# Patient Record
Sex: MALE | Race: BLACK OR AFRICAN AMERICAN | Employment: OTHER | URBAN - METROPOLITAN AREA
[De-identification: names, ages, dates, MRNs, and addresses within clinical notes are randomized per-mention and may not be internally consistent; named-entity substitution may affect disease eponyms.]

---

## 2017-01-13 RX ORDER — VALSARTAN 320 MG/1
TABLET ORAL
Qty: 90 TABLET | Refills: 1 | Status: SHIPPED | OUTPATIENT
Start: 2017-01-13 | End: 2017-07-13 | Stop reason: SDUPTHER

## 2017-01-13 RX ORDER — POTASSIUM CHLORIDE 750 MG/1
TABLET, EXTENDED RELEASE ORAL
Qty: 180 TABLET | Refills: 1 | Status: SHIPPED | OUTPATIENT
Start: 2017-01-13 | End: 2017-07-12 | Stop reason: SDUPTHER

## 2017-01-27 RX ORDER — HYDROCHLOROTHIAZIDE 25 MG/1
TABLET ORAL
Qty: 135 TABLET | Refills: 1 | Status: SHIPPED | OUTPATIENT
Start: 2017-01-27 | End: 2017-07-26 | Stop reason: SDUPTHER

## 2017-03-24 DIAGNOSIS — K21.9 GERD (GASTROESOPHAGEAL REFLUX DISEASE): ICD-10-CM

## 2017-03-24 DIAGNOSIS — L41.0 PITYRIASIS LICHENOIDES: ICD-10-CM

## 2017-03-24 DIAGNOSIS — Z00.00 PREVENTATIVE HEALTH CARE: ICD-10-CM

## 2017-03-24 DIAGNOSIS — I10 HYPERTENSION: ICD-10-CM

## 2017-03-24 DIAGNOSIS — M19.90 OSTEOARTHRITIS: ICD-10-CM

## 2017-03-24 RX ORDER — ACYCLOVIR 400 MG/1
TABLET ORAL
Qty: 90 TABLET | Refills: 0 | Status: SHIPPED | OUTPATIENT
Start: 2017-03-24 | End: 2019-11-20 | Stop reason: SDUPTHER

## 2017-05-03 LAB
A/G RATIO: 1.8
ALBUMIN SERPL-MCNC: 4.3 G/DL
ALP BLD-CCNC: 46 U/L
ALT SERPL-CCNC: 21 U/L
AST SERPL-CCNC: 25 U/L
BASOPHILS ABSOLUTE: ABNORMAL /ΜL
BASOPHILS RELATIVE PERCENT: ABNORMAL %
BILIRUB SERPL-MCNC: 1 MG/DL (ref 0.1–1.4)
BILIRUBIN DIRECT: 0.29 MG/DL
BILIRUBIN, INDIRECT: NORMAL
BUN BLDV-MCNC: 18 MG/DL
CALCIUM SERPL-MCNC: 9 MG/DL
CHLORIDE BLD-SCNC: 104 MMOL/L
CHOLESTEROL, TOTAL: 170 MG/DL
CHOLESTEROL/HDL RATIO: ABNORMAL
CO2: NORMAL MMOL/L
CREAT SERPL-MCNC: 0.89 MG/DL
EOSINOPHILS ABSOLUTE: ABNORMAL /ΜL
EOSINOPHILS RELATIVE PERCENT: ABNORMAL %
GFR CALCULATED: NORMAL
GLOBULIN: 2.4
GLUCOSE BLD-MCNC: 95 MG/DL
HCT VFR BLD CALC: 40.4 % (ref 41–53)
HDLC SERPL-MCNC: 86 MG/DL (ref 35–70)
HEMOGLOBIN: 13.8 G/DL (ref 13.5–17.5)
LDL CHOLESTEROL CALCULATED: 74 MG/DL (ref 0–160)
LYMPHOCYTES ABSOLUTE: ABNORMAL /ΜL
LYMPHOCYTES RELATIVE PERCENT: ABNORMAL %
MCH RBC QN AUTO: 32.2 PG
MCHC RBC AUTO-ENTMCNC: 34.2 G/DL
MCV RBC AUTO: 94 FL
MONOCYTES ABSOLUTE: ABNORMAL /ΜL
MONOCYTES RELATIVE PERCENT: ABNORMAL %
NEUTROPHILS ABSOLUTE: ABNORMAL /ΜL
NEUTROPHILS RELATIVE PERCENT: ABNORMAL %
PLATELET # BLD: 235 K/ΜL
PMV BLD AUTO: ABNORMAL FL
POTASSIUM SERPL-SCNC: 4.2 MMOL/L
PROTEIN TOTAL: 6.7 G/DL
RBC # BLD: ABNORMAL 10^6/ΜL
SODIUM BLD-SCNC: 146 MMOL/L
TRIGL SERPL-MCNC: 50 MG/DL
VLDLC SERPL CALC-MCNC: 10 MG/DL
WBC # BLD: ABNORMAL 10^3/ML

## 2017-06-02 RX ORDER — SUCRALFATE 1 G/1
TABLET ORAL
Qty: 270 TABLET | Refills: 0 | Status: SHIPPED | OUTPATIENT
Start: 2017-06-02 | End: 2017-09-05 | Stop reason: SDUPTHER

## 2017-07-12 RX ORDER — POTASSIUM CHLORIDE 750 MG/1
TABLET, EXTENDED RELEASE ORAL
Qty: 180 TABLET | Refills: 0 | Status: SHIPPED | OUTPATIENT
Start: 2017-07-12 | End: 2017-10-11 | Stop reason: SDUPTHER

## 2017-07-13 RX ORDER — VALSARTAN 320 MG/1
TABLET ORAL
Qty: 90 TABLET | Refills: 0 | Status: SHIPPED | OUTPATIENT
Start: 2017-07-13 | End: 2017-10-15 | Stop reason: SDUPTHER

## 2017-07-26 RX ORDER — HYDROCHLOROTHIAZIDE 25 MG/1
TABLET ORAL
Qty: 135 TABLET | Refills: 0 | Status: SHIPPED | OUTPATIENT
Start: 2017-07-26 | End: 2017-10-25 | Stop reason: SDUPTHER

## 2017-08-14 RX ORDER — ESOMEPRAZOLE MAGNESIUM 40 MG/1
CAPSULE, DELAYED RELEASE ORAL
Qty: 180 CAPSULE | Refills: 0 | Status: SHIPPED | OUTPATIENT
Start: 2017-08-14 | End: 2017-11-12 | Stop reason: SDUPTHER

## 2017-09-05 RX ORDER — SUCRALFATE 1 G/1
TABLET ORAL
Qty: 270 TABLET | Refills: 0 | OUTPATIENT
Start: 2017-09-05

## 2017-09-05 RX ORDER — SUCRALFATE 1 G/1
TABLET ORAL
Qty: 90 TABLET | Refills: 0 | Status: SHIPPED | OUTPATIENT
Start: 2017-09-05 | End: 2018-10-20 | Stop reason: SDUPTHER

## 2017-09-14 ENCOUNTER — OFFICE VISIT (OUTPATIENT)
Dept: INTERNAL MEDICINE CLINIC | Age: 62
End: 2017-09-14

## 2017-09-14 VITALS
BODY MASS INDEX: 19.93 KG/M2 | WEIGHT: 139.2 LBS | DIASTOLIC BLOOD PRESSURE: 82 MMHG | HEART RATE: 69 BPM | TEMPERATURE: 98.8 F | SYSTOLIC BLOOD PRESSURE: 126 MMHG | HEIGHT: 70 IN | OXYGEN SATURATION: 99 %

## 2017-09-14 DIAGNOSIS — K21.9 GASTROESOPHAGEAL REFLUX DISEASE, ESOPHAGITIS PRESENCE NOT SPECIFIED: ICD-10-CM

## 2017-09-14 DIAGNOSIS — R19.5 HEME POSITIVE STOOL: ICD-10-CM

## 2017-09-14 DIAGNOSIS — Z76.89 ESTABLISHING CARE WITH NEW DOCTOR, ENCOUNTER FOR: Primary | ICD-10-CM

## 2017-09-14 DIAGNOSIS — I10 ESSENTIAL HYPERTENSION: ICD-10-CM

## 2017-09-14 PROCEDURE — 99213 OFFICE O/P EST LOW 20 MIN: CPT | Performed by: INTERNAL MEDICINE

## 2017-09-14 ASSESSMENT — PATIENT HEALTH QUESTIONNAIRE - PHQ9
2. FEELING DOWN, DEPRESSED OR HOPELESS: 0
SUM OF ALL RESPONSES TO PHQ QUESTIONS 1-9: 0
1. LITTLE INTEREST OR PLEASURE IN DOING THINGS: 0
SUM OF ALL RESPONSES TO PHQ9 QUESTIONS 1 & 2: 0

## 2017-10-11 RX ORDER — POTASSIUM CHLORIDE 750 MG/1
TABLET, EXTENDED RELEASE ORAL
Qty: 180 TABLET | Refills: 0 | Status: SHIPPED | OUTPATIENT
Start: 2017-10-11 | End: 2018-01-09 | Stop reason: SDUPTHER

## 2017-10-16 RX ORDER — VALSARTAN 320 MG/1
TABLET ORAL
Qty: 90 TABLET | Refills: 0 | Status: SHIPPED | OUTPATIENT
Start: 2017-10-16 | End: 2018-01-14 | Stop reason: SDUPTHER

## 2017-10-25 RX ORDER — HYDROCHLOROTHIAZIDE 25 MG/1
TABLET ORAL
Qty: 135 TABLET | Refills: 0 | Status: SHIPPED | OUTPATIENT
Start: 2017-10-25 | End: 2018-01-23 | Stop reason: SDUPTHER

## 2017-11-03 ENCOUNTER — OFFICE VISIT (OUTPATIENT)
Dept: INTERNAL MEDICINE CLINIC | Age: 62
End: 2017-11-03

## 2017-11-03 VITALS
HEART RATE: 80 BPM | HEIGHT: 69 IN | WEIGHT: 140 LBS | DIASTOLIC BLOOD PRESSURE: 76 MMHG | BODY MASS INDEX: 20.73 KG/M2 | SYSTOLIC BLOOD PRESSURE: 134 MMHG | TEMPERATURE: 98.4 F

## 2017-11-03 DIAGNOSIS — I10 ESSENTIAL HYPERTENSION: ICD-10-CM

## 2017-11-03 DIAGNOSIS — K21.9 GASTROESOPHAGEAL REFLUX DISEASE, ESOPHAGITIS PRESENCE NOT SPECIFIED: ICD-10-CM

## 2017-11-03 DIAGNOSIS — Z23 FLU VACCINE NEED: ICD-10-CM

## 2017-11-03 DIAGNOSIS — Z00.00 PHYSICAL EXAM: Primary | ICD-10-CM

## 2017-11-03 DIAGNOSIS — Z00.00 PHYSICAL EXAM: ICD-10-CM

## 2017-11-03 LAB
ANION GAP SERPL CALCULATED.3IONS-SCNC: 14 MMOL/L (ref 3–16)
BASOPHILS ABSOLUTE: 0.1 K/UL (ref 0–0.2)
BASOPHILS RELATIVE PERCENT: 2.1 %
BILIRUBIN URINE: NEGATIVE
BLOOD, URINE: ABNORMAL
BUN BLDV-MCNC: 17 MG/DL (ref 7–20)
CALCIUM SERPL-MCNC: 9 MG/DL (ref 8.3–10.6)
CHLORIDE BLD-SCNC: 101 MMOL/L (ref 99–110)
CHOLESTEROL, TOTAL: 186 MG/DL (ref 0–199)
CLARITY: CLEAR
CO2: 29 MMOL/L (ref 21–32)
COLOR: YELLOW
CREAT SERPL-MCNC: 0.8 MG/DL (ref 0.8–1.3)
EOSINOPHILS ABSOLUTE: 0.4 K/UL (ref 0–0.6)
EOSINOPHILS RELATIVE PERCENT: 8.9 %
EPITHELIAL CELLS, UA: 0 /HPF (ref 0–5)
GFR AFRICAN AMERICAN: >60
GFR NON-AFRICAN AMERICAN: >60
GLUCOSE BLD-MCNC: 86 MG/DL (ref 70–99)
GLUCOSE URINE: NEGATIVE MG/DL
HCT VFR BLD CALC: 42.4 % (ref 40.5–52.5)
HDLC SERPL-MCNC: 99 MG/DL (ref 40–60)
HEMOGLOBIN: 14.5 G/DL (ref 13.5–17.5)
HYALINE CASTS: 0 /LPF (ref 0–8)
KETONES, URINE: NEGATIVE MG/DL
LDL CHOLESTEROL CALCULATED: 79 MG/DL
LEUKOCYTE ESTERASE, URINE: NEGATIVE
LYMPHOCYTES ABSOLUTE: 1.3 K/UL (ref 1–5.1)
LYMPHOCYTES RELATIVE PERCENT: 29.4 %
MCH RBC QN AUTO: 33 PG (ref 26–34)
MCHC RBC AUTO-ENTMCNC: 34.2 G/DL (ref 31–36)
MCV RBC AUTO: 96.6 FL (ref 80–100)
MICROSCOPIC EXAMINATION: YES
MONOCYTES ABSOLUTE: 0.4 K/UL (ref 0–1.3)
MONOCYTES RELATIVE PERCENT: 8.5 %
NEUTROPHILS ABSOLUTE: 2.2 K/UL (ref 1.7–7.7)
NEUTROPHILS RELATIVE PERCENT: 51.1 %
NITRITE, URINE: NEGATIVE
PDW BLD-RTO: 13 % (ref 12.4–15.4)
PH UA: 6.5
PLATELET # BLD: 182 K/UL (ref 135–450)
PMV BLD AUTO: 9.1 FL (ref 5–10.5)
POTASSIUM SERPL-SCNC: 3.9 MMOL/L (ref 3.5–5.1)
PROTEIN UA: NEGATIVE MG/DL
RBC # BLD: 4.39 M/UL (ref 4.2–5.9)
RBC UA: 4 /HPF (ref 0–4)
SODIUM BLD-SCNC: 144 MMOL/L (ref 136–145)
SPECIFIC GRAVITY UA: 1.02
TRIGL SERPL-MCNC: 40 MG/DL (ref 0–150)
TSH SERPL DL<=0.05 MIU/L-ACNC: 0.66 UIU/ML (ref 0.27–4.2)
UROBILINOGEN, URINE: 0.2 E.U./DL
VITAMIN D 25-HYDROXY: 43.9 NG/ML
VLDLC SERPL CALC-MCNC: 8 MG/DL
WBC # BLD: 4.3 K/UL (ref 4–11)
WBC UA: 0 /HPF (ref 0–5)

## 2017-11-03 PROCEDURE — 90471 IMMUNIZATION ADMIN: CPT | Performed by: INTERNAL MEDICINE

## 2017-11-03 PROCEDURE — 81001 URINALYSIS AUTO W/SCOPE: CPT | Performed by: INTERNAL MEDICINE

## 2017-11-03 PROCEDURE — 99396 PREV VISIT EST AGE 40-64: CPT | Performed by: INTERNAL MEDICINE

## 2017-11-03 PROCEDURE — 90688 IIV4 VACCINE SPLT 0.5 ML IM: CPT | Performed by: INTERNAL MEDICINE

## 2017-11-03 NOTE — PATIENT INSTRUCTIONS
Vaccine Information Sheet, \"Influenza - Inactivated\"  given to Rachel Cooper, or parent/legal guardian of  Rachel Cooper and verbalized understanding. Patient responses:    Have you ever had a reaction to a flu vaccine? No  Are you able to eat eggs without adverse effects? Yes  Do you have any current illness? No  Have you ever had Guillian [place] Syndrome? No    Flu vaccine given per order. Please see immunization tab.

## 2017-11-03 NOTE — PROGRESS NOTES
Aviva Sim  YOB: 1955    Date of Service:  11/3/2017    Chief Complaint:   Aviva Sim is a 58 y.o. male who presents for complete physical examination. HPI: Patient is here for physical exam.  Patient denies any complaints today. Patient has history of hypertension and GERD stable. Patient requested flu vaccine today. As stool heme test was positive previously, Patient again counseled to see GI specialist.Patient says his body weight is stable no recent weight loss. Review of Systems:  Constitutional: Negative for chills, fever,  HENT: Negative for headaches,sore throat. Respiratory: Negative for cough, shortness of breath and wheezing. Cardiovascular: Negative for chest pain, claudication and leg swelling. Gastrointestinal: Negative for abdominal pain, nausea and vomiting, constipation/diarrhea. Genitourinary: Negative for dysuria and urgency. Musculoskeletal: Negative for back pain, myalgias and neck pain. Neurological: Negative for dizziness. Vitals:    11/03/17 1105   BP: 134/76   Pulse: 80   Temp: 98.4 °F (36.9 °C)     Wt Readings from Last 3 Encounters:   11/03/17 140 lb (63.5 kg)   09/14/17 139 lb 3.2 oz (63.1 kg)   12/01/16 138 lb 9.6 oz (62.9 kg)     BP Readings from Last 3 Encounters:   11/03/17 134/76   09/14/17 126/82   12/01/16 124/66       Physical Exam:  Constitutional:  Oriented to person, place, and time. appears well-nourished. HENT: Oropharynx is clear and moist.  Neck supple. Cardiovascular: Normal heart sounds and intact distal pulses. Chest: Effort normal and breath sounds normal. no wheezes. Abdominal: Soft. There is no tenderness. Musculoskeletal: Normal range of motion. no edema. Neurological: alert and oriented to person, place, and time. normal reflexes. Skin: Skin is warm. No rash noted. No erythema.          Immunization History   Administered Date(s) Administered    Influenza Vaccine, unspecified formulation 10/01/2016    Influenza Virus Vaccine 10/23/2013, 10/01/2014, 10/01/2015    Influenza Whole 2010    Pneumococcal Polysaccharide ([id number]) 2011    Tdap (Boostrix, Adacel) 2013    Zoster 2015       No Known Allergies  Current Outpatient Prescriptions   Medication Sig Dispense Refill    hydrochlorothiazide (HYDRODIURIL) 25 MG tablet TAKE ONE AND ONE-HALF TABLETS DAILY 135 tablet 0    valsartan (DIOVAN) 320 MG tablet TAKE 1 TABLET DAILY 90 tablet 0    potassium chloride (KLOR-CON M) 10 MEQ extended release tablet TAKE 2 TABLETS DAILY 180 tablet 0    sucralfate (CARAFATE) 1 GM tablet TAKE ONE TABLET THREE TIMES DAILY 90 tablet 0    esomeprazole (NEXIUM) 40 MG delayed release capsule TAKE 1 CAPSULE TWICE A  capsule 0    acyclovir (ZOVIRAX) 400 MG tablet TAKE ONE TABLET BY MOUTH THREE TIMES A DAY FOR 10 DAYS FOR HERPES SIMPLEX FLARE-UP 90 tablet 0    Loratadine (CLARITIN PO) Take  by mouth.  Multiple Vitamin (MULTIVITAMIN PO) Take 1 capsule by mouth daily.  potassium chloride SA (K-DUR;KLOR-CON) 10 MEQ tablet Take 2 tablets by mouth daily. 180 tablet 3     No current facility-administered medications for this visit. Past Medical History:   Diagnosis Date    Allergic rhinitis     GERD (gastroesophageal reflux disease)     Hemorrhoids     Herpes simplex 2012    Hypertension     Osteoarthritis     Other Acquired Deformity of Ankle and Foot     Right carotid bruit 2014 Normal carotid / vertebral US.  Right carotid bruit 2015 Carotid US normal     Thyroid nodule     colloid nodule--benign     Past Surgical History:   Procedure Laterality Date    COLONOSCOPY  8/10    ANT Rodriguez, hemorrhoidal rectal bleeding, repeat 10 years    KNEE SURGERY      right knee    THYROID SURGERY      biopsy-benign colloid nodule     Family History   Problem Relation Age of Onset    Diabetes Mother 79      from [address] Father 39 Oral cancer,    [employer] Sister      Breast    Hypertension Mother     Heart Attack  80      in hospital    Dementia Mother      Social History     Social History    Marital status:      Spouse name: N/A    Number of children: N/A    Years of education: N/A     Occupational History    Not on file. Social History Main Topics    Smoking status: Current Every Day Smoker     Packs/day: 0.50    Smokeless tobacco: Never Used      Comment: He has Chantix but has not tried it.  Alcohol use 0.0 oz/week      Comment: occassionally    Drug use: No    Sexual activity: Not on file     Other Topics Concern    Not on file     Social History Narrative    No narrative on file       Assessment/Plan:  2. Physical exam  - TSH without Reflex; Future  - Vitamin D 25 Hydroxy; Future  - Urinalysis with Microscopic; Future  - Lipid Panel; Future  - CBC Auto Differential; Future  - Basic Metabolic Panel  Hypertension and GERD  Stable  Continue the same management    Preventive Care:  Colonoscopy: Patient was given referral for Dr Monica Worthington on last visit.    Vaccine:   - INFLUENZA, QUADV, 3 YRS AND OLDER, IM, MDV, 0.5ML (Jacoby Franks)

## 2017-11-13 RX ORDER — ESOMEPRAZOLE MAGNESIUM 40 MG/1
CAPSULE, DELAYED RELEASE ORAL
Qty: 180 CAPSULE | Refills: 0 | Status: SHIPPED | OUTPATIENT
Start: 2017-11-13 | End: 2018-02-11 | Stop reason: SDUPTHER

## 2018-01-09 RX ORDER — POTASSIUM CHLORIDE 750 MG/1
TABLET, EXTENDED RELEASE ORAL
Qty: 180 TABLET | Refills: 0 | Status: SHIPPED | OUTPATIENT
Start: 2018-01-09 | End: 2018-04-09 | Stop reason: SDUPTHER

## 2018-01-16 RX ORDER — VALSARTAN 320 MG/1
TABLET ORAL
Qty: 90 TABLET | Refills: 0 | Status: SHIPPED | OUTPATIENT
Start: 2018-01-16 | End: 2018-04-14 | Stop reason: SDUPTHER

## 2018-01-23 RX ORDER — HYDROCHLOROTHIAZIDE 25 MG/1
TABLET ORAL
Qty: 135 TABLET | Refills: 1 | Status: SHIPPED | OUTPATIENT
Start: 2018-01-23 | End: 2018-07-22 | Stop reason: SDUPTHER

## 2018-02-02 ENCOUNTER — TELEPHONE (OUTPATIENT)
Dept: INTERNAL MEDICINE CLINIC | Age: 63
End: 2018-02-02

## 2018-02-02 ENCOUNTER — HOSPITAL ENCOUNTER (OUTPATIENT)
Dept: GENERAL RADIOLOGY | Facility: MEDICAL CENTER | Age: 63
Discharge: HOME | End: 2018-02-02
Attending: INTERNAL MEDICINE | Admitting: INTERNAL MEDICINE

## 2018-02-02 ENCOUNTER — OFFICE VISIT (OUTPATIENT)
Dept: INTERNAL MEDICINE CLINIC | Age: 63
End: 2018-02-02

## 2018-02-02 VITALS
BODY MASS INDEX: 20.97 KG/M2 | DIASTOLIC BLOOD PRESSURE: 70 MMHG | HEIGHT: 69 IN | HEART RATE: 76 BPM | WEIGHT: 141.6 LBS | TEMPERATURE: 98.4 F | SYSTOLIC BLOOD PRESSURE: 120 MMHG

## 2018-02-02 DIAGNOSIS — M54.50 ACUTE LEFT-SIDED LOW BACK PAIN WITHOUT SCIATICA: ICD-10-CM

## 2018-02-02 DIAGNOSIS — M54.50 ACUTE LEFT-SIDED LOW BACK PAIN WITHOUT SCIATICA: Primary | ICD-10-CM

## 2018-02-02 PROCEDURE — 99213 OFFICE O/P EST LOW 20 MIN: CPT | Performed by: INTERNAL MEDICINE

## 2018-02-02 RX ORDER — METHOCARBAMOL 500 MG/1
500 TABLET, FILM COATED ORAL 3 TIMES DAILY
Qty: 20 TABLET | Refills: 0 | Status: SHIPPED | OUTPATIENT
Start: 2018-02-02 | End: 2018-02-12

## 2018-02-02 NOTE — PROGRESS NOTES
Mahendra Pineda  YOB: 1955    Date of Service:  2/2/2018    Chief Complaint:   Mahendra Pineda is a 58 y.o. male who presents for lower back pain. HPI: patient came here for lower back pain and left hip pain. Patient says first he had left hip pain for two weeks that improved . last week patient was removing snow from driveway, then days later patient has  lower back pain . Pain associated with movement, intensity 3, sharp in nature. No h/o fall. Patient denies any swelling or redness. Patient has h/o osteoarthritis . Review of Systems:  Constitutional: Negative for chills, fever, malaise/fatigue and weight loss. HENT: Negative for headaches, ear discharge, ear pain, hearing loss, sore throat, blurry vision and double vision. Respiratory: Negative for cough,sputum production, shortness of breath and wheezing. Cardiovascular: Negative for chest pain, palpitations, orthopnea, claudication and leg swelling. Gastrointestinal: Negative for abdominal pain, nausea and vomiting, constipation, diarrhea. Genitourinary: Negative for dysuria, flank pain, frequency, hematuria and urgency. Musculoskeletal: Negative for  neck pain. Neurological: Negative for dizziness, tremors, sensory change, focal weakness, seizures, loss of consciousness . Psychiatric/Behavioral: Negative for depression and substance abuse. The patient does not have insomnia. Vitals:    02/02/18 1103   BP: 120/70   Pulse: 76   Temp: 98.4 °F (36.9 °C)     Wt Readings from Last 3 Encounters:   02/02/18 141 lb 9.6 oz (64.2 kg)   11/03/17 140 lb (63.5 kg)   09/14/17 139 lb 3.2 oz (63.1 kg)     BP Readings from Last 3 Encounters:   02/02/18 120/70   11/03/17 134/76   09/14/17 126/82     Physical Exam:  Constitutional:  Oriented to person, place, and time. appears well-nourished. HENT:Conjunctivae and EOM are normal,Mouth/Throat: Oropharynx is clear and moist.   Neck: Normal range of motion. Neck supple. for this visit. Past Medical History:   Diagnosis Date    Allergic rhinitis     GERD (gastroesophageal reflux disease)     Hemorrhoids     Herpes simplex 2012    Hypertension     Osteoarthritis     Other Acquired Deformity of Ankle and Foot     Right carotid bruit 2014 Normal carotid / vertebral US.  Right carotid bruit 2015 Carotid US normal     Thyroid nodule     colloid nodule--benign     Past Surgical History:   Procedure Laterality Date    COLONOSCOPY  8/10    M. Michael, hemorrhoidal rectal bleeding, repeat 10 years    KNEE SURGERY      right knee    THYROID SURGERY      biopsy-benign colloid nodule     Family History   Problem Relation Age of Onset    Diabetes Mother 79      from [address] Father 39     Oral cancer,    [employer] Cancer Sister      Breast    Hypertension Mother     Heart Attack  80      in hospital    Dementia Mother      Social History     Social History    Marital status:      Spouse name: N/A    Number of children: N/A    Years of education: N/A     Occupational History    Not on file. Social History Main Topics    Smoking status: Current Every Day Smoker     Packs/day: 0.50    Smokeless tobacco: Never Used      Comment: He has Chantix but has not tried it.  Alcohol use 0.0 oz/week      Comment: occassionally    Drug use: No    Sexual activity: Not on file     Other Topics Concern    Not on file     Social History Narrative    No narrative on file       Assessment/Plan:    1. Acute left-sided low back pain without sciatica  - XR HIP LEFT (2-3 VIEWS); Future  - XR LUMBAR SPINE (2-3 VIEWS);  Future  -Robaxin

## 2018-02-02 NOTE — TELEPHONE ENCOUNTER
Per Dr Fany Vazquez, due to patient's liver function medication should only be taken twice a day. Pharmacy should be called with the correct directions.   I spoke with Moiz, Mirta Gerard, and gave her the new directions

## 2018-02-02 NOTE — TELEPHONE ENCOUNTER
PT CALLING SAYING HE IS WAITING AT [facility] [phone] SAYING THEY ARE HAVING TROUBLE FILLING RX BECAUSE DIRECTIONS AND QUANTITY DO NOT [address]. HE SAYS THEY ARE TELLING HIM THEY CALLED THE OFFICE BUT IT WOULD BE QUICKER IF HE CALLED THE OFFICE.

## 2018-02-05 ENCOUNTER — TELEPHONE (OUTPATIENT)
Dept: INTERNAL MEDICINE CLINIC | Age: 63
End: 2018-02-05

## 2018-02-05 DIAGNOSIS — M25.552 PAIN OF LEFT HIP JOINT: Primary | ICD-10-CM

## 2018-02-05 DIAGNOSIS — M54.89 OTHER BACK PAIN, UNSPECIFIED CHRONICITY: Primary | ICD-10-CM

## 2018-02-05 DIAGNOSIS — M54.9 OTHER CHRONIC BACK PAIN: ICD-10-CM

## 2018-02-05 DIAGNOSIS — G89.29 OTHER CHRONIC BACK PAIN: ICD-10-CM

## 2018-02-05 NOTE — TELEPHONE ENCOUNTER
Spoke with patient. He is okay with the MRIs. These orders sent to [facility]. He also wants his physical therapy orders sent to a local PT office. He will provide us with the fax number. He wants to have therapy in place should the MRI not show any explanation for the pain.

## 2018-02-09 ENCOUNTER — TELEPHONE (OUTPATIENT)
Dept: INTERNAL MEDICINE CLINIC | Age: 63
End: 2018-02-09

## 2018-02-12 RX ORDER — ESOMEPRAZOLE MAGNESIUM 40 MG/1
CAPSULE, DELAYED RELEASE ORAL
Qty: 180 CAPSULE | Refills: 0 | Status: SHIPPED | OUTPATIENT
Start: 2018-02-12 | End: 2018-05-12 | Stop reason: SDUPTHER

## 2018-02-14 ENCOUNTER — HOSPITAL ENCOUNTER (OUTPATIENT)
Dept: MRI IMAGING | Age: 63
Discharge: HOME | End: 2018-02-14
Admitting: INTERNAL MEDICINE

## 2018-02-14 ENCOUNTER — TELEPHONE (OUTPATIENT)
Dept: INTERNAL MEDICINE CLINIC | Age: 63
End: 2018-02-14

## 2018-02-14 DIAGNOSIS — M54.9 OTHER CHRONIC BACK PAIN: ICD-10-CM

## 2018-02-14 DIAGNOSIS — M25.552 PAIN OF LEFT HIP JOINT: ICD-10-CM

## 2018-02-14 DIAGNOSIS — M25.552 PAIN IN LEFT HIP: ICD-10-CM

## 2018-02-14 DIAGNOSIS — G89.29 OTHER CHRONIC BACK PAIN: ICD-10-CM

## 2018-04-09 RX ORDER — POTASSIUM CHLORIDE 750 MG/1
TABLET, EXTENDED RELEASE ORAL
Qty: 180 TABLET | Refills: 0 | Status: SHIPPED | OUTPATIENT
Start: 2018-04-09 | End: 2018-07-08 | Stop reason: SDUPTHER

## 2018-04-16 RX ORDER — VALSARTAN 320 MG/1
TABLET ORAL
Qty: 90 TABLET | Refills: 0 | Status: SHIPPED | OUTPATIENT
Start: 2018-04-16 | End: 2018-07-15 | Stop reason: SDUPTHER

## 2018-05-04 ENCOUNTER — OFFICE VISIT (OUTPATIENT)
Dept: INTERNAL MEDICINE CLINIC | Age: 63
End: 2018-05-04

## 2018-05-04 VITALS
WEIGHT: 142.2 LBS | BODY MASS INDEX: 21 KG/M2 | DIASTOLIC BLOOD PRESSURE: 68 MMHG | TEMPERATURE: 98.6 F | HEART RATE: 76 BPM | SYSTOLIC BLOOD PRESSURE: 110 MMHG

## 2018-05-04 DIAGNOSIS — I10 ESSENTIAL HYPERTENSION: Primary | ICD-10-CM

## 2018-05-04 DIAGNOSIS — I10 ESSENTIAL HYPERTENSION: ICD-10-CM

## 2018-05-04 LAB
ANION GAP SERPL CALCULATED.3IONS-SCNC: 16 MMOL/L (ref 3–16)
BUN BLDV-MCNC: 16 MG/DL (ref 7–20)
CALCIUM SERPL-MCNC: 8.9 MG/DL (ref 8.3–10.6)
CHLORIDE BLD-SCNC: 101 MMOL/L (ref 99–110)
CO2: 25 MMOL/L (ref 21–32)
CREAT SERPL-MCNC: 0.8 MG/DL (ref 0.8–1.3)
GFR AFRICAN AMERICAN: >60
GFR NON-AFRICAN AMERICAN: >60
GLUCOSE BLD-MCNC: 98 MG/DL (ref 70–99)
POTASSIUM SERPL-SCNC: 3.8 MMOL/L (ref 3.5–5.1)
SODIUM BLD-SCNC: 142 MMOL/L (ref 136–145)

## 2018-05-04 PROCEDURE — 99213 OFFICE O/P EST LOW 20 MIN: CPT | Performed by: INTERNAL MEDICINE

## 2018-05-14 RX ORDER — ESOMEPRAZOLE MAGNESIUM 40 MG/1
CAPSULE, DELAYED RELEASE ORAL
Qty: 180 CAPSULE | Refills: 0 | Status: SHIPPED | OUTPATIENT
Start: 2018-05-14 | End: 2018-08-11 | Stop reason: SDUPTHER

## 2018-07-09 RX ORDER — POTASSIUM CHLORIDE 750 MG/1
TABLET, EXTENDED RELEASE ORAL
Qty: 180 TABLET | Refills: 0 | Status: SHIPPED | OUTPATIENT
Start: 2018-07-09 | End: 2018-10-07 | Stop reason: SDUPTHER

## 2018-07-16 RX ORDER — VALSARTAN 320 MG/1
TABLET ORAL
Qty: 90 TABLET | Refills: 1 | Status: SHIPPED | OUTPATIENT
Start: 2018-07-16 | End: 2019-12-10

## 2018-07-23 RX ORDER — HYDROCHLOROTHIAZIDE 25 MG/1
TABLET ORAL
Qty: 135 TABLET | Refills: 1 | Status: SHIPPED | OUTPATIENT
Start: 2018-07-23 | End: 2019-01-19 | Stop reason: SDUPTHER

## 2018-07-24 ENCOUNTER — TELEPHONE (OUTPATIENT)
Dept: INTERNAL MEDICINE CLINIC | Age: 63
End: 2018-07-24

## 2018-07-24 RX ORDER — OLMESARTAN MEDOXOMIL 40 MG/1
40 TABLET ORAL DAILY
Qty: 90 TABLET | Refills: 1 | Status: SHIPPED | OUTPATIENT
Start: 2018-07-24 | End: 2019-01-07 | Stop reason: SDUPTHER

## 2018-07-24 RX ORDER — OLMESARTAN MEDOXOMIL 40 MG/1
40 TABLET ORAL DAILY
Qty: 30 TABLET | Refills: 0 | Status: SHIPPED | OUTPATIENT
Start: 2018-07-24 | End: 2019-05-10 | Stop reason: SDUPTHER

## 2018-07-24 NOTE — TELEPHONE ENCOUNTER
Left message on voice mail that the medications have sent. He should stop the valsartan right away and begin the benicar.

## 2018-08-13 RX ORDER — ESOMEPRAZOLE MAGNESIUM 40 MG/1
CAPSULE, DELAYED RELEASE ORAL
Qty: 180 CAPSULE | Refills: 1 | Status: SHIPPED | OUTPATIENT
Start: 2018-08-13 | End: 2019-02-09 | Stop reason: SDUPTHER

## 2018-10-08 RX ORDER — POTASSIUM CHLORIDE 750 MG/1
TABLET, EXTENDED RELEASE ORAL
Qty: 180 TABLET | Refills: 0 | Status: SHIPPED | OUTPATIENT
Start: 2018-10-08 | End: 2019-01-06 | Stop reason: SDUPTHER

## 2018-10-22 RX ORDER — SUCRALFATE 1 G/1
TABLET ORAL
Qty: 90 TABLET | Refills: 0 | Status: SHIPPED | OUTPATIENT
Start: 2018-10-22 | End: 2018-12-17 | Stop reason: SDUPTHER

## 2018-10-23 ENCOUNTER — TELEPHONE (OUTPATIENT)
Dept: PRIMARY CARE CLINIC | Age: 63
End: 2018-10-23

## 2018-11-07 ENCOUNTER — NURSE ONLY (OUTPATIENT)
Dept: PRIMARY CARE CLINIC | Age: 63
End: 2018-11-07
Payer: COMMERCIAL

## 2018-11-07 DIAGNOSIS — Z00.00 ROUTINE GENERAL MEDICAL EXAMINATION AT A HEALTH CARE FACILITY: ICD-10-CM

## 2018-11-07 DIAGNOSIS — Z23 FLU VACCINE NEED: ICD-10-CM

## 2018-11-07 DIAGNOSIS — Z23 FLU VACCINE NEED: Primary | ICD-10-CM

## 2018-11-07 LAB
ANION GAP SERPL CALCULATED.3IONS-SCNC: 16 MMOL/L (ref 3–16)
BASOPHILS ABSOLUTE: 0.1 K/UL (ref 0–0.2)
BASOPHILS RELATIVE PERCENT: 1.4 %
BUN BLDV-MCNC: 14 MG/DL (ref 7–20)
CALCIUM SERPL-MCNC: 9 MG/DL (ref 8.3–10.6)
CHLORIDE BLD-SCNC: 101 MMOL/L (ref 99–110)
CHOLESTEROL, TOTAL: 159 MG/DL (ref 0–199)
CO2: 28 MMOL/L (ref 21–32)
CREAT SERPL-MCNC: 0.8 MG/DL (ref 0.8–1.3)
EOSINOPHILS ABSOLUTE: 0.4 K/UL (ref 0–0.6)
EOSINOPHILS RELATIVE PERCENT: 9.1 %
GFR AFRICAN AMERICAN: >60
GFR NON-AFRICAN AMERICAN: >60
GLUCOSE BLD-MCNC: 97 MG/DL (ref 70–99)
HCT VFR BLD CALC: 40.6 % (ref 40.5–52.5)
HDLC SERPL-MCNC: 77 MG/DL (ref 40–60)
HEMOGLOBIN: 14 G/DL (ref 13.5–17.5)
LDL CHOLESTEROL CALCULATED: 75 MG/DL
LYMPHOCYTES ABSOLUTE: 1.1 K/UL (ref 1–5.1)
LYMPHOCYTES RELATIVE PERCENT: 25.6 %
MCH RBC QN AUTO: 32.9 PG (ref 26–34)
MCHC RBC AUTO-ENTMCNC: 34.6 G/DL (ref 31–36)
MCV RBC AUTO: 95 FL (ref 80–100)
MONOCYTES ABSOLUTE: 0.4 K/UL (ref 0–1.3)
MONOCYTES RELATIVE PERCENT: 8.3 %
NEUTROPHILS ABSOLUTE: 2.5 K/UL (ref 1.7–7.7)
NEUTROPHILS RELATIVE PERCENT: 55.6 %
PDW BLD-RTO: 12.9 % (ref 12.4–15.4)
PLATELET # BLD: 196 K/UL (ref 135–450)
PMV BLD AUTO: 8.7 FL (ref 5–10.5)
POTASSIUM SERPL-SCNC: 3.5 MMOL/L (ref 3.5–5.1)
PROSTATE SPECIFIC ANTIGEN: 0.47 NG/ML (ref 0–4)
RBC # BLD: 4.27 M/UL (ref 4.2–5.9)
SODIUM BLD-SCNC: 145 MMOL/L (ref 136–145)
TRIGL SERPL-MCNC: 35 MG/DL (ref 0–150)
TSH SERPL DL<=0.05 MIU/L-ACNC: 0.6 UIU/ML (ref 0.27–4.2)
VITAMIN D 25-HYDROXY: 35.1 NG/ML
VLDLC SERPL CALC-MCNC: 7 MG/DL
WBC # BLD: 4.4 K/UL (ref 4–11)

## 2018-11-07 PROCEDURE — 90682 RIV4 VACC RECOMBINANT DNA IM: CPT | Performed by: INTERNAL MEDICINE

## 2018-11-07 PROCEDURE — 90471 IMMUNIZATION ADMIN: CPT | Performed by: INTERNAL MEDICINE

## 2018-11-09 ENCOUNTER — OFFICE VISIT (OUTPATIENT)
Dept: PRIMARY CARE CLINIC | Age: 63
End: 2018-11-09
Payer: COMMERCIAL

## 2018-11-09 VITALS
HEIGHT: 69 IN | BODY MASS INDEX: 20.71 KG/M2 | SYSTOLIC BLOOD PRESSURE: 116 MMHG | WEIGHT: 139.8 LBS | DIASTOLIC BLOOD PRESSURE: 74 MMHG | TEMPERATURE: 98.7 F | HEART RATE: 64 BPM

## 2018-11-09 DIAGNOSIS — Z00.00 PHYSICAL EXAM: Primary | ICD-10-CM

## 2018-11-09 PROCEDURE — 99396 PREV VISIT EST AGE 40-64: CPT | Performed by: INTERNAL MEDICINE

## 2018-11-09 ASSESSMENT — PATIENT HEALTH QUESTIONNAIRE - PHQ9
1. LITTLE INTEREST OR PLEASURE IN DOING THINGS: 0
SUM OF ALL RESPONSES TO PHQ QUESTIONS 1-9: 0
SUM OF ALL RESPONSES TO PHQ9 QUESTIONS 1 & 2: 0
2. FEELING DOWN, DEPRESSED OR HOPELESS: 0
SUM OF ALL RESPONSES TO PHQ QUESTIONS 1-9: 0

## 2018-12-18 RX ORDER — SUCRALFATE 1 G/1
TABLET ORAL
Qty: 90 TABLET | Refills: 0 | Status: SHIPPED | OUTPATIENT
Start: 2018-12-18 | End: 2019-04-12 | Stop reason: SDUPTHER

## 2019-01-07 RX ORDER — POTASSIUM CHLORIDE 750 MG/1
TABLET, EXTENDED RELEASE ORAL
Qty: 180 TABLET | Refills: 0 | Status: SHIPPED | OUTPATIENT
Start: 2019-01-07 | End: 2019-04-06 | Stop reason: SDUPTHER

## 2019-01-07 RX ORDER — OLMESARTAN MEDOXOMIL 40 MG/1
TABLET ORAL
Qty: 90 TABLET | Refills: 1 | Status: SHIPPED | OUTPATIENT
Start: 2019-01-07 | End: 2019-07-06 | Stop reason: SDUPTHER

## 2019-01-21 RX ORDER — HYDROCHLOROTHIAZIDE 25 MG/1
TABLET ORAL
Qty: 135 TABLET | Refills: 1 | Status: SHIPPED | OUTPATIENT
Start: 2019-01-21 | End: 2019-07-20 | Stop reason: SDUPTHER

## 2019-02-11 RX ORDER — ESOMEPRAZOLE MAGNESIUM 40 MG/1
CAPSULE, DELAYED RELEASE ORAL
Qty: 180 CAPSULE | Refills: 1 | Status: SHIPPED | OUTPATIENT
Start: 2019-02-11 | End: 2019-08-10 | Stop reason: SDUPTHER

## 2019-04-08 RX ORDER — POTASSIUM CHLORIDE 750 MG/1
TABLET, EXTENDED RELEASE ORAL
Qty: 180 TABLET | Refills: 1 | Status: SHIPPED | OUTPATIENT
Start: 2019-04-08 | End: 2019-10-05 | Stop reason: SDUPTHER

## 2019-04-15 RX ORDER — SUCRALFATE 1 G/1
TABLET ORAL
Qty: 90 TABLET | Refills: 0 | Status: SHIPPED | OUTPATIENT
Start: 2019-04-15 | End: 2019-05-10 | Stop reason: SDUPTHER

## 2019-05-10 ENCOUNTER — OFFICE VISIT (OUTPATIENT)
Dept: PRIMARY CARE CLINIC | Age: 64
End: 2019-05-10
Payer: COMMERCIAL

## 2019-05-10 VITALS
DIASTOLIC BLOOD PRESSURE: 76 MMHG | WEIGHT: 138.8 LBS | HEART RATE: 56 BPM | TEMPERATURE: 98.7 F | BODY MASS INDEX: 20.5 KG/M2 | SYSTOLIC BLOOD PRESSURE: 134 MMHG

## 2019-05-10 DIAGNOSIS — I10 ESSENTIAL HYPERTENSION: ICD-10-CM

## 2019-05-10 DIAGNOSIS — K21.9 GASTROESOPHAGEAL REFLUX DISEASE, ESOPHAGITIS PRESENCE NOT SPECIFIED: Primary | ICD-10-CM

## 2019-05-10 DIAGNOSIS — R19.5 HEME POSITIVE STOOL: ICD-10-CM

## 2019-05-10 PROCEDURE — 99214 OFFICE O/P EST MOD 30 MIN: CPT | Performed by: INTERNAL MEDICINE

## 2019-05-10 RX ORDER — SUCRALFATE 1 G/1
TABLET ORAL
Qty: 270 TABLET | Refills: 1 | Status: SHIPPED | OUTPATIENT
Start: 2019-05-10 | End: 2019-11-06 | Stop reason: SDUPTHER

## 2019-05-10 NOTE — PROGRESS NOTES
Arie Zamudio  YOB: 1955    Date of Service:  5/10/2019    Chief Complaint:   Arie Zamudio is a 61 y.o. male who presents for chronic health problems. HPI: patient came here for chronic health problems.          Hypertension : stable patient is on Benicar and HCT. He denies any side effects of medication. Patient is not monitoring his bp at home. He encouraged to monitor bp at home. Gastroesophageal reflux disease: stable  On Nexium and sucralfate     Heme positive stool: this time he is agreed for EGD and colonoscopy. Patient denies any active complaints today. Review of Systems:  Constitutional: Negative for chills, fever, malaise/fatigue and weight loss. HENT: Negative for headaches. , ear discharge, ear pain, hearing loss, sore throat,   blurry vision and double vision. Respiratory: Negative for cough, hemoptysis, sputum production, shortness of breath and wheezing. Cardiovascular: Negative for chest pain, palpitations, orthopnea, claudication and leg swelling. Gastrointestinal: Negative for abdominal pain, nausea and vomiting, constipation, diarrhea, heartburn, blood in stool, , melena. Genitourinary: Negative for dysuria, flank pain, frequency, hematuria and urgency. Musculoskeletal: Negative for back pain, myalgias and neck pain. Neurological: Negative for dizziness, seizure , sensory change, focal weakness,  loss of consciousness . Psychiatric/Behavioral: Negative for depression and substance abuse. The patient does not have insomnia. Vitals:    05/10/19 1325   BP: 134/76   Pulse: 56   Temp: 98.7 °F (37.1 °C)     Wt Readings from Last 3 Encounters:   05/10/19 138 lb 12.8 oz (63 kg)   11/09/18 139 lb 12.8 oz (63.4 kg)   05/04/18 142 lb 3.2 oz (64.5 kg)     BP Readings from Last 3 Encounters:   05/10/19 134/76   11/09/18 116/74   05/04/18 110/68         Physical Exam:  Constitutional:   appears well-nourished.  No distress. HENT:   Head: Normocephalic. Right Ear: External ear normal.   Left Ear: External ear normal.   Mouth/Throat: Oropharynx is clear and moist. No oropharyngeal exudate. Eyes: Conjunctivae and EOM are normal. Pupils are equal, round, and reactive to light. Right eye exhibits no discharge. Left eye exhibits no discharge. No scleral icterus. Neck: Neck supple. No JVD present. No thyromegaly present. Cardiovascular:  Normal rate, regular rhythm, normal heart sounds and intact distal pulses. Exam reveals no gallop. No murmur heard. no edema. Chest: Effort normal and breath sounds normal. no wheezes. has no rales. Abdominal: Soft, Bowel sounds are normal.  exhibits no distension and no mass. No organomegaly  There is no tenderness. There is no guarding. Musculoskeletal: Normal range of motion in all extremities. Lymphadenopathy: No cervical adenopathy. Neurological:  exhibits normal muscle tone. No sensory or motor deficit, Coordination normal, normal reflexes. Skin: Skin is warm. No rash noted. No Erythema. Psychiatric:  alert and oriented to person, place, and time. Normal mood and affect.          Immunization History   Administered Date(s) Administered    Hepatitis A 08/23/2018    Influenza Vaccine, unspecified formulation 10/01/2016    Influenza Virus Vaccine 10/23/2013, 10/01/2014, 10/01/2015    Influenza Whole 09/17/2010    Influenza, Mili Ovalle, 3 Years and older, IM (Fluzone 3 yrs and older or Afluria 5 yrs and older) 11/03/2017    Influenza, Quadv, Recombinant, IM PF (Flublok 18 yrs and older) 11/07/2018    Pneumococcal Polysaccharide ([id number]) 08/29/2011    Tdap (Boostrix, Adacel) 05/21/2013    Zoster Live (Zostavax) 01/08/2015       No Known Allergies  Current Outpatient Medications   Medication Sig Dispense Refill    sucralfate (CARAFATE) 1 GM tablet TAKE 1 TABLET THREE TIMES A  tablet 1    potassium chloride (KLOR-CON M) 10 MEQ extended release tablet TAKE 2 TABLETS DAILY 180 tablet 1    esomeprazole (NEXIUM) 40 MG delayed release capsule TAKE 1 CAPSULE TWICE A  capsule 1    hydrochlorothiazide (HYDRODIURIL) 25 MG tablet TAKE ONE AND ONE-HALF TABLETS DAILY 135 tablet 1    olmesartan (BENICAR) 40 MG tablet TAKE 1 TABLET DAILY 90 tablet 1    valsartan (DIOVAN) 320 MG tablet TAKE 1 TABLET DAILY 90 tablet 1    acyclovir (ZOVIRAX) 400 MG tablet TAKE ONE TABLET BY MOUTH THREE TIMES A DAY FOR 10 DAYS FOR HERPES SIMPLEX FLARE-UP 90 tablet 0    Loratadine (CLARITIN PO) Take  by mouth.  Multiple Vitamin (MULTIVITAMIN PO) Take 1 capsule by mouth daily. No current facility-administered medications for this visit. Past Medical History:   Diagnosis Date    Allergic rhinitis     GERD (gastroesophageal reflux disease)     Hemorrhoids     Herpes simplex 2012    Hypertension     Osteoarthritis     Other Acquired Deformity of Ankle and Foot     Right carotid bruit 2014 Normal carotid / vertebral US.  Right carotid bruit 2015 Carotid US normal     Thyroid nodule     colloid nodule--benign     Past Surgical History:   Procedure Laterality Date    COLONOSCOPY  8/10    M.  Michael, hemorrhoidal rectal bleeding, repeat 10 years    KNEE SURGERY      right knee    THYROID SURGERY      biopsy-benign colloid nodule     Family History   Problem Relation Age of Onset    Diabetes Mother 79         from [address] Father 39        Oral cancer,    [facility] Cancer Sister         Breast    Hypertension Mother     Heart Attack Unknown 80         in hospital    Dementia Mother      Social History     Socioeconomic History    Marital status:      Spouse name: Not on file    Number of children: Not on file    Years of education: Not on file    Highest education level: Not on file   Occupational History    Not on file   Social Needs    Financial resource strain: Not on file  Food insecurity:     Worry: Not on file     Inability: Not on file    Transportation needs:     Medical: Not on file     Non-medical: Not on file   Tobacco Use    Smoking status: Current Every Day Smoker     Packs/day: 0.50    Smokeless tobacco: Never Used    Tobacco comment: He has Chantix but has not tried it. Substance and Sexual Activity    Alcohol use: Yes     Alcohol/week: 0.0 oz     Comment: occassionally    Drug use: No    Sexual activity: Not on file   Lifestyle    Physical activity:     Days per week: Not on file     Minutes per session: Not on file    Stress: Not on file   Relationships    Social connections:     Talks on phone: Not on file     Gets together: Not on file     Attends L[employer]-day service: Not on file     Active member of club or organization: Not on file     Attends meetings of clubs or organizations: Not on file     Relationship status: Not on file    Intimate partner violence:     Fear of current or ex partner: Not on file     Emotionally abused: Not on file     Physically abused: Not on file     Forced sexual activity: Not on file   Other Topics Concern    Not on file   Social History Narrative    Not on file       Assessment/Plan:  1. Essential hypertension  - BASIC METABOLIC PANEL; Future  Benicar  and HCT. Monitor blood pressure regularly  Regular exercise and low salt diet        1. Gastroesophageal reflux disease, esophagitis presence not specified  Stable       3.  Heme positive stool  He needs EGD and colonoscopy   - [facility] - Geoffrey Johnson MD, Gastroenterology, [place]

## 2019-05-11 LAB
ANION GAP SERPL CALCULATED.3IONS-SCNC: 15 MMOL/L (ref 3–16)
BUN BLDV-MCNC: 12 MG/DL (ref 7–20)
CALCIUM SERPL-MCNC: 9.4 MG/DL (ref 8.3–10.6)
CHLORIDE BLD-SCNC: 103 MMOL/L (ref 99–110)
CO2: 27 MMOL/L (ref 21–32)
CREAT SERPL-MCNC: 0.8 MG/DL (ref 0.8–1.3)
GFR AFRICAN AMERICAN: >60
GFR NON-AFRICAN AMERICAN: >60
GLUCOSE BLD-MCNC: 104 MG/DL (ref 70–99)
POTASSIUM SERPL-SCNC: 4.9 MMOL/L (ref 3.5–5.1)
SODIUM BLD-SCNC: 145 MMOL/L (ref 136–145)

## 2019-07-08 RX ORDER — OLMESARTAN MEDOXOMIL 40 MG/1
TABLET ORAL
Qty: 90 TABLET | Refills: 1 | Status: SHIPPED | OUTPATIENT
Start: 2019-07-08 | End: 2020-01-06

## 2019-07-08 NOTE — TELEPHONE ENCOUNTER
Medication:   Requested Prescriptions     Pending Prescriptions Disp Refills    olmesartan (BENICAR) 40 MG tablet [Pharmacy Med Name: OLMESARTAN MEDOXOMIL TABS 40MG] 90 tablet 1     Sig: TAKE 1 TABLET DAILY       Last Filled:      Patient Phone Number: [phone] (home) [phone] (work)    Last appt: 5/10/2019   Next appt: 11/15/2019    Last BMP:   Lab Results   Component Value Date     05/10/2019    K 4.9 05/10/2019     05/10/2019    CO2 27 05/10/2019    ANIONGAP 15 05/10/2019    GLUCOSE 104 05/10/2019    BUN 12 05/10/2019    CREATININE 0.8 05/10/2019    LABGLOM >60 05/10/2019    GFRAA >60 05/10/2019    GFRAA >60 05/15/2013    CALCIUM 9.4 05/10/2019      Last CMP:   Lab Results   Component Value Date     05/10/2019    K 4.9 05/10/2019     05/10/2019    CO2 27 05/10/2019    ANIONGAP 15 05/10/2019    GLUCOSE 104 05/10/2019    BUN 12 05/10/2019    CREATININE 0.8 05/10/2019    LABGLOM >60 05/10/2019    GFRAA >60 05/10/2019    GFRAA >60 05/15/2013    PROT 6.7 05/03/2017    LABALBU 4.3 05/03/2017    AGRATIO 1.8 05/03/2017    BILITOT 1.0 05/03/2017    ALKPHOS 46 05/03/2017    ALT 21 05/03/2017    AST 25 05/03/2017    GLOB 2.4 05/03/2017     Last Renal Function:   Lab Results   Component Value Date     05/10/2019    K 4.9 05/10/2019     05/10/2019    CO2 27 05/10/2019    GLUCOSE 104 05/10/2019    BUN 12 05/10/2019    CREATININE 0.8 05/10/2019    LABALBU 4.3 05/03/2017    CALCIUM 9.4 05/10/2019    GFR >60 05/15/2013    GFRAA >60 05/10/2019    GFRAA >60 05/15/2013     Last Labs DM: No results found for: LABA1C  Last Lipid:   Lab Results   Component Value Date    CHOL 159 11/07/2018    TRIG 35 11/07/2018    HDL 77 11/07/2018    HDL 75 04/30/2012    LDLCALC 75 11/07/2018     Last PSA:   Lab Results   Component Value Date    PSA 0.47 11/07/2018     Last Thyroid:   Lab Results   Component Value Date    TSH 0.60 11/07/2018       Last OARRS: No flowsheet data found.     Preferred Pharmacy: [facility] - [address] [facility] [address]  [facility] [place] [address]  Phone: [phone] Fax: [phone]

## 2019-07-22 RX ORDER — HYDROCHLOROTHIAZIDE 25 MG/1
TABLET ORAL
Qty: 135 TABLET | Refills: 1 | Status: SHIPPED | OUTPATIENT
Start: 2019-07-22 | End: 2020-01-20

## 2019-08-12 RX ORDER — ESOMEPRAZOLE MAGNESIUM 40 MG/1
CAPSULE, DELAYED RELEASE ORAL
Qty: 180 CAPSULE | Refills: 1 | Status: SHIPPED | OUTPATIENT
Start: 2019-08-12 | End: 2020-02-10

## 2019-10-07 RX ORDER — POTASSIUM CHLORIDE 750 MG/1
TABLET, EXTENDED RELEASE ORAL
Qty: 180 TABLET | Refills: 1 | Status: SHIPPED | OUTPATIENT
Start: 2019-10-07 | End: 2020-07-21 | Stop reason: SDUPTHER

## 2019-11-06 RX ORDER — SUCRALFATE 1 G/1
TABLET ORAL
Qty: 270 TABLET | Refills: 4 | Status: SHIPPED | OUTPATIENT
Start: 2019-11-06 | End: 2020-05-06

## 2019-11-20 ENCOUNTER — OFFICE VISIT (OUTPATIENT)
Dept: PRIMARY CARE CLINIC | Age: 64
End: 2019-11-20
Payer: COMMERCIAL

## 2019-11-20 ENCOUNTER — TELEPHONE (OUTPATIENT)
Dept: PHARMACY | Age: 64
End: 2019-11-20

## 2019-11-20 VITALS
HEIGHT: 69 IN | BODY MASS INDEX: 20.44 KG/M2 | SYSTOLIC BLOOD PRESSURE: 114 MMHG | WEIGHT: 138 LBS | HEART RATE: 68 BPM | TEMPERATURE: 98.6 F | DIASTOLIC BLOOD PRESSURE: 62 MMHG

## 2019-11-20 DIAGNOSIS — F17.200 SMOKER: ICD-10-CM

## 2019-11-20 DIAGNOSIS — Z12.11 COLON CANCER SCREENING: ICD-10-CM

## 2019-11-20 DIAGNOSIS — M19.91 PRIMARY OSTEOARTHRITIS, UNSPECIFIED SITE: ICD-10-CM

## 2019-11-20 DIAGNOSIS — K21.9 GASTROESOPHAGEAL REFLUX DISEASE, ESOPHAGITIS PRESENCE NOT SPECIFIED: ICD-10-CM

## 2019-11-20 DIAGNOSIS — R19.5 HEME POSITIVE STOOL: ICD-10-CM

## 2019-11-20 DIAGNOSIS — Z23 NEED FOR PNEUMOCOCCAL VACCINE: ICD-10-CM

## 2019-11-20 DIAGNOSIS — Z00.00 ROUTINE GENERAL MEDICAL EXAMINATION AT A HEALTH CARE FACILITY: Primary | ICD-10-CM

## 2019-11-20 DIAGNOSIS — Z12.5 SCREENING PSA (PROSTATE SPECIFIC ANTIGEN): ICD-10-CM

## 2019-11-20 DIAGNOSIS — Z23 FLU VACCINE NEED: ICD-10-CM

## 2019-11-20 DIAGNOSIS — I10 ESSENTIAL HYPERTENSION: ICD-10-CM

## 2019-11-20 PROCEDURE — 90670 PCV13 VACCINE IM: CPT | Performed by: INTERNAL MEDICINE

## 2019-11-20 PROCEDURE — 90686 IIV4 VACC NO PRSV 0.5 ML IM: CPT | Performed by: INTERNAL MEDICINE

## 2019-11-20 PROCEDURE — 99396 PREV VISIT EST AGE 40-64: CPT | Performed by: INTERNAL MEDICINE

## 2019-11-20 PROCEDURE — 90471 IMMUNIZATION ADMIN: CPT | Performed by: INTERNAL MEDICINE

## 2019-11-20 PROCEDURE — 99214 OFFICE O/P EST MOD 30 MIN: CPT | Performed by: INTERNAL MEDICINE

## 2019-11-20 PROCEDURE — 90472 IMMUNIZATION ADMIN EACH ADD: CPT | Performed by: INTERNAL MEDICINE

## 2019-11-20 RX ORDER — ACYCLOVIR 400 MG/1
TABLET ORAL
Qty: 90 TABLET | Refills: 1 | Status: SHIPPED | OUTPATIENT
Start: 2019-11-20 | End: 2021-02-01 | Stop reason: SDUPTHER

## 2019-11-20 ASSESSMENT — PATIENT HEALTH QUESTIONNAIRE - PHQ9
2. FEELING DOWN, DEPRESSED OR HOPELESS: 0
1. LITTLE INTEREST OR PLEASURE IN DOING THINGS: 0
SUM OF ALL RESPONSES TO PHQ9 QUESTIONS 1 & 2: 0
SUM OF ALL RESPONSES TO PHQ QUESTIONS 1-9: 0
SUM OF ALL RESPONSES TO PHQ QUESTIONS 1-9: 0

## 2019-11-21 DIAGNOSIS — Z00.00 ROUTINE GENERAL MEDICAL EXAMINATION AT A HEALTH CARE FACILITY: ICD-10-CM

## 2019-11-21 LAB
ANION GAP SERPL CALCULATED.3IONS-SCNC: 13 MMOL/L (ref 3–16)
BASOPHILS ABSOLUTE: 0.1 K/UL (ref 0–0.2)
BASOPHILS RELATIVE PERCENT: 1 %
BUN BLDV-MCNC: 22 MG/DL (ref 7–20)
CALCIUM SERPL-MCNC: 9.1 MG/DL (ref 8.3–10.6)
CHLORIDE BLD-SCNC: 100 MMOL/L (ref 99–110)
CHOLESTEROL, TOTAL: 166 MG/DL (ref 0–199)
CO2: 26 MMOL/L (ref 21–32)
CREAT SERPL-MCNC: 0.9 MG/DL (ref 0.8–1.3)
EOSINOPHILS ABSOLUTE: 0.4 K/UL (ref 0–0.6)
EOSINOPHILS RELATIVE PERCENT: 7.8 %
GFR AFRICAN AMERICAN: >60
GFR NON-AFRICAN AMERICAN: >60
GLUCOSE BLD-MCNC: 93 MG/DL (ref 70–99)
HCT VFR BLD CALC: 40.3 % (ref 40.5–52.5)
HDLC SERPL-MCNC: 94 MG/DL (ref 40–60)
HEMOGLOBIN: 13.9 G/DL (ref 13.5–17.5)
LDL CHOLESTEROL CALCULATED: 66 MG/DL
LYMPHOCYTES ABSOLUTE: 1 K/UL (ref 1–5.1)
LYMPHOCYTES RELATIVE PERCENT: 18.3 %
MCH RBC QN AUTO: 33.5 PG (ref 26–34)
MCHC RBC AUTO-ENTMCNC: 34.4 G/DL (ref 31–36)
MCV RBC AUTO: 97.3 FL (ref 80–100)
MONOCYTES ABSOLUTE: 0.6 K/UL (ref 0–1.3)
MONOCYTES RELATIVE PERCENT: 11.2 %
NEUTROPHILS ABSOLUTE: 3.4 K/UL (ref 1.7–7.7)
NEUTROPHILS RELATIVE PERCENT: 61.7 %
PDW BLD-RTO: 12.5 % (ref 12.4–15.4)
PLATELET # BLD: 207 K/UL (ref 135–450)
PMV BLD AUTO: 8.6 FL (ref 5–10.5)
POTASSIUM SERPL-SCNC: 4.1 MMOL/L (ref 3.5–5.1)
RBC # BLD: 4.14 M/UL (ref 4.2–5.9)
SODIUM BLD-SCNC: 139 MMOL/L (ref 136–145)
TRIGL SERPL-MCNC: 32 MG/DL (ref 0–150)
TSH SERPL DL<=0.05 MIU/L-ACNC: 1.09 UIU/ML (ref 0.27–4.2)
VITAMIN D 25-HYDROXY: 38.7 NG/ML
VLDLC SERPL CALC-MCNC: 6 MG/DL
WBC # BLD: 5.5 K/UL (ref 4–11)

## 2019-12-10 ENCOUNTER — OFFICE VISIT (OUTPATIENT)
Dept: PHARMACY | Age: 64
End: 2019-12-10
Payer: COMMERCIAL

## 2019-12-10 ENCOUNTER — TELEPHONE (OUTPATIENT)
Dept: PHARMACY | Age: 64
End: 2019-12-10

## 2019-12-10 VITALS — DIASTOLIC BLOOD PRESSURE: 72 MMHG | SYSTOLIC BLOOD PRESSURE: 110 MMHG

## 2019-12-10 DIAGNOSIS — Z72.0 TOBACCO ABUSE: ICD-10-CM

## 2019-12-10 PROCEDURE — 99212 OFFICE O/P EST SF 10 MIN: CPT

## 2019-12-10 RX ORDER — VARENICLINE TARTRATE 25 MG
KIT ORAL SEE ADMIN INSTRUCTIONS
COMMUNITY
End: 2019-12-11

## 2019-12-11 ENCOUNTER — NURSE ONLY (OUTPATIENT)
Dept: PRIMARY CARE CLINIC | Age: 64
End: 2019-12-11
Payer: COMMERCIAL

## 2019-12-11 DIAGNOSIS — Z23 NEED FOR SHINGLES VACCINE: Primary | ICD-10-CM

## 2019-12-11 PROCEDURE — 90750 HZV VACC RECOMBINANT IM: CPT | Performed by: INTERNAL MEDICINE

## 2019-12-11 PROCEDURE — 90471 IMMUNIZATION ADMIN: CPT | Performed by: INTERNAL MEDICINE

## 2019-12-11 RX ORDER — VARENICLINE TARTRATE 0.5 MG/1
TABLET, FILM COATED ORAL
Qty: 57 TABLET | Refills: 0 | Status: SHIPPED | OUTPATIENT
Start: 2019-12-11 | End: 2020-01-15 | Stop reason: DRUGHIGH

## 2019-12-19 ENCOUNTER — OFFICE VISIT (OUTPATIENT)
Dept: PHARMACY | Age: 64
End: 2019-12-19
Payer: COMMERCIAL

## 2019-12-19 DIAGNOSIS — F17.209 NICOTINE DEPENDENCE WITH NICOTINE-INDUCED DISORDER, UNSPECIFIED NICOTINE PRODUCT TYPE: ICD-10-CM

## 2019-12-19 PROCEDURE — 99211 OFF/OP EST MAY X REQ PHY/QHP: CPT

## 2019-12-31 ENCOUNTER — OFFICE VISIT (OUTPATIENT)
Dept: PHARMACY | Age: 64
End: 2019-12-31
Payer: COMMERCIAL

## 2019-12-31 DIAGNOSIS — Z72.0 NICOTINE ABUSE: ICD-10-CM

## 2019-12-31 PROCEDURE — 99211 OFF/OP EST MAY X REQ PHY/QHP: CPT

## 2020-01-06 RX ORDER — OLMESARTAN MEDOXOMIL 40 MG/1
TABLET ORAL
Qty: 90 TABLET | Refills: 4 | Status: SHIPPED | OUTPATIENT
Start: 2020-01-06 | End: 2021-01-12 | Stop reason: SDUPTHER

## 2020-01-14 ENCOUNTER — OFFICE VISIT (OUTPATIENT)
Dept: PHARMACY | Age: 65
End: 2020-01-14
Payer: COMMERCIAL

## 2020-01-14 PROCEDURE — 99212 OFFICE O/P EST SF 10 MIN: CPT

## 2020-01-14 RX ORDER — VARENICLINE TARTRATE 1 MG/1
1 TABLET, FILM COATED ORAL 2 TIMES DAILY
Qty: 60 TABLET | Refills: 1 | Status: SHIPPED | OUTPATIENT
Start: 2020-01-14 | End: 2020-03-12 | Stop reason: SDUPTHER

## 2020-01-14 NOTE — PATIENT INSTRUCTIONS
Recumbent bike 3x per week  Try straw/cinnamon stick near TV. Write list of reasons for quitting.    Try quitters [place] shun or 5-423-QUIT-NOW  Continue chantix   Save money for indoor grill

## 2020-01-14 NOTE — PROGRESS NOTES
CLINICAL PHARMACY NOTE--Smoking Cessation    SUBJECTIVE/OBJECTIVE:   Gaurav Krueger is a 59 y.o. male with PMHx significant for GERD, HTN, pulmonary emphesema referred by Dr Snow Smith for smoking cessation counseling and medication management. 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 30-60 min (previously immediately after waking)  Do you smoke 20 or more cigarettes each day? No  At times when you can't smoke or haven't got any cigarettes, do you feel a craving for one? Yes, but not if not keeping busy  Is it tough for you to keep from smoking for more than a few hours? No, not if in a place where he can't smoke  When you are sick enough to stay in bed, to you still smoke? no  If yes to two or more questions, consider using NRT    Reasons for addiction: Touch and Handle, Stress-relief, Habit, Addiction, \"something to do\"  Triggers: meals, stress, smoke breaks from his work; stopped smoking while driving, with coffee, alcohol (already tried to dissociate smoking with certain activities, but picked it up with other activities)  Barriers: not confident (only 5 on a 1-10 scale); He is \"afraid\" to run out of cigarettes. Motivation for quitting: Has wanted to quit for years (in back of mind) because he doesn't like that it's in control of him (addiction), Health, family, money    12/19 update:  Did  Chantix from pharmacy ($40), but did not start. Fearful of side effects.  Has history of bad dreams 4 days followed by 1mg TWICE DAILY. Take with food. 57 tablet 0    acyclovir (ZOVIRAX) 400 MG tablet TAKE ONE TABLET BY MOUTH THREE TIMES A DAY FOR 10 DAYS FOR HERPES SIMPLEX FLARE-UP 90 tablet 1    sucralfate (CARAFATE) 1 GM tablet TAKE 1 TABLET THREE TIMES A  tablet 4    potassium chloride (KLOR-CON M) 10 MEQ extended release tablet TAKE 2 TABLETS DAILY 180 tablet 1    esomeprazole (NEXIUM) 40 MG delayed release capsule TAKE 1 CAPSULE TWICE A  capsule 1    hydrochlorothiazide (HYDRODIURIL) 25 MG tablet TAKE ONE AND ONE-HALF TABLETS DAILY 135 tablet 1    Loratadine (CLARITIN PO) Take  by mouth.  Multiple Vitamin (MULTIVITAMIN PO) Take 1 capsule by mouth daily. No current facility-administered medications for this visit. Pertinent Labs/Vitals  Scr 0.9 (11/21/19)    ASSESSMENT/PLAN:   Patient is motivated to quit smoking and has been cutting back gradually; however he is not very confident that he can quit. Pt would like to continue Chantix 1 mg BID, as he feels he is tolerating fine. Pt informed he can reduce dose if nausea persists and is bothersome. Pt instructed to stop Chantix and notify PCP for worsening anxiety, but he feels it is mild and tolerable. Will send refill today for maintenance dosing.  -Side effects: nausea (take with food), abnormal dreams, neuropsychiatric symptoms (rare)  -Flexible Quit Approach: Quit between days 8-35, continue Chantix for 12 weeks total  -Gradual Quit Approach: Gradually cut down on smoking with the goal of quitting by week 12, continue Chantix for 24 weeks. Follow healthy diet, moderate exercise, and limit alcohol  Referred pt to [address] [facility] [employer]. Referred pt to [email or web address]  Referred to [employer] Echo on their smartphone device    Goals  Recumbent bike 3x per week  Try putting straw/cinnamon stick near TV, when you would normally smoke. Write list of reasons for quitting.    Try quitters [place] shun or 1-800-QUIT-NOW  Save money for indoor grill  Keep cutting back gradually with goal of cutting back at least 50% within the first month. Discussed the following  Health risks of smoking  Physical and psychological dependence associated with smoking and potential withdrawal symptoms  Benefits to quitting: health, time, financial  Tobacco cessation quit tips  Trigger avoidance and coping with the urge to quit   Nicotine replacement and pharmacological options     Standard written patient education provided:  Medication Management Tobacco Cessation Program packet  Stopping smoking: Care Instructions  Deciding About Using Medicines To Quit Smoking  Learning About Benefits From Quitting Smoking  10 Things You Should Know About Quitting Smoking    Next appt:  1/30    Pt verbalized understanding of the above information and denied further questions or concerns. The total time of the visit was 45 min.     Judith Acevedo, PharmD, [facility]  Medication Management Clinic   [place] [address] Ph: [phone]  [facility] Ph: [phone]  1/14/2020 1:29 PM

## 2020-01-20 RX ORDER — HYDROCHLOROTHIAZIDE 25 MG/1
TABLET ORAL
Qty: 135 TABLET | Refills: 4 | Status: SHIPPED | OUTPATIENT
Start: 2020-01-20 | End: 2020-10-05

## 2020-01-30 ENCOUNTER — OFFICE VISIT (OUTPATIENT)
Dept: PHARMACY | Age: 65
End: 2020-01-30
Payer: COMMERCIAL

## 2020-01-30 VITALS — SYSTOLIC BLOOD PRESSURE: 113 MMHG | DIASTOLIC BLOOD PRESSURE: 75 MMHG

## 2020-01-30 PROCEDURE — 99211 OFF/OP EST MAY X REQ PHY/QHP: CPT

## 2020-01-30 NOTE — PROGRESS NOTES
several years ago and is afraid it may cause vivid dreams. Has been able to cut back slightly on his own. Was able to eliminate the 1st cigarette of the day, is now working on the 2nd. Started playing pool to fill downtime. He has been able to avoid buying more cigarettes until completely out of current pack  It has been a busy week, and he hasn't been able to focus on quitting as much as he would like. He plans to go to [place] for [place]. 12/31 update:  Smoking \"a little less,\" but unable to give exact amount. Started Chantix yesterday. Has tolerated 2 doses so far. No side effects. Has been able to delay the 2nd cigarette more by switching AM routine (drink coffe before breakfast)  Sucking on mints  Used recumbent bike a couple times, but no routine yet. Moved cigarettes to basement (out of pocket) to make it more inconvenient. Has kept a list of things to do on his breaks. 1/14 update:  Continues Chantix 1 mg BID. Takes with food, but has been difficult because he does not eat at regular times during the day. Experiencing some nausea/ \"queesy\" stomach. Also having occasional slight anxiety, but nothing major and is able to tolerate. Denies depression/thoughts of suicide or harming himself. Smoking less, but is not counting exact number because he thinks it will make him think about smoking more. He knows he is not buying cigarettes as often. He does not buy cigarettes until he is completely out of them. He needs a new RX for chantix. He is trying to stay busy and avoid long breaks which triggers him to smoke. He has been able to postpone the cigarette many times. 1/30 update:   Continues Chantix 1 mg BID. Takes with food. Experiencing some nausea/ \"queesy\" stomach, but tolerable. Also having occasional slight anxiety, but nothing major and is able to tolerate. Denies depression/thoughts of suicide or harming himself. Rides his recumbent bike 2x per week for 15 minutes.   Smoking less, but is still not counting the exact number because he thinks it will make him think about smoking more. He is buying cigarettes less often and has placed them in his basement so has to go downstairs to get them when he wants to smoke. He does not like going down stairs. He has been able to walk back upstairs from the basement without bringing a cigarette with him. He made a list of reasons for quitting and placed it by his coat so he sees it when he is going outside to smoke. He does not smoke inside house or in car. He started using hard candies in place of cigarettes while he is preparing his meals. He often smokes right before and right after meals. He always brushes his teeth after meals. Patient feels that drinking coffee throughout the day would help keep him busy during breaks, and coffee has not been a trigger for him lately. Pharmacy: [place], [place]    Current Medication and Allergy List Reviewed and Updated:  Current Outpatient Medications   Medication Sig Dispense Refill    hydrochlorothiazide (HYDRODIURIL) 25 MG tablet TAKE ONE AND ONE-HALF TABLETS DAILY 135 tablet 4    varenicline (CHANTIX) 1 MG tablet Take 1 tablet by mouth 2 times daily 60 tablet 1    olmesartan (BENICAR) 40 MG tablet TAKE 1 TABLET DAILY 90 tablet 4    acyclovir (ZOVIRAX) 400 MG tablet TAKE ONE TABLET BY MOUTH THREE TIMES A DAY FOR 10 DAYS FOR HERPES SIMPLEX FLARE-UP 90 tablet 1    sucralfate (CARAFATE) 1 GM tablet TAKE 1 TABLET THREE TIMES A  tablet 4    potassium chloride (KLOR-CON M) 10 MEQ extended release tablet TAKE 2 TABLETS DAILY 180 tablet 1    esomeprazole (NEXIUM) 40 MG delayed release capsule TAKE 1 CAPSULE TWICE A  capsule 1    Loratadine (CLARITIN PO) Take  by mouth.  Multiple Vitamin (MULTIVITAMIN PO) Take 1 capsule by mouth daily. No current facility-administered medications for this visit.       Pertinent Labs/Vitals  Scr 0.9 (11/21/19)    ASSESSMENT/PLAN: Patient is motivated to quit smoking and has been cutting back gradually; however he is not very confident that he can quit. Pt would like to continue Chantix 1 mg BID, as he feels he is tolerating fine. Pt informed he can reduce dose if nausea persists and is intolerable. Pt instructed to stop Chantix and notify PCP for worsening anxiety, but he feels it is mild and tolerable.   -Side effects: nausea (take with food), abnormal dreams, neuropsychiatric symptoms (rare)  -Flexible Quit Approach: Quit between days 8-35, continue Chantix for 12 weeks total  -Gradual Quit Approach: Gradually cut down on smoking with the goal of quitting by week 12, continue Chantix for 24 weeks. Follow healthy diet, moderate exercise, and limit alcohol  Referred pt to [address] [facility] [employer]. Referred pt to [email or web address]  Referred to [employer] Echo on their smartphone device    Goals  Drink more of coffee (some decaf) throughout the day in between activities and meals. Keep your list of reasons for quitting with a pack of cigarettes. Starting on Sunday, only buy 2 packs of cigarettes each week. Try quitters [place] echo or 1-800-QUIT-NOW  Save money for indoor grill  Keep cutting back gradually with goal of cutting back at least 50% within the first month.     Discussed the following  Health risks of smoking  Physical and psychological dependence associated with smoking and potential withdrawal symptoms  Benefits to quitting: health, time, financial  Tobacco cessation quit tips  Trigger avoidance and coping with the urge to quit   Nicotine replacement and pharmacological options     Standard written patient education provided:  Medication Management Tobacco Cessation Program packet  Stopping smoking: Care Instructions  Deciding About Using Medicines To Quit Smoking  Learning About Benefits From Quitting Smoking  10 Things You Should Know About Quitting Smoking    Next appt:  2/11    Pt verbalized understanding of the above information and denied further questions or concerns. The total time of the visit was 45 min.     Blanca Hernandez PharmD  PGY1 Pharmacy Resident   Wireless: [phone]  1/30/2020 3:56 PM

## 2020-02-10 RX ORDER — ESOMEPRAZOLE MAGNESIUM 40 MG/1
CAPSULE, DELAYED RELEASE ORAL
Qty: 180 CAPSULE | Refills: 1 | Status: SHIPPED | OUTPATIENT
Start: 2020-02-10 | End: 2020-05-06

## 2020-02-27 ENCOUNTER — OFFICE VISIT (OUTPATIENT)
Dept: PHARMACY | Age: 65
End: 2020-02-27
Payer: COMMERCIAL

## 2020-02-27 PROCEDURE — 99211 OFF/OP EST MAY X REQ PHY/QHP: CPT

## 2020-02-27 NOTE — PROGRESS NOTES
CLINICAL PHARMACY NOTE--Smoking Cessation    SUBJECTIVE/OBJECTIVE:   Breanne Stark is a 59 y.o. male with PMHx significant for GERD, HTN, pulmonary emphesema referred by Dr Keven Mclaughlin for smoking cessation counseling and medication management. 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 30-60 min (previously immediately after waking)  Do you smoke 20 or more cigarettes each day? No  At times when you can't smoke or haven't got any cigarettes, do you feel a craving for one? Yes, but not if not keeping busy  Is it tough for you to keep from smoking for more than a few hours? No, not if in a place where he can't smoke  When you are sick enough to stay in bed, to you still smoke? no  If yes to two or more questions, consider using NRT    Reasons for addiction: Touch and Handle, Stress-relief, Habit, Addiction, \"something to do\"  Triggers: meals, stress, smoke breaks from his work; stopped smoking while driving, with coffee, alcohol (already tried to dissociate smoking with certain activities, but picked it up with other activities)  Barriers: not confident (only 5 on a 1-10 scale); He is \"afraid\" to run out of cigarettes. Motivation for quitting: Has wanted to quit for years (in back of mind) because he doesn't like that it's in control of him (addiction), Health, family, money    12/19 update:  Did  Chantix from pharmacy ($40), but did not start. Fearful of side effects.  Has history of bad dreams several years ago and is afraid it may cause vivid dreams. Has been able to cut back slightly on his own. Was able to eliminate the 1st cigarette of the day, is now working on the 2nd. Started playing pool to fill downtime. He has been able to avoid buying more cigarettes until completely out of current pack  It has been a busy week, and he hasn't been able to focus on quitting as much as he would like. He plans to go to [place] for [place]. 12/31 update:  Smoking \"a little less,\" but unable to give exact amount. Started Chantix yesterday. Has tolerated 2 doses so far. No side effects. Has been able to delay the 2nd cigarette more by switching AM routine (drink coffe before breakfast)  Sucking on mints  Used recumbent bike a couple times, but no routine yet. Moved cigarettes to basement (out of pocket) to make it more inconvenient. Has kept a list of things to do on his breaks. 1/14 update:  Continues Chantix 1 mg BID. Takes with food, but has been difficult because he does not eat at regular times during the day. Experiencing some nausea/ \"queesy\" stomach. Also having occasional slight anxiety, but nothing major and is able to tolerate. Denies depression/thoughts of suicide or harming himself. Smoking less, but is not counting exact number because he thinks it will make him think about smoking more. He knows he is not buying cigarettes as often. He does not buy cigarettes until he is completely out of them. He needs a new RX for chantix. He is trying to stay busy and avoid long breaks which triggers him to smoke. He has been able to postpone the cigarette many times. 1/30 update:   Continues Chantix 1 mg BID. Takes with food. Experiencing some nausea/ \"queesy\" stomach, but tolerable. Also having occasional slight anxiety, but nothing major and is able to tolerate. Denies depression/thoughts of suicide or harming himself. Rides his recumbent bike 2x per week for 15 minutes.   Smoking (KLOR-CON M) 10 MEQ extended release tablet TAKE 2 TABLETS DAILY 180 tablet 1    Loratadine (CLARITIN PO) Take  by mouth.  Multiple Vitamin (MULTIVITAMIN PO) Take 1 capsule by mouth daily. No current facility-administered medications for this visit. Pertinent Labs/Vitals  Scr 0.9 (11/21/19)    ASSESSMENT/PLAN:   Patient is motivated to quit smoking and has been cutting back gradually; however he is not very confident that he can quit. Pt would like to continue Chantix 1 mg BID, as he feels he is tolerating fine. Pt informed he can reduce dose if nausea persists and is intolerable. Pt instructed to stop Chantix and notify PCP for worsening anxiety, but he feels it is mild and tolerable.   -Side effects: nausea (take with food), abnormal dreams, neuropsychiatric symptoms (rare)  -Gradual Quit Approach: Gradually cut down on smoking with the goal of quitting by wk 12, continue x 24 wks. Follow healthy diet, moderate exercise, and limit alcohol  Referred pt to [email or web address]  Referred to [employer] Echo on their smartphone device    Goals  Increase exercise time/duration. Try listening to music while exercising. Keep your list of reasons for quitting with a pack of cigarettes. Try quitters [place] echo or 1-800-QUIT-NOW  Save money for indoor grill  Keep cutting back gradually with goal of no more than 1 pack every 5 days. Will eventually reduce to 1 pack per week.     Discussed the following  Health risks of smoking  Physical and psychological dependence associated with smoking and potential withdrawal symptoms  Benefits to quitting: health, time, financial  Tobacco cessation quit tips  Trigger avoidance and coping with the urge to quit   Nicotine replacement and pharmacological options     Standard written patient education provided:  Medication Management Tobacco Cessation Program packet  Stopping smoking: Care Instructions  Deciding About Using Medicines To Quit

## 2020-03-10 ENCOUNTER — NURSE ONLY (OUTPATIENT)
Dept: PRIMARY CARE CLINIC | Age: 65
End: 2020-03-10

## 2020-03-12 ENCOUNTER — OFFICE VISIT (OUTPATIENT)
Dept: PHARMACY | Age: 65
End: 2020-03-12
Payer: COMMERCIAL

## 2020-03-12 PROCEDURE — 99211 OFF/OP EST MAY X REQ PHY/QHP: CPT

## 2020-03-12 RX ORDER — VARENICLINE TARTRATE 1 MG/1
1 TABLET, FILM COATED ORAL 2 TIMES DAILY
Qty: 60 TABLET | Refills: 1 | Status: SHIPPED | OUTPATIENT
Start: 2020-03-12 | End: 2020-06-03 | Stop reason: DRUGHIGH

## 2020-03-12 NOTE — PATIENT INSTRUCTIONS
Increase exercise to 3x per week for at least 20 minutes  Suck on [employer] TV or drink more tea   No more than 1 pack every 5 days.   Primary motivation for quitting: HEALTH   Set aside money for grill when you achieve a goal

## 2020-03-12 NOTE — PROGRESS NOTES
CLINICAL PHARMACY NOTE--Smoking Cessation    SUBJECTIVE/OBJECTIVE:   Satish Patel is a 59 y.o. male with PMHx significant for GERD, HTN, pulmonary emphesema referred by Dr César Coronel for smoking cessation counseling and medication management. 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 30-60 min (previously immediately after waking)  Do you smoke 20 or more cigarettes each day? No  At times when you can't smoke or haven't got any cigarettes, do you feel a craving for one? Yes, but not if not keeping busy  Is it tough for you to keep from smoking for more than a few hours? No, not if in a place where he can't smoke  When you are sick enough to stay in bed, to you still smoke? no  If yes to two or more questions, consider using NRT    Reasons for addiction: Touch and Handle, Stress-relief, Habit, Addiction, \"something to do\"  Triggers: meals, stress, smoke breaks from his work; stopped smoking while driving, with coffee, alcohol (already tried to dissociate smoking with certain activities, but picked it up with other activities)  Barriers: not confident (only 5 on a 1-10 scale); He is \"afraid\" to run out of cigarettes. Motivation for quitting: Has wanted to quit for years (in back of mind) because he doesn't like that it's in control of him (addiction), Health, family, money    12/19 update:  Did  Chantix from pharmacy ($40), but did not start. Fearful of side effects.  Has history of bad dreams with the urge to quit   Nicotine replacement and pharmacological options     Standard written patient education provided:  Medication Management Tobacco Cessation Program packet  Stopping smoking: Care Instructions  Deciding About Using Medicines To Quit Smoking  Learning About Benefits From Quitting Smoking  10 Things You Should Know About Quitting Smoking    Next appt:  3/26    Pt verbalized understanding of the above information and denied further questions or concerns. The total time of the visit was 45 min.     Farhad Bland PharmD, [facility]  Medication Management Clinic   [place] [address] Ph: [phone]  [facility] Ph: [phone]  3/12/2020 1:20 PM

## 2020-03-26 ENCOUNTER — SCHEDULED TELEPHONE ENCOUNTER (OUTPATIENT)
Dept: PHARMACY | Age: 65
End: 2020-03-26
Payer: COMMERCIAL

## 2020-03-26 PROCEDURE — 99211 OFF/OP EST MAY X REQ PHY/QHP: CPT

## 2020-03-26 NOTE — TELEPHONE ENCOUNTER
several years ago and is afraid it may cause vivid dreams. Has been able to cut back slightly on his own. Was able to eliminate the 1st cigarette of the day, is now working on the 2nd. Started playing pool to fill downtime. He has been able to avoid buying more cigarettes until completely out of current pack  It has been a busy week, and he hasn't been able to focus on quitting as much as he would like. He plans to go to [place] for [place]. 12/31 update:  Smoking \"a little less,\" but unable to give exact amount. Started Chantix yesterday. Has tolerated 2 doses so far. No side effects. Has been able to delay the 2nd cigarette more by switching AM routine (drink coffe before breakfast)  Sucking on mints  Used recumbent bike a couple times, but no routine yet. Moved cigarettes to basement (out of pocket) to make it more inconvenient. Has kept a list of things to do on his breaks. 1/14 update:  Continues Chantix 1 mg BID. Takes with food, but has been difficult because he does not eat at regular times during the day. Experiencing some nausea/ \"queesy\" stomach. Also having occasional slight anxiety, but nothing major and is able to tolerate. Denies depression/thoughts of suicide or harming himself. Smoking less, but is not counting exact number because he thinks it will make him think about smoking more. He knows he is not buying cigarettes as often. He does not buy cigarettes until he is completely out of them. He needs a new RX for chantix. He is trying to stay busy and avoid long breaks which triggers him to smoke. He has been able to postpone the cigarette many times. 1/30 update:   Continues Chantix 1 mg BID. Takes with food. Experiencing some nausea/ \"queesy\" stomach, but tolerable. Also having occasional slight anxiety, but nothing major and is able to tolerate. Denies depression/thoughts of suicide or harming himself. Rides his recumbent bike 2x per week for 15 minutes.   Smoking group class in future. Goals  Keep track of exercise on the same calendar that you keep track of smoking. Increase exercise to 3x per week for at least 20 minutes. Continue core exercises. Keep your list of reasons for quitting with a pack of cigarettes. Highlight primary motivation for quitting: HEALTH   Keep cutting back gradually with goal of no more than 4 cig/day  Suck on [employer] or drink more tea   Set aside money for grill when you achieve a goal    Next appt:  4/9    Pt verbalized understanding of the above information and denied further questions or concerns. The total time of the visit was 45 min.     Kwasi Zarco, PharmD, [facility]  Medication Management Clinic   [address] Ph: [phone]  Elena Johnson Ph: [phone]  3/26/2020 1:37 PM     CLINICAL PHARMACY CONSULT: MED RECONCILIATION/REVIEW ADDENDUM    For Pharmacy Admin Tracking Only    PHSO: No  Total # of Interventions Recommended: 1  - Decreased Dose #: 1 - taper chantix  Total Interventions Accepted: 1  Time Spent (min): 45    Lula WongD

## 2020-03-31 ENCOUNTER — SCHEDULED TELEPHONE ENCOUNTER (OUTPATIENT)
Dept: PHARMACY | Age: 65
End: 2020-03-31

## 2020-03-31 PROCEDURE — 99211 OFF/OP EST MAY X REQ PHY/QHP: CPT

## 2020-03-31 NOTE — TELEPHONE ENCOUNTER
CLINICAL PHARMACY NOTE--Smoking Cessation    SUBJECTIVE/OBJECTIVE:   Abraham Concepcion is a 59 y.o. male with PMHx significant for GERD, HTN, pulmonary emphesema referred by Dr Rick Azul for smoking cessation counseling and medication management. 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 30-60 min (previously immediately after waking)  Do you smoke 20 or more cigarettes each day? No  At times when you can't smoke or haven't got any cigarettes, do you feel a craving for one? Yes, but not if not keeping busy  Is it tough for you to keep from smoking for more than a few hours? No, not if in a place where he can't smoke  When you are sick enough to stay in bed, to you still smoke? no  If yes to two or more questions, consider using NRT    Reasons for addiction: Touch and Handle, Stress-relief, Habit, Addiction, \"something to do\"  Triggers: meals, stress, smoke breaks from his work; stopped smoking while driving, with coffee, alcohol (already tried to dissociate smoking with certain activities, but picked it up with other activities)  Barriers: not confident (only 5 on a 1-10 scale); He is \"afraid\" to run out of cigarettes. Motivation for quitting: Has wanted to quit for years (in back of mind) because he doesn't like that it's in control of him (addiction), Health, family, money    12/19 update:  Did  Chantix from pharmacy ($40), but did not start. Fearful of side effects.  Has history of bad dreams several years ago and is afraid it may cause vivid dreams. Has been able to cut back slightly on his own. Was able to eliminate the 1st cigarette of the day, is now working on the 2nd. Started playing pool to fill downtime. He has been able to avoid buying more cigarettes until completely out of current pack  It has been a busy week, and he hasn't been able to focus on quitting as much as he would like. He plans to go to [place] for [place]. 12/31 update:  Smoking \"a little less,\" but unable to give exact amount. Started Chantix yesterday. Has tolerated 2 doses so far. No side effects. Has been able to delay the 2nd cigarette more by switching AM routine (drink coffe before breakfast)  Sucking on mints  Used recumbent bike a couple times, but no routine yet. Moved cigarettes to basement (out of pocket) to make it more inconvenient. Has kept a list of things to do on his breaks. 1/14 update:  Continues Chantix 1 mg BID. Takes with food, but has been difficult because he does not eat at regular times during the day. Experiencing some nausea/ \"queesy\" stomach. Also having occasional slight anxiety, but nothing major and is able to tolerate. Denies depression/thoughts of suicide or harming himself. Smoking less, but is not counting exact number because he thinks it will make him think about smoking more. He knows he is not buying cigarettes as often. He does not buy cigarettes until he is completely out of them. He needs a new RX for chantix. He is trying to stay busy and avoid long breaks which triggers him to smoke. He has been able to postpone the cigarette many times. 1/30 update:   Continues Chantix 1 mg BID. Takes with food. Experiencing some nausea/ \"queesy\" stomach, but tolerable. Also having occasional slight anxiety, but nothing major and is able to tolerate. Denies depression/thoughts of suicide or harming himself. Rides his recumbent bike 2x per week for 15 minutes.   Smoking less, but is still not counting the exact number because he thinks it will make him think about smoking more. He is buying cigarettes less often and has placed them in his basement so has to go downstairs to get them when he wants to smoke. He does not like going down stairs. He has been able to walk back upstairs from the basement without bringing a cigarette with him. He made a list of reasons for quitting and placed it by his coat so he sees it when he is going outside to smoke. He does not smoke inside house or in car. He started using hard candies in place of cigarettes while he is preparing his meals. He often smokes right before and right after meals. He always brushes his teeth after meals. Patient feels that drinking coffee throughout the day would help keep him busy during breaks, and coffee has not been a trigger for him lately. 2/27  Has been able to reduce to 2 packs per week, but it was really hard. He surprised himself. Pt started drinking tea instead of coffee. Has been able to postpoine 1st cig at least a couple hours. Plans to avoid [facility], where he buys his cigs. Feels \"accomplished\" after exercising, but has only been able to exercise 2x per week. 3/12  Pt does not feel he did well with the goals we set at last visit 2/2 less focus and recent stressors (DDS, shingles vax, coronavirus). He didn't exercise as much. He increased to 1 pack in 3 days, then cut back down to 1 pack in 3.5 days. His cravings are less frequent, but not necessarily less strong. The recent [address] outbreak is worrying him because he is high risk. This is more motivation for him to quit. Pt requests a Chantix refill. Tried 1-800-QUIT-NOW- [place] it was not for him. 3/26  Pt reports having \"Up and downs. \" Coronavirus has really concerned him. He stopped Chantix for 2 days due to depression surrounding the situation, but realized that not listening to the news helped him much more.  Pt feels his mood is stable

## 2020-04-02 ENCOUNTER — SCHEDULED TELEPHONE ENCOUNTER (OUTPATIENT)
Dept: PHARMACY | Age: 65
End: 2020-04-02
Payer: COMMERCIAL

## 2020-04-02 PROCEDURE — 99211 OFF/OP EST MAY X REQ PHY/QHP: CPT

## 2020-04-02 NOTE — TELEPHONE ENCOUNTER
less, but is still not counting the exact number because he thinks it will make him think about smoking more. He is buying cigarettes less often and has placed them in his basement so has to go downstairs to get them when he wants to smoke. He does not like going down stairs. He has been able to walk back upstairs from the basement without bringing a cigarette with him. He made a list of reasons for quitting and placed it by his coat so he sees it when he is going outside to smoke. He does not smoke inside house or in car. He started using hard candies in place of cigarettes while he is preparing his meals. He often smokes right before and right after meals. He always brushes his teeth after meals. Patient feels that drinking coffee throughout the day would help keep him busy during breaks, and coffee has not been a trigger for him lately. 2/27  Has been able to reduce to 2 packs per week, but it was really hard. He surprised himself. Pt started drinking tea instead of coffee. Has been able to postpoine 1st cig at least a couple hours. Plans to avoid [facility], where he buys his cigs. Feels \"accomplished\" after exercising, but has only been able to exercise 2x per week. 3/12  Pt does not feel he did well with the goals we set at last visit 2/2 less focus and recent stressors (DDS, shingles vax, coronavirus). He didn't exercise as much. He increased to 1 pack in 3 days, then cut back down to 1 pack in 3.5 days. His cravings are less frequent, but not necessarily less strong. The recent [address] outbreak is worrying him because he is high risk. This is more motivation for him to quit. Pt requests a Chantix refill. Tried 1-800-QUIT-NOW- [place] it was not for him. 3/26  Pt reports having \"Up and downs. \" Coronavirus has really concerned him. He stopped Chantix for 2 days due to depression surrounding the situation, but realized that not listening to the news helped him much more.  Pt feels his mood is stable

## 2020-04-07 ENCOUNTER — SCHEDULED TELEPHONE ENCOUNTER (OUTPATIENT)
Dept: PHARMACY | Age: 65
End: 2020-04-07
Payer: COMMERCIAL

## 2020-04-07 PROCEDURE — 99211 OFF/OP EST MAY X REQ PHY/QHP: CPT

## 2020-04-13 ENCOUNTER — SCHEDULED TELEPHONE ENCOUNTER (OUTPATIENT)
Dept: PHARMACY | Age: 65
End: 2020-04-13
Payer: COMMERCIAL

## 2020-04-13 PROCEDURE — 99211 OFF/OP EST MAY X REQ PHY/QHP: CPT

## 2020-04-13 NOTE — TELEPHONE ENCOUNTER
less, but is still not counting the exact number because he thinks it will make him think about smoking more. He is buying cigarettes less often and has placed them in his basement so has to go downstairs to get them when he wants to smoke. He does not like going down stairs. He has been able to walk back upstairs from the basement without bringing a cigarette with him. He made a list of reasons for quitting and placed it by his coat so he sees it when he is going outside to smoke. He does not smoke inside house or in car. He started using hard candies in place of cigarettes while he is preparing his meals. He often smokes right before and right after meals. He always brushes his teeth after meals. Patient feels that drinking coffee throughout the day would help keep him busy during breaks, and coffee has not been a trigger for him lately. 2/27  Has been able to reduce to 2 packs per week, but it was really hard. He surprised himself. Pt started drinking tea instead of coffee. Has been able to postpoine 1st cig at least a couple hours. Plans to avoid [facility], where he buys his cigs. Feels \"accomplished\" after exercising, but has only been able to exercise 2x per week. 3/12  Pt does not feel he did well with the goals we set at last visit 2/2 less focus and recent stressors (DDS, shingles vax, coronavirus). He didn't exercise as much. He increased to 1 pack in 3 days, then cut back down to 1 pack in 3.5 days. His cravings are less frequent, but not necessarily less strong. The recent [address] outbreak is worrying him because he is high risk. This is more motivation for him to quit. Pt requests a Chantix refill. Tried 1-800-QUIT-NOW- [place] it was not for him. 3/26  Pt reports having \"Up and downs. \" Coronavirus has really concerned him. He stopped Chantix for 2 days due to depression surrounding the situation, but realized that not listening to the news helped him much more.  Pt feels his mood is stable

## 2020-04-15 ENCOUNTER — SCHEDULED TELEPHONE ENCOUNTER (OUTPATIENT)
Dept: PHARMACY | Age: 65
End: 2020-04-15
Payer: COMMERCIAL

## 2020-04-15 PROCEDURE — 99211 OFF/OP EST MAY X REQ PHY/QHP: CPT

## 2020-04-15 RX ORDER — AMOXICILLIN 500 MG/1
500 CAPSULE ORAL 3 TIMES DAILY
COMMUNITY
End: 2020-04-22 | Stop reason: ALTCHOICE

## 2020-04-15 NOTE — TELEPHONE ENCOUNTER
CLINICAL PHARMACY NOTE--Smoking Cessation    SUBJECTIVE/OBJECTIVE:   Porfirio Dawkins is a 59 y.o. male with PMHx significant for GERD, HTN, pulmonary emphesema referred by Dr Riri Portillo for smoking cessation counseling and medication management. 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 30-60 min (previously immediately after waking)  Do you smoke 20 or more cigarettes each day? No  At times when you can't smoke or haven't got any cigarettes, do you feel a craving for one? Yes, but not if not keeping busy  Is it tough for you to keep from smoking for more than a few hours? No, not if in a place where he can't smoke  When you are sick enough to stay in bed, to you still smoke? no  If yes to two or more questions, consider using NRT    Reasons for addiction: Touch and Handle, Stress-relief, Habit, Addiction, \"something to do\"  Triggers: meals, stress, smoke breaks from his work; stopped smoking while driving, with coffee, alcohol (already tried to dissociate smoking with certain activities, but picked it up with other activities)  Barriers: not confident (only 5 on a 1-10 scale); He is \"afraid\" to run out of cigarettes. Motivation for quitting: Has wanted to quit for years (in back of mind) because he doesn't like that it's in control of him (addiction), Health, family, money    12/19 update:  Did  Chantix from pharmacy ($40), but did not start. Fearful of side effects.  Has history of bad dreams tablet 1    sucralfate (CARAFATE) 1 GM tablet TAKE 1 TABLET THREE TIMES A  tablet 4    potassium chloride (KLOR-CON M) 10 MEQ extended release tablet TAKE 2 TABLETS DAILY 180 tablet 1    Loratadine (CLARITIN PO) Take  by mouth.  Multiple Vitamin (MULTIVITAMIN PO) Take 1 capsule by mouth daily. No current facility-administered medications for this visit. Pertinent Labs/Vitals  Scr 0.9 (11/21/19)    ASSESSMENT/PLAN:   Patient has been tobacco free x 19 days! He still has occasional cravings, but is able to overcome them. Continue Chantix to 1 mg BID. Pt reminded to stop medication for any worsening mood or intolerable nausea. We discussed reducing dose to 0.5 mg BID, but pt prefers to continue full dose for now. May consider group class in future. For withdrawal sxs: Discussed good sleep hygiene, herbal tea before bed, drinking more water, suck on sugar-free candy. Goals  Write list of health benefits  To do list  Deep clean home and coat (waiting)  Rewards: grill, new sheet music, star trek. Exercise 6 d per week: Core, walk, bike-- Keep track of exercise on the same calendar that you keep track of smoking. Remember a lapse is not a relapse. Don't let one cigarette turn back into old habits. Suck on [employer], sugar free candy, or drink more tea     Next appt:  4/22- pt requested earlier call because he wasn't able to speak long due to DDS appt. Pt verbalized understanding of the above information and denied further questions or concerns. The total time of the visit was 30 min.     Miguel Rose, PharmD, [facility]  Medication Management Clinic   [facility] [address] Ph: [phone]  [facility] Ph: [phone]  4/15/2020 10:54 AM     CLINICAL PHARMACY CONSULT: MED RECONCILIATION/REVIEW ADDENDUM    For Pharmacy Admin Tracking Only    PHSO: No  Total # of Interventions Recommended: 0 - amoxicillin  - New Order #: 1 New Medication Order Reason(s): Needs Additional Medication Therapy  Total Interventions Accepted: 0  Time Spent (min): 30    Irvin Ritter, PharmD

## 2020-04-22 ENCOUNTER — SCHEDULED TELEPHONE ENCOUNTER (OUTPATIENT)
Dept: PHARMACY | Age: 65
End: 2020-04-22
Payer: COMMERCIAL

## 2020-04-22 PROCEDURE — 99211 OFF/OP EST MAY X REQ PHY/QHP: CPT

## 2020-04-22 NOTE — TELEPHONE ENCOUNTER
several years ago and is afraid it may cause vivid dreams. Has been able to cut back slightly on his own. Was able to eliminate the 1st cigarette of the day, is now working on the 2nd. Started playing pool to fill downtime. He has been able to avoid buying more cigarettes until completely out of current pack  It has been a busy week, and he hasn't been able to focus on quitting as much as he would like. He plans to go to [place] for [place]. 12/31 update:  Smoking \"a little less,\" but unable to give exact amount. Started Chantix yesterday. Has tolerated 2 doses so far. No side effects. Has been able to delay the 2nd cigarette more by switching AM routine (drink coffe before breakfast)  Sucking on mints  Used recumbent bike a couple times, but no routine yet. Moved cigarettes to basement (out of pocket) to make it more inconvenient. Has kept a list of things to do on his breaks. 1/14 update:  Continues Chantix 1 mg BID. Takes with food, but has been difficult because he does not eat at regular times during the day. Experiencing some nausea/ \"queesy\" stomach. Also having occasional slight anxiety, but nothing major and is able to tolerate. Denies depression/thoughts of suicide or harming himself. Smoking less, but is not counting exact number because he thinks it will make him think about smoking more. He knows he is not buying cigarettes as often. He does not buy cigarettes until he is completely out of them. He needs a new RX for chantix. He is trying to stay busy and avoid long breaks which triggers him to smoke. He has been able to postpone the cigarette many times. 1/30 update:   Continues Chantix 1 mg BID. Takes with food. Experiencing some nausea/ \"queesy\" stomach, but tolerable. Also having occasional slight anxiety, but nothing major and is able to tolerate. Denies depression/thoughts of suicide or harming himself. Rides his recumbent bike 2x per week for 15 minutes.   Smoking less, but is still not counting the exact number because he thinks it will make him think about smoking more. He is buying cigarettes less often and has placed them in his basement so has to go downstairs to get them when he wants to smoke. He does not like going down stairs. He has been able to walk back upstairs from the basement without bringing a cigarette with him. He made a list of reasons for quitting and placed it by his coat so he sees it when he is going outside to smoke. He does not smoke inside house or in car. He started using hard candies in place of cigarettes while he is preparing his meals. He often smokes right before and right after meals. He always brushes his teeth after meals. Patient feels that drinking coffee throughout the day would help keep him busy during breaks, and coffee has not been a trigger for him lately. 2/27  Has been able to reduce to 2 packs per week, but it was really hard. He surprised himself. Pt started drinking tea instead of coffee. Has been able to postpoine 1st cig at least a couple hours. Plans to avoid [facility], where he buys his cigs. Feels \"accomplished\" after exercising, but has only been able to exercise 2x per week. 3/12  Pt does not feel he did well with the goals we set at last visit 2/2 less focus and recent stressors (DDS, shingles vax, coronavirus). He didn't exercise as much. He increased to 1 pack in 3 days, then cut back down to 1 pack in 3.5 days. His cravings are less frequent, but not necessarily less strong. The recent [address] outbreak is worrying him because he is high risk. This is more motivation for him to quit. Pt requests a Chantix refill. Tried 1-800-QUIT-NOW- [place] it was not for him. 3/26  Pt reports having \"Up and downs. \" Coronavirus has really concerned him. He stopped Chantix for 2 days due to depression surrounding the situation, but realized that not listening to the news helped him much more.  Pt feels his mood is stable hours per day  Learn a new piano song and play it for your sister. Increase days or duration of exercise -- Keep track of exercise on the same calendar that you keep track of smoking. Reward yourself when you reach goals (grill, new sheet music, star trek, jigsaw puzzle, book)  Remember a lapse is not a relapse. Don't let one cigarette turn back into old habits. Next appt:       Pt verbalized understanding of the above information and denied further questions or concerns. The total time of the visit was 30 min.     Lula FloresD, [facility]  Medication Management Clinic   [facility] [address] Ph: [phone]  Vero Lalito Ph: [phone]  2020 11:15 AM     CLINICAL PHARMACY CONSULT: MED RECONCILIATION/REVIEW ADDENDUM    For Pharmacy Admin Tracking Only    PHSO: No  Total # of Interventions Recommended: 0  - Updated Order #: 1 Updated Order Reason(s):  Medication  Total Interventions Accepted: 0  Time Spent (min): 30    Mike Rico PharmD

## 2020-04-29 ENCOUNTER — SCHEDULED TELEPHONE ENCOUNTER (OUTPATIENT)
Dept: PHARMACY | Age: 65
End: 2020-04-29
Payer: COMMERCIAL

## 2020-04-29 PROCEDURE — 99211 OFF/OP EST MAY X REQ PHY/QHP: CPT

## 2020-04-29 NOTE — TELEPHONE ENCOUNTER
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 \" Urge lasted ~10 min. Craving rated 7/10.    2) after a really long phone call, paced, ate a piece of candy (max 1-2 pieces candy per day)  3) found himself at the door that he goes out to smoke; he is not sure what led to that moment; asked himself what am I doing? Was able to leave the situation    4/13  Pt on way to dentist for toothache. He watched star trek this weekend for 2 week reward. For next reward, he wants to buy new sheet music for [employer] since he cannot go to lessons. He would like to learn \"I can only imagine. \" Took 1 walk, bike x 2, but no core exercises. Will pick back up this week. 4/15  Root canal on 4/13. Started amoxicillin 500 mg TID. Exercise going well, except for past couple days. Unable to figure out what foods relieve nausea from Chantix. Urge 4/13: after leaving [facility], had 2 hours to kill before root canal, sat in car with nothing to do, feeling anxious, read his book and listened to music; always used to smoke when he needs to kills time. Missed Chantix on 4/13, took 1 dose on 4/13.     4/22  Still needs dental work done, fillings next week. Core exercise 2x per week, bike 2-3x per week-- feeling stronger. Tobacco cravings \"up and down. \" Most days they are less frequent, intensity varies. Is able to overcome. Watched star trek this weekend as reward. 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 Sunday night craving: made food for the whole week and was on feet all day; had to clean up after, strong craving after he took a break, created a list of distractions when he has cravings: jigsaw puzzle, read book.    Still having trouble sleeping: plans to read old spiritual book (11/21/19)    ASSESSMENT/PLAN:   Patient has been tobacco free x 1 month! He still has occasional cravings, but is able to overcome them. Continue Chantix to 1 mg BID. Pt reminded to stop medication for any worsening mood or intolerable nausea. We discussed reducing dose to 0.5 mg BID due to nausea and slightly depressed mood, but pt prefers to continue full dose for now. For withdrawal sxs: Discussed good sleep hygiene, reading, herbal tea before bed, drinking more water, suck on sugar-free candy. Call with new insurance so we can confirm Chantix is on formulary. 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 Increase days or duration of exercise -- Keep track of exercise on the same calendar that you keep track of smoking. Reward yourself when you reach goals (grill, new sheet music, star trek, jigsaw puzzle, book)  Remember a lapse is not a relapse. Don't let one cigarette turn back into old habits. Next appt:  5/4    Pt verbalized understanding of the above information and denied further questions or concerns. The total time of the visit was 30 min.     Zachery Tejeda PharmD, [facility]  Medication Management Clinic   [place] Ph: [phone]  Noa Silverman Ph: [phone]  4/29/2020 10:49 AM     CLINICAL PHARMACY CONSULT: MED RECONCILIATION/REVIEW ADDENDUM    For Pharmacy Admin Tracking Only    PHSO: No  Total # of Interventions Recommended: 0  Total Interventions Accepted: 0  Time Spent (min): 30    Nan Jarvis PharmD

## 2020-05-06 ENCOUNTER — VIRTUAL VISIT (OUTPATIENT)
Dept: PRIMARY CARE CLINIC | Age: 65
End: 2020-05-06
Payer: COMMERCIAL

## 2020-05-06 ENCOUNTER — SCHEDULED TELEPHONE ENCOUNTER (OUTPATIENT)
Dept: PHARMACY | Age: 65
End: 2020-05-06

## 2020-05-06 PROBLEM — M48.062 SPINAL STENOSIS OF LUMBAR REGION WITH NEUROGENIC CLAUDICATION: Status: ACTIVE | Noted: 2020-05-06

## 2020-05-06 PROBLEM — A60.00 RECURRENT GENITAL HERPES SIMPLEX: Status: ACTIVE | Noted: 2020-05-06

## 2020-05-06 PROBLEM — Z87.891 FORMER SMOKER: Status: ACTIVE | Noted: 2020-05-06

## 2020-05-06 PROCEDURE — 99214 OFFICE O/P EST MOD 30 MIN: CPT | Performed by: FAMILY MEDICINE

## 2020-05-06 RX ORDER — ESOMEPRAZOLE MAGNESIUM 40 MG/1
40 CAPSULE, DELAYED RELEASE ORAL DAILY
Qty: 180 CAPSULE | Refills: 1
Start: 2020-05-06 | End: 2020-10-05

## 2020-05-06 RX ORDER — LORATADINE 10 MG/1
10 TABLET ORAL DAILY
COMMUNITY

## 2020-05-06 ASSESSMENT — ENCOUNTER SYMPTOMS
WHEEZING: 0
SORE THROAT: 0
NAUSEA: 0
BLOOD IN STOOL: 0
DIARRHEA: 0
COUGH: 0
BACK PAIN: 1
SHORTNESS OF BREATH: 0
ABDOMINAL PAIN: 0
EYE ITCHING: 0
EYE PAIN: 0
VOMITING: 0
CONSTIPATION: 0

## 2020-05-06 NOTE — TELEPHONE ENCOUNTER
several years ago and is afraid it may cause vivid dreams. Has been able to cut back slightly on his own. Was able to eliminate the 1st cigarette of the day, is now working on the 2nd. Started playing pool to fill downtime. He has been able to avoid buying more cigarettes until completely out of current pack  It has been a busy week, and he hasn't been able to focus on quitting as much as he would like. He plans to go to [place] for [employer]. 12/31 update:  Smoking \"a little less,\" but unable to give exact amount. Started Chantix yesterday. Has tolerated 2 doses so far. No side effects. Has been able to delay the 2nd cigarette more by switching AM routine (drink coffe before breakfast)  Sucking on mints  Used recumbent bike a couple times, but no routine yet. Moved cigarettes to basement (out of pocket) to make it more inconvenient. Has kept a list of things to do on his breaks. 1/14 update:  Continues Chantix 1 mg BID. Takes with food, but has been difficult because he does not eat at regular times during the day. Experiencing some nausea/ \"queesy\" stomach. Also having occasional slight anxiety, but nothing major and is able to tolerate. Denies depression/thoughts of suicide or harming himself. Smoking less, but is not counting exact number because he thinks it will make him think about smoking more. He knows he is not buying cigarettes as often. He does not buy cigarettes until he is completely out of them. He needs a new RX for chantix. He is trying to stay busy and avoid long breaks which triggers him to smoke. He has been able to postpone the cigarette many times. 1/30 update:   Continues Chantix 1 mg BID. Takes with food. Experiencing some nausea/ \"queesy\" stomach, but tolerable. Also having occasional slight anxiety, but nothing major and is able to tolerate. Denies depression/thoughts of suicide or harming himself. Rides his recumbent bike 2x per week for 15 minutes.   Smoking less, but is still not counting the exact number because he thinks it will make him think about smoking more. He is buying cigarettes less often and has placed them in his basement so has to go downstairs to get them when he wants to smoke. He does not like going down stairs. He has been able to walk back upstairs from the basement without bringing a cigarette with him. He made a list of reasons for quitting and placed it by his coat so he sees it when he is going outside to smoke. He does not smoke inside house or in car. He started using hard candies in place of cigarettes while he is preparing his meals. He often smokes right before and right after meals. He always brushes his teeth after meals. Patient feels that drinking coffee throughout the day would help keep him busy during breaks, and coffee has not been a trigger for him lately. 2/27  Has been able to reduce to 2 packs per week, but it was really hard. He surprised himself. Pt started drinking tea instead of coffee. Has been able to postpoine 1st cig at least a couple hours. Plans to avoid [facility], where he buys his cigs. Feels \"accomplished\" after exercising, but has only been able to exercise 2x per week. 3/12  Pt does not feel he did well with the goals we set at last visit 2/2 less focus and recent stressors (DDS, shingles vax, coronavirus). He didn't exercise as much. He increased to 1 pack in 3 days, then cut back down to 1 pack in 3.5 days. His cravings are less frequent, but not necessarily less strong. The recent [address] outbreak is worrying him because he is high risk. This is more motivation for him to quit. Pt requests a Chantix refill. Tried 1-800-QUIT-NOW- [place] it was not for him. 3/26  Pt reports having \"Up and downs. \" Coronavirus has really concerned him. He stopped Chantix for 2 days due to depression surrounding the situation, but realized that not listening to the news helped him much more.  Pt feels his mood is stable and would like to resume chantix. Started keeping track of when he smokes (exact #s). He smoked more for a couple days, but he has been able to limit to 4 cig/day the past 2 days! This equates to goal of no more than 1 pack every 5 days. 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 He increased exercise to 20 min 2x per week and started core exercises. He is listening to an old CD while he exercises, which helps. He feel accomplished after exercise. 3/31  Pt quit smoking 3 days ago on 3/28! Motivation was fear. Pt started back on 0.5 mg once daily. Will increase to 0.5 mg BID. Things that worked: prayer, staying occupied, stopped watching the news because it was depressing. 4/2  Will increase Chantix today to 0.5 mg BID  He did put lighter out of pocket/ out of sight  Doesn't have an jacquelyn tray, he put the cigarette butts in a planter pot. Suggested he move this to a different location and plant a new plant or flowers in it. His urges continue to be able piano and after programming. His biggest urge to overcome was when he had been working on programming or several months and finally had to test if it worked, and it did not work. He had a very strong urge to smoke but paced and prayed, then occupied himself with something different. He is not ready to throw away the cigarettes, but he did put them out of site and doesn't remember where they are. Withdrawal sxs include: nasal drip, insomnia, irritability    4/7 11th day of ebing tobacco free. Scared that he might relapse. Reports mood is stable-- slightly anxious/worried about COVID19, but better than before. Withdrawal sxs include: nasal drip better, irritability, insomnia but not sure if 2/2 withdrawal or worry about COVID19. Increased water intake from 3 cups to 6 cups per day.    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 \" Urge lasted ~10 min. Craving rated 7/10.    2) after a really long phone call, paced, ate a piece of candy (max 1-2 pieces candy per day)  3) found himself at the door that he goes out to smoke; he is not sure what led to that moment; asked himself what am I doing? Was able to leave the situation    4/13  Pt on way to dentist for toothache. He watched star trek this weekend for 2 week reward. For next reward, he wants to buy new sheet music for [employer] since he cannot go to lessons. He would like to learn \"I can only imagine. \" Took 1 walk, bike x 2, but no core exercises. Will pick back up this week. 4/15  Root canal on 4/13. Started amoxicillin 500 mg TID. Exercise going well, except for past couple days. Unable to figure out what foods relieve nausea from Chantix. Urge 4/13: after leaving [facility], had 2 hours to kill before root canal, sat in car with nothing to do, feeling anxious, read his book and listened to music; always used to smoke when he needs to kills time. Missed Chantix on 4/13, took 1 dose on 4/13.     4/22  Still needs dental work done, fillings next week. Core exercise 2x per week, bike 2-3x per week-- feeling stronger. Tobacco cravings \"up and down. \" Most days they are less frequent, intensity varies. Is able to overcome. Watched star trek this weekend as reward. 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 Sunday night craving: made food for the whole week and was on feet all day; had to clean up after, strong craving after he took a break, created a list of distractions when he has cravings: jigsaw puzzle, read book.    Still having trouble sleeping: plans to read old spiritual book before bed. Drainage much better. Slightly more depressed due to [address]. Does not feel this is due to Chantix, just situational. 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 Pt denies any thought of harming himself or others. 4/29  Pt smoke free x 1 month! Watched an old [employer] game as reward. Sleeping improved now that he is reading 30 min before bed. Piano 1 hour 4x/per week--found a tutorial on how to play \"I can only imagine\" and he is trying to write [employer] own sheet music for it. Tried working on software again. He is trying to make a routine. Lifting COVID restrictions is worrying him. He has been busy looking for new insurance as his [employer] expires this month and he is inelligible for [employer] until oct. It has been time consuming. He has a pcp visit next week. Needs to make sure all his RXs are covered under new plan. 5/6  Mood is \"up and down\"- manly due to coronavirus pandemic, but feels it is up more often than down. Pt new [employer] insurance starts today. Cravings are rare with short duration, but still strong (7 out of 10). Saw PCP today. Walks outside. Has been on Chantix x 4 months. Is not sure if it's covered by new insurance.      Pharmacy: [place], [place]    Current Medication and Allergy List Reviewed and Updated:  Current Outpatient Medications   Medication Sig Dispense Refill    loratadine (CLARITIN) 10 MG tablet Take 10 mg by mouth daily      Misc Natural Products (GLUCOSAMINE CHOND DOUBLE STR PO) Take 1 tablet by mouth daily      esomeprazole (NEXIUM) 40 MG delayed release capsule Take 1 capsule by mouth daily 180 capsule 1    varenicline (CHANTIX) 1 MG tablet Take 1 tablet by mouth 2 times daily 60 tablet 1    hydrochlorothiazide (HYDRODIURIL) 25 MG tablet TAKE ONE AND ONE-HALF TABLETS DAILY 135 tablet 4    olmesartan (BENICAR) 40 MG tablet TAKE 1 TABLET DAILY 90 tablet 4    acyclovir (ZOVIRAX) 400 MG tablet TAKE ONE TABLET BY MOUTH THREE TIMES A DAY FOR 10 DAYS FOR HERPES SIMPLEX FLARE-UP 90 tablet 1    potassium chloride (KLOR-CON M) 10 MEQ extended release tablet TAKE 2 TABLETS DAILY 180 tablet 1    Multiple Vitamin (MULTIVITAMIN PO) Take 1 capsule by mouth daily. No current facility-administered medications for this visit. Pertinent Labs/Vitals  Scr 0.9 (11/21/19)    ASSESSMENT/PLAN:   Patient has been tobacco free x >1 month! He still has occasional cravings, but is able to overcome them. Continue Chantix to 1 mg BID. Pt reminded to stop medication for any worsening mood or intolerable nausea. We discussed reducing dose to 0.5 mg BID due to nausea and slightly depressed mood, but pt prefers to continue full dose for now. For withdrawal sxs: Discussed good sleep hygiene, reading, herbal tea before bed, drinking more water, suck on sugar-free candy. Call pharmacy with new insurance so we can confirm Chantix is on formulary. 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 Increase days or duration of exercise -- Keep track of exercise on the same calendar that you keep track of smoking. Reward yourself when you reach goals (grill, new sheet music, star trek, jigsaw puzzle, book)  Remember a lapse is not a relapse. Don't let one cigarette turn back into old habits. Next appt:  5/20    Pt verbalized understanding of the above information and denied further questions or concerns. The total time of the visit was 30 min.     Mauricio Hernandes, PharmD, [facility]  Medication Management Clinic   [facility] [address] Ph: [phone]  Angelene Holter Ph: [phone]  5/6/2020 11:19 AM     CLINICAL PHARMACY CONSULT: MED RECONCILIATION/REVIEW ADDENDUM    For Pharmacy Admin Tracking Only    PHSO: No  Total # of Interventions Recommended: 0  Total Interventions Accepted: 0  Time Spent (min): 30    Harper Saenz PharmD

## 2020-05-06 NOTE — PROGRESS NOTES
HENT:      Head: Normocephalic and atraumatic. Eyes:      Extraocular Movements: Extraocular movements intact. Conjunctiva/sclera: Conjunctivae normal.   Pulmonary:      Effort: Pulmonary effort is normal.   Neurological:      General: No focal deficit present. Mental Status: He is alert and oriented to person, place, and time. Psychiatric:         Mood and Affect: Mood normal.         Thought Content: Thought content normal.         Assessment:  Encounter Diagnoses   Name Primary?  Gastroesophageal reflux disease, esophagitis presence not specified Yes    Essential hypertension     Spinal stenosis of lumbar region with neurogenic claudication     Primary osteoarthritis involving multiple joints - knees     Pulmonary emphysema, unspecified emphysema type ([address]     Former smoker     Recurrent genital herpes simplex        Plan:  1. Gastroesophageal reflux disease, esophagitis presence not specified  Chronic by patient's report. There is not a whole lot of detail within the chart regarding the specifics about any more intrusive etiology. At this time we have discussed his currently reported medications and what I would recommend adjusting. I would like for him to reduce his Nexium to just once daily. I would like for him to stop the Carafate altogether. He will call me in about 3 to 4 weeks to let me know how he is feeling. 2. Essential hypertension  Chronic, reviewed medications. No change at this time. 3. Spinal stenosis of lumbar region with neurogenic claudication  Noted on MRI from 2018. It sounds like with his previous primary care doctor they had completed this work-up and he is actually had what sounds like an injection to the lumbar spine in the past.  At this time he does seem to be having some fairly regular symptoms related to this and there could be some benefit to more aggressive treatment.   Patient is happy with his current treatment regimen, and we will simply dragon dictation software. Although every effort was made to ensure the accuracy of this automated transcription, some errors in transcription may have occurred.

## 2020-05-20 ENCOUNTER — SCHEDULED TELEPHONE ENCOUNTER (OUTPATIENT)
Dept: PHARMACY | Age: 65
End: 2020-05-20

## 2020-05-27 ENCOUNTER — SCHEDULED TELEPHONE ENCOUNTER (OUTPATIENT)
Dept: PHARMACY | Age: 65
End: 2020-05-27

## 2020-05-27 NOTE — TELEPHONE ENCOUNTER
CLINICAL PHARMACY NOTE--Smoking Cessation    SUBJECTIVE/OBJECTIVE:   Julian Roblero is a 59 y.o. male with PMHx significant for GERD, HTN, pulmonary emphesema referred by Dr Adonis Mclaughlin for smoking cessation counseling and medication management. Spoke with patient over the phone. 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 30-60 min (previously immediately after waking)  Do you smoke 20 or more cigarettes each day? No  At times when you can't smoke or haven't got any cigarettes, do you feel a craving for one? Yes, but not if not keeping busy  Is it tough for you to keep from smoking for more than a few hours? No, not if in a place where he can't smoke  When you are sick enough to stay in bed, to you still smoke? no  If yes to two or more questions, consider using NRT    Reasons for addiction: Touch and Handle, Stress-relief, Habit, Addiction, \"something to do\"  Triggers: meals, stress, smoke breaks from his work; stopped smoking while driving, with coffee, alcohol (already tried to dissociate smoking with certain activities, but picked it up with other activities)  Barriers: not confident (only 5 on a 1-10 scale); He is \"afraid\" to run out of cigarettes. Motivation for quitting: Has wanted to quit for years (in back of mind) because he doesn't like that it's in control of him (addiction), Health, family, money    12/19 update:  Did  Chantix from pharmacy ($40), but did not start.  Fearful of side

## 2020-06-03 ENCOUNTER — SCHEDULED TELEPHONE ENCOUNTER (OUTPATIENT)
Dept: PHARMACY | Age: 65
End: 2020-06-03

## 2020-06-03 RX ORDER — VARENICLINE TARTRATE 1 MG/1
0.5 TABLET, FILM COATED ORAL 2 TIMES DAILY
Qty: 30 TABLET | Refills: 0 | Status: SHIPPED | OUTPATIENT
Start: 2020-06-03 | End: 2020-08-05 | Stop reason: SDUPTHER

## 2020-06-03 NOTE — TELEPHONE ENCOUNTER
CLINICAL PHARMACY NOTE--Smoking Cessation    SUBJECTIVE/OBJECTIVE:   Abraham Concepcion is a 59 y.o. male with PMHx significant for GERD, HTN, pulmonary emphesema referred by Dr Rick Azul for smoking cessation counseling and medication management. Spoke with patient over the phone. 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 30-60 min (previously immediately after waking)  Do you smoke 20 or more cigarettes each day? No  At times when you can't smoke or haven't got any cigarettes, do you feel a craving for one? Yes, but not if not keeping busy  Is it tough for you to keep from smoking for more than a few hours? No, not if in a place where he can't smoke  When you are sick enough to stay in bed, to you still smoke? no  If yes to two or more questions, consider using NRT    Reasons for addiction: Touch and Handle, Stress-relief, Habit, Addiction, \"something to do\"  Triggers: meals, stress, smoke breaks from his work; stopped smoking while driving, with coffee, alcohol (already tried to dissociate smoking with certain activities, but picked it up with other activities)  Barriers: not confident (only 5 on a 1-10 scale); He is \"afraid\" to run out of cigarettes. Motivation for quitting: Has wanted to quit for years (in back of mind) because he doesn't like that it's in control of him (addiction), Health, family, money    12/19 update:  Did  Chantix from pharmacy ($40), but did not start.  Fearful of side plans to read old spiritual book before bed. Drainage much better. Slightly more depressed due to [address] Does not feel this is due to Chantix, just situational. 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 Pt denies any thought of harming himself or others. 4/29  Pt smoke free x 1 month! Watched an old [employer] game as reward. Sleeping improved now that he is reading 30 min before bed. 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 Tried working on software again. He is trying to make a routine. Lifting COVID restrictions is worrying him. He has been busy looking for new insurance as his [employer] expires this month and he is inelligible for [employer] until oct. It has been time consuming. He has a pcp visit next week. Needs to make sure all his RXs are covered under new plan. 5/6  Mood is \"up and down\"- manly due to coronavirus pandemic, but feels it is up more often than down. Pt new [employer] insurance starts today. Cravings are rare with short duration, but still strong (7 out of 10). Saw PCP today. Walks outside. Has been on Chantix x 4 months. Is not sure if it's covered by new insurance. 5/20  No lapses. Still some strong urges avg once per day. Bike 20 minutes 3x per week, increasing intensity \"from 1 to 2\". Continues core exercises. Working on writing sheet music for \"I can only imagine\". Increased amount of time spending on software, gradually. Increased nausea with Chantix. Mood is good, better than before. 5/27  No increase in cravings after cutting back slightly on Chantix. Nausea improved slightly possibly. A couple strong cravings but able to overcome. Increased exercise from 20 to 30 min on bike 3x per week, core exercises 1x per week, would like to increase to 2x per week. Feels good while riding bike, feels tired/accomplished after.  Plays upbeat capsule by mouth daily. No current facility-administered medications for this visit. Pertinent Labs/Vitals  Scr 0.9 (11/21/19)    ASSESSMENT/PLAN:   Patient has been tobacco free for > 2 months! He still has occasional mild cravings, but is able to overcome them. --Continue reduced dose Chantix: 0.5 mg BID to help reduce nausea. Sent refill. Consider d/c in the next month, as patient has been taking for ~6 months. --Discussed that GERD sxs likely improved because he quit smoking. Encouraged pt to discuss slow taper with PCP due to concern over potential adverse effects associated with long term therapy. Explained that a sudden d/c of PPIS may lead to rebound sxs. 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 Increase intensity of biking from 2 to 3 or increase length. Keep track of exercise on the same calendar that you keep track of smoking. Reward yourself when you reach goals (grill, new sheet music, star trek, jigsaw puzzle, book)  Remember a lapse is not a relapse. Don't let one cigarette turn back into old habits. Next appt:  6/17    Pt verbalized understanding of the above information and denied further questions or concerns. The total time of the visit was 30 min.     Noe García PharmD, [facility]  Medication Management Clinic   [address] Ph: [phone]  Andrew Nelson Ph: [phone]  6/3/2020 10:32 AM     CLINICAL PHARMACY CONSULT: MED RECONCILIATION/REVIEW ADDENDUM    For Pharmacy Admin Tracking Only    PHSO: No  Total # of Interventions Recommended: 1  - Decreased Dose #: 1  - Refills Provided #: 1  Total Interventions Accepted: 1  Time Spent (min): 30    Emmie Sanchez PharmD

## 2020-06-17 ENCOUNTER — SCHEDULED TELEPHONE ENCOUNTER (OUTPATIENT)
Dept: PHARMACY | Age: 65
End: 2020-06-17
Payer: COMMERCIAL

## 2020-06-17 ENCOUNTER — TELEPHONE (OUTPATIENT)
Dept: PHARMACY | Age: 65
End: 2020-06-17

## 2020-06-17 PROCEDURE — 99211 OFF/OP EST MAY X REQ PHY/QHP: CPT

## 2020-06-17 NOTE — TELEPHONE ENCOUNTER
update:  Did  Chantix from pharmacy ($40), but did not start. Fearful of side effects. Has history of bad dreams several years ago and is afraid it may cause vivid dreams. Has been able to cut back slightly on his own. Was able to eliminate the 1st cigarette of the day, is now working on the 2nd. Started playing pool to fill downtime. He has been able to avoid buying more cigarettes until completely out of current pack  It has been a busy week, and he hasn't been able to focus on quitting as much as he would like. He plans to go to [place] for [place]. 12/31 update:  Smoking \"a little less,\" but unable to give exact amount. Started Chantix yesterday. Has tolerated 2 doses so far. No side effects. Has been able to delay the 2nd cigarette more by switching AM routine (drink coffe before breakfast)  Sucking on mints  Used recumbent bike a couple times, but no routine yet. Moved cigarettes to basement (out of pocket) to make it more inconvenient. Has kept a list of things to do on his breaks. 1/14 update:  Continues Chantix 1 mg BID. Takes with food, but has been difficult because he does not eat at regular times during the day. Experiencing some nausea/ \"queesy\" stomach. Also having occasional slight anxiety, but nothing major and is able to tolerate. Denies depression/thoughts of suicide or harming himself. Smoking less, but is not counting exact number because he thinks it will make him think about smoking more. He knows he is not buying cigarettes as often. He does not buy cigarettes until he is completely out of them. He needs a new RX for chantix. He is trying to stay busy and avoid long breaks which triggers him to smoke. He has been able to postpone the cigarette many times. 1/30 update:   Continues Chantix 1 mg BID. Takes with food. Experiencing some nausea/ \"queesy\" stomach, but tolerable. Also having occasional slight anxiety, but nothing major and is able to tolerate. 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 \" Urge lasted ~10 min. Craving rated 7/10.    2) after a really long phone call, paced, ate a piece of candy (max 1-2 pieces candy per day)  3) found himself at the door that he goes out to smoke; he is not sure what led to that moment; asked himself what am I doing? Was able to leave the situation    4/13  Pt on way to dentist for toothache. He watched star je this weekend for 2 week reward. For next reward, he wants to buy new sheet music for [employer] since he cannot go to lessons. He would like to learn \"I can only imagine. \" Took 1 walk, bike x 2, but no core exercises. Will pick back up this week. 4/15  Root canal on 4/13. Started amoxicillin 500 mg TID. Exercise going well, except for past couple days. Unable to figure out what foods relieve nausea from Chantix. Urge 4/13: after leaving [facility], had 2 hours to kill before root canal, sat in car with nothing to do, feeling anxious, read his book and listened to music; always used to smoke when he needs to kills time. Missed Chantix on 4/13, took 1 dose on 4/13.     4/22  Still needs dental work done, fillings next week. Core exercise 2x per week, bike 2-3x per week-- feeling stronger. Tobacco cravings \"up and down. \" Most days they are less frequent, intensity varies. Is able to overcome. Watched star je this weekend as reward. 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    Sunday night craving: made food for the whole week and was on feet all day; had to clean up after, strong craving after he took a break, created a list of

## 2020-07-06 ENCOUNTER — SCHEDULED TELEPHONE ENCOUNTER (OUTPATIENT)
Dept: PHARMACY | Age: 65
End: 2020-07-06

## 2020-07-06 NOTE — TELEPHONE ENCOUNTER
CLINICAL PHARMACY NOTE--Smoking Cessation    SUBJECTIVE/OBJECTIVE:   Melvin Salmon is a 59 y.o. male with PMHx significant for GERD, HTN, pulmonary emphesema referred by Dr Josephine Wells for smoking cessation counseling and medication management. Patient has since est care with Dr Yan Valverde, as Dr Josephine Wells left the practice. Spoke with patient over the phone on 07/06/20. 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 30-60 min (previously immediately after waking)  Do you smoke 20 or more cigarettes each day? No  At times when you can't smoke or haven't got any cigarettes, do you feel a craving for one? Yes, but not if not keeping busy  Is it tough for you to keep from smoking for more than a few hours? No, not if in a place where he can't smoke  When you are sick enough to stay in bed, to you still smoke? no  If yes to two or more questions, consider using NRT    Reasons for addiction: [place], Stress-relief, Habit, Addiction, \"something to do\"  Triggers: meals, stress, smoke breaks from his work; stopped smoking while driving, with coffee, alcohol (already tried to dissociate smoking with certain activities, but picked it up with other activities)  Barriers: not confident (only 5 on a 1-10 scale); He is \"afraid\" to run out of cigarettes.   Motivation for quitting: Has wanted to quit for years (in back of mind) because he doesn't like that it's in control of him (addiction), Health, family, money    12/19 update:  Did  Chantix from pharmacy ($40), but did not start. Fearful of side effects. Has history of bad dreams several years ago and is afraid it may cause vivid dreams. Has been able to cut back slightly on his own. Was able to eliminate the 1st cigarette of the day, is now working on the 2nd. Started playing pool to fill downtime. He has been able to avoid buying more cigarettes until completely out of current pack  It has been a busy week, and he hasn't been able to focus on quitting as much as he would like. He plans to go to [place] for [place]. 12/31 update:  Smoking \"a little less,\" but unable to give exact amount. Started Chantix yesterday. Has tolerated 2 doses so far. No side effects. Has been able to delay the 2nd cigarette more by switching AM routine (drink coffe before breakfast)  Sucking on mints  Used recumbent bike a couple times, but no routine yet. Moved cigarettes to basement (out of pocket) to make it more inconvenient. Has kept a list of things to do on his breaks. 1/14 update:  Continues Chantix 1 mg BID. Takes with food, but has been difficult because he does not eat at regular times during the day. Experiencing some nausea/ \"queesy\" stomach. Also having occasional slight anxiety, but nothing major and is able to tolerate. Denies depression/thoughts of suicide or harming himself. Smoking less, but is not counting exact number because he thinks it will make him think about smoking more. He knows he is not buying cigarettes as often. He does not buy cigarettes until he is completely out of them. He needs a new RX for chantix. He is trying to stay busy and avoid long breaks which triggers him to smoke. He has been able to postpone the cigarette many times. 1/30 update:   Continues Chantix 1 mg BID. Takes with food. Experiencing some nausea/ \"queesy\" stomach, but tolerable. Also having occasional slight anxiety, but nothing major and is able to tolerate. Denies depression/thoughts of suicide or harming himself. Rides his recumbent bike 2x per week for 15 minutes. Smoking less, but is still not counting the exact number because he thinks it will make him think about smoking more. He is buying cigarettes less often and has placed them in his basement so has to go downstairs to get them when he wants to smoke. He does not like going down stairs. He has been able to walk back upstairs from the basement without bringing a cigarette with him. He made a list of reasons for quitting and placed it by his coat so he sees it when he is going outside to smoke. He does not smoke inside house or in car. He started using hard candies in place of cigarettes while he is preparing his meals. He often smokes right before and right after meals. He always brushes his teeth after meals. Patient feels that drinking coffee throughout the day would help keep him busy during breaks, and coffee has not been a trigger for him lately. 2/27  Has been able to reduce to 2 packs per week, but it was really hard. He surprised himself. Pt started drinking tea instead of coffee. Has been able to postpoine 1st cig at least a couple hours. Plans to avoid [facility], where he buys his cigs. Feels \"accomplished\" after exercising, but has only been able to exercise 2x per week. 3/12  Pt does not feel he did well with the goals we set at last visit 2/2 less focus and recent stressors (DDS, shingles vax, coronavirus). He didn't exercise as much. He increased to 1 pack in 3 days, then cut back down to 1 pack in 3.5 days. His cravings are less frequent, but not necessarily less strong. The recent [address] outbreak is worrying him because he is high risk. This is more motivation for him to quit. Pt requests a Chantix refill. Tried 1-800-QUIT-NOW- [place] it was not for him. 3/26  Pt reports having \"Up and downs. \" Coronavirus has really concerned him.  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 \" Urge lasted ~10 min. Craving rated 7/10.    2) after a really long phone call, paced, ate a piece of candy (max 1-2 pieces candy per day)  3) found himself at the door that he goes out to smoke; he is not sure what led to that moment; asked himself what am I doing? Was able to leave the situation    4/13  Pt on way to dentist for toothache. He watched star ej this weekend for 2 week reward. For next reward, he wants to buy new sheet music for [employer] since he cannot go to lessons. He would like to learn \"I can only imagine. \" Took 1 walk, bike x 2, but no core exercises. Will pick back up this week. 4/15  Root canal on 4/13. Started amoxicillin 500 mg TID. Exercise going well, except for past couple days. Unable to figure out what foods relieve nausea from Chantix. Urge 4/13: after leaving [facility], had 2 hours to kill before root canal, sat in car with nothing to do, feeling anxious, read his book and listened to music; always used to smoke when he needs to kills time. Missed Chantix on 4/13, took 1 dose on 4/13.     4/22  Still needs dental work done, fillings next week. Core exercise 2x per week, bike 2-3x per week-- feeling stronger. Tobacco cravings \"up and down. \" Most days they are less frequent, intensity varies. Is able to overcome. Watched star je this weekend as reward. 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    Sunday night craving: made food for the whole week and was on feet all day; had to clean up after, strong craving after he took a break, created a list of distractions when he has cravings: jigsaw puzzle, read book. Still having trouble sleeping: plans to read old spiritual book before bed. Drainage much better. Slightly more depressed due to [address]. Does not feel this is due to Chantix, just situational. 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 Pt denies any thought of harming himself or others. 4/29  Pt smoke free x 1 month! Watched an old [employer] game as reward. Sleeping improved now that he is reading 30 min before bed. Piano 1 hour 4x/per week--found a tutorial on how to play \"I can only imagine\" and he is trying to write Button sheet music for it. Tried working on software again. He is trying to make a routine. Lifting COVID restrictions is worrying him. He has been busy looking for new insurance as his [employer] expires this month and he is inelligible for [employer] until oct. It has been time consuming. He has a pcp visit next week. Needs to make sure all his RXs are covered under new plan. 5/6  Mood is \"up and down\"- manly due to coronavirus pandemic, but feels it is up more often than down. Pt new [employer] insurance starts today. Cravings are rare with short duration, but still strong (7 out of 10). Saw PCP today. Walks outside. Has been on Chantix x 4 months. Is not sure if it's covered by new insurance. 5/20  No lapses. Still some strong urges avg once per day. Bike 20 minutes 3x per week, increasing intensity \"from 1 to 2\". Continues core exercises. Working on writing sheet music for \"I can only imagine\". Increased amount of time spending on software, gradually. Increased nausea with Chantix. Mood is good, better than before. 5/27  No increase in cravings after cutting back slightly on Chantix. Nausea improved slightly possibly. A couple strong cravings but able to overcome.  Increased exercise from 20 to 30 min on bike 3x per week, core exercises 1x per week, would like to increase helped. He will take it for a few weeks and then stop and see what happens. \"    7/6  -Continues Chantix, remains smoke free >3 months, no changes in cravings, etc. Nauseous once because he forgot to eat. Is nervous to stop medication altogether.   -Pt reduced Nexium 40 mg to QOD ~1.5 wks ago, doing well with no increase in GERD sxs. Feels comfortable gradually tapering off. -/72 one time at home, but does not check daily   -Bike 3x per week, Core exercises 2x per week. [facility], [id number] [id number] [address]    Current Medication and Allergy List Reviewed and Updated:  Current Outpatient Medications   Medication Sig Dispense Refill    varenicline (CHANTIX) 1 MG tablet Take 0.5 tablets by mouth 2 times daily 30 tablet 0    loratadine (CLARITIN) 10 MG tablet Take 10 mg by mouth daily      Misc Natural Products (GLUCOSAMINE CHOND DOUBLE STR PO) Take 1 tablet by mouth daily      esomeprazole (NEXIUM) 40 MG delayed release capsule Take 1 capsule by mouth daily 180 capsule 1    hydrochlorothiazide (HYDRODIURIL) 25 MG tablet TAKE ONE AND ONE-HALF TABLETS DAILY 135 tablet 4    olmesartan (BENICAR) 40 MG tablet TAKE 1 TABLET DAILY 90 tablet 4    acyclovir (ZOVIRAX) 400 MG tablet TAKE ONE TABLET BY MOUTH THREE TIMES A DAY FOR 10 DAYS FOR HERPES SIMPLEX FLARE-UP 90 tablet 1    potassium chloride (KLOR-CON M) 10 MEQ extended release tablet TAKE 2 TABLETS DAILY 180 tablet 1    Multiple Vitamin (MULTIVITAMIN PO) Take 1 capsule by mouth daily. No current facility-administered medications for this visit. Pertinent Labs/Vitals  Scr 0.9 (11/21/19)    ASSESSMENT/PLAN:   Patient has been tobacco free for > 3 months! He still has occasional mild cravings, but is able to overcome them. --Continue reduced dose Chantix: 0.5 mg BID. Pt worried about stopping medication. Agreed to try cutting back to 0.5 mg once daily to see if there are any increase in cravings.   --Discussed that GERD sxs likely improved because he quit smoking. Patient will continue to slowly taper Nexium. Next reduction will be 20 mg QOD. Explained that a sudden d/c of PPI after long term use may lead to rebound sxs. Counseled on other options for prn heartburn relief (antacid - Tums, H2RA- Pepcid)  --Discussed that smoking cessation can help lower BP. Recommended check BP daily and keep log  --Recommend repeat BMP at next PCP visit to determine need for potassium supplement. 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 Increase intensity or duration of biking. Reward yourself when you reach goals (grill, new sheet music, star trek, jigsaw puzzle, book)    Next appt:  7/29    Pt verbalized understanding of the above information and denied further questions or concerns. The total time of the visit was 30 min.     Lula JjD, [facility]  Medication Management Clinic   [facility] [address] Ph: [phone]  Duyen Hughes Ph: [phone]  7/6/2020 10:59 AM     CLINICAL PHARMACY CONSULT: MED RECONCILIATION/REVIEW ADDENDUM    For Pharmacy Admin Tracking Only    PHSO: Yes  Total # of Interventions Recommended: 1  - Decreased Dose #: 1  Total Interventions Accepted: 1  Time Spent (min): 30    Amadeo Damian PharmD

## 2020-07-21 RX ORDER — POTASSIUM CHLORIDE 750 MG/1
TABLET, EXTENDED RELEASE ORAL
Qty: 180 TABLET | Refills: 1 | Status: SHIPPED | OUTPATIENT
Start: 2020-07-21 | End: 2020-07-27 | Stop reason: SDUPTHER

## 2020-07-22 ENCOUNTER — SCHEDULED TELEPHONE ENCOUNTER (OUTPATIENT)
Dept: PHARMACY | Age: 65
End: 2020-07-22

## 2020-07-27 RX ORDER — POTASSIUM CHLORIDE 750 MG/1
TABLET, EXTENDED RELEASE ORAL
Qty: 180 TABLET | Refills: 1 | Status: SHIPPED | OUTPATIENT
Start: 2020-07-27 | End: 2020-10-05

## 2020-07-27 NOTE — TELEPHONE ENCOUNTER
[facility] for [facility]. [facility]. Spoke with patient he does not know why [facility] asked for refill. He will not be picking up the medication there. He [facility]. Rx pended.

## 2020-08-05 ENCOUNTER — SCHEDULED TELEPHONE ENCOUNTER (OUTPATIENT)
Dept: PHARMACY | Age: 65
End: 2020-08-05

## 2020-08-05 ENCOUNTER — TELEPHONE (OUTPATIENT)
Dept: PHARMACY | Age: 65
End: 2020-08-05

## 2020-08-05 RX ORDER — VARENICLINE TARTRATE 1 MG/1
0.5 TABLET, FILM COATED ORAL 2 TIMES DAILY
Qty: 30 TABLET | Refills: 0 | Status: SHIPPED | OUTPATIENT
Start: 2020-08-05 | End: 2020-10-05 | Stop reason: ALTCHOICE

## 2020-08-05 NOTE — TELEPHONE ENCOUNTER
CLINICAL PHARMACY NOTE--Smoking Cessation    SUBJECTIVE/OBJECTIVE:   Tulio Nix is a 59 y.o. male with PMHx significant for GERD, HTN, pulmonary emphesema referred by Dr Winston Verduzco for smoking cessation counseling and medication management. Patient has since est care with Dr Susanne Lockett, as Dr Winston Verduzco left the practice. Spoke with patient over the phone on 08/05/20. 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 30-60 min (previously immediately after waking)  Do you smoke 20 or more cigarettes each day? No  At times when you can't smoke or haven't got any cigarettes, do you feel a craving for one? Yes, but not if not keeping busy  Is it tough for you to keep from smoking for more than a few hours? No, not if in a place where he can't smoke  When you are sick enough to stay in bed, to you still smoke? no  If yes to two or more questions, consider using NRT    Reasons for addiction: Touch and Handle, Stress-relief, Habit, Addiction, \"something to do\"  Triggers: meals, stress, smoke breaks from his work; stopped smoking while driving, with coffee, alcohol (already tried to dissociate smoking with certain activities, but picked it up with other activities)  Barriers: not confident (only 5 on a 1-10 scale); He is \"afraid\" to run out of cigarettes.   Motivation for quitting: Has wanted to quit for years (in back of mind) because he doesn't like that it's in control of him (addiction), Health, family, money    12/19 update:  Did  Chantix from pharmacy ($40), but did not start. Fearful of side effects. Has history of bad dreams several years ago and is afraid it may cause vivid dreams. Has been able to cut back slightly on his own. Was able to eliminate the 1st cigarette of the day, is now working on the 2nd. Started playing pool to fill downtime. He has been able to avoid buying more cigarettes until completely out of current pack  It has been a busy week, and he hasn't been able to focus on quitting as much as he would like. He plans to go to [address] [place] [address] for [place]. 12/31 update:  Smoking \"a little less,\" but unable to give exact amount. Started Chantix yesterday. Has tolerated 2 doses so far. No side effects. Has been able to delay the 2nd cigarette more by switching AM routine (drink coffe before breakfast)  Sucking on mints  Used recumbent bike a couple times, but no routine yet. Moved cigarettes to basement (out of pocket) to make it more inconvenient. Has kept a list of things to do on his breaks. 1/14 update:  Continues Chantix 1 mg BID. Takes with food, but has been difficult because he does not eat at regular times during the day. Experiencing some nausea/ \"queesy\" stomach. Also having occasional slight anxiety, but nothing major and is able to tolerate. Denies depression/thoughts of suicide or harming himself. Smoking less, but is not counting exact number because he thinks it will make him think about smoking more. He knows he is not buying cigarettes as often. He does not buy cigarettes until he is completely out of them. He needs a new RX for chantix. He is trying to stay busy and avoid long breaks which triggers him to smoke. He has been able to postpone the cigarette many times. 1/30 update:   Continues Chantix 1 mg BID. Takes with food. Experiencing some nausea/ \"queesy\" stomach, but tolerable. Also having occasional slight anxiety, but nothing major and is able to tolerate. Denies depression/thoughts of suicide or harming himself. Rides his recumbent bike 2x per week for 15 minutes. Smoking less, but is still not counting the exact number because he thinks it will make him think about smoking more. He is buying cigarettes less often and has placed them in his basement so has to go downstairs to get them when he wants to smoke. He does not like going down stairs. He has been able to walk back upstairs from the basement without bringing a cigarette with him. He made a list of reasons for quitting and placed it by his coat so he sees it when he is going outside to smoke. He does not smoke inside house or in car. He started using hard candies in place of cigarettes while he is preparing his meals. He often smokes right before and right after meals. He always brushes his teeth after meals. Patient feels that drinking coffee throughout the day would help keep him busy during breaks, and coffee has not been a trigger for him lately. 2/27  Has been able to reduce to 2 packs per week, but it was really hard. He surprised himself. Pt started drinking tea instead of coffee. Has been able to postpoine 1st cig at least a couple hours. Plans to avoid [facility], where he buys his cigs. Feels \"accomplished\" after exercising, but has only been able to exercise 2x per week. 3/12  Pt does not feel he did well with the goals we set at last visit 2/2 less focus and recent stressors (DDS, shingles vax, coronavirus). He didn't exercise as much. He increased to 1 pack in 3 days, then cut back down to 1 pack in 3.5 days. His cravings are less frequent, but not necessarily less strong. The recent [address] outbreak is worrying him because he is high risk. This is more motivation for him to quit. Pt requests a Chantix refill. Tried 1-800-QUIT-NOW- [place] it was not for him. 3/26  Pt reports having \"Up and downs. \" Coronavirus has really concerned him.  He stopped Chantix for 2 days due to depression surrounding the situation, but realized that not listening to the news helped him much more. Pt feels his mood is stable and would like to resume chantix. Started keeping track of when he smokes (exact #s). He smoked more for a couple days, but he has been able to limit to 4 cig/day the past 2 days! This equates to goal of no more than 1 pack every 5 days. 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 He increased exercise to 20 min 2x per week and started core exercises. He is listening to an old CD while he exercises, which helps. He feel accomplished after exercise. 3/31  Pt quit smoking 3 days ago on 3/28! Motivation was fear. Pt started back on 0.5 mg once daily. Will increase to 0.5 mg BID. Things that worked: prayer, staying occupied, stopped watching the news because it was depressing. 4/2  Will increase Chantix today to 0.5 mg BID  He did put lighter out of pocket/ out of sight  Doesn't have an jacquelyn tray, he put the cigarette butts in a planter pot. Suggested he move this to a different location and plant a new plant or flowers in it. His urges continue to be able piano and after programming. His biggest urge to overcome was when he had been working on programming or several months and finally had to test if it worked, and it did not work. He had a very strong urge to smoke but paced and prayed, then occupied himself with something different. He is not ready to throw away the cigarettes, but he did put them out of site and doesn't remember where they are. Withdrawal sxs include: nasal drip, insomnia, irritability    4/7 11th day of ebing tobacco free. Scared that he might relapse. Reports mood is stable-- slightly anxious/worried about COVID19, but better than before. Withdrawal sxs include: nasal drip better, irritability, insomnia but not sure if 2/2 withdrawal or worry about COVID19. Increased water intake from 3 cups to 6 cups per day.    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 \" Urge lasted ~10 min. Craving rated 7/10.    2) after a really long phone call, paced, ate a piece of candy (max 1-2 pieces candy per day)  3) found himself at the door that he goes out to smoke; he is not sure what led to that moment; asked himself what am I doing? Was able to leave the situation    4/13  Pt on way to dentist for toothache. He watched star je this weekend for 2 week reward. For next reward, he wants to buy new sheet music for [employer] since he cannot go to lessons. He would like to learn \"I can only imagine. \" Took 1 walk, bike x 2, but no core exercises. Will pick back up this week. 4/15  Root canal on 4/13. Started amoxicillin 500 mg TID. Exercise going well, except for past couple days. Unable to figure out what foods relieve nausea from Chantix. Urge 4/13: after leaving [facility], had 2 hours to kill before root canal, sat in car with nothing to do, feeling anxious, read his book and listened to music; always used to smoke when he needs to kills time. Missed Chantix on 4/13, took 1 dose on 4/13.     4/22  Still needs dental work done, fillings next week. Core exercise 2x per week, bike 2-3x per week-- feeling stronger. Tobacco cravings \"up and down. \" Most days they are less frequent, intensity varies. Is able to overcome. Watched star je this weekend as reward. 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    Sunday night craving: made food for the whole week and was on feet all day; had to clean up after, strong craving after he took a break, created a list of distractions when he has cravings: jigsaw puzzle, read book. Still having trouble sleeping: plans to read old spiritual book before bed. Drainage much better. Slightly more depressed due to [address]. Does not feel this is due to Chantix, just situational. 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 Pt denies any thought of harming himself or others. 4/29  Pt smoke free x 1 month! Watched an old [employer] game as reward. Sleeping improved now that he is reading 30 min before bed. Piano 1 hour 4x/per week--found a tutorial on how to play \"I can only imagine\" and he is trying to write [employer] sheet music for it. Tried working on software again. He is trying to make a routine. Lifting COVID restrictions is worrying him. He has been busy looking for new insurance as his [employer] expires this month and he is inelligible for [employer] until oct. It has been time consuming. He has a pcp visit next week. Needs to make sure all his RXs are covered under new plan. 5/6  Mood is \"up and down\"- manly due to coronavirus pandemic, but feels it is up more often than down. Pt new [employer] insurance starts today. Cravings are rare with short duration, but still strong (7 out of 10). Saw PCP today. Walks outside. Has been on Chantix x 4 months. Is not sure if it's covered by new insurance. 5/20  No lapses. Still some strong urges avg once per day. Bike 20 minutes 3x per week, increasing intensity \"from 1 to 2\". Continues core exercises. Working on writing sheet music for \"I can only imagine\". Increased amount of time spending on software, gradually. Increased nausea with Chantix. Mood is good, better than before. 5/27  No increase in cravings after cutting back slightly on Chantix. Nausea improved slightly possibly. A couple strong cravings but able to overcome.  Increased exercise from 20 to 30 min on bike 3x per week, core exercises 1x per week, would like to increase to 2x per week. Feels good while riding bike, feels tired/accomplished after. Plays upbeat music. Still practicing piano, trying to write sheet music. Spends time on software, but not accomplishing a lot due to some limitations (space on hard drive). Going to [facility] for [employer] today. Plans to cancel insurance at end of month because they do not cover Chantix or other needs (DDS, chiropractor, PCP). Arranged for Ag to start 6/1/20 and Drs in [employer]. Sleeping better- was waking up at 3-4a and was anxious. Now not waking up anxious. 6/3  Decreased Chantix to 0.5 mg BID at end of last week, no increase in strength or frequency of cravings. Only a brief craving this AM while lying in bed, able to overcome easily. Needs refill on Chantix; has ~1 week left. Cut esomeprazole in half- still no GERD sxs. Still clearing throat (has happened since he quit smoking which he attributes to his lungs clearing out). Nausea has improved since reducing Chantix. Mood is good, he is less anxious and not waking in middle of night. He is drinking 6 glasses of water each day. 6/17  Continues Chantix 0.5 mg BID, still not smoking, slightly more frequent and stronger urges, but not too bad, in control of cravings. Nausea much better. Working more on programming which is sometimes a trigger during \"breaks. \"  Now sucks hard candy instead. Will start 1k puzzle. Goes for a walk every other day. Increased water to 6-7 glasses per day. Only had one episode of GERD since cutting back on dose and stopping carafate. Pt is unsure why he started the medications. Looking back on med list, he has been on this at least 10 years. Per OV note from Dr Divine Monk 2/28/11, \"Just 2 weeks ago he began to have hiccups and a feeling of acid around his throat. He is on Nexium every day. He had an EGD by Dr. Serina Vicente for this in ~ 2008 and had sucralfate prescribed then, which helped.  He recently found the old med and  Has taken it for a week now and it has helped. He will take it for a few weeks and then stop and see what happens. \"    7/6  -Continues Chantix, remains smoke free >3 months, no changes in cravings, etc. Nauseous once because he forgot to eat. Is nervous to stop medication altogether.   -Pt reduced Nexium 40 mg to QOD ~1.5 wks ago, doing well with no increase in GERD sxs. Feels comfortable gradually tapering off. -/72 one time at home, but does not check daily   -Bike 3x per week, Core exercises 2x per week. 8/5/20  -Getting exercise, but not outside. Cut back on Chantix 0.5 mg once daily on most days. Had some stronger cravings some days, so took it BID. Strong craving on way to [employer]. It was the 1st time back to [employer] since he quit smoking. One of members smoked in front of him, which was difficult. He stopped where he normally would buy cigarettes on way to [employer] and bought candy instead. Checked BP yesterday 133/73. Doing well with PPI 40 mg QOD, but not ready to cut back to 20 mg QOD because he has a large supply of 40 mg tabs.      Pharmacy: [facility], [id number] [id number] [address]    Current Medication and Allergy List Reviewed and Updated:  Current Outpatient Medications   Medication Sig Dispense Refill    potassium chloride (KLOR-CON M) 10 MEQ extended release tablet TAKE 2 TABLETS DAILY 180 tablet 1    varenicline (CHANTIX) 1 MG tablet Take 0.5 tablets by mouth 2 times daily 30 tablet 0    loratadine (CLARITIN) 10 MG tablet Take 10 mg by mouth daily      Misc Natural Products (GLUCOSAMINE CHOND DOUBLE STR PO) Take 1 tablet by mouth daily      esomeprazole (NEXIUM) 40 MG delayed release capsule Take 1 capsule by mouth daily (Patient taking differently: Take 40 mg by mouth every other day ) 180 capsule 1    hydrochlorothiazide (HYDRODIURIL) 25 MG tablet TAKE ONE AND ONE-HALF TABLETS DAILY 135 tablet 4    olmesartan (BENICAR) 40 MG tablet TAKE 1 TABLET DAILY 90 tablet 4    acyclovir (ZOVIRAX) 400 MG tablet TAKE ONE TABLET BY MOUTH THREE TIMES A [address], PharmD

## 2020-08-26 ENCOUNTER — APPOINTMENT (OUTPATIENT)
Dept: PHARMACY | Age: 65
End: 2020-08-26
Payer: COMMERCIAL

## 2020-08-26 PROCEDURE — 99211 OFF/OP EST MAY X REQ PHY/QHP: CPT

## 2020-08-26 NOTE — PROGRESS NOTES
**note complete***      Disclaimer for Virtual Visits: We want to confirm that, for purposes of billing, this is a virtual visit with your provider for which we will submit a claim for reimbursement with your insurance company. You may be responsible for any copays, coinsurance amounts or other amounts not covered by your insurance company. If you do not accept this, unfortunately we will not be able to schedule a virtual visit with the provider. Do you accept? Yes. CLINICAL PHARMACY NOTE--Smoking Cessation    SUBJECTIVE/OBJECTIVE:   Lidya Beebe is a 59 y.o. male with PMHx significant for GERD, HTN, pulmonary emphesema referred by Dr Tawni Schlatter for smoking cessation counseling and medication management. Spoke with patient over the phone on 08/26/20. 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 30-60 min (previously immediately after waking)  Do you smoke 20 or more cigarettes each day? No  At times when you can't smoke or haven't got any cigarettes, do you feel a craving for one? Yes, but not if not keeping busy  Is it tough for you to keep from smoking for more than a few hours?  No, not if in a place where he can't smoke  When you are sick enough to stay in bed, to you still smoke? no  If yes to two or more questions, consider using NRT    Reasons for addiction: Touch and Handle, Stress-relief, Habit, Addiction, \"something to do\"  Triggers: meals, stress, smoke breaks from his work; stopped smoking while driving, with coffee, alcohol (already tried to dissociate smoking with certain activities, but picked it up with other activities)  Barriers: not confident (only 5 on a 1-10 scale); He is \"afraid\" to run out of cigarettes. Motivation for quitting: Has wanted to quit for years (in back of mind) because he doesn't like that it's in control of him (addiction), Health, family, money    12/19 update:  Did  Chantix from pharmacy ($40), but did not start. Fearful of side effects. Has history of bad dreams several years ago and is afraid it may cause vivid dreams. Has been able to cut back slightly on his own. Was able to eliminate the 1st cigarette of the day, is now working on the 2nd. Started playing pool to fill downtime. He has been able to avoid buying more cigarettes until completely out of current pack  It has been a busy week, and he hasn't been able to focus on quitting as much as he would like. He plans to go to [place] for [place]. 12/31 update:  Smoking \"a little less,\" but unable to give exact amount. Started Chantix yesterday. Has tolerated 2 doses so far. No side effects. Has been able to delay the 2nd cigarette more by switching AM routine (drink coffe before breakfast)  Sucking on mints  Used recumbent bike a couple times, but no routine yet. Moved cigarettes to basement (out of pocket) to make it more inconvenient. Has kept a list of things to do on his breaks. 1/14 update:  Continues Chantix 1 mg BID. Takes with food, but has been difficult because he does not eat at regular times during the day. Experiencing some nausea/ \"queesy\" stomach. Also having occasional slight anxiety, but nothing major and is able to tolerate. Denies depression/thoughts of suicide or harming himself. Smoking less, but is not counting exact number because he thinks it will make him think about smoking more. He knows he is not buying cigarettes as often.  He does not buy cigarettes until he is completely out of them. He needs a new RX for chantix. He is trying to stay busy and avoid long breaks which triggers him to smoke. He has been able to postpone the cigarette many times. 1/30 update:   Continues Chantix 1 mg BID. Takes with food. Experiencing some nausea/ \"queesy\" stomach, but tolerable. Also having occasional slight anxiety, but nothing major and is able to tolerate. Denies depression/thoughts of suicide or harming himself. Rides his recumbent bike 2x per week for 15 minutes. Smoking less, but is still not counting the exact number because he thinks it will make him think about smoking more. He is buying cigarettes less often and has placed them in his basement so has to go downstairs to get them when he wants to smoke. He does not like going down stairs. He has been able to walk back upstairs from the basement without bringing a cigarette with him. He made a list of reasons for quitting and placed it by his coat so he sees it when he is going outside to smoke. He does not smoke inside house or in car. He started using hard candies in place of cigarettes while he is preparing his meals. He often smokes right before and right after meals. He always brushes his teeth after meals. Patient feels that drinking coffee throughout the day would help keep him busy during breaks, and coffee has not been a trigger for him lately. 2/27  Has been able to reduce to 2 packs per week, but it was really hard. He surprised himself. Pt started drinking tea instead of coffee. Has been able to postpoine 1st cig at least a couple hours. Plans to avoid [facility], where he buys his cigs. Feels \"accomplished\" after exercising, but has only been able to exercise 2x per week. 3/12  Pt does not feel he did well with the goals we set at last visit 2/2 less focus and recent stressors (DDS, shingles vax, coronavirus). He didn't exercise as much.  He increased to 1 pack in 3 days, then cut back down to 1 pack in 3.5 days. His cravings are less frequent, but not necessarily less strong. The recent [address] outbreak is worrying him because he is high risk. This is more motivation for him to quit. Pt requests a Chantix refill. Tried 1-800-QUIT-NOW- [place] it was not for him. 3/26  Pt reports having \"Up and downs. \" Coronavirus has really concerned him. He stopped Chantix for 2 days due to depression surrounding the situation, but realized that not listening to the news helped him much more. Pt feels his mood is stable and would like to resume chantix. Started keeping track of when he smokes (exact #s). He smoked more for a couple days, but he has been able to limit to 4 cig/day the past 2 days! This equates to goal of no more than 1 pack every 5 days. 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 He increased exercise to 20 min 2x per week and started core exercises. He is listening to an old CD while he exercises, which helps. He feel accomplished after exercise. 3/31  Pt quit smoking 3 days ago on 3/28! Motivation was fear. Pt started back on 0.5 mg once daily. Will increase to 0.5 mg BID. Things that worked: prayer, staying occupied, stopped watching the news because it was depressing. 4/2  Will increase Chantix today to 0.5 mg BID  He did put lighter out of pocket/ out of sight  Doesn't have an jacquelyn tray, he put the cigarette butts in a planter pot. Suggested he move this to a different location and plant a new plant or flowers in it. His urges continue to be able piano and after programming. His biggest urge to overcome was when he had been working on programming or several months and finally had to test if it worked, and it did not work. He had a very strong urge to smoke but paced and prayed, then occupied himself with something different. He is not ready to throw away the cigarettes, but he did put them out of site and doesn't remember where they are.    Withdrawal sxs include: nasal drip, insomnia, irritability    4/7 11th day of ebing tobacco free. Scared that he might relapse. Reports mood is stable-- slightly anxious/worried about COVID19, but better than before. Withdrawal sxs include: nasal drip better, irritability, insomnia but not sure if 2/2 withdrawal or worry about COVID19. Increased water intake from 3 cups to 6 cups per day. 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 \" Urge lasted ~10 min. Craving rated 7/10.    2) after a really long phone call, paced, ate a piece of candy (max 1-2 pieces candy per day)  3) found himself at the door that he goes out to smoke; he is not sure what led to that moment; asked himself what am I doing? Was able to leave the situation    4/13  Pt on way to dentist for toothache. He watched star trek this weekend for 2 week reward. For next reward, he wants to buy new sheet music for piano since he cannot go to lessons. He would like to learn \"I can only imagine. \" Took 1 walk, bike x 2, but no core exercises. Will pick back up this week. 4/15  Root canal on 4/13. Started amoxicillin 500 mg TID. Exercise going well, except for past couple days. Unable to figure out what foods relieve nausea from Chantix. Urge 4/13: after leaving [facility], had 2 hours to kill before root canal, sat in car with nothing to do, feeling anxious, read his book and listened to music; always used to smoke when he needs to kills time. Missed Chantix on 4/13, took 1 dose on 4/13.     4/22  Still needs dental work done, fillings next week.  Core exercise 2x per week, bike 2-3x per week-- feeling stronger. Tobacco cravings \"up and down. \" Most days they are less frequent, intensity varies. Is able to overcome. Watched star trek this weekend as reward. 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 Sunday night craving: made food for the whole week and was on feet all day; had to clean up after, strong craving after he took a break, created a list of distractions when he has cravings: jigsaw puzzle, read book. Still having trouble sleeping: plans to read old spiritual book before bed. Drainage much better. Slightly more depressed due to [address]. Does not feel this is due to Chantix, just situational. 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 Pt denies any thought of harming himself or others. 4/29  Pt smoke free x 1 month! Watched an old eyefactive game as reward. Sleeping improved now that he is reading 30 min before bed. Piano 1 hour 4x/per week--found a tutorial on how to play \"I can only imagine\" and he is trying to write Tunespeak sheet music for it. Tried working on software again. He is trying to make a routine. Lifting COVID restrictions is worrying him. He has been busy looking for new insurance as his [employer] expires this month and he is inelligible for [employer] until oct. It has been time consuming. He has a pcp visit next week. Needs to make sure all his RXs are covered under new plan. 5/6  Mood is \"up and down\"- manly due to coronavirus pandemic, but feels it is up more often than down. Pt new [employer] insurance starts today. Cravings are rare with short duration, but still strong (7 out of 10). Saw PCP today. Walks outside. Has been on Chantix x 4 months. Is not sure if it's covered by new insurance. 5/20/20  No lapses. Still some strong urges avg once per day. Bike 20 minutes 3x per week, increasing intensity \"from 1 to 2\". Continues core exercises.  Working on writing sheet music for [employer] can try MWF.  -6 glasses of water each day  -Echo submitted and approved by [employer].  -not checking BP daily    Pharmacy: [facility] [place], [place]    Current Medication and Allergy List Reviewed and Updated:  Current Outpatient Medications   Medication Sig Dispense Refill    varenicline (CHANTIX) 1 MG tablet Take 0.5 tablets by mouth 2 times daily 30 tablet 0    potassium chloride (KLOR-CON M) 10 MEQ extended release tablet TAKE 2 TABLETS DAILY 180 tablet 1    loratadine (CLARITIN) 10 MG tablet Take 10 mg by mouth daily      Misc Natural Products (GLUCOSAMINE CHOND DOUBLE STR PO) Take 1 tablet by mouth daily      esomeprazole (NEXIUM) 40 MG delayed release capsule Take 1 capsule by mouth daily (Patient taking differently: Take 40 mg by mouth every other day ) 180 capsule 1    hydrochlorothiazide (HYDRODIURIL) 25 MG tablet TAKE ONE AND ONE-HALF TABLETS DAILY 135 tablet 4    olmesartan (BENICAR) 40 MG tablet TAKE 1 TABLET DAILY 90 tablet 4    acyclovir (ZOVIRAX) 400 MG tablet TAKE ONE TABLET BY MOUTH THREE TIMES A DAY FOR 10 DAYS FOR HERPES SIMPLEX FLARE-UP 90 tablet 1    Multiple Vitamin (MULTIVITAMIN PO) Take 1 capsule by mouth daily. No current facility-administered medications for this visit. Pertinent Labs/Vitals  Scr 0.9 (11/21/19)    ASSESSMENT/PLAN:   Patient has been tobacco free for > 4 months! He still has occasional cravings, but is able to overcome them. --On reduced dose Chantix: 0.5 mg QOD. Pt worried about stopping medication, likely placebo effect at this point. --Discussed that GERD sxs likely improved because he quit smoking. Patient will continue to slowly taper Nexium, agreed to try 40 mg on MWF and continue to reduce dose/frequency. Next reduction will be 20 mg QOD. Explained that a sudden d/c of PPI after long term use may lead to rebound sxs.  Counseled on other options for prn heartburn relief (antacid - Tums, H2RA- Pepcid)  --Discussed that smoking cessation can help lower BP. Recommended check BP daily and keep log. If BP and BMP are normal, will suggest the following med changes to PCP:   -change hctz 37.5mg and olmesartan 40 mg to combo pill: olmesartan/hctz 40/25.   -stop potassium  --Recommend repeat BMP at next PCP visit to determine need for potassium supplement. Goals   Work on software development/shun at least 2 hours per day  Learn a new piano song and play it for your sister. Increase intensity or duration of exercise (biking). Reward yourself when you reach goals (grill, new sheet music, star trek, jigsaw puzzle, book)  D/c Chantix before needing new refill  Find alternate route to [employer]  Increase to 8 glasses of water each day (glass before meals + with meds)    Next appt:  9/16    Pt verbalized understanding of the above information and denied further questions or concerns. The total time of the visit was 45 min.     Lula JuanD, [facility]  [facility]   [facility] [address] Ph: [phone]  [address] Ph: [phone]  8/26/2020 11:07 AM     CLINICAL PHARMACY CONSULT: MED RECONCILIATION/REVIEW ADDENDUM    For Pharmacy Admin Tracking Only    PHSO: Yes  Total # of Interventions Recommended: 1  - Decreased Dose #: 1  Total Interventions Accepted: 1  Time Spent (min): 45    Neva Geiger PharmD

## 2020-09-16 ENCOUNTER — VIRTUAL VISIT (OUTPATIENT)
Dept: PHARMACY | Age: 65
End: 2020-09-16
Payer: COMMERCIAL

## 2020-09-16 PROCEDURE — 99211 OFF/OP EST MAY X REQ PHY/QHP: CPT

## 2020-09-16 NOTE — PROGRESS NOTES
Disclaimer for Virtual Visits: We want to confirm that, for purposes of billing, this is a virtual visit with your provider for which we will submit a claim for reimbursement with your insurance company. You may be responsible for any copays, coinsurance amounts or other amounts not covered by your insurance company. If you do not accept this, unfortunately we will not be able to schedule a virtual visit with the provider. Do you accept? Yes. CLINICAL PHARMACY NOTE--Smoking Cessation    SUBJECTIVE/OBJECTIVE:   Majnu Mcgee is a 59 y.o. male with PMHx significant for GERD, HTN, pulmonary emphesema referred by Dr Nguyen Schwartz for smoking cessation counseling and medication management. Spoke with patient over the phone on 09/16/20. 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 30-60 min (previously immediately after waking)  Do you smoke 20 or more cigarettes each day? No  At times when you can't smoke or haven't got any cigarettes, do you feel a craving for one? Yes, but not if not keeping busy  Is it tough for you to keep from smoking for more than a few hours?  No, not if in a place where he can't smoke  When you are sick enough to stay in bed, to you still smoke? no  If yes to two or more questions, consider using NRT    Reasons for addiction: Touch and Handle, Stress-relief, Habit, Addiction, \"something to do\"  Triggers: meals, stress, smoke breaks from his work; stopped smoking while driving, with coffee, alcohol (already tried to dissociate smoking with certain activities, but picked it up with other activities)  Barriers: not confident (only 5 on a 1-10 scale); He is \"afraid\" to run out of cigarettes. Motivation for quitting: Has wanted to quit for years (in back of mind) because he doesn't like that it's in control of him (addiction), Health, family, money    12/19 update:  Did  Chantix from pharmacy ($40), but did not start. Fearful of side effects. Has history of bad dreams several years ago and is afraid it may cause vivid dreams. Has been able to cut back slightly on his own. Was able to eliminate the 1st cigarette of the day, is now working on the 2nd. Started playing pool to fill downtime. He has been able to avoid buying more cigarettes until completely out of current pack  It has been a busy week, and he hasn't been able to focus on quitting as much as he would like. He plans to go to [place] for [place]. 12/31 update:  Smoking \"a little less,\" but unable to give exact amount. Started Chantix yesterday. Has tolerated 2 doses so far. No side effects. Has been able to delay the 2nd cigarette more by switching AM routine (drink coffe before breakfast)  Sucking on mints  Used recumbent bike a couple times, but no routine yet. Moved cigarettes to basement (out of pocket) to make it more inconvenient. Has kept a list of things to do on his breaks. 1/14 update:  Continues Chantix 1 mg BID. Takes with food, but has been difficult because he does not eat at regular times during the day. Experiencing some nausea/ \"queesy\" stomach. Also having occasional slight anxiety, but nothing major and is able to tolerate. Denies depression/thoughts of suicide or harming himself. Smoking less, but is not counting exact number because he thinks it will make him think about smoking more. He knows he is not buying cigarettes as often.  He does not buy cigarettes until he is completely out of irritability    4/7 11th day of ebing tobacco free. Scared that he might relapse. Reports mood is stable-- slightly anxious/worried about COVID19, but better than before. Withdrawal sxs include: nasal drip better, irritability, insomnia but not sure if 2/2 withdrawal or worry about COVID19. Increased water intake from 3 cups to 6 cups per day. 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 \" Urge lasted ~10 min. Craving rated 7/10.    2) after a really long phone call, paced, ate a piece of candy (max 1-2 pieces candy per day)  3) found himself at the door that he goes out to smoke; he is not sure what led to that moment; asked himself what am I doing? Was able to leave the situation    4/13  Pt on way to dentist for toothache. He watched star trek this weekend for 2 week reward. For next reward, he wants to buy new sheet music for piano since he cannot go to lessons. He would like to learn \"I can only imagine. \" Took 1 walk, bike x 2, but no core exercises. Will pick back up this week. 4/15  Root canal on 4/13. Started amoxicillin 500 mg TID. Exercise going well, except for past couple days. Unable to figure out what foods relieve nausea from Chantix. Urge 4/13: after leaving [facility], had 2 hours to kill before root canal, sat in car with nothing to do, feeling anxious, read his book and listened to music; always used to smoke when he needs to kills time. Missed Chantix on 4/13, took 1 dose on 4/13.     4/22  Still needs dental work done, fillings next week.  Core exercise 2x per week, bike 2-3x per week-- feeling imagine\". Increased amount of time spending on software, gradually. Increased nausea with Chantix. Mood is good, better than before. 5/27/20  No increase in cravings after cutting back slightly on Chantix. Nausea improved slightly possibly. A couple strong cravings but able to overcome. Increased exercise from 20 to 30 min on bike 3x per week, core exercises 1x per week, would like to increase to 2x per week. Feels good while riding bike, feels tired/accomplished after. Plays upbeat music. Still practicing piano, trying to write sheet music. Spends time on software, but not accomplishing a lot due to some limitations (space on hard drive). Going to [facility] for [facility] today. Plans to cancel insurance at end of month because they do not cover Chantix or other needs ([facility], chiropractor, PCP). Arranged for [employer] to start 6/1/20 and Drs in [employer]. Sleeping better- was waking up at 3-4a and was anxious. Now not waking up anxious. 6/3/20  Decreased Chantix to 0.5 mg BID at end of last week, no increase in strength or frequency of cravings. Only a brief craving this AM while lying in bed, able to overcome easily. Needs refill on Chantix; has ~1 week left. Cut esomeprazole in half- still no GERD sxs. Still clearing throat (has happened since he quit smoking which he attributes to his lungs clearing out). Nausea has improved since reducing Chantix. Mood is good, he is less anxious and not waking in middle of night. He is drinking 6 glasses of water each day. 6/17/20  Continues Chantix 0.5 mg BID, still not smoking, slightly more frequent and stronger urges, but not too bad, in control of cravings. Nausea much better. Working more on programming which is sometimes a trigger during \"breaks. \"  Now sucks hard candy instead. Will start 1k puzzle. Goes for a walk every other day. Increased water to 6-7 glasses per day. Only had one episode of GERD since cutting back on dose and stopping carafate.  Pt is unsure why he started the medications. Looking back on med list, he has been on this at least 10 years. Per OV note from Dr Shane Westbrook 2/28/11, \"Just 2 weeks ago he began to have hiccups and a feeling of acid around his throat. He is on Nexium every day. He had an EGD by Dr. Martha Medley for this in ~ 2008 and had sucralfate prescribed then, which helped. He recently found the old med and  Has taken it for a week now and it has helped. He will take it for a few weeks and then stop and see what happens. \"    7/6/20  -Continues Chantix, remains smoke free >3 months, no changes in cravings, etc. Nauseous once because he forgot to eat. Is nervous to stop medication altogether.   -Pt reduced Nexium 40 mg to QOD ~1.5 wks ago, doing well with no increase in GERD sxs. Feels comfortable gradually tapering off. -/72 one time at home, but does not check daily   -Bike 3x per week, Core exercises 2x per week. 8/5/20  -Getting exercise, but not outside. Cut back on Chantix 0.5 mg once daily on most days. Had some stronger cravings some days, so took it BID. Strong craving on way to [employer]. It was the 1st time back to [employer] since he quit smoking. One of members smoked in front of him, which was difficult. He stopped where he normally would buy cigarettes on way to [employer] and bought candy instead. Checked BP yesterday 133/73. Doing well with PPI 40 mg QOD, but not ready to cut back to 20 mg QOD because he has a large supply of 40 mg tabs. 8/26/20  -Getting core/bike exercise, but not outside. Working toward goals.   -Cut back on Chantix 0.5 mg to QOD. Initially forgot dose, but then skipped doses intentionally.   -Triggers: frustration, on way to [employer], seeing people smoke on TV. -Plans to tell his friends at the [employer].  -Was able to not stop where he buys cigarettes on way to [employer] the last time he went. -Doing well with PPI 40 mg QOD, but not ready to cut back to 20 mg QOD because he has a large supply of 40 mg tabs.  Agreed to try taper Nexium, agreed to try 40 mg on MWF and continue to reduce dose/frequency. Next reduction will be 20 mg QOD. Explained that a sudden d/c of PPI after long term use may lead to rebound sxs. Counseled on other options for prn heartburn relief (antacid - Tums, H2RA- Pepcid)  --Discussed that smoking cessation can help lower BP. Recommended check BP daily and keep log. If BP and BMP are normal, will suggest the following med changes to PCP:   -change hctz 37.5mg and olmesartan 40 mg to combo pill: olmesartan/hctz 40/25.   -stop potassium    Goals   Check BP daily and schedule well check with PCP  Drink cup coffee on way to work  Work on software development/shun at least 2 hours per day  Learn a new piano song and play it for your sister. Increase intensity or duration of exercise (biking). Reward yourself when you reach goals (grill, new sheet music, star trek, jigsaw puzzle, book)  D/c Chantix before needing new refill  Find alternate route to [employer] [facility]   Increase to 8 glasses of water each day (glass before meals + with meds)    Next appt:  10/7    Pt verbalized understanding of the above information and denied further questions or concerns. The total time of the visit was 30 min.     Precious Mai, PharmD, [facility]  [facility]   [place] Ph: [phone]  Stu Blanca Ph: [phone]  9/16/2020 11:18 AM     CLINICAL PHARMACY CONSULT: MED RECONCILIATION/REVIEW ADDENDUM    For Pharmacy Admin Tracking Only    PHSO: Yes  Total # of Interventions Recommended: 0  - Decreased Dose #: 0  Total Interventions Accepted: 0  Time Spent (min): 30    Adrian Farris PharmD

## 2020-10-05 ENCOUNTER — OFFICE VISIT (OUTPATIENT)
Dept: PRIMARY CARE CLINIC | Age: 65
End: 2020-10-05
Payer: COMMERCIAL

## 2020-10-05 VITALS
OXYGEN SATURATION: 96 % | HEIGHT: 69 IN | HEART RATE: 65 BPM | SYSTOLIC BLOOD PRESSURE: 124 MMHG | WEIGHT: 144.6 LBS | TEMPERATURE: 98.1 F | BODY MASS INDEX: 21.42 KG/M2 | DIASTOLIC BLOOD PRESSURE: 66 MMHG

## 2020-10-05 PROBLEM — Z86.0100 HISTORY OF COLON POLYPS: Status: ACTIVE | Noted: 2020-10-05

## 2020-10-05 PROBLEM — Z86.010 HISTORY OF COLON POLYPS: Status: ACTIVE | Noted: 2020-10-05

## 2020-10-05 LAB
APPEARANCE FLUID: CLEAR
BILIRUBIN URINE: NEGATIVE
BILIRUBIN, POC: NORMAL
BLOOD URINE, POC: NORMAL
BLOOD, URINE: ABNORMAL
CLARITY, POC: CLEAR
CLARITY: CLEAR
COLOR, POC: YELLOW
COLOR: YELLOW
EPITHELIAL CELLS, UA: 0 /HPF (ref 0–5)
GLUCOSE URINE, POC: NORMAL
GLUCOSE URINE: NEGATIVE MG/DL
HYALINE CASTS: 1 /LPF (ref 0–8)
KETONES, POC: NORMAL
KETONES, URINE: NEGATIVE MG/DL
LEUKOCYTE EST, POC: NORMAL
LEUKOCYTE ESTERASE, URINE: NEGATIVE
MICROSCOPIC EXAMINATION: YES
NITRITE, POC: NORMAL
NITRITE, URINE: NEGATIVE
PH UA: 6 (ref 5–8)
PH, POC: 5.5
PROTEIN UA: NEGATIVE MG/DL
PROTEIN, POC: NORMAL
RBC UA: 1 /HPF (ref 0–4)
SPECIFIC GRAVITY UA: 1.01 (ref 1–1.03)
SPECIFIC GRAVITY, POC: 1.01
URINE TYPE: ABNORMAL
UROBILINOGEN, POC: 0.2
UROBILINOGEN, URINE: 0.2 E.U./DL
WBC UA: 0 /HPF (ref 0–5)

## 2020-10-05 PROCEDURE — 99396 PREV VISIT EST AGE 40-64: CPT | Performed by: FAMILY MEDICINE

## 2020-10-05 PROCEDURE — 81002 URINALYSIS NONAUTO W/O SCOPE: CPT | Performed by: FAMILY MEDICINE

## 2020-10-05 PROCEDURE — 99213 OFFICE O/P EST LOW 20 MIN: CPT | Performed by: FAMILY MEDICINE

## 2020-10-05 RX ORDER — ESOMEPRAZOLE MAGNESIUM 40 MG/1
40 CAPSULE, DELAYED RELEASE ORAL EVERY OTHER DAY
COMMUNITY
Start: 2020-10-05 | End: 2021-01-12

## 2020-10-05 RX ORDER — POTASSIUM CHLORIDE 750 MG/1
10 TABLET, EXTENDED RELEASE ORAL DAILY
Qty: 90 TABLET | Refills: 1 | Status: SHIPPED | OUTPATIENT
Start: 2020-10-05 | End: 2020-10-12 | Stop reason: DRUGHIGH

## 2020-10-05 RX ORDER — HYDROCHLOROTHIAZIDE 25 MG/1
25 TABLET ORAL DAILY
Qty: 90 TABLET | Refills: 1 | Status: SHIPPED | OUTPATIENT
Start: 2020-10-05 | End: 2021-03-29 | Stop reason: SDUPTHER

## 2020-10-05 ASSESSMENT — ENCOUNTER SYMPTOMS
SHORTNESS OF BREATH: 0
EYE ITCHING: 0
COUGH: 0
CONSTIPATION: 0
SORE THROAT: 0
VOMITING: 0
NAUSEA: 0
EYE PAIN: 0
DIARRHEA: 0
WHEEZING: 0

## 2020-10-05 ASSESSMENT — PATIENT HEALTH QUESTIONNAIRE - PHQ9
SUM OF ALL RESPONSES TO PHQ QUESTIONS 1-9: 0
SUM OF ALL RESPONSES TO PHQ QUESTIONS 1-9: 0
SUM OF ALL RESPONSES TO PHQ9 QUESTIONS 1 & 2: 0
1. LITTLE INTEREST OR PLEASURE IN DOING THINGS: 0
2. FEELING DOWN, DEPRESSED OR HOPELESS: 0

## 2020-10-05 NOTE — PATIENT INSTRUCTIONS
Patient Education        Well Visit, Men 48 to 72: Care Instructions  Your Care Instructions     Physical exams can help you stay healthy. Your doctor has checked your overall health and may have suggested ways to take good care of yourself. He or she also may have recommended tests. At home, you can help prevent illness with healthy eating, regular exercise, and other steps. Follow-up care is a key part of your treatment and safety. Be sure to make and go to all appointments, and call your doctor if you are having problems. It's also a good idea to know your test results and keep a list of the medicines you take. How can you care for yourself at home? · Reach and stay at a healthy weight. This will lower your risk for many problems, such as obesity, diabetes, heart disease, and high blood pressure. · Get at least 30 minutes of exercise on most days of the week. Walking is a good choice. You also may want to do other activities, such as running, swimming, cycling, or playing tennis or team sports. · Do not smoke. Smoking can make health problems worse. If you need help quitting, talk to your doctor about stop-smoking programs and medicines. These can increase your chances of quitting for good. · Protect your skin from too much sun. When you're outdoors from 10 a.m. to 4 p.m., stay in the shade or cover up with clothing and a hat with a wide brim. Wear sunglasses that block UV rays. Even when it's cloudy, put broad-spectrum sunscreen (SPF 30 or higher) on any exposed skin. · See a dentist one or two times a year for checkups and to have your teeth cleaned. · Wear a seat belt in the car. Follow your doctor's advice about when to have certain tests. These tests can spot problems early. · Cholesterol. Your doctor will tell you how often to have this done based on your overall health and other things that can increase your risk for heart attack and stroke. · Blood pressure.  Have your blood pressure checked during a routine doctor visit. Your doctor will tell you how often to check your blood pressure based on your age, your blood pressure results, and other factors. · Prostate exam. Talk to your doctor about whether you should have a blood test (called a PSA test) for prostate cancer. Experts recommend that you discuss the benefits and risks of the test with your doctor before you decide whether to have this test.  · Diabetes. Ask your doctor whether you should have tests for diabetes. · Vision. Some experts recommend that you have yearly exams for glaucoma and other age-related eye problems starting at age 48. · Hearing. Tell your doctor if you notice any change in your hearing. You can have tests to find out how well you hear. · Colorectal cancer. Your risk for colorectal cancer gets higher as you get older. Some experts say that adults should start regular screening at age 48 and stop at age 76. Others say to start before age 48 or continue after age 76. Talk with your doctor about your risk and when to start and stop screening. · Heart attack and stroke risk. At least every 4 to 6 years, you should have your risk for heart attack and stroke assessed. Your doctor uses factors such as your age, blood pressure, cholesterol, and whether you smoke or have diabetes to show what your risk for a heart attack or stroke is over the next 10 years. · Abdominal aortic aneurysm. Ask your doctor whether you should have a test to check for an aneurysm. You may need a test if you ever smoked or if your parent, brother, sister, or child has had an aneurysm. When should you call for help? Watch closely for changes in your health, and be sure to contact your doctor if you have any problems or symptoms that concern you. Where can you learn more? Go to [email or web address] org and sign in to your [employer] account.  Enter [id number] in the [facility] box to learn more about \"Well Visit, Men 48 to 72: Care

## 2020-10-05 NOTE — PROGRESS NOTES
[address] [facility] [address] PRIMARY CARE  [address] [place] [id number]  Dept: [phone]  Dept Fax: [phone]     10/5/2020      Cele Scott   1955     Chief Complaint   Patient presents with    Annual Exam       HPI  Pt comes in today for annual physical.  After our initial visit done virtually patient did stop his sacral fate and reduce PPI from twice daily to once daily. He denies any GI issues related to this change in medication therapy. He does have one concern today regarding:    New onset lower abdominal/suprapubic pain over the last 1 to 2 weeks. No obvious injury, but patient does report doing \"core muscle strengthening\" a few times per week. He has not had any change in bowel habitus. He has not noticed any change in urinary habits as well. Overall this pain is improving. PHQ Scores 10/5/2020 11/20/2019 11/9/2018 9/14/2017   PHQ2 Score 0 0 0 0   PHQ9 Score 0 0 0 0     Interpretation of Total Score Depression Severity: 1-4 = Minimal depression, 5-9 = Mild depression, 10-14 = Moderate depression, 15-19 = Moderately severe depression, 20-27 = Severe depression     Prior to Admission medications    Medication Sig Start Date End Date Taking?  Authorizing Provider   esomeprazole (NEXIUM) 40 MG delayed release capsule Take 1 capsule by mouth every other day 10/5/20  Yes Rodrigo Mckeon, DO   hydroCHLOROthiazide (HYDRODIURIL) 25 MG tablet Take 1 tablet by mouth daily 10/5/20 1/3/21 Yes Rodrigo Mckeon, DO   potassium chloride (KLOR-CON M) 10 MEQ extended release tablet Take 1 tablet by mouth daily TAKE 2 TABLETS DAILY 10/5/20 1/3/21 Yes Rodrigo Mckeon, DO   loratadine (CLARITIN) 10 MG tablet Take 10 mg by mouth daily   Yes Historical Provider, MD   Misc Natural Products (GLUCOSAMINE CHOND DOUBLE STR PO) Take 1 tablet by mouth daily   Yes Historical Provider, MD   olmesartan (BENICAR) 40 MG tablet TAKE 1 TABLET DAILY 1/6/20  Yes Teodora Rico MD   acyclovir (ZOVIRAX) 400 MG tablet TAKE ONE TABLET BY MOUTH THREE TIMES A DAY FOR 10 DAYS FOR HERPES SIMPLEX FLARE-UP 19  Yes Teodora Rico MD   Multiple Vitamin (MULTIVITAMIN PO) Take 1 capsule by mouth daily. Yes Historical Provider, MD       Past Medical History:   Diagnosis Date    Allergic rhinitis     GERD (gastroesophageal reflux disease)     Herpes simplex 2012    History of colon polyps 10/5/2020    Hypertension     Osteoarthritis     Bilateral knees        Social History     Tobacco Use    Smoking status: Former Smoker     Packs/day: 1.00     Years: 40.00     Pack years: 40.00     Types: Cigarettes     Last attempt to quit: 2020     Years since quittin.5    Smokeless tobacco: Never Used   Substance Use Topics    Alcohol use: Yes     Alcohol/week: 0.0 standard drinks     Comment: occassionally    Drug use: No        Past Surgical History:   Procedure Laterality Date    COLONOSCOPY  7/    M. Michael, hemorrhoidal rectal bleeding, repeat 10 years    KNEE ARTHROSCOPY Right 2000    Meniscus repair    THYROID SURGERY      biopsy-benign colloid nodule        No Known Allergies     Family History   Problem Relation Age of Onset    Dementia Mother     Diabetes Mother     High Blood Pressure Mother     Kidney Disease Mother     Cancer Father         Oral    Cancer Sister         Breast    Cancer Sister         Patient's past medical history, surgical history, family history, medications, and allergies  were all reviewed and updated as appropriate today. Review of Systems   Constitutional: Negative for fatigue, fever and unexpected weight change. HENT: Negative for congestion, ear pain and sore throat. Eyes: Negative for pain, itching and visual disturbance. Respiratory: Negative for cough, shortness of breath and wheezing. Cardiovascular: Negative for chest pain, palpitations and leg swelling.    Gastrointestinal: Positive for abdominal pain (lower abd/suprapubic). Negative for constipation, diarrhea, nausea and vomiting. Endocrine: Negative for cold intolerance, heat intolerance, polydipsia and polyuria. Genitourinary: Negative for difficulty urinating, dysuria, frequency, hematuria and urgency. Musculoskeletal: Negative for arthralgias and joint swelling. Skin: Negative for rash. Neurological: Negative for dizziness and headaches. /66 (Cuff Size: Medium Adult)   Pulse 65   Temp 98.1 °F (36.7 °C) (Temporal)   Ht 5' 9\" (1.753 m)   Wt 144 lb 9.6 oz (65.6 kg)   SpO2 96% Comment: room air  BMI 21.35 kg/m²      Physical Exam  Vitals signs reviewed. Constitutional:       General: He is not in acute distress. Appearance: Normal appearance. He is well-developed and normal weight. HENT:      Head: Normocephalic and atraumatic. Right Ear: Tympanic membrane and ear canal normal. No drainage. No middle ear effusion. Tympanic membrane is not erythematous. Left Ear: Tympanic membrane and ear canal normal. No drainage. No middle ear effusion. Tympanic membrane is not erythematous. Nose: Nose normal. No rhinorrhea. Mouth/Throat:      Mouth: Mucous membranes are moist.      Pharynx: No oropharyngeal exudate or posterior oropharyngeal erythema. Eyes:      Extraocular Movements: Extraocular movements intact. Pupils: Pupils are equal, round, and reactive to light. Neck:      Musculoskeletal: Neck supple. Thyroid: No thyromegaly. Cardiovascular:      Rate and Rhythm: Normal rate and regular rhythm. Heart sounds: No murmur. Pulmonary:      Effort: Pulmonary effort is normal.      Breath sounds: Normal breath sounds. No wheezing. Abdominal:      General: Bowel sounds are normal.      Palpations: Abdomen is soft. There is no mass. Tenderness: There is no abdominal tenderness. Genitourinary:     Comments: Deferred  Musculoskeletal: Normal range of motion. General: No swelling. Lymphadenopathy:      Cervical: No cervical adenopathy. Skin:     General: Skin is warm and dry. Findings: No rash. Neurological:      General: No focal deficit present. Mental Status: He is alert and oriented to person, place, and time. Cranial Nerves: No cranial nerve deficit. Psychiatric:         Mood and Affect: Mood normal.         Assessment:  Encounter Diagnoses   Name Primary?  Annual physical exam Yes    Former smoker - quit March 2020     Screening for prostate cancer     History of colon polyps     Encounter for screening for lung cancer     Suprapubic pain     Other microscopic hematuria     Essential hypertension     Gastroesophageal reflux disease without esophagitis        Plan:  1. Annual physical exam  General wellness exam. Reviewed chart for past hx and updated today. Counseled on age appropriate health guidance and discussed screening recommendations. Vaccinations reviewed and discussed. All questions answered  - Comprehensive Metabolic Panel; Future  - Lipid, Fasting; Future    2. Former smoker - quit March 2020  Patient with extensive current/past smoking history. He meets criteria for lung cancer screening. Discussed recommendations for annual low-dose CT scan of the lungs and patient is agreeable. Order provided. Will call patient with update after results and determine follow-up plan. - CT LUNG SCREENING; Future    3. Screening for prostate cancer  Discussed prostate cancer screening with male of appropriate age and risk factors. I have provided evidence behind PSA and answered all questions regarding the sensitivity of this test.  At this time, through shared decision making, patient would like to move forward with collection of annual PSA.  - Psa screening; Future    4. History of colon polyps  Patient is due for colonoscopy recall, provided contact information for his GI specialist.    5. Encounter for screening for lung cancer  See above.   - CT LUNG SCREENING; Future    6. Suprapubic pain  Newly reported by patient today, present for about 1 to 2 weeks. Does not have a significant symptom history related to this. My clinical suspicion is that this is more abdominal wall than anything. If this is the case, this will be self-limiting and resolve on its own without further injury. Given his extensive past history of cigarette smoking I do recommend we collect a urine for evaluation today. The point-of-care UA in office showed blood without signs of infection. Will send urine for formal evaluation now. - POCT Urinalysis no Micro    7. Other microscopic hematuria  See above. - Urinalysis with Microscopic    8. Essential hypertension  Chronic, controlled. At this time we are going to do some adjusting to his blood pressure medications. He will reduce his dose of HCTZ to just 25 mg daily, and start taking only one potassium tablet a day. Follow-up in a couple of weeks for nurse blood pressure check. - hydroCHLOROthiazide (HYDRODIURIL) 25 MG tablet; Take 1 tablet by mouth daily  Dispense: 90 tablet; Refill: 1  - potassium chloride (KLOR-CON M) 10 MEQ extended release tablet; Take 1 tablet by mouth daily TAKE 2 TABLETS DAILY  Dispense: 90 tablet; Refill: 1    9. Gastroesophageal reflux disease without esophagitis  Chronic, controlled and stable with just single day PPI use. Counseled patient that he can safely trial to wean off of this completely, and only use as needed. Return in about 1 week (around 10/12/2020) for Nurse BP check/Vaccines. Ching Jaramillo, DO     Please note that this chart was generated using dragon dictation software. Although every effort was made to ensure the accuracy of this automated transcription, some errors in transcription may have occurred.

## 2020-10-06 ASSESSMENT — ENCOUNTER SYMPTOMS: ABDOMINAL PAIN: 1

## 2020-10-07 ENCOUNTER — VIRTUAL VISIT (OUTPATIENT)
Dept: PHARMACY | Age: 65
End: 2020-10-07
Payer: COMMERCIAL

## 2020-10-07 PROCEDURE — 99211 OFF/OP EST MAY X REQ PHY/QHP: CPT

## 2020-10-07 NOTE — PROGRESS NOTES
coffee, alcohol (already tried to dissociate smoking with certain activities, but picked it up with other activities)  Barriers: not confident (only 5 on a 1-10 scale); He is \"afraid\" to run out of cigarettes. Motivation for quitting: Has wanted to quit for years (in back of mind) because he doesn't like that it's in control of him (addiction), Health, family, money    12/19 update:  Did  Chantix from pharmacy ($40), but did not start. Fearful of side effects. Has history of bad dreams several years ago and is afraid it may cause vivid dreams. Has been able to cut back slightly on his own. Was able to eliminate the 1st cigarette of the day, is now working on the 2nd. Started playing pool to fill downtime. He has been able to avoid buying more cigarettes until completely out of current pack  It has been a busy week, and he hasn't been able to focus on quitting as much as he would like. He plans to go to [place] for [place]. 12/31 update:  Smoking \"a little less,\" but unable to give exact amount. Started Chantix yesterday. Has tolerated 2 doses so far. No side effects. Has been able to delay the 2nd cigarette more by switching AM routine (drink coffe before breakfast)  Sucking on mints  Used recumbent bike a couple times, but no routine yet. Moved cigarettes to basement (out of pocket) to make it more inconvenient. Has kept a list of things to do on his breaks. 1/14 update:  Continues Chantix 1 mg BID. Takes with food, but has been difficult because he does not eat at regular times during the day. Experiencing some nausea/ \"queesy\" stomach. Also having occasional slight anxiety, but nothing major and is able to tolerate. Denies depression/thoughts of suicide or harming himself. Smoking less, but is not counting exact number because he thinks it will make him think about smoking more. He knows he is not buying cigarettes as often.  He does not buy cigarettes until he is completely out of them. He needs a new RX for chantix. He is trying to stay busy and avoid long breaks which triggers him to smoke. He has been able to postpone the cigarette many times. 1/30 update:   Continues Chantix 1 mg BID. Takes with food. Experiencing some nausea/ \"queesy\" stomach, but tolerable. Also having occasional slight anxiety, but nothing major and is able to tolerate. Denies depression/thoughts of suicide or harming himself. Rides his recumbent bike 2x per week for 15 minutes. Smoking less, but is still not counting the exact number because he thinks it will make him think about smoking more. He is buying cigarettes less often and has placed them in his basement so has to go downstairs to get them when he wants to smoke. He does not like going down stairs. He has been able to walk back upstairs from the basement without bringing a cigarette with him. He made a list of reasons for quitting and placed it by his coat so he sees it when he is going outside to smoke. He does not smoke inside house or in car. He started using hard candies in place of cigarettes while he is preparing his meals. He often smokes right before and right after meals. He always brushes his teeth after meals. Patient feels that drinking coffee throughout the day would help keep him busy during breaks, and coffee has not been a trigger for him lately. 2/27  Has been able to reduce to 2 packs per week, but it was really hard. He surprised himself. Pt started drinking tea instead of coffee. Has been able to postpoine 1st cig at least a couple hours. Plans to avoid [facility], where he buys his cigs. Feels \"accomplished\" after exercising, but has only been able to exercise 2x per week. 3/12  Pt does not feel he did well with the goals we set at last visit 2/2 less focus and recent stressors (DDS, shingles vax, coronavirus). He didn't exercise as much. He increased to 1 pack in 3 days, then cut back down to 1 pack in 3.5 days.  His cravings are less frequent, but not necessarily less strong. The recent [address] outbreak is worrying him because he is high risk. This is more motivation for him to quit. Pt requests a Chantix refill. Tried 1-800-QUIT-NOW- [place] it was not for him. 3/26  Pt reports having \"Up and downs. \" Coronavirus has really concerned him. He stopped Chantix for 2 days due to depression surrounding the situation, but realized that not listening to the news helped him much more. Pt feels his mood is stable and would like to resume chantix. Started keeping track of when he smokes (exact #s). He smoked more for a couple days, but he has been able to limit to 4 cig/day the past 2 days! This equates to goal of no more than 1 pack every 5 days. 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 He increased exercise to 20 min 2x per week and started core exercises. He is listening to an old CD while he exercises, which helps. He feel accomplished after exercise. 3/31  Pt quit smoking 3 days ago on 3/28! Motivation was fear. Pt started back on 0.5 mg once daily. Will increase to 0.5 mg BID. Things that worked: prayer, staying occupied, stopped watching the news because it was depressing. 4/2  Will increase Chantix today to 0.5 mg BID  He did put lighter out of pocket/ out of sight  Doesn't have an jacquelyn tray, he put the cigarette butts in a planter pot. Suggested he move this to a different location and plant a new plant or flowers in it. His urges continue to be able piano and after programming. His biggest urge to overcome was when he had been working on programming or several months and finally had to test if it worked, and it did not work. He had a very strong urge to smoke but paced and prayed, then occupied himself with something different. He is not ready to throw away the cigarettes, but he did put them out of site and doesn't remember where they are.    Withdrawal sxs include: nasal drip, insomnia, irritability    4/7 11th day of ebing tobacco free. Scared that he might relapse. Reports mood is stable-- slightly anxious/worried about COVID19, but better than before. Withdrawal sxs include: nasal drip better, irritability, insomnia but not sure if 2/2 withdrawal or worry about COVID19. Increased water intake from 3 cups to 6 cups per day. 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 \" Urge lasted ~10 min. Craving rated 7/10.    2) after a really long phone call, paced, ate a piece of candy (max 1-2 pieces candy per day)  3) found himself at the door that he goes out to smoke; he is not sure what led to that moment; asked himself what am I doing? Was able to leave the situation    4/13  Pt on way to dentist for toothache. He watched star trek this weekend for 2 week reward. For next reward, he wants to buy new sheet music for piano since he cannot go to lessons. He would like to learn \"I can only imagine. \" Took 1 walk, bike x 2, but no core exercises. Will pick back up this week. 4/15  Root canal on 4/13. Started amoxicillin 500 mg TID. Exercise going well, except for past couple days. Unable to figure out what foods relieve nausea from Chantix. Urge 4/13: after leaving [facility], had 2 hours to kill before root canal, sat in car with nothing to do, feeling anxious, read his book and listened to music; always used to smoke when he needs to kills time. Missed Chantix on 4/13, took 1 dose on 4/13.     4/22  Still needs dental work done, fillings next week.  Core exercise 2x per week, bike 2-3x per week-- feeling stronger. Tobacco cravings \"up and down. \" Most days they are less frequent, intensity varies. Is able to overcome. Watched star trek this weekend as reward. 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 Sunday night craving: made food for the whole week and was on feet all day; had to clean up after, strong craving after he took a break, created a list of distractions when he has cravings: jigsaw puzzle, read book. Still having trouble sleeping: plans to read old spiritual book before bed. Drainage much better. Slightly more depressed due to [address]. Does not feel this is due to Chantix, just situational. 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 Pt denies any thought of harming himself or others. 4/29  Pt smoke free x 1 month! Watched an old [employer] game as reward. Sleeping improved now that he is reading 30 min before bed. Piano 1 hour 4x/per week--found a tutorial on how to play \"I can only imagine\" and he is trying to write [employer] sheet music for it. Tried working on software again. He is trying to make a routine. Lifting COVID restrictions is worrying him. He has been busy looking for new insurance as his [employer] expires this month and he is inelligible for [employer] until oct. It has been time consuming. He has a pcp visit next week. Needs to make sure all his RXs are covered under new plan. 5/6  Mood is \"up and down\"- manly due to coronavirus pandemic, but feels it is up more often than down. Pt new [employer] insurance starts today. Cravings are rare with short duration, but still strong (7 out of 10). Saw PCP today. Walks outside. Has been on Chantix x 4 months. Is not sure if it's covered by new insurance. 5/20/20  No lapses. Still some strong urges avg once per day. Bike 20 minutes 3x per week, increasing intensity \"from 1 to 2\". Continues core exercises.  Working on writing sheet music for \"I can only imagine\". Increased amount of time spending on software, gradually. Increased nausea with Chantix. Mood is good, better than before. 5/27/20  No increase in cravings after cutting back slightly on Chantix. Nausea improved slightly possibly. A couple strong cravings but able to overcome. Increased exercise from 20 to 30 min on bike 3x per week, core exercises 1x per week, would like to increase to 2x per week. Feels good while riding bike, feels tired/accomplished after. Plays upbeat music. Still practicing piano, trying to write sheet music. Spends time on software, but not accomplishing a lot due to some limitations (space on hard drive). Going to [facility] [facility] [employer] today. Plans to cancel insurance at end of month because they do not cover Chantix or other needs (DDS, chiropractor, PCP). Arranged for [employer] to start 6/1/20 and Drs in [employer]. Sleeping better- was waking up at 3-4a and was anxious. Now not waking up anxious. 6/3/20  Decreased Chantix to 0.5 mg BID at end of last week, no increase in strength or frequency of cravings. Only a brief craving this AM while lying in bed, able to overcome easily. Needs refill on Chantix; has ~1 week left. Cut esomeprazole in half- still no GERD sxs. Still clearing throat (has happened since he quit smoking which he attributes to his lungs clearing out). Nausea has improved since reducing Chantix. Mood is good, he is less anxious and not waking in middle of night. He is drinking 6 glasses of water each day. 6/17/20  Continues Chantix 0.5 mg BID, still not smoking, slightly more frequent and stronger urges, but not too bad, in control of cravings. Nausea much better. Working more on programming which is sometimes a trigger during \"breaks. \"  Now sucks hard candy instead. Will start 1k puzzle. Goes for a walk every other day. Increased water to 6-7 glasses per day. Only had one episode of GERD since cutting back on dose and stopping carafate.  Pt is unsure why he started the medications. Looking back on med list, he has been on this at least 10 years. Per OV note from Dr Aakash Ramires 2/28/11, \"Just 2 weeks ago he began to have hiccups and a feeling of acid around his throat. He is on Nexium every day. He had an EGD by Dr. Nabil Ernandez for this in ~ 2008 and had sucralfate prescribed then, which helped. He recently found the old med and  Has taken it for a week now and it has helped. He will take it for a few weeks and then stop and see what happens. \"    7/6/20  -Continues Chantix, remains smoke free >3 months, no changes in cravings, etc. Nauseous once because he forgot to eat. Is nervous to stop medication altogether.   -Pt reduced Nexium 40 mg to QOD ~1.5 wks ago, doing well with no increase in GERD sxs. Feels comfortable gradually tapering off. -/72 one time at home, but does not check daily   -Bike 3x per week, Core exercises 2x per week. 8/5/20  -Getting exercise, but not outside. Cut back on Chantix 0.5 mg once daily on most days. Had some stronger cravings some days, so took it BID. Strong craving on way to [employer]. It was the 1st time back to [employer] since he quit smoking. One of members smoked in front of him, which was difficult. He stopped where he normally would buy cigarettes on way to [employer] and bought candy instead. Checked BP yesterday 133/73. Doing well with PPI 40 mg QOD, but not ready to cut back to 20 mg QOD because he has a large supply of 40 mg tabs. 8/26/20  -Getting core/bike exercise, but not outside. Working toward goals.   -Cut back on Chantix 0.5 mg to QOD. Initially forgot dose, but then skipped doses intentionally.   -Triggers: frustration, on way to [employer], seeing people smoke on TV. -Plans to tell his friends at the [employer].  -Was able to not stop where he buys cigarettes on way to [employer] the last time he went. -Doing well with PPI 40 mg QOD, but not ready to cut back to 20 mg QOD because he has a large supply of 40 mg tabs.  Agreed to try MWF.  -6 glasses of water each day  -Echo submitted and approved by [employer].  -not checking BP daily    9/16/20  -Still taking Chantix QOD, occasional strong cravings due to triggers  -Triggers: will go back to [employer] this weekend.   -Hasn't told anyone at [employer] that he quit. -slowly increasing to 8 glasses water/day  -reports BP log: SBP range 116-131,  DBP range 66-76  -looking for better sense of smell     10/7/20  -Working to apply for medicare; still trying to figure out which plan is best.   -D/c Chantix 5 days ago, still has on hand to use prn. Has \"Ups and downs. \" Craving frequency and intensity leveled off.   -Quit 6 months ago!   -Told brother in law in [place] that he quit smoking.  -Improved smell    -Saw PCP who found blood in urine, which is worrying patient. Will return in 1 week for BP f/u after med changes made.   -Plans to continue PPI taper and will use Pepcid prn heartburn    Pharmacy: [facility]    Current Medication and Allergy List Reviewed and Updated:  Current Outpatient Medications   Medication Sig Dispense Refill    esomeprazole (NEXIUM) 40 MG delayed release capsule Take 1 capsule by mouth every other day      hydroCHLOROthiazide (HYDRODIURIL) 25 MG tablet Take 1 tablet by mouth daily 90 tablet 1    potassium chloride (KLOR-CON M) 10 MEQ extended release tablet Take 1 tablet by mouth daily TAKE 2 TABLETS DAILY 90 tablet 1    loratadine (CLARITIN) 10 MG tablet Take 10 mg by mouth daily      Misc Natural Products (GLUCOSAMINE CHOND DOUBLE STR PO) Take 1 tablet by mouth daily      olmesartan (BENICAR) 40 MG tablet TAKE 1 TABLET DAILY 90 tablet 4    acyclovir (ZOVIRAX) 400 MG tablet TAKE ONE TABLET BY MOUTH THREE TIMES A DAY FOR 10 DAYS FOR HERPES SIMPLEX FLARE-UP 90 tablet 1    Multiple Vitamin (MULTIVITAMIN PO) Take 1 capsule by mouth daily. No current facility-administered medications for this visit.       Pertinent Labs/Vitals  Scr 0.9 (11/21/19)    ASSESSMENT/PLAN:   Patient has been tobacco free for >6 months! He still has occasional cravings, but is able to overcome them. Frequency and intensity of cravings has leveled out. --Chantix d/c'd 5 days ago. --Discussed that GERD sxs likely improved because he quit smoking. Patient will continue to slowly taper Nexium, agreed to try 40 mg on MWF and continue to reduce dose/frequency. Explained that a sudden d/c of PPI after long term use may lead to rebound sxs. Counseled on other options for prn heartburn relief (antacid - Tums, H2RA- Pepcid)  --Discussed that smoking cessation can help lower BP. Reviewed recent med changes made by PCP. Recommended check BP daily and keep log. 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 Increase intensity or duration of exercise (biking). Reward yourself when you reach goals (grill, new sheet music, star trek, jigsaw puzzle, book)  Find alternate route to [employer]   Increase to 8 glasses of water each day (glass before meals + with meds)    Next appt:  11/2    Pt verbalized understanding of the above information and denied further questions or concerns. The total time of the visit was 30 min.     Russell Sherman, LulaD, [facility]  [facility]   [address] Ph: [phone]  Gely Hinkle Ph: [phone]  10/7/2020 11:29 AM     CLINICAL PHARMACY CONSULT: MED RECONCILIATION/REVIEW ADDENDUM    For Pharmacy Admin Tracking Only    PHSO: Yes  Total # of Interventions Recommended: 0  - Decreased Dose #: 0  Total Interventions Accepted: 0  Time Spent (min): 30    Kassie Cardoso PharmD

## 2020-10-09 NOTE — TELEPHONE ENCOUNTER
CAMPBELL FROM [facility] CALLING FOR CLARIFICATION ON PT'S RX FOR POSTASSIUM. SHE SAYS IT HAS 2 SETS OF DIRECTIONS.   PLEASE CLARIFY    PT'S REFERENCE NUMBER [id number]    CAMPBELL AWARE NO DR IN OFFICE UNTIL NEXT TUES AND THAT IS FINE

## 2020-10-12 ENCOUNTER — NURSE ONLY (OUTPATIENT)
Dept: PRIMARY CARE CLINIC | Age: 65
End: 2020-10-12
Payer: COMMERCIAL

## 2020-10-12 ENCOUNTER — TELEPHONE (OUTPATIENT)
Dept: PRIMARY CARE CLINIC | Age: 65
End: 2020-10-12

## 2020-10-12 LAB
BILIRUBIN URINE: NEGATIVE
BLOOD, URINE: ABNORMAL
CLARITY: CLEAR
COLOR: YELLOW
EPITHELIAL CELLS, UA: 0 /HPF (ref 0–5)
GLUCOSE URINE: NEGATIVE MG/DL
HYALINE CASTS: 0 /LPF (ref 0–8)
KETONES, URINE: NEGATIVE MG/DL
LEUKOCYTE ESTERASE, URINE: NEGATIVE
MICROSCOPIC EXAMINATION: YES
NITRITE, URINE: NEGATIVE
PH UA: 6 (ref 5–8)
PROTEIN UA: NEGATIVE MG/DL
RBC UA: 3 /HPF (ref 0–4)
SPECIFIC GRAVITY UA: 1.02 (ref 1–1.03)
URINE TYPE: ABNORMAL
UROBILINOGEN, URINE: 0.2 E.U./DL
WBC UA: 0 /HPF (ref 0–5)

## 2020-10-12 PROCEDURE — 90694 VACC AIIV4 NO PRSRV 0.5ML IM: CPT | Performed by: FAMILY MEDICINE

## 2020-10-12 PROCEDURE — 90471 IMMUNIZATION ADMIN: CPT | Performed by: FAMILY MEDICINE

## 2020-10-12 PROCEDURE — 90472 IMMUNIZATION ADMIN EACH ADD: CPT | Performed by: FAMILY MEDICINE

## 2020-10-12 PROCEDURE — 90732 PPSV23 VACC 2 YRS+ SUBQ/IM: CPT | Performed by: FAMILY MEDICINE

## 2020-10-12 RX ORDER — POTASSIUM CHLORIDE 750 MG/1
10 TABLET, EXTENDED RELEASE ORAL DAILY
Qty: 90 TABLET | Refills: 1 | Status: SHIPPED
Start: 2020-10-12 | End: 2021-07-13 | Stop reason: SDUPTHER

## 2020-10-12 NOTE — PROGRESS NOTES
BP was 116/62- sitting position- left arm  Vaccine Information Sheet, \"Influenza - Inactivated\"  given to Jannet Ferrell, or parent/legal guardian of  Jannet Ferrell and verbalized understanding. Patient responses:    Have you ever had a reaction to a flu vaccine? No  Do you have any current illness? No  Have you ever had Guillian Delta Syndrome? No  Do you have a serious allergy to any of the follow: Neomycin, Polymyxin, Thimerosal, eggs or egg products? No    Flu vaccine given per order. Please see immunization tab. Risks and benefits explained. Current VIS given.

## 2020-10-12 NOTE — TELEPHONE ENCOUNTER
BP was 116/62- sitting position- left arm   -he got his high dose flu shot   -Got Pneumovax 23 shot  ( all of which was ordered and documented in his appointment encounter for today)  - he gave us his urine sample and we sent off a UA to the lab   - he was told to complete his labs mid November - per note Willie Neves wrote on his blood order.

## 2020-10-13 PROBLEM — R31.29 MICROSCOPIC HEMATURIA: Status: ACTIVE | Noted: 2020-10-13

## 2020-11-03 ENCOUNTER — VIRTUAL VISIT (OUTPATIENT)
Dept: PHARMACY | Age: 65
End: 2020-11-03

## 2020-11-03 NOTE — PROGRESS NOTES
Disclaimer for Virtual Visits: We want to confirm that, for purposes of billing, this is a virtual visit with your provider for which we will submit a claim for reimbursement with your insurance company. You may be responsible for any copays, coinsurance amounts or other amounts not covered by your insurance company. If you do not accept this, unfortunately we will not be able to schedule a virtual visit with the provider. Do you accept? Yes. CLINICAL PHARMACY NOTE--Smoking Cessation    SUBJECTIVE/OBJECTIVE:   Cele Scott is a 72 y.o. male with PMHx significant for GERD, HTN, pulmonary emphesema referred by Dr Karan Mejia for smoking cessation counseling and medication management. Spoke with patient over the phone on 11/03/20. 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 30-60 min (previously immediately after waking)  Do you smoke 20 or more cigarettes each day? No  At times when you can't smoke or haven't got any cigarettes, do you feel a craving for one? Yes, but not if not keeping busy  Is it tough for you to keep from smoking for more than a few hours?  No, not if in a place where he can't smoke  When you are sick enough to stay in bed, to you still smoke? no  If yes to two or more questions, consider using NRT    Reasons for addiction: Touch and Handle, Stress-relief, Habit, Addiction, \"something to do\"  Triggers: meals, stress, smoke breaks from his work; stopped smoking while driving, with coffee, alcohol (already tried to dissociate smoking with certain activities, but picked it up with other activities)  Barriers: not confident (only 5 on a 1-10 scale); He is \"afraid\" to run out of cigarettes. Motivation for quitting: Has wanted to quit for years (in back of mind) because he doesn't like that it's in control of him (addiction), Health, family, money    12/19 update:  Did  Chantix from pharmacy ($40), but did not start. Fearful of side effects. Has history of bad dreams several years ago and is afraid it may cause vivid dreams. Has been able to cut back slightly on his own. Was able to eliminate the 1st cigarette of the day, is now working on the 2nd. Started playing pool to fill downtime. He has been able to avoid buying more cigarettes until completely out of current pack  It has been a busy week, and he hasn't been able to focus on quitting as much as he would like. He plans to go to [place]. 12/31 update:  Smoking \"a little less,\" but unable to give exact amount. Started Chantix yesterday. Has tolerated 2 doses so far. No side effects. Has been able to delay the 2nd cigarette more by switching AM routine (drink coffe before breakfast)  Sucking on mints  Used recumbent bike a couple times, but no routine yet. Moved cigarettes to basement (out of pocket) to make it more inconvenient. Has kept a list of things to do on his breaks. 1/14 update:  Continues Chantix 1 mg BID. Takes with food, but has been difficult because he does not eat at regular times during the day. Experiencing some nausea/ \"queesy\" stomach. Also having occasional slight anxiety, but nothing major and is able to tolerate. Denies depression/thoughts of suicide or harming himself. Smoking less, but is not counting exact number because he thinks it will make him think about smoking more. He knows he is not buying cigarettes as often.  He does not buy cigarettes until he is completely out of them. He needs a new RX for chantix. He is trying to stay busy and avoid long breaks which triggers him to smoke. He has been able to postpone the cigarette many times. 1/30 update:   Continues Chantix 1 mg BID. Takes with food. Experiencing some nausea/ \"queesy\" stomach, but tolerable. Also having occasional slight anxiety, but nothing major and is able to tolerate. Denies depression/thoughts of suicide or harming himself. Rides his recumbent bike 2x per week for 15 minutes. Smoking less, but is still not counting the exact number because he thinks it will make him think about smoking more. He is buying cigarettes less often and has placed them in his basement so has to go downstairs to get them when he wants to smoke. He does not like going down stairs. He has been able to walk back upstairs from the basement without bringing a cigarette with him. He made a list of reasons for quitting and placed it by his coat so he sees it when he is going outside to smoke. He does not smoke inside house or in car. He started using hard candies in place of cigarettes while he is preparing his meals. He often smokes right before and right after meals. He always brushes his teeth after meals. Patient feels that drinking coffee throughout the day would help keep him busy during breaks, and coffee has not been a trigger for him lately. 2/27  Has been able to reduce to 2 packs per week, but it was really hard. He surprised himself. Pt started drinking tea instead of coffee. Has been able to postpoine 1st cig at least a couple hours. Plans to avoid [facility], where he buys his cigs. Feels \"accomplished\" after exercising, but has only been able to exercise 2x per week. 3/12  Pt does not feel he did well with the goals we set at last visit 2/2 less focus and recent stressors (DDS, shingles vax, coronavirus). He didn't exercise as much. He increased to 1 pack in 3 days, then cut back down to 1 pack in 3.5 days.  His cravings are less frequent, but not necessarily less strong. The recent [address] outbreak is worrying him because he is high risk. This is more motivation for him to quit. Pt requests a Chantix refill. Tried 1-800-QUIT-NOW- [place] it was not for him. 3/26  Pt reports having \"Up and downs. \" Coronavirus has really concerned him. He stopped Chantix for 2 days due to depression surrounding the situation, but realized that not listening to the news helped him much more. Pt feels his mood is stable and would like to resume chantix. Started keeping track of when he smokes (exact #s). He smoked more for a couple days, but he has been able to limit to 4 cig/day the past 2 days! This equates to goal of no more than 1 pack every 5 days. 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 He increased exercise to 20 min 2x per week and started core exercises. He is listening to an old CD while he exercises, which helps. He feel accomplished after exercise. 3/31  Pt quit smoking 3 days ago on 3/28! Motivation was fear. Pt started back on 0.5 mg once daily. Will increase to 0.5 mg BID. Things that worked: prayer, staying occupied, stopped watching the news because it was depressing. 4/2  Will increase Chantix today to 0.5 mg BID  He did put lighter out of pocket/ out of sight  Doesn't have an jacquelyn tray, he put the cigarette butts in a planter pot. Suggested he move this to a different location and plant a new plant or flowers in it. His urges continue to be able piano and after programming. His biggest urge to overcome was when he had been working on programming or several months and finally had to test if it worked, and it did not work. He had a very strong urge to smoke but paced and prayed, then occupied himself with something different. He is not ready to throw away the cigarettes, but he did put them out of site and doesn't remember where they are.    Withdrawal sxs include: nasal drip, insomnia, stronger. Tobacco cravings \"up and down. \" Most days they are less frequent, intensity varies. Is able to overcome. Watched star trek this weekend as reward. 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 Sunday night craving: made food for the whole week and was on feet all day; had to clean up after, strong craving after he took a break, created a list of distractions when he has cravings: jigsaw puzzle, read book. Still having trouble sleeping: plans to read old spiritual book before bed. Drainage much better. Slightly more depressed due to [address]. Does not feel this is due to Chantix, just situational. 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 Pt denies any thought of harming himself or others. 4/29  Pt smoke free x 1 month! Watched an old [employer] game as reward. Sleeping improved now that he is reading 30 min before bed. Piano 1 hour 4x/per week--found a tutorial on how to play \"I can only imagine\" and he is trying to write [employer] sheet music for it. Tried working on software again. He is trying to make a routine. Lifting COVID restrictions is worrying him. He has been busy looking for new insurance as his [employer] expires this month and he is inelligible for [employer] until oct. It has been time consuming. He has a pcp visit next week. Needs to make sure all his RXs are covered under new plan. 5/6  Mood is \"up and down\"- manly due to coronavirus pandemic, but feels it is up more often than down. Pt new [employer] insurance starts today. Cravings are rare with short duration, but still strong (7 out of 10). Saw PCP today. Walks outside. Has been on Chantix x 4 months. Is not sure if it's covered by new insurance. 5/20/20  No lapses. Still some strong urges avg once per day. Bike 20 minutes 3x per week, increasing intensity \"from 1 to 2\". Continues core exercises.  Working on writing sheet music for \"I can only imagine\". Increased amount of time spending on software, gradually. Increased nausea with Chantix. Mood is good, better than before. 5/27/20  No increase in cravings after cutting back slightly on Chantix. Nausea improved slightly possibly. A couple strong cravings but able to overcome. Increased exercise from 20 to 30 min on bike 3x per week, core exercises 1x per week, would like to increase to 2x per week. Feels good while riding bike, feels tired/accomplished after. Plays upbeat music. Still practicing piano, trying to write sheet music. Spends time on software, but not accomplishing a lot due to some limitations (space on hard drive). Going to [employer] [facility] [employer] today. Plans to cancel insurance at end of month because they do not cover Chantix or other needs (DDS, chiropractor, PCP). Arranged for [employer] to start 6/1/20 and Drs in [employer]. Sleeping better- was waking up at 3-4a and was anxious. Now not waking up anxious. 6/3/20  Decreased Chantix to 0.5 mg BID at end of last week, no increase in strength or frequency of cravings. Only a brief craving this AM while lying in bed, able to overcome easily. Needs refill on Chantix; has ~1 week left. Cut esomeprazole in half- still no GERD sxs. Still clearing throat (has happened since he quit smoking which he attributes to his lungs clearing out). Nausea has improved since reducing Chantix. Mood is good, he is less anxious and not waking in middle of night. He is drinking 6 glasses of water each day. 6/17/20  Continues Chantix 0.5 mg BID, still not smoking, slightly more frequent and stronger urges, but not too bad, in control of cravings. Nausea much better. Working more on programming which is sometimes a trigger during \"breaks. \"  Now sucks hard candy instead. Will start 1k puzzle. Goes for a walk every other day. Increased water to 6-7 glasses per day. Only had one episode of GERD since cutting back on dose and stopping carafate.  Pt is unsure why he started the medications. Looking back on med list, he has been on this at least 10 years. Per OV note from Dr Cinda Felix 2/28/11, \"Just 2 weeks ago he began to have hiccups and a feeling of acid around his throat. He is on Nexium every day. He had an EGD by Dr. Laura Lawler for this in ~ 2008 and had sucralfate prescribed then, which helped. He recently found the old med and  Has taken it for a week now and it has helped. He will take it for a few weeks and then stop and see what happens. \"    7/6/20  -Continues Chantix, remains smoke free >3 months, no changes in cravings, etc. Nauseous once because he forgot to eat. Is nervous to stop medication altogether.   -Pt reduced Nexium 40 mg to QOD ~1.5 wks ago, doing well with no increase in GERD sxs. Feels comfortable gradually tapering off. -/72 one time at home, but does not check daily   -Bike 3x per week, Core exercises 2x per week. 8/5/20  -Getting exercise, but not outside. Cut back on Chantix 0.5 mg once daily on most days. Had some stronger cravings some days, so took it BID. Strong craving on way to [employer]. It was the 1st time back to [employer] since he quit smoking. One of members smoked in front of him, which was difficult. He stopped where he normally would buy cigarettes on way to [employer] and bought candy instead. Checked BP yesterday 133/73. Doing well with PPI 40 mg QOD, but not ready to cut back to 20 mg QOD because he has a large supply of 40 mg tabs. 8/26/20  -Getting core/bike exercise, but not outside. Working toward goals.   -Cut back on Chantix 0.5 mg to QOD. Initially forgot dose, but then skipped doses intentionally.   -Triggers: frustration, on way to [employer], seeing people smoke on TV. -Plans to tell his friends at the [employer].  -Was able to not stop where he buys cigarettes on way to [employer] the last time he went. -Doing well with PPI 40 mg QOD, but not ready to cut back to 20 mg QOD because he has a large supply of 40 mg tabs.  Agreed to try MWF.  -6 glasses of water each day  -Echo submitted and approved by [employer].  -not checking BP daily    9/16/20  -Still taking Chantix QOD, occasional strong cravings due to triggers  -Triggers: will go back to [employer] this weekend.   -Hasn't told anyone at [employer] that he quit. -slowly increasing to 8 glasses water/day  -reports BP log: SBP range 116-131,  DBP range 66-76  -looking for better sense of smell     10/7/20  -Working to apply for medicare; still trying to figure out which plan is best.   -D/c Chantix 5 days ago, still has on hand to use prn. Has \"Ups and downs. \" Craving frequency and intensity leveled off.   -Quit 6 months ago!   -Told brother in law in [place] that he quit smoking.  -Improved smell    -Saw PCP who found blood in urine, which is worrying patient. Will return in 1 week for BP f/u after med changes made.   -Plans to continue PPI taper and will use Pepcid prn heartburn    11/3/20  -Remains smoke free  -Has not had to use Chantix since last appointment    -Still gets urges when seeing people smoke on TV. Will keep hard candies by the TV to avoid urge to smoke. -Got Nexium OTC instead of Pepcid, but only taking three times a week. Advised to get Pepcid instead as this is better for PRN use.      Pharmacy: [place], [place]    Current Medication and Allergy List Reviewed and Updated:  Current Outpatient Medications   Medication Sig Dispense Refill    potassium chloride (KLOR-CON M) 10 MEQ extended release tablet Take 1 tablet by mouth daily TAKE 2 TABLETS DAILY 90 tablet 1    esomeprazole (NEXIUM) 40 MG delayed release capsule Take 1 capsule by mouth every other day      hydroCHLOROthiazide (HYDRODIURIL) 25 MG tablet Take 1 tablet by mouth daily 90 tablet 1    loratadine (CLARITIN) 10 MG tablet Take 10 mg by mouth daily      Misc Natural Products (GLUCOSAMINE CHOND DOUBLE STR PO) Take 1 tablet by mouth daily      olmesartan (BENICAR) 40 MG tablet TAKE 1 TABLET DAILY 90 tablet 4    acyclovir (ZOVIRAX) 400 MG tablet TAKE ONE TABLET BY MOUTH THREE TIMES A DAY FOR 10 DAYS FOR HERPES SIMPLEX FLARE-UP 90 tablet 1    Multiple Vitamin (MULTIVITAMIN PO) Take 1 capsule by mouth daily. No current facility-administered medications for this visit. Pertinent Labs/Vitals  Scr 0.9 (11/21/19)    ASSESSMENT/PLAN:   Patient has been tobacco free for >7 months! He still has occasional cravings, but is able to overcome them. Frequency and intensity of cravings has leveled out. --Has not used Chantix in >1 month but has if needed. --Patient plans to stop Nexium once he gets Pepcid OTC for prn use. --Gets urges when sees people smoking on TV. He will place hard candies by his chair and eat one to fight the urge of smoking when this happens in the future. --Discussed that smoking cessation can help lower BP. Reviewed recent med changes made by PCP. Recommended check BP daily and keep log. 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 Increase intensity or duration of exercise (biking). Reward yourself when you reach goals (grill, new sheet music, star trek, jigsaw puzzle, book)  Find alternate route to [employer]   Increase to 8 glasses of water each day (glass before meals + with meds)    Next appt:  12/1    Pt verbalized understanding of the above information and denied further questions or concerns. The total time of the visit was 30 min.     Artis Jaimes, PharmD, [facility]  [address] [facility]  [place]: [phone]  [facility]: [phone]  11/3/2020 11:59 AM      CLINICAL PHARMACY CONSULT: MED RECONCILIATION/REVIEW ADDENDUM    For Pharmacy Admin Tracking Only    PHSO: Yes  Total # of Interventions Recommended: 0  - Decreased Dose #: 0  Total Interventions Accepted: 0  Time Spent (min): [address], [place]

## 2020-11-03 NOTE — PATIENT INSTRUCTIONS
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 Increase intensity or duration of exercise (biking).   Reward yourself when you reach goals (grill, new sheet music, star trek, jigsaw puzzle, book)  Find alternate route to [employer]   Increase to 8 glasses of water each day (glass before meals + with meds)

## 2020-11-24 ENCOUNTER — VIRTUAL VISIT (OUTPATIENT)
Dept: PHARMACY | Age: 65
End: 2020-11-24
Payer: COMMERCIAL

## 2020-11-24 NOTE — PROGRESS NOTES
Disclaimer for Virtual Visits: We want to confirm that, for purposes of billing, this is a virtual visit with your provider for which we will submit a claim for reimbursement with your insurance company. You may be responsible for any copays, coinsurance amounts or other amounts not covered by your insurance company. If you do not accept this, unfortunately we will not be able to schedule a virtual visit with the provider. Do you accept? Yes. CLINICAL PHARMACY NOTE--Smoking Cessation    SUBJECTIVE/OBJECTIVE:   Bertha Paez is a 72 y.o. male with PMHx significant for GERD, HTN, pulmonary emphesema referred by Dr Mikki Box for smoking cessation counseling and medication management. Spoke with patient over the phone on 11/24/20. 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 30-60 min (previously immediately after waking)  Do you smoke 20 or more cigarettes each day? No  At times when you can't smoke or haven't got any cigarettes, do you feel a craving for one? Yes, but not if not keeping busy  Is it tough for you to keep from smoking for more than a few hours?  No, not if in a place where he can't smoke  When you are sick enough to stay in bed, to you still smoke? no  If yes to two or more questions, consider using NRT    Reasons for addiction: Touch and Handle, Stress-relief, Habit, Addiction, \"something to do\"  Triggers: meals, stress, smoke breaks from his work; stopped smoking while driving, with coffee, alcohol (already tried to dissociate smoking with certain activities, but picked it up with other activities)  Barriers: not confident (only 5 on a 1-10 scale); He is \"afraid\" to run out of cigarettes. Motivation for quitting: Has wanted to quit for years (in back of mind) because he doesn't like that it's in control of him (addiction), Health, family, money    12/19 update:  Did  Chantix from pharmacy ($40), but did not start. Fearful of side effects. Has history of bad dreams several years ago and is afraid it may cause vivid dreams. Has been able to cut back slightly on his own. Was able to eliminate the 1st cigarette of the day, is now working on the 2nd. Started playing pool to fill downtime. He has been able to avoid buying more cigarettes until completely out of current pack  It has been a busy week, and he hasn't been able to focus on quitting as much as he would like. [facility]. 12/31 update:  Smoking \"a little less,\" but unable to give exact amount. Started Chantix yesterday. Has tolerated 2 doses so far. No side effects. Has been able to delay the 2nd cigarette more by switching AM routine (drink coffe before breakfast)  Sucking on mints  Used recumbent bike a couple times, but no routine yet. Moved cigarettes to basement (out of pocket) to make it more inconvenient. Has kept a list of things to do on his breaks. 1/14 update:  Continues Chantix 1 mg BID. Takes with food, but has been difficult because he does not eat at regular times during the day. Experiencing some nausea/ \"queesy\" stomach. Also having occasional slight anxiety, but nothing major and is able to tolerate. Denies depression/thoughts of suicide or harming himself. Smoking less, but is not counting exact number because he thinks it will make him think about smoking more. He knows he is not buying cigarettes as often.  He does not buy cigarettes until he is completely out of cravings are less frequent, but not necessarily less strong. The recent [address] outbreak is worrying him because he is high risk. This is more motivation for him to quit. Pt requests a Chantix refill. Tried 1-800-QUIT-NOW- [place] it was not for him. 3/26  Pt reports having \"Up and downs. \" Coronavirus has really concerned him. He stopped Chantix for 2 days due to depression surrounding the situation, but realized that not listening to the news helped him much more. Pt feels his mood is stable and would like to resume chantix. Started keeping track of when he smokes (exact #s). He smoked more for a couple days, but he has been able to limit to 4 cig/day the past 2 days! This equates to goal of no more than 1 pack every 5 days. 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 He increased exercise to 20 min 2x per week and started core exercises. He is listening to an old CD while he exercises, which helps. He feel accomplished after exercise. 3/31  Pt quit smoking 3 days ago on 3/28! Motivation was fear. Pt started back on 0.5 mg once daily. Will increase to 0.5 mg BID. Things that worked: prayer, staying occupied, stopped watching the news because it was depressing. 4/2  Will increase Chantix today to 0.5 mg BID  He did put lighter out of pocket/ out of sight  Doesn't have an jacquelyn tray, he put the cigarette butts in a planter pot. Suggested he move this to a different location and plant a new plant or flowers in it. His urges continue to be able piano and after programming. His biggest urge to overcome was when he had been working on programming or several months and finally had to test if it worked, and it did not work. He had a very strong urge to smoke but paced and prayed, then occupied himself with something different. He is not ready to throw away the cigarettes, but he did put them out of site and doesn't remember where they are.    Withdrawal sxs include: nasal drip, insomnia, irritability    4/7 11th day of ebing tobacco free. Scared that he might relapse. Reports mood is stable-- slightly anxious/worried about COVID19, but better than before. Withdrawal sxs include: nasal drip better, irritability, insomnia but not sure if 2/2 withdrawal or worry about COVID19. Increased water intake from 3 cups to 6 cups per day. 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 \" Urge lasted ~10 min. Craving rated 7/10.    2) after a really long phone call, paced, ate a piece of candy (max 1-2 pieces candy per day)  3) found himself at the door that he goes out to smoke; he is not sure what led to that moment; asked himself what am I doing? Was able to leave the situation    4/13  Pt on way to dentist for toothache. He watched star trek this weekend for 2 week reward. For next reward, he wants to buy new sheet music for piano since he cannot go to lessons. He would like to learn \"I can only imagine. \" Took 1 walk, bike x 2, but no core exercises. Will pick back up this week. 4/15  Root canal on 4/13. Started amoxicillin 500 mg TID. Exercise going well, except for past couple days. Unable to figure out what foods relieve nausea from Chantix. Urge 4/13: after leaving [facility], had 2 hours to kill before root canal, sat in car with nothing to do, feeling anxious, read his book and listened to music; always used to smoke when he needs to kills time. Missed Chantix on 4/13, took 1 dose on 4/13.     4/22  Still needs dental work done, fillings next week.  Core exercise 2x per week, bike 2-3x per week-- feeling stronger. Tobacco cravings \"up and down. \" Most days they are less frequent, intensity varies. Is able to overcome. Watched star trek this weekend as reward. 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 Sunday night craving: made food for the whole week and was on feet all day; had to clean up after, strong craving after he took a break, created a list of distractions when he has cravings: jigsaw puzzle, read book. Still having trouble sleeping: plans to read old spiritual book before bed. Drainage much better. Slightly more depressed due to [address]. Does not feel this is due to Chantix, just situational. 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 Pt denies any thought of harming himself or others. 4/29  Pt smoke free x 1 month! Watched an old [employer] game as reward. Sleeping improved now that he is reading 30 min before bed. Piano 1 hour 4x/per week--found a tutorial on how to play \"I can only imagine\" and he is trying to write [employer] sheet music for it. Tried working on software again. He is trying to make a routine. Lifting COVID restrictions is worrying him. He has been busy looking for new insurance as his [employer] expires this month and he is inelligible for [employer] until oct. It has been time consuming. He has a pcp visit next week. Needs to make sure all his RXs are covered under new plan. 5/6  Mood is \"up and down\"- manly due to coronavirus pandemic, but feels it is up more often than down. Pt new [employer] insurance starts today. Cravings are rare with short duration, but still strong (7 out of 10). Saw PCP today. Walks outside. Has been on Chantix x 4 months. Is not sure if it's covered by new insurance. 5/20/20  No lapses. Still some strong urges avg once per day. Bike 20 minutes 3x per week, increasing intensity \"from 1 to 2\". Continues core exercises.  Working on writing sheet music for \"I can only imagine\". Increased amount of time spending on software, gradually. Increased nausea with Chantix. Mood is good, better than before. 5/27/20  No increase in cravings after cutting back slightly on Chantix. Nausea improved slightly possibly. A couple strong cravings but able to overcome. Increased exercise from 20 to 30 min on bike 3x per week, core exercises 1x per week, would like to increase to 2x per week. Feels good while riding bike, feels tired/accomplished after. Plays upbeat music. Still practicing piano, trying to write sheet music. Spends time on software, but not accomplishing a lot due to some limitations (space on hard drive). Going to [employer] [facility] [employer] today. Plans to cancel insurance at end of month because they do not cover Chantix or other needs ([facility], chiropractor, PCP). Arranged for [employer] to start 6/1/20 and Drs in [employer]. Sleeping better- was waking up at 3-4a and was anxious. Now not waking up anxious. 6/3/20  Decreased Chantix to 0.5 mg BID at end of last week, no increase in strength or frequency of cravings. Only a brief craving this AM while lying in bed, able to overcome easily. Needs refill on Chantix; has ~1 week left. Cut esomeprazole in half- still no GERD sxs. Still clearing throat (has happened since he quit smoking which he attributes to his lungs clearing out). Nausea has improved since reducing Chantix. Mood is good, he is less anxious and not waking in middle of night. He is drinking 6 glasses of water each day. 6/17/20  Continues Chantix 0.5 mg BID, still not smoking, slightly more frequent and stronger urges, but not too bad, in control of cravings. Nausea much better. Working more on programming which is sometimes a trigger during \"breaks. \"  Now sucks hard candy instead. Will start 1k puzzle. Goes for a walk every other day. Increased water to 6-7 glasses per day. Only had one episode of GERD since cutting back on dose and stopping carafate.  Pt is unsure why he started the medications. Looking back on med list, he has been on this at least 10 years. Per OV note from Dr Donell Zuniga 2/28/11, \"Just 2 weeks ago he began to have hiccups and a feeling of acid around his throat. He is on Nexium every day. He had an EGD by Dr. Adonis Brown for this in ~ 2008 and had sucralfate prescribed then, which helped. He recently found the old med and  Has taken it for a week now and it has helped. He will take it for a few weeks and then stop and see what happens. \"    7/6/20  -Continues Chantix, remains smoke free >3 months, no changes in cravings, etc. Nauseous once because he forgot to eat. Is nervous to stop medication altogether.   -Pt reduced Nexium 40 mg to QOD ~1.5 wks ago, doing well with no increase in GERD sxs. Feels comfortable gradually tapering off. -/72 one time at home, but does not check daily   -Bike 3x per week, Core exercises 2x per week. 8/5/20  -Getting exercise, but not outside. Cut back on Chantix 0.5 mg once daily on most days. Had some stronger cravings some days, so took it BID. Strong craving on way to [facility]. It was the 1st time back to [facility] since he quit smoking. One of members smoked in front of him, which was difficult. He stopped where he normally would buy cigarettes on way to [facility] and bought candy instead. Checked BP yesterday 133/73. Doing well with PPI 40 mg QOD, but not ready to cut back to 20 mg QOD because he has a large supply of 40 mg tabs. 8/26/20  -Getting core/bike exercise, but not outside. Working toward goals.   -Cut back on Chantix 0.5 mg to QOD. Initially forgot dose, but then skipped doses intentionally.   -Triggers: frustration, on way to [facility], seeing people smoke on TV. -Plans to tell his friends at the [employer].  -Was able to not stop where he buys cigarettes on way to [facility] the last time he went. -Doing well with PPI 40 mg QOD, but not ready to cut back to 20 mg QOD because he has a large supply of 40 mg tabs.  Agreed to try MWF.  -6 glasses of water each day  -Echo submitted and approved by [employer].  -not checking BP daily    9/16/20  -Still taking Chantix QOD, occasional strong cravings due to triggers  -Triggers: will go back to [place] this weekend.   -Hasn't told anyone at [facility] that he quit. -slowly increasing to 8 glasses water/day  -reports BP log: SBP range 116-131,  DBP range 66-76  -looking for better sense of smell     10/7/20  -Working to apply for medicare; still trying to figure out which plan is best.   -D/c Chantix 5 days ago, still has on hand to use prn. Has \"Ups and downs. \" Craving frequency and intensity leveled off.   -Quit 6 months ago!   -Told brother in law in [place] that he quit smoking.  -Improved smell    -Saw PCP who found blood in urine, which is worrying patient. Will return in 1 week for BP f/u after med changes made.   -Plans to continue PPI taper and will use Pepcid prn heartburn    11/3/20  -Remains smoke free  -Has not had to use Chantix since last appointment    -Still gets urges when seeing people smoke on TV. Will keep hard candies by the TV to avoid urge to smoke. -Got Nexium OTC instead of Pepcid, but only taking three times a week. Advised to get Pepcid instead as this is better for PRN use. 11/24/20  Cravings reduced significantly. Has not taken any Chantix. Has not switched from nexium QOD to pepcid yet, but bought at store. No GERD sxs. SBP mostly<130. Pt feeling well. Socially distanced. Staying healthy.     Pharmacy: [place], [place]    Current Medication and Allergy List Reviewed and Updated:  Current Outpatient Medications   Medication Sig Dispense Refill    potassium chloride (KLOR-CON M) 10 MEQ extended release tablet Take 1 tablet by mouth daily TAKE 2 TABLETS DAILY 90 tablet 1    esomeprazole (NEXIUM) 40 MG delayed release capsule Take 1 capsule by mouth every other day      hydroCHLOROthiazide (HYDRODIURIL) 25 MG tablet Take 1 tablet by mouth daily 90 tablet Order #: 2 Updated Order Reason(s): OTC  Total Interventions Accepted: 2  Time Spent (min): 30    Terry Frank PharmD

## 2020-12-16 ENCOUNTER — TELEPHONE (OUTPATIENT)
Dept: PRIMARY CARE CLINIC | Age: 65
End: 2020-12-16

## 2020-12-16 NOTE — TELEPHONE ENCOUNTER
PT CALLING SAYING HE IS CHANGING MAIL ORDER PHARM EFFECTIVE 1-1-21.     HE SAYS HE MAY RUN SHORT ON MEDS BEFORE THE MAIL ORDER CAN BE CONTACTED    HE SAYS HE WILL BE SHORT ON OLM[place]AN AND WILL FIND OUT FROM INS IF A SHORT SUPPLY CAN BE CALLED IN LOCALLY    THE OTHER 2 MEDS ARE POTASSIUM AND HCTZ    HE WILL CALL US BACK

## 2021-01-12 ENCOUNTER — VIRTUAL VISIT (OUTPATIENT)
Dept: PHARMACY | Age: 66
End: 2021-01-12
Payer: MEDICARE

## 2021-01-12 DIAGNOSIS — Z87.891 FORMER SMOKER: Primary | ICD-10-CM

## 2021-01-12 PROCEDURE — 99211 OFF/OP EST MAY X REQ PHY/QHP: CPT

## 2021-01-12 RX ORDER — OLMESARTAN MEDOXOMIL 40 MG/1
TABLET ORAL
Qty: 90 TABLET | Refills: 3 | Status: SHIPPED | OUTPATIENT
Start: 2021-01-12 | End: 2021-12-28

## 2021-01-12 NOTE — PROGRESS NOTES
Disclaimer for Virtual Visits: We want to confirm that, for purposes of billing, this is a virtual visit with your provider for which we will submit a claim for reimbursement with your insurance company. You may be responsible for any copays, coinsurance amounts or other amounts not covered by your insurance company. If you do not accept this, unfortunately we will not be able to schedule a virtual visit with the provider. Do you accept? Yes. CLINICAL PHARMACY NOTESmoking Cessation    SUBJECTIVE/OBJECTIVE:   Malcolm Campos is a 72 y.o. male with PMHx significant for GERD, HTN, pulmonary emphesema referred by Dr Anaid Villalta for smoking cessation counseling and medication management. Spoke with patient over the phone on 01/12/21. 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 30-60 min (previously immediately after waking)  Do you smoke 20 or more cigarettes each day? No  At times when you can't smoke or haven't got any cigarettes, do you feel a craving for one? Yes, but not if not keeping busy  Is it tough for you to keep from smoking for more than a few hours?  No, not if in a place where he can't smoke  When you are sick enough to stay in bed, to you still smoke? no  If yes to two or more questions, consider using NRT    Reasons for addiction: Touch and Handle, Stress-relief, Habit, Addiction, \"something to do\" Triggers: meals, stress, smoke breaks from his work; stopped smoking while driving, with coffee, alcohol (already tried to dissociate smoking with certain activities, but picked it up with other activities)  Barriers: not confident (only 5 on a 1-10 scale); He is \"afraid\" to run out of cigarettes. Motivation for quitting: Has wanted to quit for years (in back of mind) because he doesn't like that it's in control of him (addiction), Health, family, money    12/19 update:  Did  Chantix from pharmacy ($40), but did not start. Fearful of side effects. Has history of bad dreams several years ago and is afraid it may cause vivid dreams. Has been able to cut back slightly on his own. Was able to eliminate the 1st cigarette of the day, is now working on the 2nd. Started playing pool to fill downtime. He has been able to avoid buying more cigarettes until completely out of current pack  It has been a busy week, and he hasn't been able to focus on quitting as much as he would like. He plans to go to [place] for [place]. 12/31 update:  Smoking \"a little less,\" but unable to give exact amount. Started Chantix yesterday. Has tolerated 2 doses so far. No side effects. Has been able to delay the 2nd cigarette more by switching AM routine (drink coffe before breakfast)  Sucking on mints  Used recumbent bike a couple times, but no routine yet. Moved cigarettes to basement (out of pocket) to make it more inconvenient. Has kept a list of things to do on his breaks. 1/14 update:  Continues Chantix 1 mg BID. Takes with food, but has been difficult because he does not eat at regular times during the day. Experiencing some nausea/ \"queesy\" stomach. Also having occasional slight anxiety, but nothing major and is able to tolerate. Denies depression/thoughts of suicide or harming himself. Smoking less, but is not counting exact number because he thinks it will make him think about smoking more. He knows he is not buying cigarettes as often. He does not buy cigarettes until he is completely out of them. He needs a new RX for chantix. He is trying to stay busy and avoid long breaks which triggers him to smoke. He has been able to postpone the cigarette many times. 1/30 update:   Continues Chantix 1 mg BID. Takes with food. Experiencing some nausea/ \"queesy\" stomach, but tolerable. Also having occasional slight anxiety, but nothing major and is able to tolerate. Denies depression/thoughts of suicide or harming himself. Rides his recumbent bike 2x per week for 15 minutes. Smoking less, but is still not counting the exact number because he thinks it will make him think about smoking more. He is buying cigarettes less often and has placed them in his basement so has to go downstairs to get them when he wants to smoke. He does not like going down stairs. He has been able to walk back upstairs from the basement without bringing a cigarette with him. He made a list of reasons for quitting and placed it by his coat so he sees it when he is going outside to smoke. He does not smoke inside house or in car. He started using hard candies in place of cigarettes while he is preparing his meals. He often smokes right before and right after meals. He always brushes his teeth after meals. Patient feels that drinking coffee throughout the day would help keep him busy during breaks, and coffee has not been a trigger for him lately. 2/27  Has been able to reduce to 2 packs per week, but it was really hard. He surprised himself. Pt started drinking tea instead of coffee. Has been able to postpoine 1st cig at least a couple hours. Plans to avoid [facility], where he buys his cigs. Feels \"accomplished\" after exercising, but has only been able to exercise 2x per week.      3/12 Pt does not feel he did well with the goals we set at last visit 2/2 less focus and recent stressors (DDS, shingles vax, coronavirus). He didn't exercise as much. He increased to 1 pack in 3 days, then cut back down to 1 pack in 3.5 days. His cravings are less frequent, but not necessarily less strong. The recent [address] outbreak is worrying him because he is high risk. This is more motivation for him to quit. Pt requests a Chantix refill. Tried 1-800-QUIT-NOW- [place] it was not for him. 3/26  Pt reports having \"Up and downs. \" Coronavirus has really concerned him. He stopped Chantix for 2 days due to depression surrounding the situation, but realized that not listening to the news helped him much more. Pt feels his mood is stable and would like to resume chantix. Started keeping track of when he smokes (exact #s). He smoked more for a couple days, but he has been able to limit to 4 cig/day the past 2 days! This equates to goal of no more than 1 pack every 5 days. 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 He increased exercise to 20 min 2x per week and started core exercises. He is listening to an old CD while he exercises, which helps. He feel accomplished after exercise. 3/31  Pt quit smoking 3 days ago on 3/28! Motivation was fear. Pt started back on 0.5 mg once daily. Will increase to 0.5 mg BID. Things that worked: prayer, staying occupied, stopped watching the news because it was depressing. 4/2  Will increase Chantix today to 0.5 mg BID  He did put lighter out of pocket/ out of sight  Doesn't have an jacquelyn tray, he put the cigarette butts in a planter pot. Suggested he move this to a different location and plant a new plant or flowers in it. His urges continue to be able piano and after programming. His biggest urge to overcome was when he had been working on programming or several months and finally had to test if it worked, and it did not work. He had a very strong urge to smoke but paced and prayed, then occupied himself with something different. He is not ready to throw away the cigarettes, but he did put them out of site and doesn't remember where they are. Withdrawal sxs include: nasal drip, insomnia, irritability    4/7 11th day of ebing tobacco free. Scared that he might relapse. Reports mood is stable-- slightly anxious/worried about COVID19, but better than before. Withdrawal sxs include: nasal drip better, irritability, insomnia but not sure if 2/2 withdrawal or worry about COVID19. Increased water intake from 3 cups to 6 cups per day. 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 \" Urge lasted ~10 min. Craving rated 7/10.    2) after a really long phone call, paced, ate a piece of candy (max 1-2 pieces candy per day)  3) found himself at the door that he goes out to smoke; he is not sure what led to that moment; asked himself what am I doing? Was able to leave the situation    4/13  Pt on way to dentist for toothache. He watched star trek this weekend for 2 week reward. For next reward, he wants to buy new sheet music for piano since he cannot go to lessons. He would like to learn \"I can only imagine. \" Took 1 walk, bike x 2, but no core exercises. Will pick back up this week. 4/15  Root canal on 4/13. Started amoxicillin 500 mg TID. Exercise going well, except for past couple days. Unable to figure out what foods relieve nausea from Chantix. Urge 4/13: after leaving [facility], had 2 hours to kill before root canal, sat in car with nothing to do, feeling anxious, read his book and listened to music; always used to smoke when he needs to kills time. Missed Chantix on 4/13, took 1 dose on 4/13.     4/22  Still needs dental work done, fillings next week. Core exercise 2x per week, bike 2-3x per week-- feeling stronger. Tobacco cravings \"up and down. \" Most days they are less frequent, intensity varies. Is able to overcome. Watched star trek this weekend as reward. 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 Sunday night craving: made food for the whole week and was on feet all day; had to clean up after, strong craving after he took a break, created a list of distractions when he has cravings: jigsaw puzzle, read book. Still having trouble sleeping: plans to read old spiritual book before bed. Drainage much better. Slightly more depressed due to [address]. Does not feel this is due to Chantix, just situational. 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 Pt denies any thought of harming himself or others. 4/29  Pt smoke free x 1 month! Watched an old [employer] game as reward. Sleeping improved now that he is reading 30 min before bed. Piano 1 hour 4x/per week--found a tutorial on how to play \"I can only imagine\" and he is trying to write [employer] own sheet music for it. Tried working on software again. He is trying to make a routine. Lifting COVID restrictions is worrying him. He has been busy looking for new insurance as his [employer] expires this month and he is inelligible for [employer] until oct. It has been time consuming. He has a pcp visit next week. Needs to make sure all his RXs are covered under new plan.     5/6 Mood is \"up and down\"- manly due to coronavirus pandemic, but feels it is up more often than down. Pt new [employer] insurance starts today. Cravings are rare with short duration, but still strong (7 out of 10). Saw PCP today. Walks outside. Has been on Chantix x 4 months. Is not sure if it's covered by new insurance. 5/20/20  No lapses. Still some strong urges avg once per day. Bike 20 minutes 3x per week, increasing intensity \"from 1 to 2\". Continues core exercises. Working on writing sheet music for \"I can only imagine\". Increased amount of time spending on software, gradually. Increased nausea with Chantix. Mood is good, better than before. 5/27/20  No increase in cravings after cutting back slightly on Chantix. Nausea improved slightly possibly. A couple strong cravings but able to overcome. Increased exercise from 20 to 30 min on bike 3x per week, core exercises 1x per week, would like to increase to 2x per week. Feels good while riding bike, feels tired/accomplished after. Plays upbeat music. Still practicing piano, trying to write sheet music. Spends time on software, but not accomplishing a lot due to some limitations (space on hard drive). Going to [employer] [facility] [employer] today. Plans to cancel insurance at end of month because they do not cover Chantix or other needs (DDS, chiropractor, PCP). Arranged for [employer] to start 6/1/20 and Drs in [employer]. Sleeping better- was waking up at 3-4a and was anxious. Now not waking up anxious.     6/3/20 -Getting exercise, but not outside. Cut back on Chantix 0.5 mg once daily on most days. Had some stronger cravings some days, so took it BID. Strong craving on way to [employer]. It was the 1st time back to [employer] since he quit smoking. One of members smoked in front of him, which was difficult. He stopped where he normally would buy cigarettes on way to [employer] and bought candy instead. Checked BP yesterday 133/73. Doing well with PPI 40 mg QOD, but not ready to cut back to 20 mg QOD because he has a large supply of 40 mg tabs. 8/26/20  -Getting core/bike exercise, but not outside. Working toward goals.   -Cut back on Chantix 0.5 mg to QOD. Initially forgot dose, but then skipped doses intentionally.   -Triggers: frustration, on way to [employer], seeing people smoke on TV. -Plans to tell his friends at the [employer].  -Was able to not stop where he buys cigarettes on way to [employer] the last time he went. -Doing well with PPI 40 mg QOD, but not ready to cut back to 20 mg QOD because he has a large supply of 40 mg tabs. Agreed to try MWF.  -6 glasses of water each day  -Echo submitted and approved by [employer].  -not checking BP daily    9/16/20  -Still taking Chantix QOD, occasional strong cravings due to triggers  -Triggers: will go back to [employer] this weekend.   -Hasn't told anyone at [employer] that he quit. -slowly increasing to 8 glasses water/day  -reports BP log: SBP range 116-131,  DBP range 66-76  -looking for better sense of smell     10/7/20  -Working to apply for medicare; still trying to figure out which plan is best.   -D/c Chantix 5 days ago, still has on hand to use prn. Has \"Ups and downs. \" Craving frequency and intensity leveled off.   -Quit 6 months ago!   -Told brother in law in [place] that he quit smoking.  -Improved smell    -Saw PCP who found blood in urine, which is worrying patient. Will return in 1 week for BP f/u after med changes made.  acyclovir (ZOVIRAX) 400 MG tablet TAKE ONE TABLET BY MOUTH THREE TIMES A DAY FOR 10 DAYS FOR HERPES SIMPLEX FLARE-UP 90 tablet 1    Multiple Vitamin (MULTIVITAMIN PO) Take 1 capsule by mouth daily. No current facility-administered medications for this visit. Pertinent Labs/Vitals  Scr 0.9 (11/21/19)    ASSESSMENT/PLAN:   Patient has been tobacco free for almost 10 months! Frequency and intensity of cravings has decreased significantly. --Has not used Chantix in >2 months but has if needed. --Gets urges when sees people smoking on TV. He will place hard candies by his chair and eat one to fight the urge of smoking when this happens in the future. --Discussed that smoking cessation can help lower BP. Reviewed home rdgs which are now at goal.      Goals   Drink cup coffee on way to [facility]  Work on software development/shun at least 2 hours per day   Learn a new piano song and play it for your sister. Increase intensity or duration of exercise (biking). Reward yourself when you reach goals (grill, new sheet music, star trek, jigsaw puzzle, book)  Find alternate route to [employer]   Increase to 8 glasses of water each day (glass before meals + with meds)    Next appt:  2/23    Pt verbalized understanding of the above information and denied further questions or concerns. The total time of the visit was 30 min.     Jame Esquivel PharmD, [facility]  [facility]   [address] Ph: [phone]  Nai Olea Ph: [phone]  1/12/2021 11:06 AM      CLINICAL PHARMACY CONSULT: MED RECONCILIATION/REVIEW ADDENDUM    For Pharmacy Admin Tracking Only    PHSO: Yes  Total # of Interventions Recommended: 2  - Updated Order #: 2 Updated Order Reason(s): OTC, Other  Total Interventions Accepted: 2  Time Spent (min): 30    Ashlyn Harman PharmD

## 2021-01-12 NOTE — TELEPHONE ENCOUNTER
PT CALLING FOR REFILL ON [facility] TO BE CALLED IN TO [facility] FOR 90-DAY SUPPPLY    LAST OV = PE 10-5-20 AC  NO FUTURE OV

## 2021-02-01 RX ORDER — ACYCLOVIR 400 MG/1
TABLET ORAL
Qty: 90 TABLET | Refills: 1 | Status: SHIPPED | OUTPATIENT
Start: 2021-02-01

## 2021-02-22 NOTE — PROGRESS NOTES
Disclaimer for Virtual Visits: We want to confirm that, for purposes of billing, this is a virtual visit with your provider for which we will submit a claim for reimbursement with your insurance company. You may be responsible for any copays, coinsurance amounts or other amounts not covered by your insurance company. If you do not accept this, unfortunately we will not be able to schedule a virtual visit with the provider. Do you accept? Yes. CLINICAL PHARMACY NOTESmoking Cessation    SUBJECTIVE/OBJECTIVE:   Ivy Robbins is a 72 y.o. male with PMHx significant for GERD, HTN, pulmonary emphesema referred by Dr Radha Alcaraz for smoking cessation counseling and medication management. Spoke with patient over the phone on 02/22/21. 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 30-60 min (previously immediately after waking)  Do you smoke 20 or more cigarettes each day? No  At times when you can't smoke or haven't got any cigarettes, do you feel a craving for one? Yes, but not if not keeping busy  Is it tough for you to keep from smoking for more than a few hours?  No, not if in a place where he can't smoke  When you are sick enough to stay in bed, to you still smoke? no  If yes to two or more questions, consider using NRT    Reasons for addiction: Touch and Handle, Stress-relief, Habit, Addiction, \"something to do\"  Triggers: meals, stress, smoke breaks from his work; stopped smoking while driving, with coffee, alcohol (already tried to dissociate smoking with certain activities, but picked it up with other activities)  Barriers: not confident (only 5 on a 1-10 scale); He is \"afraid\" to run out of cigarettes. Motivation for quitting: Has wanted to quit for years (in back of mind) because he doesn't like that it's in control of him (addiction), Health, family, money    12/19 update:  Did  Chantix from pharmacy ($40), but did not start. Fearful of side effects. Has history of bad dreams several years ago and is afraid it may cause vivid dreams. Has been able to cut back slightly on his own. Was able to eliminate the 1st cigarette of the day, is now working on the 2nd. Started playing pool to fill downtime. He has been able to avoid buying more cigarettes until completely out of current pack  It has been a busy week, and he hasn't been able to focus on quitting as much as he would like. He plans to go to [place] for [place]. 12/31 update:  Smoking \"a little less,\" but unable to give exact amount. Started Chantix yesterday. Has tolerated 2 doses so far. No side effects. Has been able to delay the 2nd cigarette more by switching AM routine (drink coffe before breakfast)  Sucking on mints  Used recumbent bike a couple times, but no routine yet. Moved cigarettes to basement (out of pocket) to make it more inconvenient. Has kept a list of things to do on his breaks. 1/14 update:  Continues Chantix 1 mg BID. Takes with food, but has been difficult because he does not eat at regular times during the day. Experiencing some nausea/ \"queesy\" stomach. Also having occasional slight anxiety, but nothing major and is able to tolerate. Denies depression/thoughts of suicide or harming himself. Smoking less, but is not counting exact number because he thinks it will make him think about smoking more. He knows he is not buying cigarettes as often.  He does not buy cigarettes until he is completely out of them. He needs a new RX for chantix. He is trying to stay busy and avoid long breaks which triggers him to smoke. He has been able to postpone the cigarette many times. 1/30 update:   Continues Chantix 1 mg BID. Takes with food. Experiencing some nausea/ \"queesy\" stomach, but tolerable. Also having occasional slight anxiety, but nothing major and is able to tolerate. Denies depression/thoughts of suicide or harming himself. Rides his recumbent bike 2x per week for 15 minutes. Smoking less, but is still not counting the exact number because he thinks it will make him think about smoking more. He is buying cigarettes less often and has placed them in his basement so has to go downstairs to get them when he wants to smoke. He does not like going down stairs. He has been able to walk back upstairs from the basement without bringing a cigarette with him. He made a list of reasons for quitting and placed it by his coat so he sees it when he is going outside to smoke. He does not smoke inside house or in car. He started using hard candies in place of cigarettes while he is preparing his meals. He often smokes right before and right after meals. He always brushes his teeth after meals. Patient feels that drinking coffee throughout the day would help keep him busy during breaks, and coffee has not been a trigger for him lately. 2/27  Has been able to reduce to 2 packs per week, but it was really hard. He surprised himself. Pt started drinking tea instead of coffee. Has been able to postpoine 1st cig at least a couple hours. Plans to avoid [facility], where he buys his cigs. Feels \"accomplished\" after exercising, but has only been able to exercise 2x per week. 3/12  Pt does not feel he did well with the goals we set at last visit 2/2 less focus and recent stressors (DDS, shingles vax, coronavirus). He didn't exercise as much. He increased to 1 pack in 3 days, then cut back down to 1 pack in 3.5 days.  His cravings are less frequent, but not necessarily less strong. The recent [address] outbreak is worrying him because he is high risk. This is more motivation for him to quit. Pt requests a Chantix refill. Tried 1-800-QUIT-NOW- [place] it was not for him. 3/26  Pt reports having \"Up and downs. \" Coronavirus has really concerned him. He stopped Chantix for 2 days due to depression surrounding the situation, but realized that not listening to the news helped him much more. Pt feels his mood is stable and would like to resume chantix. Started keeping track of when he smokes (exact #s). He smoked more for a couple days, but he has been able to limit to 4 cig/day the past 2 days! This equates to goal of no more than 1 pack every 5 days. 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 He increased exercise to 20 min 2x per week and started core exercises. He is listening to an old CD while he exercises, which helps. He feel accomplished after exercise. 3/31  Pt quit smoking 3 days ago on 3/28! Motivation was fear. Pt started back on 0.5 mg once daily. Will increase to 0.5 mg BID. Things that worked: prayer, staying occupied, stopped watching the news because it was depressing. 4/2  Will increase Chantix today to 0.5 mg BID  He did put lighter out of pocket/ out of sight  Doesn't have an jacquelyn tray, he put the cigarette butts in a planter pot. Suggested he move this to a different location and plant a new plant or flowers in it. His urges continue to be able piano and after programming. His biggest urge to overcome was when he had been working on programming or several months and finally had to test if it worked, and it did not work. He had a very strong urge to smoke but paced and prayed, then occupied himself with something different. He is not ready to throw away the cigarettes, but he did put them out of site and doesn't remember where they are.    Withdrawal sxs include: nasal drip, insomnia, irritability 4/7 11th day of ebing tobacco free. Scared that he might relapse. Reports mood is stable-- slightly anxious/worried about COVID19, but better than before. Withdrawal sxs include: nasal drip better, irritability, insomnia but not sure if 2/2 withdrawal or worry about COVID19. Increased water intake from 3 cups to 6 cups per day. 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 \" Urge lasted ~10 min. Craving rated 7/10.    2) after a really long phone call, paced, ate a piece of candy (max 1-2 pieces candy per day)  3) found himself at the door that he goes out to smoke; he is not sure what led to that moment; asked himself what am I doing? Was able to leave the situation    4/13  Pt on way to dentist for toothache. He watched star trek this weekend for 2 week reward. For next reward, he wants to buy new sheet music for piano since he cannot go to lessons. He would like to learn \"I can only imagine. \" Took 1 walk, bike x 2, but no core exercises. Will pick back up this week. 4/15  Root canal on 4/13. Started amoxicillin 500 mg TID. Exercise going well, except for past couple days. Unable to figure out what foods relieve nausea from Chantix. Urge 4/13: after leaving [facility], had 2 hours to kill before root canal, sat in car with nothing to do, feeling anxious, read his book and listened to music; always used to smoke when he needs to kills time. Missed Chantix on 4/13, took 1 dose on 4/13.     4/22  Still needs dental work done, fillings next week. Core exercise 2x per week, bike 2-3x per week-- feeling stronger.    Tobacco cravings \"up and down. \" Most days they are less frequent, intensity varies. Is able to overcome. Watched star trek this weekend as reward. 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 Sunday night craving: made food for the whole week and was on feet all day; had to clean up after, strong craving after he took a break, created a list of distractions when he has cravings: jigsaw puzzle, read book. Still having trouble sleeping: plans to read old spiritual book before bed. Drainage much better. Slightly more depressed due to [address]. Does not feel this is due to Chantix, just situational. 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 Pt denies any thought of harming himself or others. 4/29  Pt smoke free x 1 month! Watched an old [employer] game as reward. Sleeping improved now that he is reading 30 min before bed. Piano 1 hour 4x/per week--found a tutorial on how to play \"I can only imagine\" and he is trying to write [place] own sheet music for it. Tried working on software again. He is trying to make a routine. Lifting COVID restrictions is worrying him. He has been busy looking for new insurance as his [employer] expires this month and he is inelligible for [employer] until oct. It has been time consuming. He has a pcp visit next week. Needs to make sure all his RXs are covered under new plan. 5/6  Mood is \"up and down\"- manly due to coronavirus pandemic, but feels it is up more often than down. Pt new [employer] insurance starts today. Cravings are rare with short duration, but still strong (7 out of 10). Saw PCP today. Walks outside. Has been on Chantix x 4 months. Is not sure if it's covered by new insurance. 5/20/20  No lapses. Still some strong urges avg once per day. Bike 20 minutes 3x per week, increasing intensity \"from 1 to 2\". Continues core exercises. Working on writing sheet music for \"I can only imagine\".  Increased amount of time spending on software, gradually. Increased nausea with Chantix. Mood is good, better than before. 5/27/20  No increase in cravings after cutting back slightly on Chantix. Nausea improved slightly possibly. A couple strong cravings but able to overcome. Increased exercise from 20 to 30 min on bike 3x per week, core exercises 1x per week, would like to increase to 2x per week. Feels good while riding bike, feels tired/accomplished after. Plays upbeat music. Still practicing piano, trying to write sheet music. Spends time on software, but not accomplishing a lot due to some limitations (space on hard drive). Going to [employer] [facility] [employer] today. Plans to cancel insurance at end of month because they do not cover Chantix or other needs (DDS, chiropractor, PCP). Arranged for [employer] to start 6/1/20 and Drs in [employer]. Sleeping better- was waking up at 3-4a and was anxious. Now not waking up anxious. 6/3/20  Decreased Chantix to 0.5 mg BID at end of last week, no increase in strength or frequency of cravings. Only a brief craving this AM while lying in bed, able to overcome easily. Needs refill on Chantix; has ~1 week left. Cut esomeprazole in half- still no GERD sxs. Still clearing throat (has happened since he quit smoking which he attributes to his lungs clearing out). Nausea has improved since reducing Chantix. Mood is good, he is less anxious and not waking in middle of night. He is drinking 6 glasses of water each day. 6/17/20  Continues Chantix 0.5 mg BID, still not smoking, slightly more frequent and stronger urges, but not too bad, in control of cravings. Nausea much better. Working more on programming which is sometimes a trigger during \"breaks. \"  Now sucks hard candy instead. Will start 1k puzzle. Goes for a walk every other day. Increased water to 6-7 glasses per day. Only had one episode of GERD since cutting back on dose and stopping carafate.  Pt is unsure why he started the glasses of water each day  -Echo submitted and approved by [employer].  -not checking BP daily    9/16/20  -Still taking Chantix QOD, occasional strong cravings due to triggers  -Triggers: will go back to [employer] this weekend.   -Hasn't told anyone at [employer] that he quit. -slowly increasing to 8 glasses water/day  -reports BP log: SBP range 116-131,  DBP range 66-76  -looking for better sense of smell     10/7/20  -Working to apply for medicare; still trying to figure out which plan is best.   -D/c Chantix 5 days ago, still has on hand to use prn. Has \"Ups and downs. \" Craving frequency and intensity leveled off.   -Quit 6 months ago!   -Told brother in law in [place] that he quit smoking.  -Improved smell    -Saw PCP who found blood in urine, which is worrying patient. Will return in 1 week for BP f/u after med changes made.   -Plans to continue PPI taper and will use Pepcid prn heartburn    11/3/20  -Remains smoke free  -Has not had to use Chantix since last appointment    -Still gets urges when seeing people smoke on TV. Will keep hard candies by the TV to avoid urge to smoke. -Got Nexium OTC instead of Pepcid, but only taking three times a week. Advised to get Pepcid instead as this is better for PRN use. 11/24/20  Cravings reduced significantly. Has not taken any Chantix. Has not switched from nexium QOD to pepcid yet, but bought at store. No GERD sxs. SBP mostly<130. Pt feeling well. Socially distanced. Staying healthy. 1/12/21  Put on an old jacket and had a pack of cigarettes in pocket. He put the pack directly in the trash without looking at it. He wasn't able to take out the trash right away, but didn't dig them out. He is not looking forward to when he is offered a cigarette in the future. Discussed how to prepare for his. Very excited because he got his echo published in [employer]. Stopped Nexium ~2 weeks ago. Using pepcid prn, but hasn't needed to use this at all.  Cut potassium down to once per day. 2/23/21  1 year piter of being tobacco free is coming up - March 28. Emphasized the enormity of this accomplishment. States he is staying busy with his software development, exercise, [employer] activity, and learning the piano. He still has triggers such as seeing someone smoking on tv or being frustrated. He was hoping after a year these triggers would be easier to handle. Encouraged patient to celebrate his success and focus on ways he used to avoid triggers when he was quitting. He states he often will pace, drink water, or just leave or turn away from the trigger. He states he is proud that his health has improved since quitting and he has been able to take less pills. Is also able to smell his coffee now. He is still nervous for when he is offered a cigarette in the future. Discussed how to prepare for this using by using his trigger-avoiding strategies and just leaving the room/situation if needed. Pharmacy: [place], [place]    Current Medication and Allergy List Reviewed and Updated:  Current Outpatient Medications   Medication Sig Dispense Refill    acyclovir (ZOVIRAX) 400 MG tablet TAKE ONE TABLET BY MOUTH THREE TIMES A DAY FOR 10 DAYS FOR HERPES SIMPLEX FLARE-UP 90 tablet 1    olmesartan (BENICAR) 40 MG tablet TAKE 1 TABLET DAILY 90 tablet 3    potassium chloride (KLOR-CON M) 10 MEQ extended release tablet Take 1 tablet by mouth daily TAKE 2 TABLETS DAILY (Patient taking differently: Take 10 mEq by mouth daily ) 90 tablet 1    hydroCHLOROthiazide (HYDRODIURIL) 25 MG tablet Take 1 tablet by mouth daily 90 tablet 1    loratadine (CLARITIN) 10 MG tablet Take 10 mg by mouth daily      Misc Natural Products (GLUCOSAMINE CHOND DOUBLE STR PO) Take 1 tablet by mouth daily      Multiple Vitamin (MULTIVITAMIN PO) Take 1 capsule by mouth daily. No current facility-administered medications for this visit.       Pertinent Labs/Vitals  Scr 0.9 (11/21/19)    ASSESSMENT/PLAN: Patient has been tobacco free for almost 10 months! Frequency and intensity of cravings has decreased significantly. --Has not used Chantix in >2 months but has if needed. --Gets urges when sees people smoking on TV. He will place hard candies by his chair and eat one to fight the urge of smoking when this happens in the future. --Discussed that smoking cessation can help lower BP. Reviewed home rdgs which are now at goal.      Goals   Celebrate on 1 year piter! Increase intensity or duration of exercise (biking). Reward yourself when you reach goals (grill, new sheet music, star trek, jigsaw puzzle, book)    Next appt:  3/23    Pt verbalized understanding of the above information and denied further questions or concerns. The total time of the visit was 30 min.     Negro Tsai PharmD  PGY1 Pharmacy Resident   [facility]   [address] Ph: [phone]  Annika Chandra Ph: [phone]  2/22/2021 12:45 PM      CLINICAL PHARMACY CONSULT: MED RECONCILIATION/REVIEW ADDENDUM    For Pharmacy Admin Tracking Only    PHSO: Yes  Total # of Interventions Recommended: 0  - Increased Dose #: 0  Total Interventions Accepted: 0  Time Spent (min): 30    Negro Tsai PharmD

## 2021-02-23 ENCOUNTER — VIRTUAL VISIT (OUTPATIENT)
Dept: PHARMACY | Age: 66
End: 2021-02-23
Payer: MEDICARE

## 2021-02-23 DIAGNOSIS — Z87.891 FORMER SMOKER: ICD-10-CM

## 2021-02-23 PROCEDURE — 99211 OFF/OP EST MAY X REQ PHY/QHP: CPT

## 2021-03-23 ENCOUNTER — VIRTUAL VISIT (OUTPATIENT)
Dept: PHARMACY | Age: 66
End: 2021-03-23
Payer: MEDICARE

## 2021-03-23 DIAGNOSIS — Z87.891 FORMER SMOKER: ICD-10-CM

## 2021-03-23 PROCEDURE — 99211 OFF/OP EST MAY X REQ PHY/QHP: CPT | Performed by: PHARMACIST

## 2021-03-23 NOTE — PROGRESS NOTES
Disclaimer for Virtual Visits: We want to confirm that, for purposes of billing, this is a virtual visit with your provider for which we will submit a claim for reimbursement with your insurance company. You may be responsible for any copays, coinsurance amounts or other amounts not covered by your insurance company. If you do not accept this, unfortunately we will not be able to schedule a virtual visit with the provider. Do you accept? Yes. CLINICAL PHARMACY NOTESmoking Cessation    SUBJECTIVE/OBJECTIVE:   Aron Alvarenga is a 72 y.o. male with PMHx significant for GERD, HTN, pulmonary emphesema referred by Dr Radha Riley for smoking cessation counseling and medication management. Spoke with patient over the phone on 03/23/21. 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 30-60 min (previously immediately after waking)  Do you smoke 20 or more cigarettes each day? No  At times when you can't smoke or haven't got any cigarettes, do you feel a craving for one? Yes, but not if not keeping busy  Is it tough for you to keep from smoking for more than a few hours?  No, not if in a place where he can't smoke  When you are sick enough to stay in bed, to you still smoke? no  If yes to two or more questions, consider using NRT    Reasons for addiction: Touch and Handle, Stress-relief, Habit, Addiction, \"something to do\"  Triggers: meals, stress, smoke breaks from his work; stopped smoking while driving, with coffee, alcohol (already tried to dissociate smoking with certain activities, but picked it up with other activities)  Barriers: not confident (only 5 on a 1-10 scale); He is \"afraid\" to run out of cigarettes. Motivation for quitting: Has wanted to quit for years (in back of mind) because he doesn't like that it's in control of him (addiction), Health, family, money    12/19 update:  Did  Chantix from pharmacy ($40), but did not start. Fearful of side effects. Has history of bad dreams several years ago and is afraid it may cause vivid dreams. Has been able to cut back slightly on his own. Was able to eliminate the 1st cigarette of the day, is now working on the 2nd. Started playing pool to fill downtime. He has been able to avoid buying more cigarettes until completely out of current pack  It has been a busy week, and he hasn't been able to focus on quitting as much as he would like. He plans to go to [place] for [place]. 12/31 update:  Smoking \"a little less,\" but unable to give exact amount. Started Chantix yesterday. Has tolerated 2 doses so far. No side effects. Has been able to delay the 2nd cigarette more by switching AM routine (drink coffe before breakfast)  Sucking on mints  Used recumbent bike a couple times, but no routine yet. Moved cigarettes to basement (out of pocket) to make it more inconvenient. Has kept a list of things to do on his breaks. 1/14 update:  Continues Chantix 1 mg BID. Takes with food, but has been difficult because he does not eat at regular times during the day. Experiencing some nausea/ \"queesy\" stomach. Also having occasional slight anxiety, but nothing major and is able to tolerate. Denies depression/thoughts of suicide or harming himself. Smoking less, but is not counting exact number because he thinks it will make him think about smoking more. He knows he is not buying cigarettes as often.  He does not buy cigarettes until he is completely out of them. He needs a new RX for chantix. He is trying to stay busy and avoid long breaks which triggers him to smoke. He has been able to postpone the cigarette many times. 1/30 update:   Continues Chantix 1 mg BID. Takes with food. Experiencing some nausea/ \"queesy\" stomach, but tolerable. Also having occasional slight anxiety, but nothing major and is able to tolerate. Denies depression/thoughts of suicide or harming himself. Rides his recumbent bike 2x per week for 15 minutes. Smoking less, but is still not counting the exact number because he thinks it will make him think about smoking more. He is buying cigarettes less often and has placed them in his basement so has to go downstairs to get them when he wants to smoke. He does not like going down stairs. He has been able to walk back upstairs from the basement without bringing a cigarette with him. He made a list of reasons for quitting and placed it by his coat so he sees it when he is going outside to smoke. He does not smoke inside house or in car. He started using hard candies in place of cigarettes while he is preparing his meals. He often smokes right before and right after meals. He always brushes his teeth after meals. Patient feels that drinking coffee throughout the day would help keep him busy during breaks, and coffee has not been a trigger for him lately. 2/27  Has been able to reduce to 2 packs per week, but it was really hard. He surprised himself. Pt started drinking tea instead of coffee. Has been able to postpoine 1st cig at least a couple hours. Plans to avoid [facility], where he buys his cigs. Feels \"accomplished\" after exercising, but has only been able to exercise 2x per week. 3/12  Pt does not feel he did well with the goals we set at last visit 2/2 less focus and recent stressors (DDS, shingles vax, coronavirus). He didn't exercise as much. He increased to 1 pack in 3 days, then cut back down to 1 pack in 3.5 days.  His cravings are less frequent, but not necessarily less strong. The recent [address] outbreak is worrying him because he is high risk. This is more motivation for him to quit. Pt requests a Chantix refill. Tried 1-800-QUIT-NOW- [place] it was not for him. 3/26  Pt reports having \"Up and downs. \" Coronavirus has really concerned him. He stopped Chantix for 2 days due to depression surrounding the situation, but realized that not listening to the news helped him much more. Pt feels his mood is stable and would like to resume chantix. Started keeping track of when he smokes (exact #s). He smoked more for a couple days, but he has been able to limit to 4 cig/day the past 2 days! This equates to goal of no more than 1 pack every 5 days. 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 He increased exercise to 20 min 2x per week and started core exercises. He is listening to an old CD while he exercises, which helps. He feel accomplished after exercise. 3/31  Pt quit smoking 3 days ago on 3/28! Motivation was fear. Pt started back on 0.5 mg once daily. Will increase to 0.5 mg BID. Things that worked: prayer, staying occupied, stopped watching the news because it was depressing. 4/2  Will increase Chantix today to 0.5 mg BID  He did put lighter out of pocket/ out of sight  Doesn't have an jacquelyn tray, he put the cigarette butts in a planter pot. Suggested he move this to a different location and plant a new plant or flowers in it. His urges continue to be able piano and after programming. His biggest urge to overcome was when he had been working on programming or several months and finally had to test if it worked, and it did not work. He had a very strong urge to smoke but paced and prayed, then occupied himself with something different. He is not ready to throw away the cigarettes, but he did put them out of site and doesn't remember where they are.    Withdrawal sxs include: nasal drip, insomnia, irritability 4/7 11th day of ebing tobacco free. Scared that he might relapse. Reports mood is stable-- slightly anxious/worried about COVID19, but better than before. Withdrawal sxs include: nasal drip better, irritability, insomnia but not sure if 2/2 withdrawal or worry about COVID19. Increased water intake from 3 cups to 6 cups per day. 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 \" Urge lasted ~10 min. Craving rated 7/10.    2) after a really long phone call, paced, ate a piece of candy (max 1-2 pieces candy per day)  3) found himself at the door that he goes out to smoke; he is not sure what led to that moment; asked himself what am I doing? Was able to leave the situation    4/13  Pt on way to dentist for toothache. He watched star trek this weekend for 2 week reward. For next reward, he wants to buy new sheet music for piano since he cannot go to lessons. He would like to learn \"I can only imagine. \" Took 1 walk, bike x 2, but no core exercises. Will pick back up this week. 4/15  Root canal on 4/13. Started amoxicillin 500 mg TID. Exercise going well, except for past couple days. Unable to figure out what foods relieve nausea from Chantix. Urge 4/13: after leaving [facility], had 2 hours to kill before root canal, sat in car with nothing to do, feeling anxious, read his book and listened to music; always used to smoke when he needs to kills time. Missed Chantix on 4/13, took 1 dose on 4/13.     4/22  Still needs dental work done, fillings next week. Core exercise 2x per week, bike 2-3x per week-- feeling stronger.    Tobacco cravings \"up and down. \" Most days they are less frequent, intensity varies. Is able to overcome. Watched star trek this weekend as reward. 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 Sunday night craving: made food for the whole week and was on feet all day; had to clean up after, strong craving after he took a break, created a list of distractions when he has cravings: jigsaw puzzle, read book. Still having trouble sleeping: plans to read old spiritual book before bed. Drainage much better. Slightly more depressed due to [address]. Does not feel this is due to Chantix, just situational. 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 Pt denies any thought of harming himself or others. 4/29  Pt smoke free x 1 month! Watched an old "[employer] game as reward. Sleeping improved now that he is reading 30 min before bed. Piano 1 hour 4x/per week--found a tutorial on how to play \"I can only imagine\" and he is trying to write [employer] sheet music for it. Tried working on software again. He is trying to make a routine. Lifting COVID restrictions is worrying him. He has been busy looking for new insurance as his [employer] expires this month and he is inelligible for [employer] until oct. It has been time consuming. He has a pcp visit next week. Needs to make sure all his RXs are covered under new plan. 5/6  Mood is \"up and down\"- manly due to coronavirus pandemic, but feels it is up more often than down. Pt new [employer] insurance starts today. Cravings are rare with short duration, but still strong (7 out of 10). Saw PCP today. Walks outside. Has been on Chantix x 4 months. Is not sure if it's covered by new insurance. 5/20/20  No lapses. Still some strong urges avg once per day. Bike 20 minutes 3x per week, increasing intensity \"from 1 to 2\". Continues core exercises. Working on writing sheet music for \"I can only imagine\".  Increased amount of time spending on software, gradually. Increased nausea with Chantix. Mood is good, better than before. 5/27/20  No increase in cravings after cutting back slightly on Chantix. Nausea improved slightly possibly. A couple strong cravings but able to overcome. Increased exercise from 20 to 30 min on bike 3x per week, core exercises 1x per week, would like to increase to 2x per week. Feels good while riding bike, feels tired/accomplished after. Plays upbeat music. Still practicing piano, trying to write sheet music. Spends time on software, but not accomplishing a lot due to some limitations (space on hard drive). Going to [place] [facility] [employer] today. Plans to cancel insurance at end of month because they do not cover Chantix or other needs (DDS, chiropractor, PCP). Arranged for [employer] to start 6/1/20 and Drs in [employer]. Sleeping better- was waking up at 3-4a and was anxious. Now not waking up anxious. 6/3/20  Decreased Chantix to 0.5 mg BID at end of last week, no increase in strength or frequency of cravings. Only a brief craving this AM while lying in bed, able to overcome easily. Needs refill on Chantix; has ~1 week left. Cut esomeprazole in half- still no GERD sxs. Still clearing throat (has happened since he quit smoking which he attributes to his lungs clearing out). Nausea has improved since reducing Chantix. Mood is good, he is less anxious and not waking in middle of night. He is drinking 6 glasses of water each day. 6/17/20  Continues Chantix 0.5 mg BID, still not smoking, slightly more frequent and stronger urges, but not too bad, in control of cravings. Nausea much better. Working more on programming which is sometimes a trigger during \"breaks. \"  Now sucks hard candy instead. Will start 1k puzzle. Goes for a walk every other day. Increased water to 6-7 glasses per day. Only had one episode of GERD since cutting back on dose and stopping carafate.  Pt is unsure why he started the medications. Looking back on med list, he has been on this at least 10 years. Per OV note from Dr Elpidio Tello 2/28/11, \"Just 2 weeks ago he began to have hiccups and a feeling of acid around his throat. He is on Nexium every day. He had an EGD by Dr. Unruly Bucio for this in ~ 2008 and had sucralfate prescribed then, which helped. He recently found the old med and  Has taken it for a week now and it has helped. He will take it for a few weeks and then stop and see what happens. \"    7/6/20  -Continues Chantix, remains smoke free >3 months, no changes in cravings, etc. Nauseous once because he forgot to eat. Is nervous to stop medication altogether.   -Pt reduced Nexium 40 mg to QOD ~1.5 wks ago, doing well with no increase in GERD sxs. Feels comfortable gradually tapering off. -/72 one time at home, but does not check daily   -Bike 3x per week, Core exercises 2x per week. 8/5/20  -Getting exercise, but not outside. Cut back on Chantix 0.5 mg once daily on most days. Had some stronger cravings some days, so took it BID. Strong craving on way to [employer]. It was the 1st time back to [employer] since he quit smoking. One of members smoked in front of him, which was difficult. He stopped where he normally would buy cigarettes on way to [employer] and bought candy instead. Checked BP yesterday 133/73. Doing well with PPI 40 mg QOD, but not ready to cut back to 20 mg QOD because he has a large supply of 40 mg tabs. 8/26/20  -Getting core/bike exercise, but not outside. Working toward goals.   -Cut back on Chantix 0.5 mg to QOD. Initially forgot dose, but then skipped doses intentionally.   -Triggers: frustration, on way to [employer], seeing people smoke on TV. -Plans to tell his friends at the [employer].  -Was able to not stop where he buys cigarettes on way to [employer] the last time he went. -Doing well with PPI 40 mg QOD, but not ready to cut back to 20 mg QOD because he has a large supply of 40 mg tabs.  Agreed to try MWF.  -6 glasses of water each day  -Echo submitted and approved by [employer].  -not checking BP daily    9/16/20  -Still taking Chantix QOD, occasional strong cravings due to triggers  -Triggers: will go back to [employer] this weekend.   -Hasn't told anyone at [employer] that he quit. -slowly increasing to 8 glasses water/day  -reports BP log: SBP range 116-131,  DBP range 66-76  -looking for better sense of smell     10/7/20  -Working to apply for medicare; still trying to figure out which plan is best.   -D/c Chantix 5 days ago, still has on hand to use prn. Has \"Ups and downs. \" Craving frequency and intensity leveled off.   -Quit 6 months ago!   -Told brother in law in [place] that he quit smoking.  -Improved smell    -Saw PCP who found blood in urine, which is worrying patient. Will return in 1 week for BP f/u after med changes made.   -Plans to continue PPI taper and will use Pepcid prn heartburn    11/3/20  -Remains smoke free  -Has not had to use Chantix since last appointment    -Still gets urges when seeing people smoke on TV. Will keep hard candies by the TV to avoid urge to smoke. -Got Nexium OTC instead of Pepcid, but only taking three times a week. Advised to get Pepcid instead as this is better for PRN use. 11/24/20  Cravings reduced significantly. Has not taken any Chantix. Has not switched from nexium QOD to pepcid yet, but bought at store. No GERD sxs. SBP mostly<130. Pt feeling well. Socially distanced. Staying healthy. 1/12/21  Put on an old jacket and had a pack of cigarettes in pocket. He put the pack directly in the trash without looking at it. He wasn't able to take out the trash right away, but didn't dig them out. He is not looking forward to when he is offered a cigarette in the future. Discussed how to prepare for his. Very excited because he got his echo published in [employer]. Stopped Nexium ~2 weeks ago. Using pepcid prn, but hasn't needed to use this at all.  Cut potassium Will call patient again in 1 month to follow-up, but encouraged patient to schedule appointment earlier if he feels he is getting overwhelmed with triggers before then. [facility] [id number] [facility] [address]    Current Medication and Allergy List Reviewed and Updated:  Current Outpatient Medications   Medication Sig Dispense Refill    acyclovir (ZOVIRAX) 400 MG tablet TAKE ONE TABLET BY MOUTH THREE TIMES A DAY FOR 10 DAYS FOR HERPES SIMPLEX FLARE-UP 90 tablet 1    olmesartan (BENICAR) 40 MG tablet TAKE 1 TABLET DAILY 90 tablet 3    potassium chloride (KLOR-CON M) 10 MEQ extended release tablet Take 1 tablet by mouth daily TAKE 2 TABLETS DAILY (Patient taking differently: Take 10 mEq by mouth daily ) 90 tablet 1    hydroCHLOROthiazide (HYDRODIURIL) 25 MG tablet Take 1 tablet by mouth daily 90 tablet 1    loratadine (CLARITIN) 10 MG tablet Take 10 mg by mouth daily      Misc Natural Products (GLUCOSAMINE CHOND DOUBLE STR PO) Take 1 tablet by mouth daily      Multiple Vitamin (MULTIVITAMIN PO) Take 1 capsule by mouth daily. No current facility-administered medications for this visit. Pertinent Labs/Vitals  Scr 0.9 (11/21/19)    ASSESSMENT/PLAN:   Patient has been tobacco free for almost one year! Frequency and intensity of cravings has decreased significantly. --Has not used Chantix in >3 months but has if needed. --Gets urges when sees people smoking on TV and nervous about going around family and friends. He will avoid triggers as discussed above. --Re-iterated health benefits of smoking cessation with patient to encourage patient to remain smoke free. Patient is very satisfied / happy with outcomes so far. Goals   Celebrate on 1 year piter! Increase intensity or duration of exercise (biking).   Reward yourself when you reach goals (grill, new sheet music, star trek, jigsaw puzzle, book)    Next appt:  4/20 or sooner if temptations increase     Pt verbalized understanding of the above information and denied further questions or concerns. The total time of the visit was 30 min.     Iveth Gutierrez, PharmD, BCPS  [facility]  [place]: [phone]  [facility]: [phone]  3/23/2021 11:28 AM      CLINICAL PHARMACY CONSULT: MED RECONCILIATION/REVIEW ADDENDUM    For Pharmacy Admin Tracking Only    PHSO: Yes  Total # of Interventions Recommended: 0  - Increased Dose #: 0  Total Interventions Accepted: 0  Time Spent (min): 30

## 2021-03-29 DIAGNOSIS — I10 ESSENTIAL HYPERTENSION: ICD-10-CM

## 2021-03-29 RX ORDER — HYDROCHLOROTHIAZIDE 25 MG/1
25 TABLET ORAL DAILY
Qty: 90 TABLET | Refills: 1 | Status: SHIPPED | OUTPATIENT
Start: 2021-03-29 | End: 2021-07-13 | Stop reason: SDUPTHER

## 2021-04-20 ENCOUNTER — VIRTUAL VISIT (OUTPATIENT)
Dept: PHARMACY | Age: 66
End: 2021-04-20
Payer: MEDICARE

## 2021-04-20 DIAGNOSIS — Z87.891 FORMER SMOKER: Primary | ICD-10-CM

## 2021-04-20 PROCEDURE — 99211 OFF/OP EST MAY X REQ PHY/QHP: CPT

## 2021-04-20 NOTE — PROGRESS NOTES
Disclaimer for Virtual Visits: We want to confirm that, for purposes of billing, this is a virtual visit with your provider for which we will submit a claim for reimbursement with your insurance company. You may be responsible for any copays, coinsurance amounts or other amounts not covered by your insurance company. If you do not accept this, unfortunately we will not be able to schedule a virtual visit with the provider. Do you accept? Yes. CLINICAL PHARMACY NOTESmoking Cessation    SUBJECTIVE/OBJECTIVE:   Karen Fagan is a 72 y.o. male with PMHx significant for GERD, HTN, pulmonary emphesema referred by Dr Nilo Jett for smoking cessation counseling and medication management. Spoke with patient over the phone on 04/20/21. 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 30-60 min (previously immediately after waking)  Do you smoke 20 or more cigarettes each day? No  At times when you can't smoke or haven't got any cigarettes, do you feel a craving for one? Yes, but not if not keeping busy  Is it tough for you to keep from smoking for more than a few hours?  No, not if in a place where he can't smoke  When you are sick enough to stay in bed, to you still smoke? no  If yes to two or more questions, consider using NRT    Reasons for addiction: Touch and Handle, Stress-relief, Habit, Addiction, \"something to do\"  Triggers: meals, stress, smoke breaks from his work; stopped smoking while driving, with coffee, alcohol (already tried to dissociate smoking with certain activities, but picked it up with other activities)  Barriers: not confident (only 5 on a 1-10 scale); He is \"afraid\" to run out of cigarettes. Motivation for quitting: Has wanted to quit for years (in back of mind) because he doesn't like that it's in control of him (addiction), Health, family, money    12/19 update:  Did  Chantix from pharmacy ($40), but did not start. Fearful of side effects. Has history of bad dreams several years ago and is afraid it may cause vivid dreams. Has been able to cut back slightly on his own. Was able to eliminate the 1st cigarette of the day, is now working on the 2nd. Started playing pool to fill downtime. He has been able to avoid buying more cigarettes until completely out of current pack  It has been a busy week, and he hasn't been able to focus on quitting as much as he would like. He plans to go to [place] for [place]. 12/31 update:  Smoking \"a little less,\" but unable to give exact amount. Started Chantix yesterday. Has tolerated 2 doses so far. No side effects. Has been able to delay the 2nd cigarette more by switching AM routine (drink coffe before breakfast)  Sucking on mints  Used recumbent bike a couple times, but no routine yet. Moved cigarettes to basement (out of pocket) to make it more inconvenient. Has kept a list of things to do on his breaks. 1/14 update:  Continues Chantix 1 mg BID. Takes with food, but has been difficult because he does not eat at regular times during the day. Experiencing some nausea/ \"queesy\" stomach. Also having occasional slight anxiety, but nothing major and is able to tolerate. Denies depression/thoughts of suicide or harming himself. Smoking less, but is not counting exact number because he thinks it will make him think about smoking more. He knows he is not buying cigarettes as often.  He does not buy cigarettes until he is completely out of cravings are less frequent, but not necessarily less strong. The recent [address] outbreak is worrying him because he is high risk. This is more motivation for him to quit. Pt requests a Chantix refill. Tried 1-800-QUIT-NOW- [place] it was not for him. 3/26  Pt reports having \"Up and downs. \" Coronavirus has really concerned him. He stopped Chantix for 2 days due to depression surrounding the situation, but realized that not listening to the news helped him much more. Pt feels his mood is stable and would like to resume chantix. Started keeping track of when he smokes (exact #s). He smoked more for a couple days, but he has been able to limit to 4 cig/day the past 2 days! This equates to goal of no more than 1 pack every 5 days. 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 He increased exercise to 20 min 2x per week and started core exercises. He is listening to an old CD while he exercises, which helps. He feel accomplished after exercise. 3/31  Pt quit smoking 3 days ago on 3/28! Motivation was fear. Pt started back on 0.5 mg once daily. Will increase to 0.5 mg BID. Things that worked: prayer, staying occupied, stopped watching the news because it was depressing. 4/2  Will increase Chantix today to 0.5 mg BID  He did put lighter out of pocket/ out of sight  Doesn't have an jacquelyn tray, he put the cigarette butts in a planter pot. Suggested he move this to a different location and plant a new plant or flowers in it. His urges continue to be able piano and after programming. His biggest urge to overcome was when he had been working on programming or several months and finally had to test if it worked, and it did not work. He had a very strong urge to smoke but paced and prayed, then occupied himself with something different. He is not ready to throw away the cigarettes, but he did put them out of site and doesn't remember where they are.    Withdrawal sxs include: nasal drip, insomnia, irritability 4/7 11th day of ebing tobacco free. Scared that he might relapse. Reports mood is stable-- slightly anxious/worried about COVID19, but better than before. Withdrawal sxs include: nasal drip better, irritability, insomnia but not sure if 2/2 withdrawal or worry about COVID19. Increased water intake from 3 cups to 6 cups per day. 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 \" Urge lasted ~10 min. Craving rated 7/10.    2) after a really long phone call, paced, ate a piece of candy (max 1-2 pieces candy per day)  3) found himself at the door that he goes out to smoke; he is not sure what led to that moment; asked himself what am I doing? Was able to leave the situation    4/13  Pt on way to dentist for toothache. He watched star trek this weekend for 2 week reward. For next reward, he wants to buy new sheet music for piano since he cannot go to lessons. He would like to learn \"I can only imagine. \" Took 1 walk, bike x 2, but no core exercises. Will pick back up this week. 4/15  Root canal on 4/13. Started amoxicillin 500 mg TID. Exercise going well, except for past couple days. Unable to figure out what foods relieve nausea from Chantix. Urge 4/13: after leaving [facility], had 2 hours to kill before root canal, sat in car with nothing to do, feeling anxious, read his book and listened to music; always used to smoke when he needs to kills time. Missed Chantix on 4/13, took 1 dose on 4/13.     4/22  Still needs dental work done, fillings next week. Core exercise 2x per week, bike 2-3x per week-- feeling stronger.    Tobacco cravings \"up and down. \" Most days they are less frequent, intensity varies. Is able to overcome. Watched star trek this weekend as reward. 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 Sunday night craving: made food for the whole week and was on feet all day; had to clean up after, strong craving after he took a break, created a list of distractions when he has cravings: jigsaw puzzle, read book. Still having trouble sleeping: plans to read old spiritual book before bed. Drainage much better. Slightly more depressed due to [address]. Does not feel this is due to Chantix, just situational. 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 Pt denies any thought of harming himself or others. 4/29  Pt smoke free x 1 month! Watched an old [employer] game as reward. Sleeping improved now that he is reading 30 min before bed. Piano 1 hour 4x/per week--found a tutorial on how to play \"I can only imagine\" and he is trying to write [employer] sheet music for it. Tried working on software again. He is trying to make a routine. Lifting COVID restrictions is worrying him. He has been busy looking for new insurance as his [employer] expires this month and he is inelligible for [employer] until oct. It has been time consuming. He has a pcp visit next week. Needs to make sure all his RXs are covered under new plan. 5/6  Mood is \"up and down\"- manly due to coronavirus pandemic, but feels it is up more often than down. Pt new [employer] insurance starts today. Cravings are rare with short duration, but still strong (7 out of 10). Saw PCP today. Walks outside. Has been on Chantix x 4 months. Is not sure if it's covered by new insurance. 5/20/20  No lapses. Still some strong urges avg once per day. Bike 20 minutes 3x per week, increasing intensity \"from 1 to 2\". Continues core exercises. Working on writing sheet music for \"I can only imagine\".  Increased amount of time spending on software, gradually. Increased nausea with Chantix. Mood is good, better than before. 5/27/20  No increase in cravings after cutting back slightly on Chantix. Nausea improved slightly possibly. A couple strong cravings but able to overcome. Increased exercise from 20 to 30 min on bike 3x per week, core exercises 1x per week, would like to increase to 2x per week. Feels good while riding bike, feels tired/accomplished after. Plays upbeat music. Still practicing piano, trying to write sheet music. Spends time on software, but not accomplishing a lot due to some limitations (space on hard drive). Going to [employer] [facility] [employer] today. Plans to cancel insurance at end of month because they do not cover Chantix or other needs ([facility], chiropractor, PCP). Arranged for [employer] to start 6/1/20 and Drs in [employer]. Sleeping better- was waking up at 3-4a and was anxious. Now not waking up anxious. 6/3/20  Decreased Chantix to 0.5 mg BID at end of last week, no increase in strength or frequency of cravings. Only a brief craving this AM while lying in bed, able to overcome easily. Needs refill on Chantix; has ~1 week left. Cut esomeprazole in half- still no GERD sxs. Still clearing throat (has happened since he quit smoking which he attributes to his lungs clearing out). Nausea has improved since reducing Chantix. Mood is good, he is less anxious and not waking in middle of night. He is drinking 6 glasses of water each day. 6/17/20  Continues Chantix 0.5 mg BID, still not smoking, slightly more frequent and stronger urges, but not too bad, in control of cravings. Nausea much better. Working more on programming which is sometimes a trigger during \"breaks. \"  Now sucks hard candy instead. Will start 1k puzzle. Goes for a walk every other day. Increased water to 6-7 glasses per day. Only had one episode of GERD since cutting back on dose and stopping carafate.  Pt is unsure why he started the medications. Looking back on med list, he has been on this at least 10 years. Per OV note from Dr Jose C Torre 2/28/11, \"Just 2 weeks ago he began to have hiccups and a feeling of acid around his throat. He is on Nexium every day. He had an EGD by Dr. Lyn José for this in ~ 2008 and had sucralfate prescribed then, which helped. He recently found the old med and  Has taken it for a week now and it has helped. He will take it for a few weeks and then stop and see what happens. \"    7/6/20  -Continues Chantix, remains smoke free >3 months, no changes in cravings, etc. Nauseous once because he forgot to eat. Is nervous to stop medication altogether.   -Pt reduced Nexium 40 mg to QOD ~1.5 wks ago, doing well with no increase in GERD sxs. Feels comfortable gradually tapering off. -/72 one time at home, but does not check daily   -Bike 3x per week, Core exercises 2x per week. 8/5/20  -Getting exercise, but not outside. Cut back on Chantix 0.5 mg once daily on most days. Had some stronger cravings some days, so took it BID. Strong craving on way to [facility]. It was the 1st time back to [facility] since he quit smoking. One of members smoked in front of him, which was difficult. He stopped where he normally would buy cigarettes on way to [facility] and bought candy instead. Checked BP yesterday 133/73. Doing well with PPI 40 mg QOD, but not ready to cut back to 20 mg QOD because he has a large supply of 40 mg tabs. 8/26/20  -Getting core/bike exercise, but not outside. Working toward goals.   -Cut back on Chantix 0.5 mg to QOD. Initially forgot dose, but then skipped doses intentionally.   -Triggers: frustration, on way to [facility], seeing people smoke on TV. -Plans to tell his friends at the [employer].  -Was able to not stop where he buys cigarettes on way to [employer] the last time he went. -Doing well with PPI 40 mg QOD, but not ready to cut back to 20 mg QOD because he has a large supply of 40 mg tabs.  Agreed to try MWF.  -6 glasses of water each day  -Echo submitted and approved by [employer].  -not checking BP daily    9/16/20  -Still taking Chantix QOD, occasional strong cravings due to triggers  -Triggers: will go back to [facility] this weekend.   -Hasn't told anyone at [facility] that he quit. -slowly increasing to 8 glasses water/day  -reports BP log: SBP range 116-131,  DBP range 66-76  -looking for better sense of smell     10/7/20  -Working to apply for medicare; still trying to figure out which plan is best.   -D/c Chantix 5 days ago, still has on hand to use prn. Has \"Ups and downs. \" Craving frequency and intensity leveled off.   -Quit 6 months ago!   -Told brother in law in [place] that he quit smoking.  -Improved smell    -Saw PCP who found blood in urine, which is worrying patient. Will return in 1 week for BP f/u after med changes made.   -Plans to continue PPI taper and will use Pepcid prn heartburn    11/3/20  -Remains smoke free  -Has not had to use Chantix since last appointment    -Still gets urges when seeing people smoke on TV. Will keep hard candies by the TV to avoid urge to smoke. -Got Nexium OTC instead of Pepcid, but only taking three times a week. Advised to get Pepcid instead as this is better for PRN use. 11/24/20  Cravings reduced significantly. Has not taken any Chantix. Has not switched from nexium QOD to pepcid yet, but bought at store. No GERD sxs. SBP mostly<130. Pt feeling well. Socially distanced. Staying healthy. 1/12/21  Put on an old jacket and had a pack of cigarettes in pocket. He put the pack directly in the trash without looking at it. He wasn't able to take out the trash right away, but didn't dig them out. He is not looking forward to when he is offered a cigarette in the future. Discussed how to prepare for his. Very excited because he got his echo published in [employer]. Stopped Nexium ~2 weeks ago. Using pepcid prn, but hasn't needed to use this at all.  Cut potassium virtual. Will call patient again in 1 month to follow-up, but encouraged patient to schedule appointment earlier if he feels he is getting overwhelmed with triggers before then. 4/20/21  Told family zoom celebration for late mom's birthday and told family he quit smoking. Brother in law wishes he could quit but hasn't set a quit date. Has not had much in person contact with family. Still not in social situations either, including [employer] volunteer work. Still virtual. Meets with group of inter-racial [employer] members and plans to meet in person next. One member smokes and he is worried it will be a trigger to smoke. Still doesn't consider himself a non-smoker, thinks he is still \"quitting\". Reminded patient that he did QUIT and has been a NONsmoker for over a year! His reward was getting his computer fixed/updated. He has been walking 2x per week and has a more consistent indoor exercise routine-- recumbent bike, weights, core exercises. Gets a \"reaction\" (not necessarily an urge) when sees people smoking on TV and nervous about going around family and friends. He will avoid triggers as discussed above.       Pharmacy: [place], [place]    Current Medication and Allergy List Reviewed and Updated:  Current Outpatient Medications   Medication Sig Dispense Refill    hydroCHLOROthiazide (HYDRODIURIL) 25 MG tablet Take 1 tablet by mouth daily 90 tablet 1    acyclovir (ZOVIRAX) 400 MG tablet TAKE ONE TABLET BY MOUTH THREE TIMES A DAY FOR 10 DAYS FOR HERPES SIMPLEX FLARE-UP 90 tablet 1    olmesartan (BENICAR) 40 MG tablet TAKE 1 TABLET DAILY 90 tablet 3    potassium chloride (KLOR-CON M) 10 MEQ extended release tablet Take 1 tablet by mouth daily TAKE 2 TABLETS DAILY (Patient taking differently: Take 10 mEq by mouth daily ) 90 tablet 1    loratadine (CLARITIN) 10 MG tablet Take 10 mg by mouth daily      Misc Natural Products (GLUCOSAMINE CHOND DOUBLE STR PO) Take 1 tablet by mouth daily      Multiple Vitamin

## 2021-05-18 ENCOUNTER — VIRTUAL VISIT (OUTPATIENT)
Dept: PHARMACY | Age: 66
End: 2021-05-18
Payer: MEDICARE

## 2021-05-18 DIAGNOSIS — Z87.891 FORMER SMOKER: Primary | ICD-10-CM

## 2021-05-18 PROCEDURE — 99211 OFF/OP EST MAY X REQ PHY/QHP: CPT

## 2021-05-18 NOTE — PROGRESS NOTES
Disclaimer for Virtual Visits: We want to confirm that, for purposes of billing, this is a virtual visit with your provider for which we will submit a claim for reimbursement with your insurance company. You may be responsible for any copays, coinsurance amounts or other amounts not covered by your insurance company. If you do not accept this, unfortunately we will not be able to schedule a virtual visit with the provider. Do you accept? Yes. CLINICAL PHARMACY NOTESmoking Cessation    SUBJECTIVE/OBJECTIVE:   Patricia Huber is a 72 y.o. male with PMHx significant for GERD, HTN, pulmonary emphesema referred by Dr Brady Ríos for smoking cessation counseling and medication management. Spoke with patient over the phone on 05/18/21. 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 30-60 min (previously immediately after waking)  Do you smoke 20 or more cigarettes each day? No  At times when you can't smoke or haven't got any cigarettes, do you feel a craving for one? Yes, but not if not keeping busy  Is it tough for you to keep from smoking for more than a few hours?  No, not if in a place where he can't smoke  When you are sick enough to stay in bed, to you still smoke? no  If yes to two or more questions, consider using NRT    Reasons for addiction: Touch and Handle, Stress-relief, Habit, Addiction, \"something to do\"  Triggers: meals, stress, smoke breaks from his work; stopped smoking while driving, with coffee, alcohol (already tried to dissociate smoking with certain activities, but picked it up with other activities)  Barriers: not confident (only 5 on a 1-10 scale); He is \"afraid\" to run out of cigarettes. Motivation for quitting: Has wanted to quit for years (in back of mind) because he doesn't like that it's in control of him (addiction), Health, family, money    12/19 update:  Did  Chantix from pharmacy ($40), but did not start. Fearful of side effects. Has history of bad dreams several years ago and is afraid it may cause vivid dreams. Has been able to cut back slightly on his own. Was able to eliminate the 1st cigarette of the day, is now working on the 2nd. Started playing pool to fill downtime. He has been able to avoid buying more cigarettes until completely out of current pack  It has been a busy week, and he hasn't been able to focus on quitting as much as he would like. He plans to go to [place] for [place]. 12/31 update:  Smoking \"a little less,\" but unable to give exact amount. Started Chantix yesterday. Has tolerated 2 doses so far. No side effects. Has been able to delay the 2nd cigarette more by switching AM routine (drink coffe before breakfast)  Sucking on mints  Used recumbent bike a couple times, but no routine yet. Moved cigarettes to basement (out of pocket) to make it more inconvenient. Has kept a list of things to do on his breaks. 1/14 update:  Continues Chantix 1 mg BID. Takes with food, but has been difficult because he does not eat at regular times during the day. Experiencing some nausea/ \"queesy\" stomach. Also having occasional slight anxiety, but nothing major and is able to tolerate. Denies depression/thoughts of suicide or harming himself. Smoking less, but is not counting exact number because he thinks it will make him think about smoking more. He knows he is not buying cigarettes as often.  He does not buy cigarettes until he is completely out of them. He needs a new RX for chantix. He is trying to stay busy and avoid long breaks which triggers him to smoke. He has been able to postpone the cigarette many times. 1/30 update:   Continues Chantix 1 mg BID. Takes with food. Experiencing some nausea/ \"queesy\" stomach, but tolerable. Also having occasional slight anxiety, but nothing major and is able to tolerate. Denies depression/thoughts of suicide or harming himself. Rides his recumbent bike 2x per week for 15 minutes. Smoking less, but is still not counting the exact number because he thinks it will make him think about smoking more. He is buying cigarettes less often and has placed them in his basement so has to go downstairs to get them when he wants to smoke. He does not like going down stairs. He has been able to walk back upstairs from the basement without bringing a cigarette with him. He made a list of reasons for quitting and placed it by his coat so he sees it when he is going outside to smoke. He does not smoke inside house or in car. He started using hard candies in place of cigarettes while he is preparing his meals. He often smokes right before and right after meals. He always brushes his teeth after meals. Patient feels that drinking coffee throughout the day would help keep him busy during breaks, and coffee has not been a trigger for him lately. 2/27  Has been able to reduce to 2 packs per week, but it was really hard. He surprised himself. Pt started drinking tea instead of coffee. Has been able to postpoine 1st cig at least a couple hours. Plans to avoid [facility], where he buys his cigs. Feels \"accomplished\" after exercising, but has only been able to exercise 2x per week. 3/12  Pt does not feel he did well with the goals we set at last visit 2/2 less focus and recent stressors (DDS, shingles vax, coronavirus). He didn't exercise as much. He increased to 1 pack in 3 days, then cut back down to 1 pack in 3.5 days.  His cravings are less frequent, but not necessarily less strong. The recent [address] outbreak is worrying him because he is high risk. This is more motivation for him to quit. Pt requests a Chantix refill. Tried 1-800-QUIT-NOW- [place] it was not for him. 3/26  Pt reports having \"Up and downs. \" Coronavirus has really concerned him. He stopped Chantix for 2 days due to depression surrounding the situation, but realized that not listening to the news helped him much more. Pt feels his mood is stable and would like to resume chantix. Started keeping track of when he smokes (exact #s). He smoked more for a couple days, but he has been able to limit to 4 cig/day the past 2 days! This equates to goal of no more than 1 pack every 5 days. 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 He increased exercise to 20 min 2x per week and started core exercises. He is listening to an old CD while he exercises, which helps. He feel accomplished after exercise. 3/31  Pt quit smoking 3 days ago on 3/28! Motivation was fear. Pt started back on 0.5 mg once daily. Will increase to 0.5 mg BID. Things that worked: prayer, staying occupied, stopped watching the news because it was depressing. 4/2  Will increase Chantix today to 0.5 mg BID  He did put lighter out of pocket/ out of sight  Doesn't have an jacquelyn tray, he put the cigarette butts in a planter pot. Suggested he move this to a different location and plant a new plant or flowers in it. His urges continue to be able piano and after programming. His biggest urge to overcome was when he had been working on programming or several months and finally had to test if it worked, and it did not work. He had a very strong urge to smoke but paced and prayed, then occupied himself with something different. He is not ready to throw away the cigarettes, but he did put them out of site and doesn't remember where they are.    Withdrawal sxs include: nasal drip, insomnia, irritability    4/7 11th day of ebing tobacco free. Scared that he might relapse. Reports mood is stable-- slightly anxious/worried about COVID19, but better than before. Withdrawal sxs include: nasal drip better, irritability, insomnia but not sure if 2/2 withdrawal or worry about COVID19. Increased water intake from 3 cups to 6 cups per day. 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 \" Urge lasted ~10 min. Craving rated 7/10.    2) after a really long phone call, paced, ate a piece of candy (max 1-2 pieces candy per day)  3) found himself at the door that he goes out to smoke; he is not sure what led to that moment; asked himself what am I doing? Was able to leave the situation    4/13  Pt on way to dentist for toothache. He watched star trek this weekend for 2 week reward. For next reward, he wants to buy new sheet music for piano since he cannot go to lessons. He would like to learn \"I can only imagine. \" Took 1 walk, bike x 2, but no core exercises. Will pick back up this week. 4/15  Root canal on 4/13. Started amoxicillin 500 mg TID. Exercise going well, except for past couple days. Unable to figure out what foods relieve nausea from Chantix. Urge 4/13: after leaving [facility], had 2 hours to kill before root canal, sat in car with nothing to do, feeling anxious, read his book and listened to music; always used to smoke when he needs to kills time. Missed Chantix on 4/13, took 1 dose on 4/13.     4/22  Still needs dental work done, fillings next week.  Core exercise 2x per week, bike 2-3x per week-- feeling stronger. Tobacco cravings \"up and down. \" Most days they are less frequent, intensity varies. Is able to overcome. Watched star trek this weekend as reward. 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 Sunday night craving: made food for the whole week and was on feet all day; had to clean up after, strong craving after he took a break, created a list of distractions when he has cravings: jigsaw puzzle, read book. Still having trouble sleeping: plans to read old spiritual book before bed. Drainage much better. Slightly more depressed due to [address]. Does not feel this is due to Chantix, just situational. 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 Pt denies any thought of harming himself or others. 4/29  Pt smoke free x 1 month! Watched an old [employer] game as reward. Sleeping improved now that he is reading 30 min before bed. Piano 1 hour 4x/per week--found a tutorial on how to play \"I can only imagine\" and he is trying to write [employer] sheet music for it. Tried working on software again. He is trying to make a routine. Lifting COVID restrictions is worrying him. He has been busy looking for new insurance as his [employer] expires this month and he is inelligible for [employer] until oct. It has been time consuming. He has a pcp visit next week. Needs to make sure all his RXs are covered under new plan. 5/6  Mood is \"up and down\"- manly due to coronavirus pandemic, but feels it is up more often than down. Pt new [employer] insurance starts today. Cravings are rare with short duration, but still strong (7 out of 10). Saw PCP today. Walks outside. Has been on Chantix x 4 months. Is not sure if it's covered by new insurance. 5/20/20  No lapses. Still some strong urges avg once per day. Bike 20 minutes 3x per week, increasing intensity \"from 1 to 2\". Continues core exercises.  Working on writing sheet music for \"I can only he started the medications. Looking back on med list, he has been on this at least 10 years. Per OV note from Dr Doreen Richardson 2/28/11, \"Just 2 weeks ago he began to have hiccups and a feeling of acid around his throat. He is on Nexium every day. He had an EGD by Dr. Jannet Young for this in ~ 2008 and had sucralfate prescribed then, which helped. He recently found the old med and  Has taken it for a week now and it has helped. He will take it for a few weeks and then stop and see what happens. \"    7/6/20  -Continues Chantix, remains smoke free >3 months, no changes in cravings, etc. Nauseous once because he forgot to eat. Is nervous to stop medication altogether.   -Pt reduced Nexium 40 mg to QOD ~1.5 wks ago, doing well with no increase in GERD sxs. Feels comfortable gradually tapering off. -/72 one time at home, but does not check daily   -Bike 3x per week, Core exercises 2x per week. 8/5/20  -Getting exercise, but not outside. Cut back on Chantix 0.5 mg once daily on most days. Had some stronger cravings some days, so took it BID. Strong craving on way to [facility]. It was the 1st time back to [facility] since he quit smoking. One of members smoked in front of him, which was difficult. He stopped where he normally would buy cigarettes on way to [facility] and bought candy instead. Checked BP yesterday 133/73. Doing well with PPI 40 mg QOD, but not ready to cut back to 20 mg QOD because he has a large supply of 40 mg tabs. 8/26/20  -Getting core/bike exercise, but not outside. Working toward goals.   -Cut back on Chantix 0.5 mg to QOD. Initially forgot dose, but then skipped doses intentionally.   -Triggers: frustration, on way to [facility], seeing people smoke on TV. -Plans to tell his friends at the [facility].  -Was able to not stop where he buys cigarettes on way to [facility] the last time he went. -Doing well with PPI 40 mg QOD, but not ready to cut back to 20 mg QOD because he has a large supply of 40 mg tabs.  Agreed to try MWF.  -6 glasses of water each day  -Echo submitted and approved by [employer].  -not checking BP daily    9/16/20  -Still taking Chantix QOD, occasional strong cravings due to triggers  -Triggers: will go back to [facility] this weekend.   -Hasn't told anyone at [facility] that he quit. -slowly increasing to 8 glasses water/day  -reports BP log: SBP range 116-131,  DBP range 66-76  -looking for better sense of smell     10/7/20  -Working to apply for medicare; still trying to figure out which plan is best.   -D/c Chantix 5 days ago, still has on hand to use prn. Has \"Ups and downs. \" Craving frequency and intensity leveled off.   -Quit 6 months ago!   -Told brother in law in [place] that he quit smoking.  -Improved smell    -Saw PCP who found blood in urine, which is worrying patient. Will return in 1 week for BP f/u after med changes made.   -Plans to continue PPI taper and will use Pepcid prn heartburn    11/3/20  -Remains smoke free  -Has not had to use Chantix since last appointment    -Still gets urges when seeing people smoke on TV. Will keep hard candies by the TV to avoid urge to smoke. -Got Nexium OTC instead of Pepcid, but only taking three times a week. Advised to get Pepcid instead as this is better for PRN use. 11/24/20  Cravings reduced significantly. Has not taken any Chantix. Has not switched from nexium QOD to pepcid yet, but bought at store. No GERD sxs. SBP mostly<130. Pt feeling well. Socially distanced. Staying healthy. 1/12/21  Put on an old jacket and had a pack of cigarettes in pocket. He put the pack directly in the trash without looking at it. He wasn't able to take out the trash right away, but didn't dig them out. He is not looking forward to when he is offered a cigarette in the future. Discussed how to prepare for his. Very excited because he got his echo published in [employer]. Stopped Nexium ~2 weeks ago. Using pepcid prn, but hasn't needed to use this at all.  Cut potassium down to once per day. 2/23/21  1 year piter of being tobacco free is coming up - March 28. Emphasized the enormity of this accomplishment. States he is staying busy with his software development, exercise, [employer] activity, and learning the piano. He still has triggers such as seeing someone smoking on tv or being frustrated. He was hoping after a year these triggers would be easier to handle. Encouraged patient to celebrate his success and focus on ways he used to avoid triggers when he was quitting. He states he often will pace, drink water, or just leave or turn away from the trigger. He states he is proud that his health has improved since quitting and he has been able to take less pills. Is also able to smell his coffee now. He is still nervous for when he is offered a cigarette in the future. Discussed how to prepare for this using by using his trigger-avoiding strategies and just leaving the room/situation if needed. 3/23/21  Patient reports he is still doing well and remaining smoke free, however he is \"afraid it wont last\". He has not been around his family members, [employer] members, or friends since [facility] started a year ago and reports he is nervous that when he is around them the temptation will be hard to overcome as many of them are smokers. Discussed methods to abstain from temptations such as staying inside when family members/friends go outside to smoke, making it physically difficult to smoke by having something else in his mouth (gum, mint, hard candies), not buying his own cigarettes, telling family / friends he has quit smoking so they do not unintentionally try to pressure him, and turning away from trigger. He states his family and friends are very supportive of him and will be happy to see how far he has come. Encouraged patient to celebrate his one year anniversary of being smoke free as this is a huge accomplishment.  Patient's family is having a celebration next week that will be virtual. Will call patient again in 1 month to follow-up, but encouraged patient to schedule appointment earlier if he feels he is getting overwhelmed with triggers before then. 4/20/21  Told family zoom celebration for late mom's birthday and told family he quit smoking. Brother in law wishes he could quit but hasn't set a quit date. Has not had much in person contact with family. Still not in social situations either, including [employer] volunteer work. Still virtual. Meets with group of inter-racial [employer] members and plans to meet in person next. One member smokes and he is worried it will be a trigger to smoke. Still doesn't consider himself a non-smoker, thinks he is still \"quitting\". Reminded patient that he did QUIT and has been a NONsmoker for over a year! His reward was getting his computer fixed/updated. He has been walking 2x per week and has a more consistent indoor exercise routine-- recumbent bike, weights, core exercises. Gets a \"reaction\" (not necessarily an urge) when sees people smoking on TV and nervous about going around family and friends. He will avoid triggers as discussed above. 5/18/21  Granddaughter graduated from college in [place] in [place]. Charlotte Winkler had celebration for her, and he attended. When he stepped outside of [employer], it reminded him of when he used to step out to take smoke breaks. Long drive to get to [employer], and stopped at one of places he normally stops to smoke, but instead he stopped to get gas. He didn't feel urge to smoke, just brought back memories. Pt states he is gaining weight, possibly snacking more and slowing metabolism. Still exercises, but not high intensity. Started [employer] exercises.  Pt had a lot of questions about his last PCP visit, when to follow up, how to get labs, where to go, etc    Pharmacy: [facility]    Current Medication and Allergy List Reviewed and Updated:  Current Outpatient Medications   Medication Sig Dispense Refill    hydroCHLOROthiazide (HYDRODIURIL) 25 MG tablet Take 1 tablet by mouth daily 90 tablet 1    acyclovir (ZOVIRAX) 400 MG tablet TAKE ONE TABLET BY MOUTH THREE TIMES A DAY FOR 10 DAYS FOR HERPES SIMPLEX FLARE-UP 90 tablet 1    olmesartan (BENICAR) 40 MG tablet TAKE 1 TABLET DAILY 90 tablet 3    potassium chloride (KLOR-CON M) 10 MEQ extended release tablet Take 1 tablet by mouth daily TAKE 2 TABLETS DAILY (Patient taking differently: Take 10 mEq by mouth daily ) 90 tablet 1    loratadine (CLARITIN) 10 MG tablet Take 10 mg by mouth daily      Misc Natural Products (GLUCOSAMINE CHOND DOUBLE STR PO) Take 1 tablet by mouth daily      Multiple Vitamin (MULTIVITAMIN PO) Take 1 capsule by mouth daily. No current facility-administered medications for this visit. Pertinent Labs/Vitals  Scr 0.9 (11/21/19)    ASSESSMENT/PLAN:   Patient has been tobacco free for over one year! Frequency and intensity of cravings has decreased significantly. --Has not used Chantix in >3 months but has if needed. Explained to patient how to obtain labs, lung CT, and schedule f/u with PCP     Goals   Increase intensity or duration of exercise (biking). Find healthier alternatives to current snacks (carrots, celery, etc)  Reward yourself when you reach goals (grill, new sheet music, star trek, jigsaw puzzle, book)  Remind yourself that you are a non-smoker and be confident and proud of your accomplishment. Next appt:  4 wk- pt prefers to keep monthly calls. Pt verbalized understanding of the above information and denied further questions or concerns. The total time of the visit was 30 min. Anabel Blackburn, PharmD, [facility]  [facility]   [address] Ph: [phone]  Robert Campbell Ph: [phone]  5/18/2021 11:08 AM      For Pharmacy Admin Tracking Only     CPA in place:   Yes   Gap Closed?: Yes    Total # of Interventions Recommended: 0   Total # of Interventions Accepted: 0   Intervention Accepted By: Patient/Caregiver: 0   Time Spent (min): 30

## 2021-06-22 ENCOUNTER — TELEPHONE (OUTPATIENT)
Dept: PHARMACY | Age: 66
End: 2021-06-22

## 2021-06-23 ENCOUNTER — TELEPHONE (OUTPATIENT)
Dept: PRIMARY CARE CLINIC | Age: 66
End: 2021-06-23

## 2021-07-13 ENCOUNTER — VIRTUAL VISIT (OUTPATIENT)
Dept: PHARMACY | Age: 66
End: 2021-07-13
Payer: MEDICARE

## 2021-07-13 DIAGNOSIS — Z87.891 FORMER SMOKER: Primary | ICD-10-CM

## 2021-07-13 DIAGNOSIS — I10 ESSENTIAL HYPERTENSION: ICD-10-CM

## 2021-07-13 PROCEDURE — 99211 OFF/OP EST MAY X REQ PHY/QHP: CPT

## 2021-07-13 RX ORDER — HYDROCHLOROTHIAZIDE 25 MG/1
25 TABLET ORAL DAILY
Qty: 90 TABLET | Refills: 1 | Status: SHIPPED | OUTPATIENT
Start: 2021-07-13 | End: 2022-03-11

## 2021-07-13 RX ORDER — POTASSIUM CHLORIDE 750 MG/1
TABLET, EXTENDED RELEASE ORAL
Qty: 180 TABLET | Refills: 1 | Status: SHIPPED | OUTPATIENT
Start: 2021-07-13 | End: 2022-03-11 | Stop reason: SDUPTHER

## 2021-07-13 NOTE — PROGRESS NOTES
Disclaimer for Virtual Visits: We want to confirm that, for purposes of billing, this is a virtual visit with your provider for which we will submit a claim for reimbursement with your insurance company. You may be responsible for any copays, coinsurance amounts or other amounts not covered by your insurance company. If you do not accept this, unfortunately we will not be able to schedule a virtual visit with the provider. Do you accept? Yes. CLINICAL PHARMACY NOTESmoking Cessation    SUBJECTIVE/OBJECTIVE:   Mahad Trejo is a 72 y.o. male with PMHx significant for GERD, HTN, pulmonary emphesema referred by Dr Thompson Raymundo for smoking cessation counseling and medication management. Spoke with patient over the phone on 07/13/21. 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 30-60 min (previously immediately after waking)  Do you smoke 20 or more cigarettes each day? No  At times when you can't smoke or haven't got any cigarettes, do you feel a craving for one? Yes, but not if not keeping busy  Is it tough for you to keep from smoking for more than a few hours?  No, not if in a place where he can't smoke  When you are sick enough to stay in bed, to you still smoke? no  If yes to two or more questions, consider using NRT    Reasons for addiction: Touch and Handle, Stress-relief, Habit, Addiction, \"something to do\"  Triggers: meals, stress, smoke breaks from his work; stopped smoking while driving, with them. He needs a new RX for chantix. He is trying to stay busy and avoid long breaks which triggers him to smoke. He has been able to postpone the cigarette many times. 1/30 update:   Continues Chantix 1 mg BID. Takes with food. Experiencing some nausea/ \"queesy\" stomach, but tolerable. Also having occasional slight anxiety, but nothing major and is able to tolerate. Denies depression/thoughts of suicide or harming himself. Rides his recumbent bike 2x per week for 15 minutes. Smoking less, but is still not counting the exact number because he thinks it will make him think about smoking more. He is buying cigarettes less often and has placed them in his basement so has to go downstairs to get them when he wants to smoke. He does not like going down stairs. He has been able to walk back upstairs from the basement without bringing a cigarette with him. He made a list of reasons for quitting and placed it by his coat so he sees it when he is going outside to smoke. He does not smoke inside house or in car. He started using hard candies in place of cigarettes while he is preparing his meals. He often smokes right before and right after meals. He always brushes his teeth after meals. Patient feels that drinking coffee throughout the day would help keep him busy during breaks, and coffee has not been a trigger for him lately. 2/27  Has been able to reduce to 2 packs per week, but it was really hard. He surprised himself. Pt started drinking tea instead of coffee. Has been able to postpoine 1st cig at least a couple hours. Plans to avoid [facility], where he buys his cigs. Feels \"accomplished\" after exercising, but has only been able to exercise 2x per week. 3/12  Pt does not feel he did well with the goals we set at last visit 2/2 less focus and recent stressors (DDS, shingles vax, coronavirus). He didn't exercise as much. He increased to 1 pack in 3 days, then cut back down to 1 pack in 3.5 days.  His cravings are less frequent, but not necessarily less strong. The recent [address] outbreak is worrying him because he is high risk. This is more motivation for him to quit. Pt requests a Chantix refill. Tried 1-800-QUIT-NOW- [place] it was not for him. 3/26  Pt reports having \"Up and downs. \" Coronavirus has really concerned him. He stopped Chantix for 2 days due to depression surrounding the situation, but realized that not listening to the news helped him much more. Pt feels his mood is stable and would like to resume chantix. Started keeping track of when he smokes (exact #s). He smoked more for a couple days, but he has been able to limit to 4 cig/day the past 2 days! This equates to goal of no more than 1 pack every 5 days. 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 He increased exercise to 20 min 2x per week and started core exercises. He is listening to an old CD while he exercises, which helps. He feel accomplished after exercise. 3/31  Pt quit smoking 3 days ago on 3/28! Motivation was fear. Pt started back on 0.5 mg once daily. Will increase to 0.5 mg BID. Things that worked: prayer, staying occupied, stopped watching the news because it was depressing. 4/2  Will increase Chantix today to 0.5 mg BID  He did put lighter out of pocket/ out of sight  Doesn't have an jacquelyn tray, he put the cigarette butts in a planter pot. Suggested he move this to a different location and plant a new plant or flowers in it. His urges continue to be able piano and after programming. His biggest urge to overcome was when he had been working on programming or several months and finally had to test if it worked, and it did not work. He had a very strong urge to smoke but paced and prayed, then occupied himself with something different. He is not ready to throw away the cigarettes, but he did put them out of site and doesn't remember where they are.    Withdrawal sxs include: nasal drip, insomnia, irritability    4/7 11th day of ebing tobacco free. Scared that he might relapse. Reports mood is stable-- slightly anxious/worried about COVID19, but better than before. Withdrawal sxs include: nasal drip better, irritability, insomnia but not sure if 2/2 withdrawal or worry about COVID19. Increased water intake from 3 cups to 6 cups per day. 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 \" Urge lasted ~10 min. Craving rated 7/10.    2) after a really long phone call, paced, ate a piece of candy (max 1-2 pieces candy per day)  3) found himself at the door that he goes out to smoke; he is not sure what led to that moment; asked himself what am I doing? Was able to leave the situation    4/13  Pt on way to dentist for toothache. He watched star trek this weekend for 2 week reward. For next reward, he wants to buy new sheet music for piano since he cannot go to lessons. He would like to learn \"I can only imagine. \" Took 1 walk, bike x 2, but no core exercises. Will pick back up this week. 4/15  Root canal on 4/13. Started amoxicillin 500 mg TID. Exercise going well, except for past couple days. Unable to figure out what foods relieve nausea from Chantix. Urge 4/13: after leaving [facility], had 2 hours to kill before root canal, sat in car with nothing to do, feeling anxious, read his book and listened to music; always used to smoke when he needs to kills time. Missed Chantix on 4/13, took 1 dose on 4/13.     4/22  Still needs dental work done, fillings next week.  Core exercise 2x per week, bike 2-3x per week-- feeling stronger. Tobacco cravings \"up and down. \" Most days they are less frequent, intensity varies. Is able to overcome. Watched star trek this weekend as reward. 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 Sunday night craving: made food for the whole week and was on feet all day; had to clean up after, strong craving after he took a break, created a list of distractions when he has cravings: jigsaw puzzle, read book. Still having trouble sleeping: plans to read old spiritual book before bed. Drainage much better. Slightly more depressed due to [address]. Does not feel this is due to Chantix, just situational. 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 Pt denies any thought of harming himself or others. 4/29  Pt smoke free x 1 month! Watched an old [employer] game as reward. Sleeping improved now that he is reading 30 min before bed. Piano 1 hour 4x/per week--found a tutorial on how to play \"I can only imagine\" and he is trying to write [employer] sheet music for it. Tried working on software again. He is trying to make a routine. Lifting COVID restrictions is worrying him. He has been busy looking for new insurance as his [employer] expires this month and he is inelligible for [employer] until oct. It has been time consuming. He has a pcp visit next week. Needs to make sure all his RXs are covered under new plan. 5/6  Mood is \"up and down\"- manly due to coronavirus pandemic, but feels it is up more often than down. Pt new [employer] insurance starts today. Cravings are rare with short duration, but still strong (7 out of 10). Saw PCP today. Walks outside. Has been on Chantix x 4 months. Is not sure if it's covered by new insurance. 5/20/20  No lapses. Still some strong urges avg once per day. Bike 20 minutes 3x per week, increasing intensity \"from 1 to 2\". Continues core exercises.  Working on writing sheet music for \"I can only he started the medications. Looking back on med list, he has been on this at least 10 years. Per OV note from Dr Zenaida Forde 2/28/11, \"Just 2 weeks ago he began to have hiccups and a feeling of acid around his throat. He is on Nexium every day. He had an EGD by Dr. Alexander Raya for this in ~ 2008 and had sucralfate prescribed then, which helped. He recently found the old med and  Has taken it for a week now and it has helped. He will take it for a few weeks and then stop and see what happens. \"    7/6/20  -Continues Chantix, remains smoke free >3 months, no changes in cravings, etc. Nauseous once because he forgot to eat. Is nervous to stop medication altogether.   -Pt reduced Nexium 40 mg to QOD ~1.5 wks ago, doing well with no increase in GERD sxs. Feels comfortable gradually tapering off. -/72 one time at home, but does not check daily   -Bike 3x per week, Core exercises 2x per week. 8/5/20  -Getting exercise, but not outside. Cut back on Chantix 0.5 mg once daily on most days. Had some stronger cravings some days, so took it BID. Strong craving on way to [facility]. It was the 1st time back to [facility] since he quit smoking. One of members smoked in front of him, which was difficult. He stopped where he normally would buy cigarettes on way to [facility] and bought candy instead. Checked BP yesterday 133/73. Doing well with PPI 40 mg QOD, but not ready to cut back to 20 mg QOD because he has a large supply of 40 mg tabs. 8/26/20  -Getting core/bike exercise, but not outside. Working toward goals.   -Cut back on Chantix 0.5 mg to QOD. Initially forgot dose, but then skipped doses intentionally.   -Triggers: frustration, on way to [facility], seeing people smoke on TV. -Plans to tell his friends at the [facility].  -Was able to not stop where he buys cigarettes on way to [facility] the last time he went. -Doing well with PPI 40 mg QOD, but not ready to cut back to 20 mg QOD because he has a large supply of 40 mg tabs.  Agreed to try MWF.  -6 glasses of water each day  -Echo submitted and approved by [employer].  -not checking BP daily    9/16/20  -Still taking Chantix QOD, occasional strong cravings due to triggers  -Triggers: will go back to [facility] this weekend.   -Hasn't told anyone at [facility] that he quit. -slowly increasing to 8 glasses water/day  -reports BP log: SBP range 116-131,  DBP range 66-76  -looking for better sense of smell     10/7/20  -Working to apply for medicare; still trying to figure out which plan is best.   -D/c Chantix 5 days ago, still has on hand to use prn. Has \"Ups and downs. \" Craving frequency and intensity leveled off.   -Quit 6 months ago!   -Told brother in law in [place] that he quit smoking.  -Improved smell    -Saw PCP who found blood in urine, which is worrying patient. Will return in 1 week for BP f/u after med changes made.   -Plans to continue PPI taper and will use Pepcid prn heartburn    11/3/20  -Remains smoke free  -Has not had to use Chantix since last appointment    -Still gets urges when seeing people smoke on TV. Will keep hard candies by the TV to avoid urge to smoke. -Got Nexium OTC instead of Pepcid, but only taking three times a week. Advised to get Pepcid instead as this is better for PRN use. 11/24/20  Cravings reduced significantly. Has not taken any Chantix. Has not switched from nexium QOD to pepcid yet, but bought at store. No GERD sxs. SBP mostly<130. Pt feeling well. Socially distanced. Staying healthy. 1/12/21  Put on an old jacket and had a pack of cigarettes in pocket. He put the pack directly in the trash without looking at it. He wasn't able to take out the trash right away, but didn't dig them out. He is not looking forward to when he is offered a cigarette in the future. Discussed how to prepare for his. Very excited because he got his echo published in [place]. Stopped Nexium ~2 weeks ago. Using pepcid prn, but hasn't needed to use this at all.  Cut potassium down to once per day. 2/23/21  1 year piter of being tobacco free is coming up - March 28. Emphasized the enormity of this accomplishment. 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 He still has triggers such as seeing someone smoking on tv or being frustrated. He was hoping after a year these triggers would be easier to handle. Encouraged patient to celebrate his success and focus on ways he used to avoid triggers when he was quitting. He states he often will pace, drink water, or just leave or turn away from the trigger. He states he is proud that his health has improved since quitting and he has been able to take less pills. Is also able to smell his coffee now. He is still nervous for when he is offered a cigarette in the future. Discussed how to prepare for this using by using his trigger-avoiding strategies and just leaving the room/situation if needed. 3/23/21  Patient reports he is still doing well and remaining smoke free, however he is \"afraid it wont last\". He has not been around his family members, [employer] members, or friends since [address]  started a year ago and reports he is nervous that when he is around them the temptation will be hard to overcome as many of them are smokers. Discussed methods to abstain from temptations such as staying inside when family members/friends go outside to smoke, making it physically difficult to smoke by having something else in his mouth (gum, mint, hard candies), not buying his own cigarettes, telling family / friends he has quit smoking so they do not unintentionally try to pressure him, and turning away from trigger. He states his family and friends are very supportive of him and will be happy to see how far he has come. Encouraged patient to celebrate his one year anniversary of being smoke free as this is a huge accomplishment.  Patient's family is having a celebration next week that will be virtual. Will call patient again in 1 month to follow-up, but encouraged patient to schedule appointment earlier if he feels he is getting overwhelmed with triggers before then. 4/20/21  Told family zoom celebration for late mom's birthday and told family he quit smoking. Brother in law wishes he could quit but hasn't set a quit date. Has not had much in person contact with family. Still not in social situations either, including [employer] volunteer work. Still virtual. Meets with group of inter-racial [employer] members and plans to meet in person next. One member smokes and he is worried it will be a trigger to smoke. Still doesn't consider himself a non-smoker, thinks he is still \"quitting\". Reminded patient that he did QUIT and has been a NONsmoker for over a year! His reward was getting his computer fixed/updated. He has been walking 2x per week and has a more consistent indoor exercise routine-- recumbent bike, weights, core exercises. Gets a \"reaction\" (not necessarily an urge) when sees people smoking on TV and nervous about going around family and friends. He will avoid triggers as discussed above. 5/18/21  Granddaughter graduated from college in [facility]. Kyle Valero had celebration for her, and he attended. When he stepped outside of [employer], it reminded him of when he used to step out to take smoke breaks. Long drive to get to [employer], and stopped at one of places he normally stops to smoke, but instead he stopped to get gas. He didn't feel urge to smoke, just brought back memories. Pt states he is gaining weight, possibly snacking more and slowing metabolism. Still exercises, but not high intensity. Started [employer] exercises. Pt had a lot of questions about his last PCP visit, when to follow up, how to get labs, where to go, etc    7/13/21  Large family gathering on 4th of July. Smokers went outside on the lawn. Pt avoided them while they smoked.  Stopped at rest stop he normally stops at to smoke on 75 and was able to stop and not smoke. Behind on healthcare paperwork and slowly getting organized. Pt has many questions about how to get his labs and where to go. Pharmacy: [facility], [place]    Current Medication and Allergy List Reviewed and Updated:  Current Outpatient Medications   Medication Sig Dispense Refill    hydroCHLOROthiazide (HYDRODIURIL) 25 MG tablet Take 1 tablet by mouth daily 90 tablet 1    potassium chloride (KLOR-CON M) 10 MEQ extended release tablet TAKE 2 TABLETS DAILY 180 tablet 1    acyclovir (ZOVIRAX) 400 MG tablet TAKE ONE TABLET BY MOUTH THREE TIMES A DAY FOR 10 DAYS FOR HERPES SIMPLEX FLARE-UP (Patient not taking: Reported on 5/18/2021) 90 tablet 1    olmesartan (BENICAR) 40 MG tablet TAKE 1 TABLET DAILY 90 tablet 3    loratadine (CLARITIN) 10 MG tablet Take 10 mg by mouth daily      Misc Natural Products (GLUCOSAMINE CHOND DOUBLE STR PO) Take 1 tablet by mouth daily      Multiple Vitamin (MULTIVITAMIN PO) Take 1 capsule by mouth daily. No current facility-administered medications for this visit. Pertinent Labs/Vitals  Scr 0.9 (11/21/19)    ASSESSMENT/PLAN:   Patient has been tobacco free for over one year! Frequency and intensity of cravings has decreased significantly. --Has not used Chantix in >3 months but has if needed. Explained to patient how to obtain labs and lung CT. Goals   Increase intensity or duration of exercise (biking). Find healthier alternatives to current snacks (carrots, celery, etc)  Reward yourself when you reach goals (grill, new sheet music, star trek, jigsaw puzzle, book)  Remind yourself that you are a non-smoker and be confident and proud of your accomplishment. Next appt:  4 wk- pt prefers to keep monthly calls. Pt verbalized understanding of the above information and denied further questions or concerns. The total time of the visit was 30 min.     Elia Gambino, PharmD, BCACP  [facility]   [address] Ph: [phone]  [address] Ph: [phone]  7/13/2021 2:37 PM    For Pharmacy [address] in place:   Yes   Gap Closed?: Yes    Time Spent (min): 30

## 2021-08-03 ENCOUNTER — TELEPHONE (OUTPATIENT)
Dept: PRIMARY CARE CLINIC | Age: 66
End: 2021-08-03

## 2021-08-03 NOTE — TELEPHONE ENCOUNTER
Patient had an order for a CT lung screening. Patient called central scheduling because he wanted the screening done in [facility]. Patient asks that a new order is faxed to [facility]. Fax number [phone].

## 2021-08-31 ENCOUNTER — VIRTUAL VISIT (OUTPATIENT)
Dept: PHARMACY | Age: 66
End: 2021-08-31
Payer: MEDICARE

## 2021-08-31 DIAGNOSIS — Z87.891 FORMER SMOKER: ICD-10-CM

## 2021-08-31 PROCEDURE — 99211 OFF/OP EST MAY X REQ PHY/QHP: CPT | Performed by: PHARMACIST

## 2021-08-31 NOTE — PROGRESS NOTES
coffee, alcohol (already tried to dissociate smoking with certain activities, but picked it up with other activities)  Barriers: not confident (only 5 on a 1-10 scale); He is \"afraid\" to run out of cigarettes. Motivation for quitting: Has wanted to quit for years (in back of mind) because he doesn't like that it's in control of him (addiction), Health, family, money    12/19 update:  Did  Chantix from pharmacy ($40), but did not start. Fearful of side effects. Has history of bad dreams several years ago and is afraid it may cause vivid dreams. Has been able to cut back slightly on his own. Was able to eliminate the 1st cigarette of the day, is now working on the 2nd. Started playing pool to fill downtime. He has been able to avoid buying more cigarettes until completely out of current pack  It has been a busy week, and he hasn't been able to focus on quitting as much as he would like. [email or web address] 12/31 update:  Smoking \"a little less,\" but unable to give exact amount. Started Chantix yesterday. Has tolerated 2 doses so far. No side effects. Has been able to delay the 2nd cigarette more by switching AM routine (drink coffe before breakfast)  Sucking on mints  Used recumbent bike a couple times, but no routine yet. Moved cigarettes to basement (out of pocket) to make it more inconvenient. Has kept a list of things to do on his breaks. 1/14 update:  Continues Chantix 1 mg BID. Takes with food, but has been difficult because he does not eat at regular times during the day. Experiencing some nausea/ \"queesy\" stomach. Also having occasional slight anxiety, but nothing major and is able to tolerate. Denies depression/thoughts of suicide or harming himself. Smoking less, but is not counting exact number because he thinks it will make him think about smoking more. He knows he is not buying cigarettes as often.  He does not buy cigarettes until he is completely out of them. He needs a new RX for chantix. He is trying to stay busy and avoid long breaks which triggers him to smoke. He has been able to postpone the cigarette many times. 1/30 update:   Continues Chantix 1 mg BID. Takes with food. Experiencing some nausea/ \"queesy\" stomach, but tolerable. Also having occasional slight anxiety, but nothing major and is able to tolerate. Denies depression/thoughts of suicide or harming himself. Rides his recumbent bike 2x per week for 15 minutes. Smoking less, but is still not counting the exact number because he thinks it will make him think about smoking more. He is buying cigarettes less often and has placed them in his basement so has to go downstairs to get them when he wants to smoke. He does not like going down stairs. He has been able to walk back upstairs from the basement without bringing a cigarette with him. He made a list of reasons for quitting and placed it by his coat so he sees it when he is going outside to smoke. He does not smoke inside house or in car. He started using hard candies in place of cigarettes while he is preparing his meals. He often smokes right before and right after meals. He always brushes his teeth after meals. Patient feels that drinking coffee throughout the day would help keep him busy during breaks, and coffee has not been a trigger for him lately. 2/27  Has been able to reduce to 2 packs per week, but it was really hard. He surprised himself. Pt started drinking tea instead of coffee. Has been able to postpoine 1st cig at least a couple hours. Plans to avoid [facility], where he buys his cigs. Feels \"accomplished\" after exercising, but has only been able to exercise 2x per week. 3/12  Pt does not feel he did well with the goals we set at last visit 2/2 less focus and recent stressors (DDS, shingles vax, coronavirus). He didn't exercise as much. He increased to 1 pack in 3 days, then cut back down to 1 pack in 3.5 days.  His cravings are less frequent, but not necessarily less strong. The recent [address] outbreak is worrying him because he is high risk. This is more motivation for him to quit. Pt requests a Chantix refill. Tried 1-800-QUIT-NOW- [place] it was not for him. 3/26  Pt reports having \"Up and downs. \" Coronavirus has really concerned him. He stopped Chantix for 2 days due to depression surrounding the situation, but realized that not listening to the news helped him much more. Pt feels his mood is stable and would like to resume chantix. Started keeping track of when he smokes (exact #s). He smoked more for a couple days, but he has been able to limit to 4 cig/day the past 2 days! This equates to goal of no more than 1 pack every 5 days. 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 He increased exercise to 20 min 2x per week and started core exercises. He is listening to an old CD while he exercises, which helps. He feel accomplished after exercise. 3/31  Pt quit smoking 3 days ago on 3/28! Motivation was fear. Pt started back on 0.5 mg once daily. Will increase to 0.5 mg BID. Things that worked: prayer, staying occupied, stopped watching the news because it was depressing. 4/2  Will increase Chantix today to 0.5 mg BID  He did put lighter out of pocket/ out of sight  Doesn't have an jacquelyn tray, he put the cigarette butts in a planter pot. Suggested he move this to a different location and plant a new plant or flowers in it. His urges continue to be able piano and after programming. His biggest urge to overcome was when he had been working on programming or several months and finally had to test if it worked, and it did not work. He had a very strong urge to smoke but paced and prayed, then occupied himself with something different. He is not ready to throw away the cigarettes, but he did put them out of site and doesn't remember where they are.    Withdrawal sxs include: nasal drip, insomnia, irritability    4/7 11th day of ebing tobacco free. Scared that he might relapse. Reports mood is stable-- slightly anxious/worried about COVID19, but better than before. Withdrawal sxs include: nasal drip better, irritability, insomnia but not sure if 2/2 withdrawal or worry about COVID19. Increased water intake from 3 cups to 6 cups per day. 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 \" Urge lasted ~10 min. Craving rated 7/10.    2) after a really long phone call, paced, ate a piece of candy (max 1-2 pieces candy per day)  3) found himself at the door that he goes out to smoke; he is not sure what led to that moment; asked himself what am I doing? Was able to leave the situation    4/13  Pt on way to dentist for toothache. He watched star trek this weekend for 2 week reward. For next reward, he wants to buy new sheet music for piano since he cannot go to lessons. He would like to learn \"I can only imagine. \" Took 1 walk, bike x 2, but no core exercises. Will pick back up this week. 4/15  Root canal on 4/13. Started amoxicillin 500 mg TID. Exercise going well, except for past couple days. Unable to figure out what foods relieve nausea from Chantix. Urge 4/13: after leaving [facility], had 2 hours to kill before root canal, sat in car with nothing to do, feeling anxious, read his book and listened to music; always used to smoke when he needs to kills time. Missed Chantix on 4/13, took 1 dose on 4/13.     4/22  Still needs dental work done, fillings next week.  Core exercise 2x per week, bike 2-3x per week-- feeling stronger. Tobacco cravings \"up and down. \" Most days they are less frequent, intensity varies. Is able to overcome. Watched star trek this weekend as reward. 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 Sunday night craving: made food for the whole week and was on feet all day; had to clean up after, strong craving after he took a break, created a list of distractions when he has cravings: jigsaw puzzle, read book. Still having trouble sleeping: plans to read old spiritual book before bed. Drainage much better. Slightly more depressed due to [address]. Does not feel this is due to Chantix, just situational. 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 Pt denies any thought of harming himself or others. 4/29  Pt smoke free x 1 month! Watched an old [employer] game as reward. Sleeping improved now that he is reading 30 min before bed. Piano 1 hour 4x/per week--found a tutorial on how to play \"I can only imagine\" and he is trying to write [employer] sheet music for it. Tried working on software again. He is trying to make a routine. Lifting COVID restrictions is worrying him. He has been busy looking for new insurance as his [employer] expires this month and he is inelligible for [employer] until oct. It has been time consuming. He has a pcp visit next week. Needs to make sure all his RXs are covered under new plan. 5/6  Mood is \"up and down\"- manly due to coronavirus pandemic, but feels it is up more often than down. Pt new [employer] insurance starts today. Cravings are rare with short duration, but still strong (7 out of 10). Saw PCP today. Walks outside. Has been on Chantix x 4 months. Is not sure if it's covered by new insurance. 5/20/20  No lapses. Still some strong urges avg once per day. Bike 20 minutes 3x per week, increasing intensity \"from 1 to 2\". Continues core exercises.  Working on writing sheet music for \"I can only imagine\". Increased amount of time spending on software, gradually. Increased nausea with Chantix. Mood is good, better than before. 5/27/20  No increase in cravings after cutting back slightly on Chantix. Nausea improved slightly possibly. A couple strong cravings but able to overcome. Increased exercise from 20 to 30 min on bike 3x per week, core exercises 1x per week, would like to increase to 2x per week. Feels good while riding bike, feels tired/accomplished after. Plays upbeat music. Still practicing piano, trying to write sheet music. Spends time on software, but not accomplishing a lot due to some limitations (space on hard drive). Going to [employer] [facility] [employer] today. Plans to cancel insurance at end of month because they do not cover Chantix or other needs (DDS, chiropractor, PCP). Arranged for [employer] to start 6/1/20 and Drs in [employer]. Sleeping better- was waking up at 3-4a and was anxious. Now not waking up anxious. 6/3/20  Decreased Chantix to 0.5 mg BID at end of last week, no increase in strength or frequency of cravings. Only a brief craving this AM while lying in bed, able to overcome easily. Needs refill on Chantix; has ~1 week left. Cut esomeprazole in half- still no GERD sxs. Still clearing throat (has happened since he quit smoking which he attributes to his lungs clearing out). Nausea has improved since reducing Chantix. Mood is good, he is less anxious and not waking in middle of night. He is drinking 6 glasses of water each day. 6/17/20  Continues Chantix 0.5 mg BID, still not smoking, slightly more frequent and stronger urges, but not too bad, in control of cravings. Nausea much better. Working more on programming which is sometimes a trigger during \"breaks. \"  Now sucks hard candy instead. Will start 1k puzzle. Goes for a walk every other day. Increased water to 6-7 glasses per day. Only had one episode of GERD since cutting back on dose and stopping carafate.  Pt is unsure why he started the medications. Looking back on med list, he has been on this at least 10 years. Per OV note from Dr Brigette Sam 2/28/11, \"Just 2 weeks ago he began to have hiccups and a feeling of acid around his throat. He is on Nexium every day. He had an EGD by Dr. Alexis Ryan for this in ~ 2008 and had sucralfate prescribed then, which helped. He recently found the old med and  Has taken it for a week now and it has helped. He will take it for a few weeks and then stop and see what happens. \"    7/6/20  -Continues Chantix, remains smoke free >3 months, no changes in cravings, etc. Nauseous once because he forgot to eat. Is nervous to stop medication altogether.   -Pt reduced Nexium 40 mg to QOD ~1.5 wks ago, doing well with no increase in GERD sxs. Feels comfortable gradually tapering off. -/72 one time at home, but does not check daily   -Bike 3x per week, Core exercises 2x per week. 8/5/20  -Getting exercise, but not outside. Cut back on Chantix 0.5 mg once daily on most days. Had some stronger cravings some days, so took it BID. Strong craving on way to [employer]. It was the 1st time back to [employer] since he quit smoking. One of members smoked in front of him, which was difficult. He stopped where he normally would buy cigarettes on way to [employer] and bought candy instead. Checked BP yesterday 133/73. Doing well with PPI 40 mg QOD, but not ready to cut back to 20 mg QOD because he has a large supply of 40 mg tabs. 8/26/20  -Getting core/bike exercise, but not outside. Working toward goals.   -Cut back on Chantix 0.5 mg to QOD. Initially forgot dose, but then skipped doses intentionally.   -Triggers: frustration, on way to [employer], seeing people smoke on TV. -Plans to tell his friends at the [employer].  -Was able to not stop where he buys cigarettes on way to [employer] the last time he went. -Doing well with PPI 40 mg QOD, but not ready to cut back to 20 mg QOD because he has a large supply of 40 mg tabs.  Agreed to try MWF.  -6 glasses of water each day  -Echo submitted and approved by [employer].  -not checking BP daily    9/16/20  -Still taking Chantix QOD, occasional strong cravings due to triggers  -Triggers: will go back to [employer] this weekend.   -Hasn't told anyone at [employer] that he quit. -slowly increasing to 8 glasses water/day  -reports BP log: SBP range 116-131,  DBP range 66-76  -looking for better sense of smell     10/7/20  -Working to apply for medicare; still trying to figure out which plan is best.   -D/c Chantix 5 days ago, still has on hand to use prn. Has \"Ups and downs. \" Craving frequency and intensity leveled off.   -Quit 6 months ago!   -Told brother in law in [place] that he quit smoking.  -Improved smell    -Saw PCP who found blood in urine, which is worrying patient. Will return in 1 week for BP f/u after med changes made.   -Plans to continue PPI taper and will use Pepcid prn heartburn    11/3/20  -Remains smoke free  -Has not had to use Chantix since last appointment    -Still gets urges when seeing people smoke on TV. Will keep hard candies by the TV to avoid urge to smoke. -Got Nexium OTC instead of Pepcid, but only taking three times a week. Advised to get Pepcid instead as this is better for PRN use. 11/24/20  Cravings reduced significantly. Has not taken any Chantix. Has not switched from nexium QOD to pepcid yet, but bought at store. No GERD sxs. SBP mostly<130. Pt feeling well. Socially distanced. Staying healthy. 1/12/21  Put on an old jacket and had a pack of cigarettes in pocket. He put the pack directly in the trash without looking at it. He wasn't able to take out the trash right away, but didn't dig them out. He is not looking forward to when he is offered a cigarette in the future. Discussed how to prepare for his. Very excited because he got his echo published in [place]. Stopped Nexium ~2 weeks ago. Using pepcid prn, but hasn't needed to use this at all.  Cut potassium down to once per day. 2/23/21  1 year piter of being tobacco free is coming up - March 28. Emphasized the enormity of this accomplishment. 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 He still has triggers such as seeing someone smoking on tv or being frustrated. He was hoping after a year these triggers would be easier to handle. Encouraged patient to celebrate his success and focus on ways he used to avoid triggers when he was quitting. He states he often will pace, drink water, or just leave or turn away from the trigger. He states he is proud that his health has improved since quitting and he has been able to take less pills. Is also able to smell his coffee now. He is still nervous for when he is offered a cigarette in the future. Discussed how to prepare for this using by using his trigger-avoiding strategies and just leaving the room/situation if needed. 3/23/21  Patient reports he is still doing well and remaining smoke free, however he is \"afraid it wont last\". He has not been around his family members, [employer] members, or friends since [employer] started a year ago and reports he is nervous that when he is around them the temptation will be hard to overcome as many of them are smokers. Discussed methods to abstain from temptations such as staying inside when family members/friends go outside to smoke, making it physically difficult to smoke by having something else in his mouth (gum, mint, hard candies), not buying his own cigarettes, telling family / friends he has quit smoking so they do not unintentionally try to pressure him, and turning away from trigger. He states his family and friends are very supportive of him and will be happy to see how far he has come. Encouraged patient to celebrate his one year anniversary of being smoke free as this is a huge accomplishment.  Patient's family is having a celebration next week that will be virtual. Will call patient again in 1 month to follow-up, but encouraged patient to schedule appointment earlier if he feels he is getting overwhelmed with triggers before then. 4/20/21  Told family zoom celebration for late mom's birthday and told family he quit smoking. Brother in law wishes he could quit but hasn't set a quit date. Has not had much in person contact with family. Still not in social situations either, including [employer] volunteer work. Still virtual. Meets with group of inter-racial [employer] members and plans to meet in person next. One member smokes and he is worried it will be a trigger to smoke. Still doesn't consider himself a non-smoker, thinks he is still \"quitting\". Reminded patient that he did QUIT and has been a NONsmoker for over a year! His reward was getting his computer fixed/updated. He has been walking 2x per week and has a more consistent indoor exercise routine-- recumbent bike, weights, core exercises. Gets a \"reaction\" (not necessarily an urge) when sees people smoking on TV and nervous about going around family and friends. He will avoid triggers as discussed above. 5/18/21  Granddaughter graduated from college in [facility]. Myke Velasquez had celebration for her, and he attended. When he stepped outside of [employer], it reminded him of when he used to step out to take smoke breaks. Long drive to get to [employer], and stopped at one of places he normally stops to smoke, but instead he stopped to get gas. He didn't feel urge to smoke, just brought back memories. Pt states he is gaining weight, possibly snacking more and slowing metabolism. Still exercises, but not high intensity. Started [facility] exercises. Pt had a lot of questions about his last PCP visit, when to follow up, how to get labs, where to go, etc    7/13/21  Large family gathering on 4th of July. Smokers went outside on the lawn. Pt avoided them while they smoked.  Stopped at rest stop he normally stops at to smoke on 75 and was able to stop and not smoke. Behind on healthcare paperwork and slowly getting organized. Pt has many questions about how to get his labs and where to go.    8/31/21  Patient having cravings off and on but has not smoked since March 28, 2020. Cravings are usually when he sees someone else smoking, such as on TV. He states he just puts his mind on something else or changes the TV channel. Patient reports doing really well overall. He is very happy with himself for remaining smoke free. Discussed long term benefits of not smoking. Patient still trying to get CT scheduled with [facility] before his appointment with Dr. Nicki Lee in October. Pharmacy: [place], [place]    Current Medication and Allergy List Reviewed and Updated:  Current Outpatient Medications   Medication Sig Dispense Refill    hydroCHLOROthiazide (HYDRODIURIL) 25 MG tablet Take 1 tablet by mouth daily 90 tablet 1    potassium chloride (KLOR-CON M) 10 MEQ extended release tablet TAKE 2 TABLETS DAILY 180 tablet 1    acyclovir (ZOVIRAX) 400 MG tablet TAKE ONE TABLET BY MOUTH THREE TIMES A DAY FOR 10 DAYS FOR HERPES SIMPLEX FLARE-UP (Patient not taking: Reported on 5/18/2021) 90 tablet 1    olmesartan (BENICAR) 40 MG tablet TAKE 1 TABLET DAILY 90 tablet 3    loratadine (CLARITIN) 10 MG tablet Take 10 mg by mouth daily      Misc Natural Products (GLUCOSAMINE CHOND DOUBLE STR PO) Take 1 tablet by mouth daily      Multiple Vitamin (MULTIVITAMIN PO) Take 1 capsule by mouth daily. No current facility-administered medications for this visit. Pertinent Labs/Vitals  Scr 0.9 (11/21/19)    ASSESSMENT/PLAN:   Patient has been tobacco free for over one year! Frequency and intensity of cravings has decreased significantly. --Has not used Chantix in >3 months but has if needed. Explained to patient how to obtain labs and lung CT. Goals   Increase intensity or duration of exercise (biking).   Find healthier alternatives to current snacks (carrots, celery, etc)  Reward yourself when you reach goals (grill, new sheet music, star trek, jigsaw puzzle, book)  Remind yourself that you are a non-smoker and be confident and proud of your accomplishment. Next appt:  4 wk- pt prefers to keep monthly calls. Pt verbalized understanding of the above information and denied further questions or concerns. The total time of the visit was 30 min. Dmitry Humphreys, PharmD, BCPS  [facility] [address] [place] [address]: [phone]  [facility]: [phone]  8/31/2021 11:28 AM    For Pharmacy Admin Tracking Only     CPA in place:   Yes   Gap Closed?: Yes    Time Spent (min): 30

## 2021-09-28 ENCOUNTER — VIRTUAL VISIT (OUTPATIENT)
Dept: PHARMACY | Age: 66
End: 2021-09-28
Payer: MEDICARE

## 2021-09-28 DIAGNOSIS — Z87.891 FORMER SMOKER: ICD-10-CM

## 2021-09-28 PROCEDURE — 99211 OFF/OP EST MAY X REQ PHY/QHP: CPT | Performed by: PHARMACIST

## 2021-09-28 NOTE — PROGRESS NOTES
Disclaimer for Virtual Visits: We want to confirm that, for purposes of billing, this is a virtual visit with your provider for which we will submit a claim for reimbursement with your insurance company. You may be responsible for any copays, coinsurance amounts or other amounts not covered by your insurance company. If you do not accept this, unfortunately we will not be able to schedule a virtual visit with the provider. Do you accept? Yes. CLINICAL PHARMACY NOTE--Smoking Cessation    SUBJECTIVE/OBJECTIVE:   Rosa Mujica is a 72 y.o. male with PMHx significant for GERD, HTN, pulmonary emphesema referred by Dr Vale Morales for smoking cessation counseling and medication management. Spoke with patient over the phone on 09/28/21. 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 30-60 min (previously immediately after waking)  Do you smoke 20 or more cigarettes each day? No  At times when you can't smoke or haven't got any cigarettes, do you feel a craving for one? Yes, but not if not keeping busy  Is it tough for you to keep from smoking for more than a few hours?  No, not if in a place where he can't smoke  When you are sick enough to stay in bed, to you still smoke? no  If yes to two or more questions, consider using NRT    Reasons for addiction: Touch and Handle, Stress-relief, Habit, Addiction, \"something to do\"  Triggers: meals, stress, smoke breaks from his work; stopped smoking while driving, with coffee, alcohol (already tried to dissociate smoking with certain activities, but picked it up with other activities)  Barriers: not confident (only 5 on a 1-10 scale); He is \"afraid\" to run out of cigarettes. Motivation for quitting: Has wanted to quit for years (in back of mind) because he doesn't like that it's in control of him (addiction), Health, family, money    12/19 update:  Did  Chantix from pharmacy ($40), but did not start. Fearful of side effects. Has history of bad dreams several years ago and is afraid it may cause vivid dreams. Has been able to cut back slightly on his own. Was able to eliminate the 1st cigarette of the day, is now working on the 2nd. Started playing pool to fill downtime. He has been able to avoid buying more cigarettes until completely out of current pack  It has been a busy week, and he hasn't been able to focus on quitting as much as he would like. He plans to go to [place] for [place]. 12/31 update:  Smoking \"a little less,\" but unable to give exact amount. Started Chantix yesterday. Has tolerated 2 doses so far. No side effects. Has been able to delay the 2nd cigarette more by switching AM routine (drink coffe before breakfast)  Sucking on mints  Used recumbent bike a couple times, but no routine yet. Moved cigarettes to basement (out of pocket) to make it more inconvenient. Has kept a list of things to do on his breaks. 1/14 update:  Continues Chantix 1 mg BID. Takes with food, but has been difficult because he does not eat at regular times during the day. Experiencing some nausea/ \"queesy\" stomach. Also having occasional slight anxiety, but nothing major and is able to tolerate. Denies depression/thoughts of suicide or harming himself. Smoking less, but is not counting exact number because he thinks it will make him think about smoking more. He knows he is not buying cigarettes as often.  He does not buy cigarettes until he is completely out of them. He needs a new RX for chantix. He is trying to stay busy and avoid long breaks which triggers him to smoke. He has been able to postpone the cigarette many times. 1/30 update:   Continues Chantix 1 mg BID. Takes with food. Experiencing some nausea/ \"queesy\" stomach, but tolerable. Also having occasional slight anxiety, but nothing major and is able to tolerate. Denies depression/thoughts of suicide or harming himself. Rides his recumbent bike 2x per week for 15 minutes. Smoking less, but is still not counting the exact number because he thinks it will make him think about smoking more. He is buying cigarettes less often and has placed them in his basement so has to go downstairs to get them when he wants to smoke. He does not like going down stairs. He has been able to walk back upstairs from the basement without bringing a cigarette with him. He made a list of reasons for quitting and placed it by his coat so he sees it when he is going outside to smoke. He does not smoke inside house or in car. He started using hard candies in place of cigarettes while he is preparing his meals. He often smokes right before and right after meals. He always brushes his teeth after meals. Patient feels that drinking coffee throughout the day would help keep him busy during breaks, and coffee has not been a trigger for him lately. 2/27  Has been able to reduce to 2 packs per week, but it was really hard. He surprised himself. Pt started drinking tea instead of coffee. Has been able to postpoine 1st cig at least a couple hours. Plans to avoid [facility], where he buys his cigs. Feels \"accomplished\" after exercising, but has only been able to exercise 2x per week. 3/12  Pt does not feel he did well with the goals we set at last visit 2/2 less focus and recent stressors (DDS, shingles vax, coronavirus). He didn't exercise as much. He increased to 1 pack in 3 days, then cut back down to 1 pack in 3.5 days.  His cravings are less frequent, but not necessarily less strong. The recent [address] outbreak is worrying him because he is high risk. This is more motivation for him to quit. Pt requests a Chantix refill. Tried 1-800-QUIT-NOW- [place] it was not for him. 3/26  Pt reports having \"Up and downs. \" Coronavirus has really concerned him. He stopped Chantix for 2 days due to depression surrounding the situation, but realized that not listening to the news helped him much more. Pt feels his mood is stable and would like to resume chantix. Started keeping track of when he smokes (exact #s). He smoked more for a couple days, but he has been able to limit to 4 cig/day the past 2 days! This equates to goal of no more than 1 pack every 5 days. 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 He increased exercise to 20 min 2x per week and started core exercises. He is listening to an old CD while he exercises, which helps. He feel accomplished after exercise. 3/31  Pt quit smoking 3 days ago on 3/28! Motivation was fear. Pt started back on 0.5 mg once daily. Will increase to 0.5 mg BID. Things that worked: prayer, staying occupied, stopped watching the news because it was depressing. 4/2  Will increase Chantix today to 0.5 mg BID  He did put lighter out of pocket/ out of sight  Doesn't have an jacquelyn tray, he put the cigarette butts in a planter pot. Suggested he move this to a different location and plant a new plant or flowers in it. His urges continue to be able piano and after programming. His biggest urge to overcome was when he had been working on programming or several months and finally had to test if it worked, and it did not work. He had a very strong urge to smoke but paced and prayed, then occupied himself with something different. He is not ready to throw away the cigarettes, but he did put them out of site and doesn't remember where they are.    Withdrawal sxs include: nasal drip, insomnia, irritability    4/7 11th day of ebing tobacco free. Scared that he might relapse. Reports mood is stable-- slightly anxious/worried about COVID19, but better than before. Withdrawal sxs include: nasal drip better, irritability, insomnia but not sure if 2/2 withdrawal or worry about COVID19. Increased water intake from 3 cups to 6 cups per day. 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 \" Urge lasted ~10 min. Craving rated 7/10.    2) after a really long phone call, paced, ate a piece of candy (max 1-2 pieces candy per day)  3) found himself at the door that he goes out to smoke; he is not sure what led to that moment; asked himself what am I doing? Was able to leave the situation    4/13  Pt on way to dentist for toothache. He watched star trek this weekend for 2 week reward. For next reward, he wants to buy new sheet music for piano since he cannot go to lessons. He would like to learn \"I can only imagine. \" Took 1 walk, bike x 2, but no core exercises. Will pick back up this week. 4/15  Root canal on 4/13. Started amoxicillin 500 mg TID. Exercise going well, except for past couple days. Unable to figure out what foods relieve nausea from Chantix. Urge 4/13: after leaving [facility], had 2 hours to kill before root canal, sat in car with nothing to do, feeling anxious, read his book and listened to music; always used to smoke when he needs to kills time. Missed Chantix on 4/13, took 1 dose on 4/13.     4/22  Still needs dental work done, fillings next week.  Core exercise 2x per week, bike 2-3x per week-- feeling stronger. Tobacco cravings \"up and down. \" Most days they are less frequent, intensity varies. Is able to overcome. Watched star trek this weekend as reward. 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 Sunday night craving: made food for the whole week and was on feet all day; had to clean up after, strong craving after he took a break, created a list of distractions when he has cravings: jigsaw puzzle, read book. Still having trouble sleeping: plans to read old spiritual book before bed. Drainage much better. Slightly more depressed due to [address]. Does not feel this is due to Chantix, just situational. 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 Pt denies any thought of harming himself or others. 4/29  Pt smoke free x 1 month! Watched an old [employer] game as reward. Sleeping improved now that he is reading 30 min before bed. Piano 1 hour 4x/per week--found a tutorial on how to play \"I can only imagine\" and he is trying to write [employer] sheet music for it. Tried working on software again. He is trying to make a routine. Lifting COVID restrictions is worrying him. He has been busy looking for new insurance as his [employer] expires this month and he is inelligible for [employer] until oct. It has been time consuming. He has a pcp visit next week. Needs to make sure all his RXs are covered under new plan. 5/6  Mood is \"up and down\"- manly due to coronavirus pandemic, but feels it is up more often than down. Pt new [employer] insurance starts today. Cravings are rare with short duration, but still strong (7 out of 10). Saw PCP today. Walks outside. Has been on Chantix x 4 months. Is not sure if it's covered by new insurance. 5/20/20  No lapses. Still some strong urges avg once per day. Bike 20 minutes 3x per week, increasing intensity \"from 1 to 2\". Continues core exercises.  Working on writing sheet music for \"I can only imagine\". Increased amount of time spending on software, gradually. Increased nausea with Chantix. Mood is good, better than before. 5/27/20  No increase in cravings after cutting back slightly on Chantix. Nausea improved slightly possibly. A couple strong cravings but able to overcome. Increased exercise from 20 to 30 min on bike 3x per week, core exercises 1x per week, would like to increase to 2x per week. Feels good while riding bike, feels tired/accomplished after. Plays upbeat music. Still practicing piano, trying to write sheet music. Spends time on software, but not accomplishing a lot due to some limitations (space on hard drive). Going to [employer] [facility] [employer] today. Plans to cancel insurance at end of month because they do not cover Chantix or other needs (DDS, chiropractor, PCP). Arranged for [employer] to start 6/1/20 and Drs in [employer]. Sleeping better- was waking up at 3-4a and was anxious. Now not waking up anxious. 6/3/20  Decreased Chantix to 0.5 mg BID at end of last week, no increase in strength or frequency of cravings. Only a brief craving this AM while lying in bed, able to overcome easily. Needs refill on Chantix; has ~1 week left. Cut esomeprazole in half- still no GERD sxs. Still clearing throat (has happened since he quit smoking which he attributes to his lungs clearing out). Nausea has improved since reducing Chantix. Mood is good, he is less anxious and not waking in middle of night. He is drinking 6 glasses of water each day. 6/17/20  Continues Chantix 0.5 mg BID, still not smoking, slightly more frequent and stronger urges, but not too bad, in control of cravings. Nausea much better. Working more on programming which is sometimes a trigger during \"breaks. \"  Now sucks hard candy instead. Will start 1k puzzle. Goes for a walk every other day. Increased water to 6-7 glasses per day. Only had one episode of GERD since cutting back on dose and stopping carafate.  Pt is unsure why he started the medications. Looking back on med list, he has been on this at least 10 years. Per OV note from Dr Lg Tony 2/28/11, \"Just 2 weeks ago he began to have hiccups and a feeling of acid around his throat. He is on Nexium every day. He had an EGD by Dr. Concepcion Villa for this in ~ 2008 and had sucralfate prescribed then, which helped. He recently found the old med and  Has taken it for a week now and it has helped. He will take it for a few weeks and then stop and see what happens. \"    7/6/20  -Continues Chantix, remains smoke free >3 months, no changes in cravings, etc. Nauseous once because he forgot to eat. Is nervous to stop medication altogether.   -Pt reduced Nexium 40 mg to QOD ~1.5 wks ago, doing well with no increase in GERD sxs. Feels comfortable gradually tapering off. -/72 one time at home, but does not check daily   -Bike 3x per week, Core exercises 2x per week. 8/5/20  -Getting exercise, but not outside. Cut back on Chantix 0.5 mg once daily on most days. Had some stronger cravings some days, so took it BID. Strong craving on way to [employer]. It was the 1st time back to [employer] since he quit smoking. One of members smoked in front of him, which was difficult. He stopped where he normally would buy cigarettes on way to [employer] and bought candy instead. Checked BP yesterday 133/73. Doing well with PPI 40 mg QOD, but not ready to cut back to 20 mg QOD because he has a large supply of 40 mg tabs. 8/26/20  -Getting core/bike exercise, but not outside. Working toward goals.   -Cut back on Chantix 0.5 mg to QOD. Initially forgot dose, but then skipped doses intentionally.   -Triggers: frustration, on way to [employer], seeing people smoke on TV. -Plans to tell his friends at the [employer].  -Was able to not stop where he buys cigarettes on way to [employer] the last time he went. -Doing well with PPI 40 mg QOD, but not ready to cut back to 20 mg QOD because he has a large supply of 40 mg tabs.  Agreed to try MWF.  -6 glasses of water each day  -Echo submitted and approved by [employer].  -not checking BP daily    9/16/20  -Still taking Chantix QOD, occasional strong cravings due to triggers  -Triggers: will go back to [employer] this weekend.   -Hasn't told anyone at [employer] that he quit. -slowly increasing to 8 glasses water/day  -reports BP log: SBP range 116-131,  DBP range 66-76  -looking for better sense of smell     10/7/20  -Working to apply for medicare; still trying to figure out which plan is best.   -D/c Chantix 5 days ago, still has on hand to use prn. Has \"Ups and downs. \" Craving frequency and intensity leveled off.   -Quit 6 months ago!   -Told brother in law in [place] that he quit smoking.  -Improved smell    -Saw PCP who found blood in urine, which is worrying patient. Will return in 1 week for BP f/u after med changes made.   -Plans to continue PPI taper and will use Pepcid prn heartburn    11/3/20  -Remains smoke free  -Has not had to use Chantix since last appointment    -Still gets urges when seeing people smoke on TV. Will keep hard candies by the TV to avoid urge to smoke. -Got Nexium OTC instead of Pepcid, but only taking three times a week. Advised to get Pepcid instead as this is better for PRN use. 11/24/20  Cravings reduced significantly. Has not taken any Chantix. Has not switched from nexium QOD to pepcid yet, but bought at store. No GERD sxs. SBP mostly<130. Pt feeling well. Socially distanced. Staying healthy. 1/12/21  Put on an old jacket and had a pack of cigarettes in pocket. He put the pack directly in the trash without looking at it. He wasn't able to take out the trash right away, but didn't dig them out. He is not looking forward to when he is offered a cigarette in the future. Discussed how to prepare for his. Very excited because he got his echo published in [place]. Stopped Nexium ~2 weeks ago. Using pepcid prn, but hasn't needed to use this at all.  Cut potassium down to once per day. 2/23/21  1 year piter of being tobacco free is coming up - March 28. Emphasized the enormity of this accomplishment. States he is staying busy with his software development, exercise, [employer] activity, and learning the piano. He still has triggers such as seeing someone smoking on tv or being frustrated. He was hoping after a year these triggers would be easier to handle. Encouraged patient to celebrate his success and focus on ways he used to avoid triggers when he was quitting. He states he often will pace, drink water, or just leave or turn away from the trigger. He states he is proud that his health has improved since quitting and he has been able to take less pills. Is also able to smell his coffee now. He is still nervous for when he is offered a cigarette in the future. Discussed how to prepare for this using by using his trigger-avoiding strategies and just leaving the room/situation if needed. 3/23/21  Patient reports he is still doing well and remaining smoke free, however he is \"afraid it wont last\". He has not been around his family members, [employer] members, or friends since [address]  started a year ago and reports he is nervous that when he is around them the temptation will be hard to overcome as many of them are smokers. Discussed methods to abstain from temptations such as staying inside when family members/friends go outside to smoke, making it physically difficult to smoke by having something else in his mouth (gum, mint, hard candies), not buying his own cigarettes, telling family / friends he has quit smoking so they do not unintentionally try to pressure him, and turning away from trigger. He states his family and friends are very supportive of him and will be happy to see how far he has come. Encouraged patient to celebrate his one year anniversary of being smoke free as this is a huge accomplishment.  Patient's family is having a celebration next week that will be virtual. Will call patient again in 1 month to follow-up, but encouraged patient to schedule appointment earlier if he feels he is getting overwhelmed with triggers before then. 4/20/21  Told family zoom celebration for late mom's birthday and told family he quit smoking. Brother in law wishes he could quit but hasn't set a quit date. Has not had much in person contact with family. Still not in social situations either, including [employer] volunteer work. Still virtual. Meets with group of inter-racial [employer] members and plans to meet in person next. One member smokes and he is worried it will be a trigger to smoke. Still doesn't consider himself a non-smoker, thinks he is still \"quitting\". Reminded patient that he did QUIT and has been a NONsmoker for over a year! His reward was getting his computer fixed/updated. He has been walking 2x per week and has a more consistent indoor exercise routine-- recumbent bike, weights, core exercises. Gets a \"reaction\" (not necessarily an urge) when sees people smoking on TV and nervous about going around family and friends. He will avoid triggers as discussed above. 5/18/21  Granddaughter graduated from college in [facility]. [employer] Montero had celebration for her, and he attended. When he stepped outside of [employer], it reminded him of when he used to step out to take smoke breaks. Long drive to get to [employer], and stopped at one of places he normally stops to smoke, but instead he stopped to get gas. He didn't feel urge to smoke, just brought back memories. Pt states he is gaining weight, possibly snacking more and slowing metabolism. Still exercises, but not high intensity. Started [employer] exercises. Pt had a lot of questions about his last PCP visit, when to follow up, how to get labs, where to go, etc    7/13/21  Large family gathering on 4th of July. Smokers went outside on the lawn. Pt avoided them while they smoked.  Stopped at rest stop he normally stops at to smoke on 75 and was able to stop and not smoke. Behind on healthcare paperwork and slowly getting organized. Pt has many questions about how to get his labs and where to go.    8/31/21  Patient having cravings off and on but has not smoked since March 28, 2020. Cravings are usually when he sees someone else smoking, such as on TV. He states he just puts his mind on something else or changes the TV channel. Patient reports doing really well overall. He is very happy with himself for remaining smoke free. Discussed long term benefits of not smoking. Patient still trying to get CT scheduled with [facility] before his appointment with Dr. Mathew Mata in October. 9/28/21  Length of time between cravings is increasing. Seeing people on TV and being in situations where he used to smoke remain triggers. Planning to start walking more now that weather is getting nicer. Reports his brother in law is considering stopping smoking and patient very supportive of him. Pharmacy: [place], [place]    Current Medication and Allergy List Reviewed and Updated:  Current Outpatient Medications   Medication Sig Dispense Refill    hydroCHLOROthiazide (HYDRODIURIL) 25 MG tablet Take 1 tablet by mouth daily 90 tablet 1    potassium chloride (KLOR-CON M) 10 MEQ extended release tablet TAKE 2 TABLETS DAILY 180 tablet 1    acyclovir (ZOVIRAX) 400 MG tablet TAKE ONE TABLET BY MOUTH THREE TIMES A DAY FOR 10 DAYS FOR HERPES SIMPLEX FLARE-UP (Patient not taking: Reported on 5/18/2021) 90 tablet 1    olmesartan (BENICAR) 40 MG tablet TAKE 1 TABLET DAILY 90 tablet 3    loratadine (CLARITIN) 10 MG tablet Take 10 mg by mouth daily      Misc Natural Products (GLUCOSAMINE CHOND DOUBLE STR PO) Take 1 tablet by mouth daily      Multiple Vitamin (MULTIVITAMIN PO) Take 1 capsule by mouth daily. No current facility-administered medications for this visit.      Pertinent Labs/Vitals  Scr 0.9 (11/21/19)    ASSESSMENT/PLAN:   Patient has been tobacco free for over one year! Frequency and intensity of cravings has decreased significantly. --Has not used Chantix in >4 months but has if needed. Goals   Increase intensity or duration of exercise (biking). Find healthier alternatives to current snacks (carrots, celery, etc)  Reward yourself when you reach goals (grill, new sheet music, star trek, jigsaw puzzle, book)  Remind yourself that you are a non-smoker and be confident and proud of your accomplishment. Next appt:  4 wk- pt prefers to keep monthly calls. Pt verbalized understanding of the above information and denied further questions or concerns. The total time of the visit was 30 min. Artis Jaimes, PharmD, BCPS  [facility] [address] [place] [address]: [phone]  [place]: [phone]  9/28/2021 10:58 AM    For Pharmacy Admin Tracking Only     CPA in place:   Yes   Gap Closed?: Yes    Time Spent (min): 30

## 2021-10-02 ASSESSMENT — LIFESTYLE VARIABLES
HOW MANY STANDARD DRINKS CONTAINING ALCOHOL DO YOU HAVE ON A TYPICAL DAY: 1
AUDIT-C TOTAL SCORE: 0
HOW OFTEN DO YOU HAVE A DRINK CONTAINING ALCOHOL: FOUR OR MORE TIMES A WEEK
HOW OFTEN DO YOU HAVE SIX OR MORE DRINKS ON ONE OCCASION: 0
HAS A RELATIVE, FRIEND, DOCTOR, OR ANOTHER HEALTH PROFESSIONAL EXPRESSED CONCERN ABOUT YOUR DRINKING OR SUGGESTED YOU CUT DOWN: 0
HOW OFTEN DURING THE LAST YEAR HAVE YOU NEEDED AN ALCOHOLIC DRINK FIRST THING IN THE MORNING TO GET YOURSELF GOING AFTER A NIGHT OF HEAVY DRINKING: 0
AUDIT-C TOTAL SCORE: 5
HOW OFTEN DURING THE LAST YEAR HAVE YOU FAILED TO DO WHAT WAS NORMALLY EXPECTED FROM YOU BECAUSE OF DRINKING: 0
AUDIT TOTAL SCORE: 5
HOW OFTEN DURING THE LAST YEAR HAVE YOU FAILED TO DO WHAT WAS NORMALLY EXPECTED FROM YOU BECAUSE OF DRINKING: NEVER
HOW OFTEN DURING THE LAST YEAR HAVE YOU FOUND THAT YOU WERE NOT ABLE TO STOP DRINKING ONCE YOU HAD STARTED: NEVER
HOW OFTEN DO YOU HAVE SIX OR MORE DRINKS ON ONE OCCASION: NEVER
HOW OFTEN DURING THE LAST YEAR HAVE YOU BEEN UNABLE TO REMEMBER WHAT HAPPENED THE NIGHT BEFORE BECAUSE YOU HAD BEEN DRINKING: 0
HAS A RELATIVE, FRIEND, DOCTOR, OR ANOTHER HEALTH PROFESSIONAL EXPRESSED CONCERN ABOUT YOUR DRINKING OR SUGGESTED YOU CUT DOWN: NO
HAVE YOU OR SOMEONE ELSE BEEN INJURED AS A RESULT OF YOUR DRINKING: NO
HOW MANY STANDARD DRINKS CONTAINING ALCOHOL DO YOU HAVE ON A TYPICAL DAY: THREE OR FOUR
HOW OFTEN DO YOU HAVE A DRINK CONTAINING ALCOHOL: 4
AUDIT TOTAL SCORE: 0
HOW OFTEN DURING THE LAST YEAR HAVE YOU FOUND THAT YOU WERE NOT ABLE TO STOP DRINKING ONCE YOU HAD STARTED: 0
HOW OFTEN DURING THE LAST YEAR HAVE YOU NEEDED AN ALCOHOLIC DRINK FIRST THING IN THE MORNING TO GET YOURSELF GOING AFTER A NIGHT OF HEAVY DRINKING: NEVER
HOW OFTEN DURING THE LAST YEAR HAVE YOU HAD A FEELING OF GUILT OR REMORSE AFTER DRINKING: 0
HAVE YOU OR SOMEONE ELSE BEEN INJURED AS A RESULT OF YOUR DRINKING: 0
HOW OFTEN DURING THE LAST YEAR HAVE YOU BEEN UNABLE TO REMEMBER WHAT HAPPENED THE NIGHT BEFORE BECAUSE YOU HAD BEEN DRINKING: NEVER
HOW OFTEN DURING THE LAST YEAR HAVE YOU HAD A FEELING OF GUILT OR REMORSE AFTER DRINKING: NEVER

## 2021-10-02 ASSESSMENT — PATIENT HEALTH QUESTIONNAIRE - PHQ9
1. LITTLE INTEREST OR PLEASURE IN DOING THINGS: 0
SUM OF ALL RESPONSES TO PHQ QUESTIONS 1-9: 0
SUM OF ALL RESPONSES TO PHQ9 QUESTIONS 1 & 2: 0
SUM OF ALL RESPONSES TO PHQ QUESTIONS 1-9: 0
2. FEELING DOWN, DEPRESSED OR HOPELESS: 0
SUM OF ALL RESPONSES TO PHQ QUESTIONS 1-9: 0

## 2021-10-06 ENCOUNTER — OFFICE VISIT (OUTPATIENT)
Dept: PRIMARY CARE CLINIC | Age: 66
End: 2021-10-06
Payer: MEDICARE

## 2021-10-06 ENCOUNTER — TELEPHONE (OUTPATIENT)
Dept: PRIMARY CARE CLINIC | Age: 66
End: 2021-10-06

## 2021-10-06 VITALS
HEIGHT: 69 IN | OXYGEN SATURATION: 99 % | SYSTOLIC BLOOD PRESSURE: 113 MMHG | TEMPERATURE: 97.3 F | HEART RATE: 64 BPM | WEIGHT: 150 LBS | DIASTOLIC BLOOD PRESSURE: 68 MMHG | BODY MASS INDEX: 22.22 KG/M2

## 2021-10-06 DIAGNOSIS — Z00.00 ROUTINE GENERAL MEDICAL EXAMINATION AT A HEALTH CARE FACILITY: Primary | ICD-10-CM

## 2021-10-06 DIAGNOSIS — Z86.010 HISTORY OF COLON POLYPS: ICD-10-CM

## 2021-10-06 DIAGNOSIS — M15.9 PRIMARY OSTEOARTHRITIS INVOLVING MULTIPLE JOINTS: ICD-10-CM

## 2021-10-06 DIAGNOSIS — I10 ESSENTIAL HYPERTENSION: ICD-10-CM

## 2021-10-06 DIAGNOSIS — J43.8 OTHER EMPHYSEMA (HCC): ICD-10-CM

## 2021-10-06 DIAGNOSIS — Z87.891 FORMER SMOKER: ICD-10-CM

## 2021-10-06 DIAGNOSIS — Z23 FLU VACCINE NEED: ICD-10-CM

## 2021-10-06 DIAGNOSIS — Z13.6 SCREENING FOR AAA (ABDOMINAL AORTIC ANEURYSM): ICD-10-CM

## 2021-10-06 PROCEDURE — 1123F ACP DISCUSS/DSCN MKR DOCD: CPT | Performed by: FAMILY MEDICINE

## 2021-10-06 PROCEDURE — 90694 VACC AIIV4 NO PRSRV 0.5ML IM: CPT | Performed by: FAMILY MEDICINE

## 2021-10-06 PROCEDURE — G0008 ADMIN INFLUENZA VIRUS VAC: HCPCS | Performed by: FAMILY MEDICINE

## 2021-10-06 PROCEDURE — G8484 FLU IMMUNIZE NO ADMIN: HCPCS | Performed by: FAMILY MEDICINE

## 2021-10-06 PROCEDURE — G0438 PPPS, INITIAL VISIT: HCPCS | Performed by: FAMILY MEDICINE

## 2021-10-06 PROCEDURE — 4040F PNEUMOC VAC/ADMIN/RCVD: CPT | Performed by: FAMILY MEDICINE

## 2021-10-06 PROCEDURE — 3017F COLORECTAL CA SCREEN DOC REV: CPT | Performed by: FAMILY MEDICINE

## 2021-10-06 RX ORDER — VALSARTAN 40 MG/1
TABLET ORAL
COMMUNITY
End: 2021-10-06 | Stop reason: SDUPTHER

## 2021-10-06 RX ORDER — HYDROCHLOROTHIAZIDE 12.5 MG/1
1 CAPSULE, GELATIN COATED ORAL
COMMUNITY
End: 2021-10-06 | Stop reason: SDUPTHER

## 2021-10-06 RX ORDER — POTASSIUM CHLORIDE 1.5 G/1.77G
1 POWDER, FOR SOLUTION ORAL
COMMUNITY
End: 2021-10-06 | Stop reason: SDUPTHER

## 2021-10-06 NOTE — PROGRESS NOTES
Vaccine Information Sheet, \"Influenza - Inactivated\"  given to Ronnie Nevarez, or parent/legal guardian of  Ronnie Nevarez and verbalized understanding. Patient responses:    Have you ever had a reaction to a flu vaccine? No  Do you have any current illness? No  Have you ever had [place] [employer] [place] Syndrome? No  Do you have a serious allergy to any of the follow: Neomycin, Polymyxin, Thimerosal, eggs or egg products? No    Flu vaccine given per order. Please see immunization tab. Risks and benefits explained. Current VIS given.

## 2021-10-06 NOTE — PATIENT INSTRUCTIONS
Advance Directives: Care Instructions  Overview  An advance directive is a legal way to state your wishes at the end of your life. It tells your family and your doctor what to do if you can't say what you want. There are two main types of advance directives. You can change them any time your wishes change. Living will. This form tells your family and your doctor your wishes about life support and other treatment. The form is also called a declaration. Medical power of . This form lets you name a person to make treatment decisions for you when you can't speak for yourself. This person is called a health care agent (health care proxy, health care surrogate). The form is also called a durable power of  for health care. If you do not have an advance directive, decisions about your medical care may be made by a family member, or by a doctor or a  who doesn't know you. It may help to think of an advance directive as a gift to the people who care for you. If you have one, they won't have to make tough decisions by themselves. Follow-up care is a key part of your treatment and safety. Be sure to make and go to all appointments, and call your doctor if you are having problems. It's also a good idea to know your test results and keep a list of the medicines you take. What should you include in an advance directive? Many states have a unique advance directive form. (It may ask you to address specific issues.) Or you might use a universal form that's approved by many states. If your form doesn't tell you what to address, it may be hard to know what to include in your advance directive. Use the questions below to help you get started. · Who do you want to make decisions about your medical care if you are not able to? · What life-support measures do you want if you have a serious illness that gets worse over time or can't be cured? · What are you most afraid of that might happen? understands what makes life meaningful for you. · Understands your Taoism and moral values. · Will do what you want, not what he or she wants. · Will be able to make difficult choices at a stressful time. · Will be able to refuse or stop treatment, if that is what you would want, even if you could die. · Will be firm and confident with health professionals if needed. · Will ask questions to get needed information. · Lives near you or agrees to travel to you if needed. Your family may help you make medical decisions while you can still be part of that process. But it's important to choose one person to be your health care agent in case you aren't able to make decisions for yourself. If you don't fill out the legal form and name a health care agent, the decisions your family can make may be limited. A health care agent may be called something else in your state. Who will make decisions for you if you don't have a health care agent? If you don't have a health care agent or a living will, you may not get the care you want. Decisions may be made by family members who disagree about your medical care. Or decisions may be made by a medical professional who doesn't know you well. In some cases, a  makes the decisions. When you name a health care agent, it is very clear who has the power to make health decisions for you. How do you name a health care agent? You name your health care agent on a legal form. This form is usually called a medical power of . Ask your hospital, state bar association, or office on aging where to find these forms. You must sign the form to make it legal. Some states require you to get the form notarized. This means that a person called a  watches you sign the form and then he or she signs the form. Some states also require that two or more witnesses sign the form. Be sure to tell your family members and doctors who your health care agent is.   Where can you learn more? Go to https://chpepiceweb.Haptik. org and sign in to your Triblio account. Enter [id number] in the [facility] box to learn more about \"Learning About [address]. \"     If you do not have an account, please click on the \"Sign Up Now\" link. Current as of: March 17, 2021               Content Version: 13.0  © 2006-2021 Healthwise, bTendo. Care instructions adapted under license by [facility] ([facility]). If you have questions about a medical condition or this instruction, always ask your healthcare professional. [address] any warranty or liability for your use of this information. Learning About Living Marylen Plough  What is a living will? A living will, also called a declaration, is a legal form. It tells your family and your doctor your wishes when you can't speak for yourself. It's used by the health professionals who will treat you as you near the end of your life or if you get seriously hurt or ill. If you put your wishes in writing, your loved ones and others will know what kind of care you want. They won't need to guess. This can ease your mind and be helpful to others. And you can change or cancel your living will at any time. A living will is not the same as an estate or property will. An estate will explains what you want to happen with your money and property after you die. How do you use it? A living will is used to describe the kinds of treatment or life support you want as you near the end of your life or if you get seriously hurt or ill. Keep these facts in mind about living iqbal. · Your living will is used only if you can't speak or make decisions for yourself. Most often, one or more doctors must certify that you can't speak or decide for yourself before your living will takes effect. · If you get better and can speak for yourself again, you can accept or refuse any treatment.  It doesn't matter what you said in your living will. · Some states may limit your right to refuse treatment in certain cases. For example, you may need to clearly state in your living will that you don't want artificial hydration and nutrition, such as being fed through a tube. Is a living will a legal document? A living will is a legal document. Each state has its own laws about living iqbal. And a living will may be called something else in your state. Here are some things to know about living iqbal. · You don't need an  to complete a living will. But legal advice can be helpful if your state's laws are unclear. It can also help if your health history is complicated or your family can't agree on what should be in your living will. · You can change your living will at any time. Some people find that their wishes about end-of-life care change as their health changes. If you make big changes to your living will, complete a new form. · If you move to another state, make sure that your living will is legal in the state where you now live. In most cases, doctors will respect your wishes even if you have a form from a different state. · You might use a universal form that has been approved by many states. This kind of form can sometimes be filled out and stored online. Your digital copy will then be available wherever you have a connection to the internet. The doctors and nurses who need to treat you can find it right away. · Your state may offer an online registry. This is another place where you can store your living will online. · It's a good idea to get your living will notarized. This means using a person called a  to watch two people sign, or witness, your living will. What should you know when you create a living will? Here are some questions to ask yourself as you make your living will:  · Do you know enough about life support methods that might be used?  If not, talk to your doctor so you know what might be done if you can't breathe on your own, your heart stops, or you can't swallow. · What things would you still want to be able to do after you receive life-support methods? Would you want to be able to walk? To speak? To eat on your own? To live without the help of machines? · Do you want certain Worship practices performed if you become very ill? · If you have a choice, where do you want to be cared for? In your home? At a hospital or nursing home? · If you have a choice at the end of your life, where would you prefer to die? At home? In a hospital or nursing home? Somewhere else? · Would you prefer to be buried or cremated? · Do you want your organs to be donated after you die? What should you do with your living will? · Make sure that your family members and your health care agent have copies of your living will (also called a declaration). · Give your doctor a copy of your living will. Ask him or her to keep it as part of your medical record. If you have more than one doctor, make sure that each one has a copy. · Put a copy of your living will where it can be easily found. For example, some people may put a copy on their refrigerator door. If you are using a digital copy, be sure your doctor, family members, and health care agent know how to find and access it. Where can you learn more? Go to https://Peeriopepiceweb.AdBuddy [email or web address] org and sign in to your Queryday account. Enter N636 in the [facility] box to learn more about \"Learning About Living [place]. \"     If you do not have an account, please click on the \"Sign Up Now\" link. Current as of: March 17, 2021               Content Version: 13.0  © 7381-2139 Healthwise, Incorporated. Care instructions adapted under license by [facility] ([facility]).  If you have questions about a medical condition or this instruction, always ask your healthcare professional. [address] any warranty or liability for your use of this information. Personalized Preventive Plan for Estelle Causey - 10/6/2021  Medicare offers a range of preventive health benefits. Some of the tests and screenings are paid in full while other may be subject to a deductible, co-insurance, and/or copay. Some of these benefits include a comprehensive review of your medical history including lifestyle, illnesses that may run in your family, and various assessments and screenings as appropriate. After reviewing your medical record and screening and assessments performed today your provider may have ordered immunizations, labs, imaging, and/or referrals for you. A list of these orders (if applicable) as well as your Preventive Care list are included within your After Visit Summary for your review. Other Preventive Recommendations:    · A preventive eye exam performed by an eye specialist is recommended every 1-2 years to screen for glaucoma; cataracts, macular degeneration, and other eye disorders. · A preventive dental visit is recommended every 6 months. · Try to get at least 150 minutes of exercise per week or 10,000 steps per day on a pedometer . · Order or download the FREE \"Exercise & Physical Activity: Your Everyday Guide\" from [employer]. Call 0-330.182.4607 or search [employer] online. · You need 1670-0859 mg of calcium and 3236-6310 IU of vitamin D per day. It is possible to meet your calcium requirement with diet alone, but a vitamin D supplement is usually necessary to meet this goal.  · When exposed to the sun, use a sunscreen that protects against both UVA and UVB radiation with an SPF of 30 or greater. Reapply every 2 to 3 hours or after sweating, drying off with a towel, or swimming. · Always wear a seat belt when traveling in a car. Always wear a helmet when riding a bicycle or motorcycle.

## 2021-10-06 NOTE — PROGRESS NOTES
Medicare Annual Wellness Visit  Name: Alber Ngo Date: 10/12/2021   MRN: <[id number]> Sex: Male   Age: 77 y.o. Ethnicity: Non- / Non    : 1955 Race: Teri Johnson /       Brandon Jorgensen is here for Garth BEVERLYV    Screenings for behavioral, psychosocial and functional/safety risks, and cognitive dysfunction are all negative except as indicated below. These results, as well as other patient data from the [address] form, are documented in Flowsheets linked to this Encounter. Wt Readings from Last 5 Encounters:   10/06/21 150 lb (68 kg)   10/05/20 144 lb 9.6 oz (65.6 kg)   19 138 lb (62.6 kg)   05/10/19 138 lb 12.8 oz (63 kg)   18 139 lb 12.8 oz (63.4 kg)       No Known Allergies      Prior to Visit Medications    Medication Sig Taking? Authorizing Provider   hydroCHLOROthiazide (HYDRODIURIL) 25 MG tablet Take 1 tablet by mouth daily Yes Radha Edwards DO   potassium chloride (KLOR-CON M) 10 MEQ extended release tablet TAKE 2 TABLETS DAILY Yes Radha Edwards DO   olmesartan (BENICAR) 40 MG tablet TAKE 1 TABLET DAILY Yes Radha Edwards DO   loratadine (CLARITIN) 10 MG tablet Take 10 mg by mouth daily Yes Historical Provider, MD   Misc Natural Products (GLUCOSAMINE CHOND DOUBLE STR PO) Take 1 tablet by mouth daily Yes Historical Provider, MD   Multiple Vitamin (MULTIVITAMIN PO) Take 1 capsule by mouth daily. Yes Historical Provider, MD   acyclovir (ZOVIRAX) 400 MG tablet TAKE ONE TABLET BY MOUTH THREE TIMES A DAY FOR 10 DAYS FOR HERPES SIMPLEX FLARE-UP  Radha Edwards DO         Past Medical History:   Diagnosis Date    Allergic rhinitis     GERD (gastroesophageal reflux disease)     Herpes simplex 2012    History of colon polyps 10/5/2020    Hypertension     Osteoarthritis     Bilateral knees       Past Surgical History:   Procedure Laterality Date    COLONOSCOPY  8/10    ANT Garcia Overall, hemorrhoidal rectal bleeding, repeat 10 years    KNEE ARTHROSCOPY Right 2000    Meniscus repair    THYROID SURGERY      biopsy-benign colloid nodule         Family History   Problem Relation Age of Onset    Dementia Mother     Diabetes Mother     High Blood Pressure Mother     Kidney Disease Mother     Cancer Father         Oral    Cancer Sister         Breast    Cancer Sister        CareTeam (Including outside providers/suppliers regularly involved in providing care):   Patient Care Team:  Cheryl Coelho DO as PCP - General (Family Medicine)  Cheryl Coelho DO as PCP - [facility] Empaneled Provider  Curtis Cohen MD as Consulting Physician (Gastroenterology)  Ebjahaira Hdez III (Ophthalmology)    Wt Readings from Last 3 Encounters:   10/06/21 150 lb (68 kg)   10/05/20 144 lb 9.6 oz (65.6 kg)   11/20/19 138 lb (62.6 kg)     Vitals:    10/06/21 0955   BP: 113/68   Pulse: 64   Temp: 97.3 °F (36.3 °C)   SpO2: 99%   Weight: 150 lb (68 kg)   Height: 5' 9\" (1.753 m)     Body mass index is 22.15 kg/m². Based upon direct observation of the patient, evaluation of cognition reveals recent and remote memory intact.     General Appearance: alert and oriented to person, place and time, well developed and well- nourished, in no acute distress  Skin: warm and dry, no rash or erythema  Head: normocephalic and atraumatic  Eyes: pupils equal, round, and reactive to light, extraocular eye movements intact, conjunctivae normal  ENT: tympanic membrane, external ear and ear canal normal bilaterally, nose without deformity, nasal mucosa and turbinates normal without polyps  Neck: supple and non-tender without mass, no thyromegaly or thyroid nodules, no cervical lymphadenopathy  Pulmonary/Chest: clear to auscultation bilaterally- no wheezes, rales or rhonchi, normal air movement, no respiratory distress  Cardiovascular: normal rate, regular rhythm, normal S1 and S2, no murmurs, rubs, clicks, or gallops, distal pulses 11/03/2017    Influenza, Quadv, IM, PF (6 mo and older Fluzone, Flulaval, Fluarix, and 3 yrs and older Afluria) 11/20/2019    Influenza, Quadv, Recombinant, IM PF (Flublok 18 yrs and older) 11/07/2018    Influenza, Quadv, adjuvanted, 65 yrs +, IM, PF (Fluad) 10/12/2020, 10/06/2021    Pneumococcal Conjugate 13-valent (Uoikeaj12) 11/20/2019    Pneumococcal Polysaccharide (Fguprarit59) 08/29/2011, 10/12/2020    Tdap (Boostrix, Adacel) 05/21/2013    Zoster Live (Zostavax) 01/08/2015    Zoster Recombinant (Shingrix) 12/11/2019, 03/10/2020        Health Maintenance   Topic Date Due    AAA screen  Never done    Colon cancer screen colonoscopy  08/01/2020    Annual Wellness Visit (AWV)  Never done    Potassium monitoring  08/04/2022    Creatinine monitoring  08/04/2022    Low dose CT lung screening  09/01/2022    DTaP/Tdap/Td vaccine (2 - Td or Tdap) 05/21/2023    Lipid screen  11/21/2024    Flu vaccine  Completed    Shingles Vaccine  Completed    Pneumococcal 65+ years Vaccine  Completed    COVID-19 Vaccine  Completed    Hepatitis C screen  Completed    Hepatitis A vaccine  Aged Out    Hepatitis B vaccine  Aged Out    Hib vaccine  Aged Out    Meningococcal (ACWY) vaccine  Aged Out     Recommendations for [employer] Due: see orders and patient instructions/AVS.  . Recommended screening schedule for the next 5-10 years is provided to the patient in written form: see Patient Instructions/AVS.    Ashly Montez was seen today for medicare aw. Diagnoses and all orders for this visit:    Routine general medical examination at a health care facility  General wellness exam. Reviewed chart for past hx and updated today. Counseled on age appropriate health guidance and discussed screening recommendations. Vaccinations reviewed and discussed.  All questions answered    Flu vaccine need  -     INFLUENZA, QUADV, ADJUVANTED, 72 YRS =, IM, PF, PREFILL SYR, 0.5ML (FLUAD)    Screening for AAA (abdominal aortic aneurysm)  Patient with risk factors for development of AAA given his smoking history. No previous screening test completed. Discussed reason for screening today with patient and he is agreeable. Order provided. Will call patient with update after results and determine if any follow-up plan is needed. -     US SCREENING FOR AAA; Future    Former smoker - quit March 2020  -     [place] AAA; Future    Other emphysema ([address]    History of colon polyps    Essential hypertension    Primary osteoarthritis involving multiple joints - knees  -     [address] [facility] MD, Orthopedic Surgery, [facility]      Advance Care Planning   Advanced Care Planning: Discussed the patients choices for care and treatment in case of a health event that adversely affects decision-making abilities. Also discussed the patients long-term treatment options. Reviewed with the patient the [address] [place] [address] of [address] [place] [address] Declaration forms  Reviewed the process of designating a competent adult as an Agent (or -in-fact) that could take make health care decisions for the patient if incompetent. Patient was asked to complete the declaration forms, either acknowledge the forms by a public notary or an eligible witness and provide a signed copy to the practice office. Time spent (minutes): 1     Cardiovascular Disease Risk Counseling: Assessed the patient's risk to develop cardiovascular disease and reviewed main risk factors.    Reviewed steps to reduce disease risk including:   · Quitting tobacco use, reducing amount smoked, or not starting the habit  · Making healthy food choices  · Being physically active and gradualy increasing activity levels   · Reduce weight and determine a healthy BMI goal  · Monitor blood pressure and treat if higher than 140/90 mmHg  · Maintain blood total cholesterol levels under 5 mmol/l or 190 mg/dl  · Maintain LDL cholesterol levels under 3.0 mmol/l or 115 mg/dl

## 2021-10-06 NOTE — TELEPHONE ENCOUNTER
PT CALLING SAYING HE WAS SEEN EARLIER TODAY AND HIS LIPID PANEL FROM LAST YEAR WAS NOT IN HIS CHART FROM [facility]    HE SAYS HE CALLED [facility] JUST NOW AND WAS TOLD THEY WILL BE FAXING IT OVER THIS AFTERNOON    PLEASE LET HIM KNOW THE RESULTS

## 2021-10-06 NOTE — TELEPHONE ENCOUNTER
Pt here for his annual physical today    He showed me on his phone a letter from Medicare denying the Lipid panel and PSA from last year but there was no reason given for the denial    Asked him to call Medicare and find out why. Plus, we rec'd a PSA result but not the Lipid panel result from [address] [place] [address],[address], just now Mark Tipton, a Medicare advocate is calling to see why these tests were denied. Told her that is what we asked the pt to find out. Told her pt was instructed to call Medicare and find out. She says she is trying to help him but told her as a rule Medicare will only speak with the pt.   She will let pt know he really does need to call Medicare

## 2021-10-26 ENCOUNTER — VIRTUAL VISIT (OUTPATIENT)
Dept: PHARMACY | Age: 66
End: 2021-10-26
Payer: MEDICARE

## 2021-10-26 DIAGNOSIS — Z87.891 FORMER SMOKER: ICD-10-CM

## 2021-10-26 PROCEDURE — 99211 OFF/OP EST MAY X REQ PHY/QHP: CPT | Performed by: PHARMACIST

## 2021-10-26 NOTE — PROGRESS NOTES
Disclaimer for Virtual Visits: We want to confirm that, for purposes of billing, this is a virtual visit with your provider for which we will submit a claim for reimbursement with your insurance company. You may be responsible for any copays, coinsurance amounts or other amounts not covered by your insurance company. If you do not accept this, unfortunately we will not be able to schedule a virtual visit with the provider. Do you accept? Yes. CLINICAL PHARMACY NOTE--Smoking Cessation    SUBJECTIVE/OBJECTIVE:   Jodi Alvarenga is a 77 y.o. male with PMHx significant for GERD, HTN, pulmonary emphesema referred by Dr Lajune Landau for smoking cessation counseling and medication management. Spoke with patient over the phone on 10/26/21. 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 30-60 min (previously immediately after waking)  Do you smoke 20 or more cigarettes each day? No  At times when you can't smoke or haven't got any cigarettes, do you feel a craving for one? Yes, but not if not keeping busy  Is it tough for you to keep from smoking for more than a few hours?  No, not if in a place where he can't smoke  When you are sick enough to stay in bed, to you still smoke? no  If yes to two or more questions, consider using NRT    Reasons for addiction: Touch and Handle, Stress-relief, Habit, Addiction, \"something to do\"  Triggers: meals, stress, smoke breaks from his work; stopped smoking while driving, with coffee, alcohol (already tried to dissociate smoking with certain activities, but picked it up with other activities)  Barriers: not confident (only 5 on a 1-10 scale); He is \"afraid\" to run out of cigarettes. Motivation for quitting: Has wanted to quit for years (in back of mind) because he doesn't like that it's in control of him (addiction), Health, family, money    12/19/19 update:  Did  Chantix from pharmacy ($40), but did not start. Fearful of side effects. Has history of bad dreams several years ago and is afraid it may cause vivid dreams. Has been able to cut back slightly on his own. Was able to eliminate the 1st cigarette of the day, is now working on the 2nd. Started playing pool to fill downtime. He has been able to avoid buying more cigarettes until completely out of current pack  It has been a busy week, and he hasn't been able to focus on quitting as much as he would like. He plans to go to [place] for [place]. 12/31/19 update:  Smoking \"a little less,\" but unable to give exact amount. Started Chantix yesterday. Has tolerated 2 doses so far. No side effects. Has been able to delay the 2nd cigarette more by switching AM routine (drink coffe before breakfast)  Sucking on mints  Used recumbent bike a couple times, but no routine yet. Moved cigarettes to basement (out of pocket) to make it more inconvenient. Has kept a list of things to do on his breaks. 1/14/20 update:  Continues Chantix 1 mg BID. Takes with food, but has been difficult because he does not eat at regular times during the day. Experiencing some nausea/ \"queesy\" stomach. Also having occasional slight anxiety, but nothing major and is able to tolerate. Denies depression/thoughts of suicide or harming himself. Smoking less, but is not counting exact number because he thinks it will make him think about smoking more. He knows he is not buying cigarettes as often.  He does not buy cigarettes until he is completely out of them. He needs a new RX for chantix. He is trying to stay busy and avoid long breaks which triggers him to smoke. He has been able to postpone the cigarette many times. 1/30/20 update:   Continues Chantix 1 mg BID. Takes with food. Experiencing some nausea/ \"queesy\" stomach, but tolerable. Also having occasional slight anxiety, but nothing major and is able to tolerate. Denies depression/thoughts of suicide or harming himself. Rides his recumbent bike 2x per week for 15 minutes. Smoking less, but is still not counting the exact number because he thinks it will make him think about smoking more. He is buying cigarettes less often and has placed them in his basement so has to go downstairs to get them when he wants to smoke. He does not like going down stairs. He has been able to walk back upstairs from the basement without bringing a cigarette with him. He made a list of reasons for quitting and placed it by his coat so he sees it when he is going outside to smoke. He does not smoke inside house or in car. He started using hard candies in place of cigarettes while he is preparing his meals. He often smokes right before and right after meals. He always brushes his teeth after meals. Patient feels that drinking coffee throughout the day would help keep him busy during breaks, and coffee has not been a trigger for him lately. 2/27/20  Has been able to reduce to 2 packs per week, but it was really hard. He surprised himself. Pt started drinking tea instead of coffee. Has been able to postpoine 1st cig at least a couple hours. Plans to avoid [facility], where he buys his cigs. Feels \"accomplished\" after exercising, but has only been able to exercise 2x per week. 3/12/20  Pt does not feel he did well with the goals we set at last visit 2/2 less focus and recent stressors (DDS, shingles vax, coronavirus). He didn't exercise as much.  He increased to 1 pack in 3 days, then cut back down to 1 pack in 3.5 days. His cravings are less frequent, but not necessarily less strong. The recent [address] outbreak is worrying him because he is high risk. This is more motivation for him to quit. Pt requests a Chantix refill. Tried 1-800-QUIT-NOW- [place] it was not for him. 3/26/20  Pt reports having \"Up and downs. \" Coronavirus has really concerned him. He stopped Chantix for 2 days due to depression surrounding the situation, but realized that not listening to the news helped him much more. Pt feels his mood is stable and would like to resume chantix. Started keeping track of when he smokes (exact #s). He smoked more for a couple days, but he has been able to limit to 4 cig/day the past 2 days! This equates to goal of no more than 1 pack every 5 days. 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 He increased exercise to 20 min 2x per week and started core exercises. He is listening to an old CD while he exercises, which helps. He feel accomplished after exercise. 3/31/20  Pt quit smoking 3 days ago on 3/28! Motivation was fear. Pt started back on 0.5 mg once daily. Will increase to 0.5 mg BID. Things that worked: prayer, staying occupied, stopped watching the news because it was depressing. 4/2/20  Will increase Chantix today to 0.5 mg BID  He did put lighter out of pocket/ out of sight  Doesn't have an jacquelyn tray, he put the cigarette butts in a planter pot. Suggested he move this to a different location and plant a new plant or flowers in it. His urges continue to be able piano and after programming. His biggest urge to overcome was when he had been working on programming or several months and finally had to test if it worked, and it did not work. He had a very strong urge to smoke but paced and prayed, then occupied himself with something different. He is not ready to throw away the cigarettes, but he did put them out of site and doesn't remember where they are.    Withdrawal sxs include: nasal per week-- feeling stronger. Tobacco cravings \"up and down. \" Most days they are less frequent, intensity varies. Is able to overcome. Watched star trek this weekend as reward. 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 Sunday night craving: made food for the whole week and was on feet all day; had to clean up after, strong craving after he took a break, created a list of distractions when he has cravings: jigsaw puzzle, read book. Still having trouble sleeping: plans to read old spiritual book before bed. Drainage much better. Slightly more depressed due to [address]. Does not feel this is due to Chantix, just situational. 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 Pt denies any thought of harming himself or others. 4/29/20  Pt smoke free x 1 month! Watched an old [facility] game as reward. Sleeping improved now that he is reading 30 min before bed. Piano 1 hour 4x/per week--found a tutorial on how to play \"I can only imagine\" and he is trying to write [employer] own sheet music for it. Tried working on software again. He is trying to make a routine. Lifting COVID restrictions is worrying him. He has been busy looking for new insurance as his [employer] expires this month and he is inelligible for [employer] until oct. It has been time consuming. He has a pcp visit next week. Needs to make sure all his RXs are covered under new plan. 5/6/20  Mood is \"up and down\"- manly due to coronavirus pandemic, but feels it is up more often than down. Pt new [employer] insurance starts today. Cravings are rare with short duration, but still strong (7 out of 10). Saw PCP today. Walks outside. Has been on Chantix x 4 months. Is not sure if it's covered by new insurance. 5/20/20  No lapses. Still some strong urges avg once per day. Bike 20 minutes 3x per week, increasing intensity \"from 1 to 2\". Continues core exercises.  Working on writing sheet music for \"I can only imagine\". Increased amount of time spending on software, gradually. Increased nausea with Chantix. Mood is good, better than before. 5/27/20  No increase in cravings after cutting back slightly on Chantix. Nausea improved slightly possibly. A couple strong cravings but able to overcome. Increased exercise from 20 to 30 min on bike 3x per week, core exercises 1x per week, would like to increase to 2x per week. Feels good while riding bike, feels tired/accomplished after. Plays upbeat music. Still practicing piano, trying to write sheet music. Spends time on software, but not accomplishing a lot due to some limitations (space on hard drive). Going to [employer] [facility] [employer] today. Plans to cancel insurance at end of month because they do not cover Chantix or other needs (DDS, chiropractor, PCP). Arranged for [employer] to start 6/1/20 and Drs in [employer]. Sleeping better- was waking up at 3-4a and was anxious. Now not waking up anxious. 6/3/20  Decreased Chantix to 0.5 mg BID at end of last week, no increase in strength or frequency of cravings. Only a brief craving this AM while lying in bed, able to overcome easily. Needs refill on Chantix; has ~1 week left. Cut esomeprazole in half- still no GERD sxs. Still clearing throat (has happened since he quit smoking which he attributes to his lungs clearing out). Nausea has improved since reducing Chantix. Mood is good, he is less anxious and not waking in middle of night. He is drinking 6 glasses of water each day. 6/17/20  Continues Chantix 0.5 mg BID, still not smoking, slightly more frequent and stronger urges, but not too bad, in control of cravings. Nausea much better. Working more on programming which is sometimes a trigger during \"breaks. \"  Now sucks hard candy instead. Will start 1k puzzle. Goes for a walk every other day. Increased water to 6-7 glasses per day.    Only had one episode of GERD since cutting back on dose and stopping carafate. Pt is unsure why he started the medications. Looking back on med list, he has been on this at least 10 years. Per OV note from Dr Alie King 2/28/11, \"Just 2 weeks ago he began to have hiccups and a feeling of acid around his throat. He is on Nexium every day. He had an EGD by Dr. Hakan Richmond for this in ~ 2008 and had sucralfate prescribed then, which helped. He recently found the old med and  Has taken it for a week now and it has helped. He will take it for a few weeks and then stop and see what happens. \"    7/6/20  -Continues Chantix, remains smoke free >3 months, no changes in cravings, etc. Nauseous once because he forgot to eat. Is nervous to stop medication altogether.   -Pt reduced Nexium 40 mg to QOD ~1.5 wks ago, doing well with no increase in GERD sxs. Feels comfortable gradually tapering off. -/72 one time at home, but does not check daily   -Bike 3x per week, Core exercises 2x per week. 8/5/20  -Getting exercise, but not outside. Cut back on Chantix 0.5 mg once daily on most days. Had some stronger cravings some days, so took it BID. Strong craving on way to [employer]. It was the 1st time back to [employer] since he quit smoking. One of members smoked in front of him, which was difficult. He stopped where he normally would buy cigarettes on way to [employer] and bought candy instead. Checked BP yesterday 133/73. Doing well with PPI 40 mg QOD, but not ready to cut back to 20 mg QOD because he has a large supply of 40 mg tabs. 8/26/20  -Getting core/bike exercise, but not outside. Working toward goals.   -Cut back on Chantix 0.5 mg to QOD. Initially forgot dose, but then skipped doses intentionally.   -Triggers: frustration, on way to [employer], seeing people smoke on TV. -Plans to tell his friends at the [employer].  -Was able to not stop where he buys cigarettes on way to [employer] the last time he went.   -Doing well with PPI 40 mg QOD, but not ready to cut back to 20 mg QOD because he has a large supply of 40 mg tabs. Agreed to try MWF.  -6 glasses of water each day  -Echo submitted and approved by [employer].  -not checking BP daily    9/16/20  -Still taking Chantix QOD, occasional strong cravings due to triggers  -Triggers: will go back to [employer] this weekend.   -Hasn't told anyone at [employer] that he quit. -slowly increasing to 8 glasses water/day  -reports BP log: SBP range 116-131,  DBP range 66-76  -looking for better sense of smell     10/7/20  -Working to apply for medicare; still trying to figure out which plan is best.   -D/c Chantix 5 days ago, still has on hand to use prn. Has \"Ups and downs. \" Craving frequency and intensity leveled off.   -Quit 6 months ago!   -Told brother in law in [place] that he quit smoking.  -Improved smell    -Saw PCP who found blood in urine, which is worrying patient. Will return in 1 week for BP f/u after med changes made.   -Plans to continue PPI taper and will use Pepcid prn heartburn    11/3/20  -Remains smoke free  -Has not had to use Chantix since last appointment    -Still gets urges when seeing people smoke on TV. Will keep hard candies by the TV to avoid urge to smoke. -Got Nexium OTC instead of Pepcid, but only taking three times a week. Advised to get Pepcid instead as this is better for PRN use. 11/24/20  Cravings reduced significantly. Has not taken any Chantix. Has not switched from nexium QOD to pepcid yet, but bought at store. No GERD sxs. SBP mostly<130. Pt feeling well. Socially distanced. Staying healthy. 1/12/21  Put on an old jacket and had a pack of cigarettes in pocket. He put the pack directly in the trash without looking at it. He wasn't able to take out the trash right away, but didn't dig them out. He is not looking forward to when he is offered a cigarette in the future. Discussed how to prepare for his. Very excited because he got his echo published in [employer]. Stopped Nexium ~2 weeks ago.  Using pepcid prn, but hasn't needed to use this at all. Cut potassium down to once per day. 2/23/21  1 year piter of being tobacco free is coming up - March 28. Emphasized the enormity of this accomplishment. States he is staying busy with his software development, exercise, [employer] activity, and learning the piano. He still has triggers such as seeing someone smoking on tv or being frustrated. He was hoping after a year these triggers would be easier to handle. Encouraged patient to celebrate his success and focus on ways he used to avoid triggers when he was quitting. He states he often will pace, drink water, or just leave or turn away from the trigger. He states he is proud that his health has improved since quitting and he has been able to take less pills. Is also able to smell his coffee now. He is still nervous for when he is offered a cigarette in the future. Discussed how to prepare for this using by using his trigger-avoiding strategies and just leaving the room/situation if needed. 3/23/21  Patient reports he is still doing well and remaining smoke free, however he is \"afraid it wont last\". He has not been around his family members, [employer] members, or friends since [address]  started a year ago and reports he is nervous that when he is around them the temptation will be hard to overcome as many of them are smokers. Discussed methods to abstain from temptations such as staying inside when family members/friends go outside to smoke, making it physically difficult to smoke by having something else in his mouth (gum, mint, hard candies), not buying his own cigarettes, telling family / friends he has quit smoking so they do not unintentionally try to pressure him, and turning away from trigger. He states his family and friends are very supportive of him and will be happy to see how far he has come. Encouraged patient to celebrate his one year anniversary of being smoke free as this is a huge accomplishment.  Patient's family is having a celebration next week that will be virtual. Will call patient again in 1 month to follow-up, but encouraged patient to schedule appointment earlier if he feels he is getting overwhelmed with triggers before then. 4/20/21  Told family zoom celebration for late mom's birthday and told family he quit smoking. Brother in law wishes he could quit but hasn't set a quit date. Has not had much in person contact with family. Still not in social situations either, including [employer] volunteer work. Still virtual. Meets with group of inter-racial [employer] members and plans to meet in person next. One member smokes and he is worried it will be a trigger to smoke. Still doesn't consider himself a non-smoker, thinks he is still \"quitting\". Reminded patient that he did QUIT and has been a NONsmoker for over a year! His reward was getting his computer fixed/updated. He has been walking 2x per week and has a more consistent indoor exercise routine-- recumbent bike, weights, core exercises. Gets a \"reaction\" (not necessarily an urge) when sees people smoking on TV and nervous about going around family and friends. He will avoid triggers as discussed above. 5/18/21  Granddaughter graduated from college in [employer] [facility]. Alexei Meeks had celebration for her, and he attended. When he stepped outside of [employer], it reminded him of when he used to step out to take smoke breaks. Long drive to get to [employer], and stopped at one of places he normally stops to smoke, but instead he stopped to get gas. He didn't feel urge to smoke, just brought back memories. Pt states he is gaining weight, possibly snacking more and slowing metabolism. Still exercises, but not high intensity. Started [employer] exercises. Pt had a lot of questions about his last PCP visit, when to follow up, how to get labs, where to go, etc    7/13/21  Large family gathering on 4th of July. Smokers went outside on the lawn. Pt avoided them while they smoked.  Stopped at rest stop he normally stops at to smoke on 75 and was able to stop and not smoke. Behind on healthcare paperwork and slowly getting organized. Pt has many questions about how to get his labs and where to go.    8/31/21  Patient having cravings off and on but has not smoked since March 28, 2020. Cravings are usually when he sees someone else smoking, such as on TV. He states he just puts his mind on something else or changes the TV channel. Patient reports doing really well overall. He is very happy with himself for remaining smoke free. Discussed long term benefits of not smoking. Patient still trying to get CT scheduled with [facility] before his appointment with Dr. Jessica Bravo in October. 9/28/21  Length of time between cravings is increasing. Seeing people on TV and being in situations where he used to smoke remain triggers. Planning to start walking more now that weather is getting nicer. Reports his brother in law is considering stopping smoking and patient very supportive of him. 10/26/21  Things are going well, remains smoke free. Cravings come and go but thinks this month has been worse than last, likely due to being around more people due to getting out more. Tries to avoid situations where he smoked previously. Recommended having coffee available in these situations as this helped him previously. Has put on weight since stopping smoking but states this has now leveled out. Wanting to increase exercise, been working out at home but knows he needs to be more disciplined.       Pharmacy: [place], [place]    Current Medication and Allergy List Reviewed and Updated:  Current Outpatient Medications   Medication Sig Dispense Refill    hydroCHLOROthiazide (HYDRODIURIL) 25 MG tablet Take 1 tablet by mouth daily 90 tablet 1    potassium chloride (KLOR-CON M) 10 MEQ extended release tablet TAKE 2 TABLETS DAILY 180 tablet 1    acyclovir (ZOVIRAX) 400 MG tablet TAKE ONE TABLET BY MOUTH THREE TIMES A DAY FOR 10 DAYS FOR HERPES SIMPLEX FLARE-UP 90 tablet 1    olmesartan (BENICAR) 40 MG tablet TAKE 1 TABLET DAILY 90 tablet 3    loratadine (CLARITIN) 10 MG tablet Take 10 mg by mouth daily      Misc Natural Products (GLUCOSAMINE CHOND DOUBLE STR PO) Take 1 tablet by mouth daily      Multiple Vitamin (MULTIVITAMIN PO) Take 1 capsule by mouth daily. No current facility-administered medications for this visit. Pertinent Labs/Vitals  Scr 0.9 (11/21/19)    ASSESSMENT/PLAN:   Patient has been tobacco free for over one year! Frequency and intensity of cravings has decreased significantly. --Has not used Chantix in >4 months but has if needed. Goals   Try walking outside more and continuing home workouts   Find healthier alternatives to current snacks (carrots, celery, etc)  Reward yourself when you reach goals (grill, new sheet music, star trek, jigsaw puzzle, book)  Remind yourself that you are a non-smoker and be confident and proud of your accomplishment. Next appt:  4 wk- pt prefers to keep monthly calls. Pt verbalized understanding of the above information and denied further questions or concerns. The total time of the visit was 30 min. Emily Marcial, PharmD, BCPS  [facility] [address] [place] [address]: [phone]  [facility]: [phone]  10/26/2021 11:07 AM    For Pharmacy Admin Tracking Only     CPA in place:   Yes   Gap Closed?: Yes    Time Spent (min): 30

## 2021-11-17 ENCOUNTER — TELEPHONE (OUTPATIENT)
Dept: PRIMARY CARE CLINIC | Age: 66
End: 2021-11-17

## 2021-11-17 NOTE — TELEPHONE ENCOUNTER
Pt again calling about his lab bill from [facility] for d.o.s. 8-4-21    He says he spoke to someone name Gumaro at [address] and was asked to call his pcp for recoding of a couple of the charges    He says things were coded preventative and the approved (looks like cpt) codes were 46650 for the insertion of needle for the collection of blood and [id number] for the blood test group of chemicals    What was denied was 65041 for lipids,chol,trigly     A psa was denied for cpt code 66271 but the code of 65610 for the psa was approved    He is wanting [facility] called about the denied charge to have it changed--for the lipids,chol,trigly

## 2021-11-23 ENCOUNTER — VIRTUAL VISIT (OUTPATIENT)
Dept: PHARMACY | Age: 66
End: 2021-11-23
Payer: MEDICARE

## 2021-11-23 DIAGNOSIS — Z87.891 FORMER SMOKER: Primary | ICD-10-CM

## 2021-11-23 PROCEDURE — 99211 OFF/OP EST MAY X REQ PHY/QHP: CPT

## 2021-11-23 NOTE — TELEPHONE ENCOUNTER
Pt calling back today    He says he was called by the  and was asked several questions. He says he was told it would be looked in to and he would receive another call    He is just checking in.  He says he was called about a week ago

## 2021-11-23 NOTE — PROGRESS NOTES
completely out of them. He needs a new RX for chantix. He is trying to stay busy and avoid long breaks which triggers him to smoke. He has been able to postpone the cigarette many times. 1/30/20 update:   Continues Chantix 1 mg BID. Takes with food. Experiencing some nausea/ \"queesy\" stomach, but tolerable. Also having occasional slight anxiety, but nothing major and is able to tolerate. Denies depression/thoughts of suicide or harming himself. Rides his recumbent bike 2x per week for 15 minutes. Smoking less, but is still not counting the exact number because he thinks it will make him think about smoking more. He is buying cigarettes less often and has placed them in his basement so has to go downstairs to get them when he wants to smoke. He does not like going down stairs. He has been able to walk back upstairs from the basement without bringing a cigarette with him. He made a list of reasons for quitting and placed it by his coat so he sees it when he is going outside to smoke. He does not smoke inside house or in car. He started using hard candies in place of cigarettes while he is preparing his meals. He often smokes right before and right after meals. He always brushes his teeth after meals. Patient feels that drinking coffee throughout the day would help keep him busy during breaks, and coffee has not been a trigger for him lately. 2/27/20  Has been able to reduce to 2 packs per week, but it was really hard. He surprised himself. Pt started drinking tea instead of coffee. Has been able to postpoine 1st cig at least a couple hours. Plans to avoid [facility], where he buys his cigs. Feels \"accomplished\" after exercising, but has only been able to exercise 2x per week. 3/12/20  Pt does not feel he did well with the goals we set at last visit 2/2 less focus and recent stressors (DDS, shingles vax, coronavirus). He didn't exercise as much.  He increased to 1 pack in 3 days, then cut back down to 1 pack in 3.5 days. His cravings are less frequent, but not necessarily less strong. The recent [address] outbreak is worrying him because he is high risk. This is more motivation for him to quit. Pt requests a Chantix refill. Tried 1-800-QUIT-NOW- [place] it was not for him. 3/26/20  Pt reports having \"Up and downs. \" Coronavirus has really concerned him. He stopped Chantix for 2 days due to depression surrounding the situation, but realized that not listening to the news helped him much more. Pt feels his mood is stable and would like to resume chantix. Started keeping track of when he smokes (exact #s). He smoked more for a couple days, but he has been able to limit to 4 cig/day the past 2 days! This equates to goal of no more than 1 pack every 5 days. 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 He increased exercise to 20 min 2x per week and started core exercises. He is listening to an old CD while he exercises, which helps. He feel accomplished after exercise. 3/31/20  Pt quit smoking 3 days ago on 3/28! Motivation was fear. Pt started back on 0.5 mg once daily. Will increase to 0.5 mg BID. Things that worked: prayer, staying occupied, stopped watching the news because it was depressing. 4/2/20  Will increase Chantix today to 0.5 mg BID  He did put lighter out of pocket/ out of sight  Doesn't have an jacquelyn tray, he put the cigarette butts in a planter pot. Suggested he move this to a different location and plant a new plant or flowers in it. His urges continue to be able piano and after programming. His biggest urge to overcome was when he had been working on programming or several months and finally had to test if it worked, and it did not work. He had a very strong urge to smoke but paced and prayed, then occupied himself with something different. He is not ready to throw away the cigarettes, but he did put them out of site and doesn't remember where they are.    Withdrawal sxs include: nasal per week-- feeling stronger. Tobacco cravings \"up and down. \" Most days they are less frequent, intensity varies. Is able to overcome. Watched star trek this weekend as reward. 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 Sunday night craving: made food for the whole week and was on feet all day; had to clean up after, strong craving after he took a break, created a list of distractions when he has cravings: jigsaw puzzle, read book. Still having trouble sleeping: plans to read old spiritual book before bed. Drainage much better. Slightly more depressed due to [address]. Does not feel this is due to Chantix, just situational. 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 Pt denies any thought of harming himself or others. 4/29/20  Pt smoke free x 1 month! Watched an old [employer] game as reward. Sleeping improved now that he is reading 30 min before bed. Piano 1 hour 4x/per week--found a tutorial on how to play \"I can only imagine\" and he is trying to write [employer] own sheet music for it. Tried working on software again. He is trying to make a routine. Lifting COVID restrictions is worrying him. He has been busy looking for new insurance as his [employer] expires this month and he is inelligible for [employer] until oct. It has been time consuming. He has a pcp visit next week. Needs to make sure all his RXs are covered under new plan. 5/6/20  Mood is \"up and down\"- manly due to coronavirus pandemic, but feels it is up more often than down. Pt new [employer] insurance starts today. Cravings are rare with short duration, but still strong (7 out of 10). Saw PCP today. Walks outside. Has been on Chantix x 4 months. Is not sure if it's covered by new insurance. 5/20/20  No lapses. Still some strong urges avg once per day. Bike 20 minutes 3x per week, increasing intensity \"from 1 to 2\". Continues core exercises.  Working on writing sheet music for \"I can only imagine\". Increased amount of time spending on software, gradually. Increased nausea with Chantix. Mood is good, better than before. 5/27/20  No increase in cravings after cutting back slightly on Chantix. Nausea improved slightly possibly. A couple strong cravings but able to overcome. Increased exercise from 20 to 30 min on bike 3x per week, core exercises 1x per week, would like to increase to 2x per week. Feels good while riding bike, feels tired/accomplished after. Plays upbeat music. Still practicing piano, trying to write sheet music. Spends time on software, but not accomplishing a lot due to some limitations (space on hard drive). Going to [employer] for [employer] today. Plans to cancel insurance at end of month because they do not cover Chantix or other needs (DDS, chiropractor, PCP). Arranged for [employer] to start 6/1/20 and Drs in [employer]. Sleeping better- was waking up at 3-4a and was anxious. Now not waking up anxious. 6/3/20  Decreased Chantix to 0.5 mg BID at end of last week, no increase in strength or frequency of cravings. Only a brief craving this AM while lying in bed, able to overcome easily. Needs refill on Chantix; has ~1 week left. Cut esomeprazole in half- still no GERD sxs. Still clearing throat (has happened since he quit smoking which he attributes to his lungs clearing out). Nausea has improved since reducing Chantix. Mood is good, he is less anxious and not waking in middle of night. He is drinking 6 glasses of water each day. 6/17/20  Continues Chantix 0.5 mg BID, still not smoking, slightly more frequent and stronger urges, but not too bad, in control of cravings. Nausea much better. Working more on programming which is sometimes a trigger during \"breaks. \"  Now sucks hard candy instead. Will start 1k puzzle. Goes for a walk every other day. Increased water to 6-7 glasses per day.    Only had one episode of GERD since cutting back on dose and stopping carafate. Pt is unsure why he started the medications. Looking back on med list, he has been on this at least 10 years. Per OV note from Dr Talisha Jimenez 2/28/11, \"Just 2 weeks ago he began to have hiccups and a feeling of acid around his throat. He is on Nexium every day. He had an EGD by Dr. Jess Junior for this in ~ 2008 and had sucralfate prescribed then, which helped. He recently found the old med and  Has taken it for a week now and it has helped. He will take it for a few weeks and then stop and see what happens. \"    7/6/20  -Continues Chantix, remains smoke free >3 months, no changes in cravings, etc. Nauseous once because he forgot to eat. Is nervous to stop medication altogether.   -Pt reduced Nexium 40 mg to QOD ~1.5 wks ago, doing well with no increase in GERD sxs. Feels comfortable gradually tapering off. -/72 one time at home, but does not check daily   -Bike 3x per week, Core exercises 2x per week. 8/5/20  -Getting exercise, but not outside. Cut back on Chantix 0.5 mg once daily on most days. Had some stronger cravings some days, so took it BID. Strong craving on way to [employer]. It was the 1st time back to [employer] since he quit smoking. One of members smoked in front of him, which was difficult. He stopped where he normally would buy cigarettes on way to [employer] and bought candy instead. Checked BP yesterday 133/73. Doing well with PPI 40 mg QOD, but not ready to cut back to 20 mg QOD because he has a large supply of 40 mg tabs. 8/26/20  -Getting core/bike exercise, but not outside. Working toward goals.   -Cut back on Chantix 0.5 mg to QOD. Initially forgot dose, but then skipped doses intentionally.   -Triggers: frustration, on way to [employer], seeing people smoke on TV. -Plans to tell his friends at the [employer].  -Was able to not stop where he buys cigarettes on way to [employer] the last time he went.   -Doing well with PPI 40 mg QOD, but not ready to cut back to 20 mg QOD because he has a large supply of 40 mg tabs. Agreed to try MWF.  -6 glasses of water each day  -Echo submitted and approved by [employer].  -not checking BP daily    9/16/20  -Still taking Chantix QOD, occasional strong cravings due to triggers  -Triggers: will go back to [employer] this weekend.   -Hasn't told anyone at [employer] that he quit. -slowly increasing to 8 glasses water/day  -reports BP log: SBP range 116-131,  DBP range 66-76  -looking for better sense of smell     10/7/20  -Working to apply for medicare; still trying to figure out which plan is best.   -D/c Chantix 5 days ago, still has on hand to use prn. Has \"Ups and downs. \" Craving frequency and intensity leveled off.   -Quit 6 months ago!   -[facility] that he quit smoking.  -Improved smell    -Saw PCP who found blood in urine, which is worrying patient. Will return in 1 week for BP f/u after med changes made.   -Plans to continue PPI taper and will use Pepcid prn heartburn    11/3/20  -Remains smoke free  -Has not had to use Chantix since last appointment    -Still gets urges when seeing people smoke on TV. Will keep hard candies by the TV to avoid urge to smoke. -Got Nexium OTC instead of Pepcid, but only taking three times a week. Advised to get Pepcid instead as this is better for PRN use. 11/24/20  Cravings reduced significantly. Has not taken any Chantix. Has not switched from nexium QOD to pepcid yet, but bought at store. No GERD sxs. SBP mostly<130. Pt feeling well. Socially distanced. Staying healthy. 1/12/21  Put on an old jacket and had a pack of cigarettes in pocket. He put the pack directly in the trash without looking at it. He wasn't able to take out the trash right away, but didn't dig them out. He is not looking forward to when he is offered a cigarette in the future. Discussed how to prepare for his. Very excited because he got his echo published in [employer]. Stopped Nexium ~2 weeks ago.  Using pepcid prn, but hasn't needed to use this at all. Cut potassium down to once per day. 2/23/21  1 year piter of being tobacco free is coming up - March 28. Emphasized the enormity of this accomplishment. 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 He still has triggers such as seeing someone smoking on tv or being frustrated. He was hoping after a year these triggers would be easier to handle. Encouraged patient to celebrate his success and focus on ways he used to avoid triggers when he was quitting. He states he often will pace, drink water, or just leave or turn away from the trigger. He states he is proud that his health has improved since quitting and he has been able to take less pills. Is also able to smell his coffee now. He is still nervous for when he is offered a cigarette in the future. Discussed how to prepare for this using by using his trigger-avoiding strategies and just leaving the room/situation if needed. 3/23/21  Patient reports he is still doing well and remaining smoke free, however he is \"afraid it wont last\". He has not been around his family members, Anabaptist members, or friends since [facility] started a year ago and reports he is nervous that when he is around them the temptation will be hard to overcome as many of them are smokers. Discussed methods to abstain from temptations such as staying inside when family members/friends go outside to smoke, making it physically difficult to smoke by having something else in his mouth (gum, mint, hard candies), not buying his own cigarettes, telling family / friends he has quit smoking so they do not unintentionally try to pressure him, and turning away from trigger. He states his family and friends are very supportive of him and will be happy to see how far he has come. Encouraged patient to celebrate his one year anniversary of being smoke free as this is a huge accomplishment.  Patient's family is having a celebration next week that will be virtual. Will call patient again in 1 month to follow-up, but encouraged patient to schedule appointment earlier if he feels he is getting overwhelmed with triggers before then. 4/20/21  Told family zoom celebration for late mom's birthday and told family he quit smoking. Brother in law wishes he could quit but hasn't set a quit date. Has not had much in person contact with family. Still not in social situations either, including [employer] volunteer work. Still virtual. Meets with group of inter-racial [employer] members and plans to meet in person next. One member smokes and he is worried it will be a trigger to smoke. Still doesn't consider himself a non-smoker, thinks he is still \"quitting\". Reminded patient that he did QUIT and has been a NONsmoker for over a year! His reward was getting his computer fixed/updated. He has been walking 2x per week and has a more consistent indoor exercise routine-- recumbent bike, weights, core exercises. Gets a \"reaction\" (not necessarily an urge) when sees people smoking on TV and nervous about going around family and friends. He will avoid triggers as discussed above. 5/18/21  Granddaughter graduated from college in [facility]. Jeff Wetzel had celebration for her, and he attended. When he stepped outside of [employer], it reminded him of when he used to step out to take smoke breaks. Long drive to get to [employer], and stopped at one of places he normally stops to smoke, but instead he stopped to get gas. He didn't feel urge to smoke, just brought back memories. Pt states he is gaining weight, possibly snacking more and slowing metabolism. Still exercises, but not high intensity. Started [employer] exercises. Pt had a lot of questions about his last PCP visit, when to follow up, how to get labs, where to go, etc    7/13/21  Large family gathering on 4th of July. Smokers went outside on the lawn. Pt avoided them while they smoked.  Stopped at rest stop he normally stops at to smoke on 75 and was able to stop and not smoke. Behind on healthcare paperwork and slowly getting organized. Pt has many questions about how to get his labs and where to go.    8/31/21  Patient having cravings off and on but has not smoked since March 28, 2020. Cravings are usually when he sees someone else smoking, such as on TV. He states he just puts his mind on something else or changes the TV channel. Patient reports doing really well overall. He is very happy with himself for remaining smoke free. Discussed long term benefits of not smoking. Patient still trying to get CT scheduled with [facility] before his appointment with Dr. Michael Zazueta in October. 9/28/21  Length of time between cravings is increasing. Seeing people on TV and being in situations where he used to smoke remain triggers. Planning to start walking more now that weather is getting nicer. Reports his brother in law is considering stopping smoking and patient very supportive of him. 10/26/21  Things are going well, remains smoke free. Cravings come and go but thinks this month has been worse than last, likely due to being around more people due to getting out more. Tries to avoid situations where he smoked previously. Recommended having coffee available in these situations as this helped him previously. Has put on weight since stopping smoking but states this has now leveled out. Wanting to increase exercise, been working out at home but knows he needs to be more disciplined. 11/23/21  Few social events, but masking and distancing. Got COVID booster. [employer] service once per month in person. Nicotine cravings still come and go, but a little less often. Carries a big water bottle with him. Drinks coffee in AM and on long drive to curb the craving and drinks more water. Exercises at home 3x per week (30-60 min), tries to increase to 4x. Anticipating a long drive to thanksgiving dinner and knows this will be a trigger.  Plans to bring coffee, water, not having an cig on hand. Working on [employer]--oh holy night. Working on a new shun to keep him busy. [facility]    Current Medication and Allergy List Reviewed and Updated:  Current Outpatient Medications   Medication Sig Dispense Refill    hydroCHLOROthiazide (HYDRODIURIL) 25 MG tablet Take 1 tablet by mouth daily 90 tablet 1    potassium chloride (KLOR-CON M) 10 MEQ extended release tablet TAKE 2 TABLETS DAILY 180 tablet 1    acyclovir (ZOVIRAX) 400 MG tablet TAKE ONE TABLET BY MOUTH THREE TIMES A DAY FOR 10 DAYS FOR HERPES SIMPLEX FLARE-UP 90 tablet 1    olmesartan (BENICAR) 40 MG tablet TAKE 1 TABLET DAILY 90 tablet 3    loratadine (CLARITIN) 10 MG tablet Take 10 mg by mouth daily      Misc Natural Products (GLUCOSAMINE CHOND DOUBLE STR PO) Take 1 tablet by mouth daily      Multiple Vitamin (MULTIVITAMIN PO) Take 1 capsule by mouth daily. No current facility-administered medications for this visit. Pertinent Labs/Vitals  Scr 0.9 (11/21/19)    ASSESSMENT/PLAN:   Patient has been tobacco free for over one year! Frequency and intensity of cravings has decreased significantly. --Has not used Chantix in >5 months but has if needed. Goals   Try walking outside more and continuing home workouts   Find healthier alternatives to current snacks (carrots, celery, etc)  Reward yourself with smoke free anniversaries (grill, new sheet music, star trek, jigsaw puzzle, book)  Remind yourself that you are a non-smoker and be confident and proud of your accomplishment. Next appt:  6 wk- pt prefers to continue calls to hold him accountable. Pt verbalized understanding of the above information and denied further questions or concerns. The total time of the visit was 20 min.     Julio C Childers, PharmD, [facility]  [facility]   [address] Ph: [phone]  [place] Ph: [phone]  11/23/2021 11:18 AM    For Pharmacy Admin Tracking Only    CPA in place:  Yes  Recommendation Provided To: Patient/Caregiver: 1 via Virtual Visit  Intervention Detail: Scheduled Appointment  Gap Closed?: Yes   Intervention Accepted By: Patient/Caregiver: 1  Time Spent (min): 20

## 2021-11-30 NOTE — TELEPHONE ENCOUNTER
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

## 2021-12-28 RX ORDER — OLMESARTAN MEDOXOMIL 40 MG/1
TABLET ORAL
Qty: 90 TABLET | Refills: 3 | Status: SHIPPED | OUTPATIENT
Start: 2021-12-28

## 2022-01-18 ENCOUNTER — VIRTUAL VISIT (OUTPATIENT)
Dept: PHARMACY | Age: 67
End: 2022-01-18
Payer: MEDICARE

## 2022-01-18 DIAGNOSIS — Z87.891 FORMER SMOKER: Primary | ICD-10-CM

## 2022-01-18 PROCEDURE — 99211 OFF/OP EST MAY X REQ PHY/QHP: CPT

## 2022-01-18 NOTE — PROGRESS NOTES
Disclaimer for Virtual Visits: We want to confirm that, for purposes of billing, this is a virtual visit with your provider for which we will submit a claim for reimbursement with your insurance company. You may be responsible for any copays, coinsurance amounts or other amounts not covered by your insurance company. If you do not accept this, unfortunately we will not be able to schedule a virtual visit with the provider. Do you accept? Yes. CLINICAL PHARMACY NOTE--Smoking Cessation    SUBJECTIVE/OBJECTIVE:   Rabia Lei is a 77 y.o. male with PMHx significant for GERD, HTN, pulmonary emphesema referred by Dr Corey Harrell for smoking cessation counseling and medication management. Spoke with patient over the phone on 01/18/22. 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 30-60 min (previously immediately after waking)  Do you smoke 20 or more cigarettes each day? No  At times when you can't smoke or haven't got any cigarettes, do you feel a craving for one? Yes, but not if not keeping busy  Is it tough for you to keep from smoking for more than a few hours?  No, not if in a place where he can't smoke  When you are sick enough to stay in bed, to you still smoke? no  If yes to two or more questions, consider using NRT    Reasons for addiction: Touch and Handle, Stress-relief, Habit, Addiction, \"something to do\"  Triggers: meals, stress, smoke breaks from his work; stopped smoking while driving, with coffee, alcohol (already tried to dissociate smoking with certain activities, but picked it up with other activities)  Barriers: not confident (only 5 on a 1-10 scale); He is \"afraid\" to run out of cigarettes. Motivation for quitting: Has wanted to quit for years (in back of mind) because he doesn't like that it's in control of him (addiction), Health, family, money    12/19/19 update:  Did  Chantix from pharmacy ($40), but did not start. Fearful of side effects. Has history of bad dreams several years ago and is afraid it may cause vivid dreams. Has been able to cut back slightly on his own. Was able to eliminate the 1st cigarette of the day, is now working on the 2nd. Started playing pool to fill downtime. He has been able to avoid buying more cigarettes until completely out of current pack  It has been a busy week, and he hasn't been able to focus on quitting as much as he would like. He plans to go to [place] for [employer]. 12/31/19 update:  Smoking \"a little less,\" but unable to give exact amount. Started Chantix yesterday. Has tolerated 2 doses so far. No side effects. Has been able to delay the 2nd cigarette more by switching AM routine (drink coffe before breakfast)  Sucking on mints  Used recumbent bike a couple times, but no routine yet. Moved cigarettes to basement (out of pocket) to make it more inconvenient. Has kept a list of things to do on his breaks. 1/14/20 update:  Continues Chantix 1 mg BID. Takes with food, but has been difficult because he does not eat at regular times during the day. Experiencing some nausea/ \"queesy\" stomach. Also having occasional slight anxiety, but nothing major and is able to tolerate. Denies depression/thoughts of suicide or harming himself. Smoking less, but is not counting exact number because he thinks it will make him think about smoking more. He knows he is not buying cigarettes as often.  He does not buy cigarettes until he is completely out of them. He needs a new RX for chantix. He is trying to stay busy and avoid long breaks which triggers him to smoke. He has been able to postpone the cigarette many times. 1/30/20 update:   Continues Chantix 1 mg BID. Takes with food. Experiencing some nausea/ \"queesy\" stomach, but tolerable. Also having occasional slight anxiety, but nothing major and is able to tolerate. Denies depression/thoughts of suicide or harming himself. Rides his recumbent bike 2x per week for 15 minutes. Smoking less, but is still not counting the exact number because he thinks it will make him think about smoking more. He is buying cigarettes less often and has placed them in his basement so has to go downstairs to get them when he wants to smoke. He does not like going down stairs. He has been able to walk back upstairs from the basement without bringing a cigarette with him. He made a list of reasons for quitting and placed it by his coat so he sees it when he is going outside to smoke. He does not smoke inside house or in car. He started using hard candies in place of cigarettes while he is preparing his meals. He often smokes right before and right after meals. He always brushes his teeth after meals. Patient feels that drinking coffee throughout the day would help keep him busy during breaks, and coffee has not been a trigger for him lately. 2/27/20  Has been able to reduce to 2 packs per week, but it was really hard. He surprised himself. Pt started drinking tea instead of coffee. Has been able to postpoine 1st cig at least a couple hours. Plans to avoid [facility], where he buys his cigs. Feels \"accomplished\" after exercising, but has only been able to exercise 2x per week. 3/12/20  Pt does not feel he did well with the goals we set at last visit 2/2 less focus and recent stressors (DDS, shingles vax, coronavirus). He didn't exercise as much.  He increased to 1 pack in 3 days, then cut back down to 1 pack in 3.5 days. His cravings are less frequent, but not necessarily less strong. The recent [address] outbreak is worrying him because he is high risk. This is more motivation for him to quit. Pt requests a Chantix refill. Tried 1-800-QUIT-NOW- [place] it was not for him. 3/26/20  Pt reports having \"Up and downs. \" Coronavirus has really concerned him. He stopped Chantix for 2 days due to depression surrounding the situation, but realized that not listening to the news helped him much more. Pt feels his mood is stable and would like to resume chantix. Started keeping track of when he smokes (exact #s). He smoked more for a couple days, but he has been able to limit to 4 cig/day the past 2 days! This equates to goal of no more than 1 pack every 5 days. 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 He increased exercise to 20 min 2x per week and started core exercises. He is listening to an old CD while he exercises, which helps. He feel accomplished after exercise. 3/31/20  Pt quit smoking 3 days ago on 3/28! Motivation was fear. Pt started back on 0.5 mg once daily. Will increase to 0.5 mg BID. Things that worked: prayer, staying occupied, stopped watching the news because it was depressing. 4/2/20  Will increase Chantix today to 0.5 mg BID  He did put lighter out of pocket/ out of sight  Doesn't have an jacquelyn tray, he put the cigarette butts in a planter pot. Suggested he move this to a different location and plant a new plant or flowers in it. His urges continue to be able piano and after programming. His biggest urge to overcome was when he had been working on programming or several months and finally had to test if it worked, and it did not work. He had a very strong urge to smoke but paced and prayed, then occupied himself with something different. He is not ready to throw away the cigarettes, but he did put them out of site and doesn't remember where they are.    Withdrawal sxs include: nasal drip, insomnia, irritability    4/7/20  11th day of ebing tobacco free. Scared that he might relapse. Reports mood is stable-- slightly anxious/worried about COVID19, but better than before. Withdrawal sxs include: nasal drip better, irritability, insomnia but not sure if 2/2 withdrawal or worry about COVID19. Increased water intake from 3 cups to 6 cups per day. 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 \" Urge lasted ~10 min. Craving rated 7/10.    2) after a really long phone call, paced, ate a piece of candy (max 1-2 pieces candy per day)  3) found himself at the door that he goes out to smoke; he is not sure what led to that moment; asked himself what am I doing? Was able to leave the situation    4/13/20  Pt on way to dentist for toothache. He watched star trek this weekend for 2 week reward. For next reward, he wants to buy new sheet music for piano since he cannot go to lessons. He would like to learn \"I can only imagine. \" Took 1 walk, bike x 2, but no core exercises. Will pick back up this week. 4/15/20  Root canal on 4/13. Started amoxicillin 500 mg TID. Exercise going well, except for past couple days. Unable to figure out what foods relieve nausea from Chantix. Urge 4/13: after leaving [facility], had 2 hours to kill before root canal, sat in car with nothing to do, feeling anxious, read his book and listened to music; always used to smoke when he needs to kills time. Missed Chantix on 4/13, took 1 dose on 4/13.     4/22/20  Still needs dental work done, fillings next week.  Core exercise 2x per week, bike 2-3x per week-- feeling stronger. Tobacco cravings \"up and down. \" Most days they are less frequent, intensity varies. Is able to overcome. Watched star trek this weekend as reward. 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 Sunday night craving: made food for the whole week and was on feet all day; had to clean up after, strong craving after he took a break, created a list of distractions when he has cravings: jigsaw puzzle, read book. Still having trouble sleeping: plans to read old spiritual book before bed. Drainage much better. Slightly more depressed due to [address]. Does not feel this is due to Chantix, just situational. 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 Pt denies any thought of harming himself or others. 4/29/20  Pt smoke free x 1 month! Watched an old [employer] game as reward. Sleeping improved now that he is reading 30 min before bed. Piano 1 hour 4x/per week--found a tutorial on how to play \"I can only imagine\" and he is trying to write [place] own sheet music for it. Tried working on software again. He is trying to make a routine. Lifting COVID restrictions is worrying him. He has been busy looking for new insurance as his [employer] expires this month and he is inelligible for [employer] until oct. It has been time consuming. He has a pcp visit next week. Needs to make sure all his RXs are covered under new plan. 5/6/20  Mood is \"up and down\"- manly due to coronavirus pandemic, but feels it is up more often than down. Pt new [employer] insurance starts today. Cravings are rare with short duration, but still strong (7 out of 10). Saw PCP today. Walks outside. Has been on Chantix x 4 months. Is not sure if it's covered by new insurance. 5/20/20  No lapses. Still some strong urges avg once per day. Bike 20 minutes 3x per week, increasing intensity \"from 1 to 2\". Continues core exercises.  Working on writing sheet music for \"I can only imagine\". Increased amount of time spending on software, gradually. Increased nausea with Chantix. Mood is good, better than before. 5/27/20  No increase in cravings after cutting back slightly on Chantix. Nausea improved slightly possibly. A couple strong cravings but able to overcome. Increased exercise from 20 to 30 min on bike 3x per week, core exercises 1x per week, would like to increase to 2x per week. Feels good while riding bike, feels tired/accomplished after. Plays upbeat music. Still practicing piano, trying to write sheet music. Spends time on software, but not accomplishing a lot due to some limitations (space on hard drive). Going to [employer] [facility] [employer] today. Plans to cancel insurance at end of month because they do not cover Chantix or other needs (DDS, chiropractor, PCP). Arranged for [employer] to start 6/1/20 and Drs in [employer]. Sleeping better- was waking up at 3-4a and was anxious. Now not waking up anxious. 6/3/20  Decreased Chantix to 0.5 mg BID at end of last week, no increase in strength or frequency of cravings. Only a brief craving this AM while lying in bed, able to overcome easily. Needs refill on Chantix; has ~1 week left. Cut esomeprazole in half- still no GERD sxs. Still clearing throat (has happened since he quit smoking which he attributes to his lungs clearing out). Nausea has improved since reducing Chantix. Mood is good, he is less anxious and not waking in middle of night. He is drinking 6 glasses of water each day. 6/17/20  Continues Chantix 0.5 mg BID, still not smoking, slightly more frequent and stronger urges, but not too bad, in control of cravings. Nausea much better. Working more on programming which is sometimes a trigger during \"breaks. \"  Now sucks hard candy instead. Will start 1k puzzle. Goes for a walk every other day. Increased water to 6-7 glasses per day.    Only had one episode of GERD since cutting back on dose and stopping 40 mg tabs. Agreed to try MWF.  -6 glasses of water each day  -Echo submitted and approved by [employer].  -not checking BP daily    9/16/20  -Still taking Chantix QOD, occasional strong cravings due to triggers  -Triggers: will go back to [employer] this weekend.   -Hasn't told anyone at [employer] that he quit. -slowly increasing to 8 glasses water/day  -reports BP log: SBP range 116-131,  DBP range 66-76  -looking for better sense of smell     10/7/20  -Working to apply for medicare; still trying to figure out which plan is best.   -D/c Chantix 5 days ago, still has on hand to use prn. Has \"Ups and downs. \" Craving frequency and intensity leveled off.   -Quit 6 months ago!   -Told brother in law in [place] that he quit smoking.  -Improved smell    -Saw PCP who found blood in urine, which is worrying patient. Will return in 1 week for BP f/u after med changes made.   -Plans to continue PPI taper and will use Pepcid prn heartburn    11/3/20  -Remains smoke free  -Has not had to use Chantix since last appointment    -Still gets urges when seeing people smoke on TV. Will keep hard candies by the TV to avoid urge to smoke. -Got Nexium OTC instead of Pepcid, but only taking three times a week. Advised to get Pepcid instead as this is better for PRN use. 11/24/20  Cravings reduced significantly. Has not taken any Chantix. Has not switched from nexium QOD to pepcid yet, but bought at store. No GERD sxs. SBP mostly<130. Pt feeling well. Socially distanced. Staying healthy. 1/12/21  Put on an old jacket and had a pack of cigarettes in pocket. He put the pack directly in the trash without looking at it. He wasn't able to take out the trash right away, but didn't dig them out. He is not looking forward to when he is offered a cigarette in the future. Discussed how to prepare for his. Very excited because he got his echo published in [employer]. Stopped Nexium ~2 weeks ago.  Using pepcid prn, but hasn't needed to use this at all. Cut potassium down to once per day. 2/23/21  1 year piter of being tobacco free is coming up - March 28. Emphasized the enormity of this accomplishment. States he is staying busy with his software development, exercise, [employer] activity, and learning the piano. He still has triggers such as seeing someone smoking on tv or being frustrated. He was hoping after a year these triggers would be easier to handle. Encouraged patient to celebrate his success and focus on ways he used to avoid triggers when he was quitting. He states he often will pace, drink water, or just leave or turn away from the trigger. He states he is proud that his health has improved since quitting and he has been able to take less pills. Is also able to smell his coffee now. He is still nervous for when he is offered a cigarette in the future. Discussed how to prepare for this using by using his trigger-avoiding strategies and just leaving the room/situation if needed. 3/23/21  Patient reports he is still doing well and remaining smoke free, however he is \"afraid it wont last\". He has not been around his family members, [employer] members, or friends since [address]  started a year ago and reports he is nervous that when he is around them the temptation will be hard to overcome as many of them are smokers. Discussed methods to abstain from temptations such as staying inside when family members/friends go outside to smoke, making it physically difficult to smoke by having something else in his mouth (gum, mint, hard candies), not buying his own cigarettes, telling family / friends he has quit smoking so they do not unintentionally try to pressure him, and turning away from trigger. He states his family and friends are very supportive of him and will be happy to see how far he has come. Encouraged patient to celebrate his one year anniversary of being smoke free as this is a huge accomplishment.  Patient's family is having a celebration next week that will be virtual. Will call patient again in 1 month to follow-up, but encouraged patient to schedule appointment earlier if he feels he is getting overwhelmed with triggers before then. 4/20/21  Told family zoom celebration for late mom's birthday and told family he quit smoking. Brother in law wishes he could quit but hasn't set a quit date. Has not had much in person contact with family. Still not in social situations either, including [employer] volunteer work. Still virtual. Meets with group of inter-racial [employer] members and plans to meet in person next. One member smokes and he is worried it will be a trigger to smoke. Still doesn't consider himself a non-smoker, thinks he is still \"quitting\". Reminded patient that he did QUIT and has been a NONsmoker for over a year! His reward was getting his computer fixed/updated. He has been walking 2x per week and has a more consistent indoor exercise routine-- recumbent bike, weights, core exercises. Gets a \"reaction\" (not necessarily an urge) when sees people smoking on TV and nervous about going around family and friends. He will avoid triggers as discussed above. 5/18/21  Granddaughter graduated from college in \[facility]". David Dacosta had celebration for her, and he attended. When he stepped outside of [employer], it reminded him of when he used to step out to take smoke breaks. Long drive to get to [employer], and stopped at one of places he normally stops to smoke, but instead he stopped to get gas. He didn't feel urge to smoke, just brought back memories. Pt states he is gaining weight, possibly snacking more and slowing metabolism. Still exercises, but not high intensity. Started [employer] exercises. Pt had a lot of questions about his last PCP visit, when to follow up, how to get labs, where to go, etc    7/13/21  Large family gathering on 4th of July. Smokers went outside on the lawn. Pt avoided them while they smoked.  Stopped at rest stop he normally stops at to smoke on 75 and was able to stop and not smoke. Behind on healthcare paperwork and slowly getting organized. Pt has many questions about how to get his labs and where to go.    8/31/21  Patient having cravings off and on but has not smoked since March 28, 2020. Cravings are usually when he sees someone else smoking, such as on TV. He states he just puts his mind on something else or changes the TV channel. Patient reports doing really well overall. He is very happy with himself for remaining smoke free. Discussed long term benefits of not smoking. Patient still trying to get CT scheduled with [facility] before his appointment with Dr. Crystal Marks in October. 9/28/21  Length of time between cravings is increasing. Seeing people on TV and being in situations where he used to smoke remain triggers. Planning to start walking more now that weather is getting nicer. Reports his brother in law is considering stopping smoking and patient very supportive of him. 10/26/21  Things are going well, remains smoke free. Cravings come and go but thinks this month has been worse than last, likely due to being around more people due to getting out more. Tries to avoid situations where he smoked previously. Recommended having coffee available in these situations as this helped him previously. Has put on weight since stopping smoking but states this has now leveled out. Wanting to increase exercise, been working out at home but knows he needs to be more disciplined. 11/23/21  Few social events, but masking and distancing. Got COVID booster. [employer] service once per month in person. Nicotine cravings still come and go, but a little less often. Carries a big water bottle with him. Drinks coffee in AM and on long drive to curb the craving and drinks more water. Exercises at home 3x per week (30-60 min), tries to increase to 4x. Anticipating a long drive to thanksgiving dinner and knows this will be a trigger.  Plans to bring coffee, water, not having an cig on hand. Working on [employer]--oh holy night. Working on a new shun to keep him busy. 1/18/22  Patient states that he is doing fine. He has been doing well this past month and reports improved nicotine cravings. As he looks back over this month he says it was better than last in terms of his cravings. The las month went very fast. With COVID he as not been around people smoking which has helped. If he sees people smoking  on TV or sees people smoking it reminds him of what it was like; he tries to avoid these situations. He says avoidance has been a good strategy for him. Did not have to drive long distance for Thanksgiving because it was cancelled. Has had other long drives though and used coffee to keep his hands and mouth occupied. Continues to do well and be successful with his smoking cessation. Pharmacy: [place], [place]    Current Medication and Allergy List Reviewed and Updated:  Current Outpatient Medications   Medication Sig Dispense Refill    olmesartan (BENICAR) 40 MG tablet TAKE 1 TABLET DAILY 90 tablet 3    hydroCHLOROthiazide (HYDRODIURIL) 25 MG tablet Take 1 tablet by mouth daily 90 tablet 1    potassium chloride (KLOR-CON M) 10 MEQ extended release tablet TAKE 2 TABLETS DAILY 180 tablet 1    acyclovir (ZOVIRAX) 400 MG tablet TAKE ONE TABLET BY MOUTH THREE TIMES A DAY FOR 10 DAYS FOR HERPES SIMPLEX FLARE-UP 90 tablet 1    loratadine (CLARITIN) 10 MG tablet Take 10 mg by mouth daily      Misc Natural Products (GLUCOSAMINE CHOND DOUBLE STR PO) Take 1 tablet by mouth daily      Multiple Vitamin (MULTIVITAMIN PO) Take 1 capsule by mouth daily. No current facility-administered medications for this visit. Pertinent Labs/Vitals  Scr 0.9 (11/21/19)    ASSESSMENT/PLAN:   Patient has been tobacco free for over one year! Frequency and intensity of cravings has decreased significantly. --Has not used Chantix in >5 months but has if needed. Goals   Try walking outside more and continuing home workouts   Find healthier alternatives to current snacks (carrots, celery, etc)  Reward yourself with smoke free anniversaries (grill, new sheet music, star trek, jigsaw puzzle, book)  Remind yourself that you are a non-smoker and be confident and proud of your accomplishment. Next appt:  4 wk- pt prefers to continue calls to hold him accountable and for the encouragement. Pt verbalized understanding of the above information and denied further questions or concerns. The total time of the visit was 20 min. Christie Whitaker, PharmD  Medication Management Clinic   [facility] Ph: [phone]  [place] Ph: [phone]  1/18/2022 11:16 AM     For Pharmacy Admin Tracking Only     CPA in place:   Yes   Recommendation Provided To: Patient/Caregiver: 1 via Virtual Visit   Intervention Detail: Scheduled Appointment   Gap Closed?: Yes    Intervention Accepted By: Patient/Caregiver: 1   Time Spent (min): 15

## 2022-02-15 ENCOUNTER — VIRTUAL VISIT (OUTPATIENT)
Dept: PHARMACY | Age: 67
End: 2022-02-15
Payer: MEDICARE

## 2022-02-15 DIAGNOSIS — Z87.891 FORMER SMOKER: ICD-10-CM

## 2022-02-15 PROCEDURE — 99211 OFF/OP EST MAY X REQ PHY/QHP: CPT

## 2022-02-15 NOTE — PROGRESS NOTES
coffee, alcohol (already tried to dissociate smoking with certain activities, but picked it up with other activities)  Barriers: not confident (only 5 on a 1-10 scale); He is \"afraid\" to run out of cigarettes. Motivation for quitting: Has wanted to quit for years (in back of mind) because he doesn't like that it's in control of him (addiction), Health, family, money    12/19/19 update:  Did  Chantix from pharmacy ($40), but did not start. Fearful of side effects. Has history of bad dreams several years ago and is afraid it may cause vivid dreams. Has been able to cut back slightly on his own. Was able to eliminate the 1st cigarette of the day, is now working on the 2nd. Started playing pool to fill downtime. He has been able to avoid buying more cigarettes until completely out of current pack  It has been a busy week, and he hasn't been able to focus on quitting as much as he would like. He plans to go to [place] for [place]. 12/31/19 update:  Smoking \"a little less,\" but unable to give exact amount. Started Chantix yesterday. Has tolerated 2 doses so far. No side effects. Has been able to delay the 2nd cigarette more by switching AM routine (drink coffe before breakfast)  Sucking on mints  Used recumbent bike a couple times, but no routine yet. Moved cigarettes to basement (out of pocket) to make it more inconvenient. Has kept a list of things to do on his breaks. 1/14/20 update:  Continues Chantix 1 mg BID. Takes with food, but has been difficult because he does not eat at regular times during the day. Experiencing some nausea/ \"queesy\" stomach. Also having occasional slight anxiety, but nothing major and is able to tolerate. Denies depression/thoughts of suicide or harming himself. Smoking less, but is not counting exact number because he thinks it will make him think about smoking more. He knows he is not buying cigarettes as often.  He does not buy cigarettes until he is completely out of them. He needs a new RX for chantix. He is trying to stay busy and avoid long breaks which triggers him to smoke. He has been able to postpone the cigarette many times. 1/30/20 update:   Continues Chantix 1 mg BID. Takes with food. Experiencing some nausea/ \"queesy\" stomach, but tolerable. Also having occasional slight anxiety, but nothing major and is able to tolerate. Denies depression/thoughts of suicide or harming himself. Rides his recumbent bike 2x per week for 15 minutes. Smoking less, but is still not counting the exact number because he thinks it will make him think about smoking more. He is buying cigarettes less often and has placed them in his basement so has to go downstairs to get them when he wants to smoke. He does not like going down stairs. He has been able to walk back upstairs from the basement without bringing a cigarette with him. He made a list of reasons for quitting and placed it by his coat so he sees it when he is going outside to smoke. He does not smoke inside house or in car. He started using hard candies in place of cigarettes while he is preparing his meals. He often smokes right before and right after meals. He always brushes his teeth after meals. Patient feels that drinking coffee throughout the day would help keep him busy during breaks, and coffee has not been a trigger for him lately. 2/27/20  Has been able to reduce to 2 packs per week, but it was really hard. He surprised himself. Pt started drinking tea instead of coffee. Has been able to postpoine 1st cig at least a couple hours. Plans to avoid [facility], where he buys his cigs. Feels \"accomplished\" after exercising, but has only been able to exercise 2x per week. 3/12/20  Pt does not feel he did well with the goals we set at last visit 2/2 less focus and recent stressors (DDS, shingles vax, coronavirus). He didn't exercise as much.  He increased to 1 pack in 3 days, then cut back down to 1 pack in 3.5 days. His cravings are less frequent, but not necessarily less strong. The recent [address] outbreak is worrying him because he is high risk. This is more motivation for him to quit. Pt requests a Chantix refill. Tried 1-800-QUIT-NOW- [place] it was not for him. 3/26/20  Pt reports having \"Up and downs. \" Coronavirus has really concerned him. He stopped Chantix for 2 days due to depression surrounding the situation, but realized that not listening to the news helped him much more. Pt feels his mood is stable and would like to resume chantix. Started keeping track of when he smokes (exact #s). He smoked more for a couple days, but he has been able to limit to 4 cig/day the past 2 days! This equates to goal of no more than 1 pack every 5 days. 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 He increased exercise to 20 min 2x per week and started core exercises. He is listening to an old CD while he exercises, which helps. He feel accomplished after exercise. 3/31/20  Pt quit smoking 3 days ago on 3/28! Motivation was fear. Pt started back on 0.5 mg once daily. Will increase to 0.5 mg BID. Things that worked: prayer, staying occupied, stopped watching the news because it was depressing. 4/2/20  Will increase Chantix today to 0.5 mg BID  He did put lighter out of pocket/ out of sight  Doesn't have an jacquelyn tray, he put the cigarette butts in a planter pot. Suggested he move this to a different location and plant a new plant or flowers in it. His urges continue to be able piano and after programming. His biggest urge to overcome was when he had been working on programming or several months and finally had to test if it worked, and it did not work. He had a very strong urge to smoke but paced and prayed, then occupied himself with something different. He is not ready to throw away the cigarettes, but he did put them out of site and doesn't remember where they are.    Withdrawal sxs include: nasal drip, insomnia, irritability    4/7/20  11th day of ebing tobacco free. Scared that he might relapse. Reports mood is stable-- slightly anxious/worried about COVID19, but better than before. Withdrawal sxs include: nasal drip better, irritability, insomnia but not sure if 2/2 withdrawal or worry about COVID19. Increased water intake from 3 cups to 6 cups per day. 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 \" Urge lasted ~10 min. Craving rated 7/10.    2) after a really long phone call, paced, ate a piece of candy (max 1-2 pieces candy per day)  3) found himself at the door that he goes out to smoke; he is not sure what led to that moment; asked himself what am I doing? Was able to leave the situation    4/13/20  Pt on way to dentist for toothache. He watched star trek this weekend for 2 week reward. For next reward, he wants to buy new sheet music for piano since he cannot go to lessons. He would like to learn \"I can only imagine. \" Took 1 walk, bike x 2, but no core exercises. Will pick back up this week. 4/15/20  Root canal on 4/13. Started amoxicillin 500 mg TID. Exercise going well, except for past couple days. Unable to figure out what foods relieve nausea from Chantix. Urge 4/13: after leaving [facility], had 2 hours to kill before root canal, sat in car with nothing to do, feeling anxious, read his book and listened to music; always used to smoke when he needs to kills time. Missed Chantix on 4/13, took 1 dose on 4/13.     4/22/20  Still needs dental work done, fillings next week.  Core exercise 2x per week, bike 2-3x per week-- feeling stronger. Tobacco cravings \"up and down. \" Most days they are less frequent, intensity varies. Is able to overcome. Watched star trek this weekend as reward. 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 Sunday night craving: made food for the whole week and was on feet all day; had to clean up after, strong craving after he took a break, created a list of distractions when he has cravings: jigsaw puzzle, read book. Still having trouble sleeping: plans to read old spiritual book before bed. Drainage much better. Slightly more depressed due to [address]. Does not feel this is due to Chantix, just situational. 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 Pt denies any thought of harming himself or others. 4/29/20  Pt smoke free x 1 month! Watched an old [employer] game as reward. Sleeping improved now that he is reading 30 min before bed. Piano 1 hour 4x/per week--found a tutorial on how to play \"I can only imagine\" and he is trying to write [employer] own sheet music for it. Tried working on software again. He is trying to make a routine. Lifting COVID restrictions is worrying him. He has been busy looking for new insurance as his [employer] expires this month and he is inelligible for [employer] until oct. It has been time consuming. He has a pcp visit next week. Needs to make sure all his RXs are covered under new plan. 5/6/20  Mood is \"up and down\"- manly due to coronavirus pandemic, but feels it is up more often than down. Pt new [employer] insurance starts today. Cravings are rare with short duration, but still strong (7 out of 10). Saw PCP today. Walks outside. Has been on Chantix x 4 months. Is not sure if it's covered by new insurance. 5/20/20  No lapses. Still some strong urges avg once per day. Bike 20 minutes 3x per week, increasing intensity \"from 1 to 2\". Continues core exercises.  Working on writing sheet music for \"I can only imagine\". Increased amount of time spending on software, gradually. Increased nausea with Chantix. Mood is good, better than before. 5/27/20  No increase in cravings after cutting back slightly on Chantix. Nausea improved slightly possibly. A couple strong cravings but able to overcome. Increased exercise from 20 to 30 min on bike 3x per week, core exercises 1x per week, would like to increase to 2x per week. Feels good while riding bike, feels tired/accomplished after. Plays upbeat music. Still practicing piano, trying to write sheet music. Spends time on software, but not accomplishing a lot due to some limitations (space on hard drive). Going to [facility] [employer] today. Plans to cancel insurance at end of month because they do not cover Chantix or other needs (DDS, chiropractor, PCP). Arranged for [employer] to start 6/1/20 and Drs in [employer]. Sleeping better- was waking up at 3-4a and was anxious. Now not waking up anxious. 6/3/20  Decreased Chantix to 0.5 mg BID at end of last week, no increase in strength or frequency of cravings. Only a brief craving this AM while lying in bed, able to overcome easily. Needs refill on Chantix; has ~1 week left. Cut esomeprazole in half- still no GERD sxs. Still clearing throat (has happened since he quit smoking which he attributes to his lungs clearing out). Nausea has improved since reducing Chantix. Mood is good, he is less anxious and not waking in middle of night. He is drinking 6 glasses of water each day. 6/17/20  Continues Chantix 0.5 mg BID, still not smoking, slightly more frequent and stronger urges, but not too bad, in control of cravings. Nausea much better. Working more on programming which is sometimes a trigger during \"breaks. \"  Now sucks hard candy instead. Will start 1k puzzle. Goes for a walk every other day. Increased water to 6-7 glasses per day.    Only had one episode of GERD since cutting back on dose and stopping carafate. Pt is unsure why he started the medications. Looking back on med list, he has been on this at least 10 years. Per OV note from Dr Simon Andino 2/28/11, \"Just 2 weeks ago he began to have hiccups and a feeling of acid around his throat. He is on Nexium every day. He had an EGD by Dr. Serina Swift for this in ~ 2008 and had sucralfate prescribed then, which helped. He recently found the old med and  Has taken it for a week now and it has helped. He will take it for a few weeks and then stop and see what happens. \"    7/6/20  -Continues Chantix, remains smoke free >3 months, no changes in cravings, etc. Nauseous once because he forgot to eat. Is nervous to stop medication altogether.   -Pt reduced Nexium 40 mg to QOD ~1.5 wks ago, doing well with no increase in GERD sxs. Feels comfortable gradually tapering off. -/72 one time at home, but does not check daily   -Bike 3x per week, Core exercises 2x per week. 8/5/20  -Getting exercise, but not outside. Cut back on Chantix 0.5 mg once daily on most days. Had some stronger cravings some days, so took it BID. Strong craving on way to [employer]. It was the 1st time back to [employer] since he quit smoking. One of members smoked in front of him, which was difficult. He stopped where he normally would buy cigarettes on way to [employer] and bought candy instead. Checked BP yesterday 133/73. Doing well with PPI 40 mg QOD, but not ready to cut back to 20 mg QOD because he has a large supply of 40 mg tabs. 8/26/20  -Getting core/bike exercise, but not outside. Working toward goals.   -Cut back on Chantix 0.5 mg to QOD. Initially forgot dose, but then skipped doses intentionally.   -Triggers: frustration, on way to [employer], seeing people smoke on TV. -Plans to tell his friends at the [employer].  -Was able to not stop where he buys cigarettes on way to [employer] the last time he went.   -Doing well with PPI 40 mg QOD, but not ready to cut back to 20 mg QOD because he has a large supply of 40 mg tabs. Agreed to try MWF.  -6 glasses of water each day  -Echo submitted and approved by [employer].  -not checking BP daily    9/16/20  -Still taking Chantix QOD, occasional strong cravings due to triggers  -Triggers: will go back to [employer] this weekend.   -Hasn't told anyone at [employer] that he quit. -slowly increasing to 8 glasses water/day  -reports BP log: SBP range 116-131,  DBP range 66-76  -looking for better sense of smell     10/7/20  -Working to apply for medicare; still trying to figure out which plan is best.   -D/c Chantix 5 days ago, still has on hand to use prn. Has \"Ups and downs. \" Craving frequency and intensity leveled off.   -Quit 6 months ago!   -Told brother in law in [place] that he quit smoking.  -Improved smell    -Saw PCP who found blood in urine, which is worrying patient. Will return in 1 week for BP f/u after med changes made.   -Plans to continue PPI taper and will use Pepcid prn heartburn    11/3/20  -Remains smoke free  -Has not had to use Chantix since last appointment    -Still gets urges when seeing people smoke on TV. Will keep hard candies by the TV to avoid urge to smoke. -Got Nexium OTC instead of Pepcid, but only taking three times a week. Advised to get Pepcid instead as this is better for PRN use. 11/24/20  Cravings reduced significantly. Has not taken any Chantix. Has not switched from nexium QOD to pepcid yet, but bought at store. No GERD sxs. SBP mostly<130. Pt feeling well. Socially distanced. Staying healthy. 1/12/21  Put on an old jacket and had a pack of cigarettes in pocket. He put the pack directly in the trash without looking at it. He wasn't able to take out the trash right away, but didn't dig them out. He is not looking forward to when he is offered a cigarette in the future. Discussed how to prepare for his. Very excited because he got his echo published in [employer]. Stopped Nexium ~2 weeks ago.  Using pepcid prn, but hasn't needed to use this at all. Cut potassium down to once per day. 2/23/21  1 year piter of being tobacco free is coming up - March 28. Emphasized the enormity of this accomplishment. 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 He still has triggers such as seeing someone smoking on tv or being frustrated. He was hoping after a year these triggers would be easier to handle. Encouraged patient to celebrate his success and focus on ways he used to avoid triggers when he was quitting. He states he often will pace, drink water, or just leave or turn away from the trigger. He states he is proud that his health has improved since quitting and he has been able to take less pills. Is also able to smell his coffee now. He is still nervous for when he is offered a cigarette in the future. Discussed how to prepare for this using by using his trigger-avoiding strategies and just leaving the room/situation if needed. 3/23/21  Patient reports he is still doing well and remaining smoke free, however he is \"afraid it wont last\". He has not been around his family members, [employer] members, or friends since [address]  started a year ago and reports he is nervous that when he is around them the temptation will be hard to overcome as many of them are smokers. Discussed methods to abstain from temptations such as staying inside when family members/friends go outside to smoke, making it physically difficult to smoke by having something else in his mouth (gum, mint, hard candies), not buying his own cigarettes, telling family / friends he has quit smoking so they do not unintentionally try to pressure him, and turning away from trigger. He states his family and friends are very supportive of him and will be happy to see how far he has come. Encouraged patient to celebrate his one year anniversary of being smoke free as this is a huge accomplishment.  Patient's family is having a celebration next week that will be virtual. Will call patient again in 1 month to follow-up, but encouraged patient to schedule appointment earlier if he feels he is getting overwhelmed with triggers before then. 4/20/21  Told family zoom celebration for late mom's birthday and told family he quit smoking. Brother in law wishes he could quit but hasn't set a quit date. Has not had much in person contact with family. Still not in social situations either, including [employer] volunteer work. Still virtual. Meets with group of inter-racial [employer] members and plans to meet in person next. One member smokes and he is worried it will be a trigger to smoke. Still doesn't consider himself a non-smoker, thinks he is still \"quitting\". Reminded patient that he did QUIT and has been a NONsmoker for over a year! His reward was getting his computer fixed/updated. He has been walking 2x per week and has a more consistent indoor exercise routine-- recumbent bike, weights, core exercises. Gets a \"reaction\" (not necessarily an urge) when sees people smoking on TV and nervous about going around family and friends. He will avoid triggers as discussed above. 5/18/21  Granddaughter graduated from college in hospitals. Venessa Chairez had celebration for her, and he attended. When he stepped outside of [employer], it reminded him of when he used to step out to take smoke breaks. Long drive to get to [employer], and stopped at one of places he normally stops to smoke, but instead he stopped to get gas. He didn't feel urge to smoke, just brought back memories. Pt states he is gaining weight, possibly snacking more and slowing metabolism. Still exercises, but not high intensity. Started [employer]ube exercises. Pt had a lot of questions about his last PCP visit, when to follow up, how to get labs, where to go, etc    7/13/21  Large family gathering on 4th of July. Smokers went outside on the lawn. Pt avoided them while they smoked.  Stopped at rest stop he normally stops at to smoke on 75 and was able to stop and not smoke. Behind on healthcare paperwork and slowly getting organized. Pt has many questions about how to get his labs and where to go.    8/31/21  Patient having cravings off and on but has not smoked since March 28, 2020. Cravings are usually when he sees someone else smoking, such as on TV. He states he just puts his mind on something else or changes the TV channel. Patient reports doing really well overall. He is very happy with himself for remaining smoke free. Discussed long term benefits of not smoking. Patient still trying to get CT scheduled with [facility] before his appointment with Dr. Jadiel Hernandez in October. 9/28/21  Length of time between cravings is increasing. Seeing people on TV and being in situations where he used to smoke remain triggers. Planning to start walking more now that weather is getting nicer. Reports his brother in law is considering stopping smoking and patient very supportive of him. 10/26/21  Things are going well, remains smoke free. Cravings come and go but thinks this month has been worse than last, likely due to being around more people due to getting out more. Tries to avoid situations where he smoked previously. Recommended having coffee available in these situations as this helped him previously. Has put on weight since stopping smoking but states this has now leveled out. Wanting to increase exercise, been working out at home but knows he needs to be more disciplined. 11/23/21  Few social events, but masking and distancing. Got COVID booster. [employer] service once per month in person. Nicotine cravings still come and go, but a little less often. Carries a big water bottle with him. Drinks coffee in AM and on long drive to curb the craving and drinks more water. Exercises at home 3x per week (30-60 min), tries to increase to 4x. Anticipating a long drive to thanksgiving dinner and knows this will be a trigger.  Plans to bring coffee, water, not having an cig on hand. Working on [employer]--oh holy night. Working on a new shun to keep him busy. 1/18/22  Patient states that he is doing fine. He has been doing well this past month and reports improved nicotine cravings. As he looks back over this month he says it was better than last in terms of his cravings. The las month went very fast. With COVID he as not been around people smoking which has helped. If he sees people smoking  on TV or sees people smoking it reminds him of what it was like; he tries to avoid these situations. He says avoidance has been a good strategy for him. Did not have to drive long distance for Thanksgiving because it was cancelled. Has had other long drives though and used coffee to keep his hands and mouth occupied. Continues to do well and be successful with his smoking cessation. 2/15/22  Patient states that he is doing well. He has not smoked since the last visit and states that he does not have increased urgers or cravings. He is fearful of having cravings or urgers as he begins to restart activities he has not done since COVID-19 started. He is particularly worried about the urge to smoke while driving the way up to [place]. He has a hour drive to his [place]. We discussed coping and mitigation strategies for this. He is also worried about urge when he sees people at [employer] smoking. We spoke about this at length. We discussed how he is now a non-smoke and can be proud of that. We discussed the potential of helping those around him quit smoking about opening a conversation about why he no longer smokes. He has bought a new MacBook which he is excited about. Overall he states that he is doing well, but would like to continue to meet monthly to keep up his motivation.       [facility]    Current Medication and Allergy List Reviewed and Updated:  Current Outpatient Medications   Medication Sig Dispense Refill    olmesartan (Ajit Quintana) 40 MG tablet TAKE 1 TABLET DAILY 90 tablet 3    hydroCHLOROthiazide (HYDRODIURIL) 25 MG tablet Take 1 tablet by mouth daily 90 tablet 1    potassium chloride (KLOR-CON M) 10 MEQ extended release tablet TAKE 2 TABLETS DAILY 180 tablet 1    acyclovir (ZOVIRAX) 400 MG tablet TAKE ONE TABLET BY MOUTH THREE TIMES A DAY FOR 10 DAYS FOR HERPES SIMPLEX FLARE-UP 90 tablet 1    loratadine (CLARITIN) 10 MG tablet Take 10 mg by mouth daily      Misc Natural Products (GLUCOSAMINE CHOND DOUBLE STR PO) Take 1 tablet by mouth daily      Multiple Vitamin (MULTIVITAMIN PO) Take 1 capsule by mouth daily. No current facility-administered medications for this visit. Pertinent Labs/Vitals  Scr 0.9 (11/21/19)    ASSESSMENT/PLAN:   Patient has been tobacco free for over one year! Frequency and intensity of cravings has decreased significantly. --Has not used Chantix in >5 months but has if needed. Goals   Try walking outside more and continuing home workouts, doing better when the weather is warmer  Find healthier alternatives to current snacks (carrots, celery, etc), he has started eating more vegetables. Reward yourself with smoke free anniversaries (grill, new sheet music, star trek, jigsaw puzzle, book)- he has a new MacBook  Remind yourself that you are a non-smoker and be confident and proud of your accomplishment. Next appt:  4 wk- pt prefers to continue calls to hold him accountable and for the encouragement. Pt verbalized understanding of the above information and denied further questions or concerns. The total time of the visit was 20 min. [place], LulaD  PGY-1 Pharmacy Resident   Murali Bravo Ph: [phone]  [facility] Ph: [phone]  2/15/2022 11:23 AM     For Pharmacy Admin Tracking Only     CPA in place:   Yes   Recommendation Provided To: Patient/Caregiver: 1 via Virtual Visit   Intervention Detail: Scheduled Appointment   Gap Closed?: Yes    Intervention Accepted By: Patient/Caregiver: 1   Time Spent (min): 15

## 2022-02-28 DIAGNOSIS — I10 ESSENTIAL HYPERTENSION: ICD-10-CM

## 2022-03-01 RX ORDER — POTASSIUM CHLORIDE 750 MG/1
TABLET, EXTENDED RELEASE ORAL
Qty: 180 TABLET | Refills: 1 | OUTPATIENT
Start: 2022-03-01

## 2022-03-09 ENCOUNTER — NURSE TRIAGE (OUTPATIENT)
Dept: OTHER | Facility: CLINIC | Age: 67
End: 2022-03-09

## 2022-03-09 ENCOUNTER — OFFICE VISIT (OUTPATIENT)
Dept: PRIMARY CARE CLINIC | Age: 67
End: 2022-03-09
Payer: MEDICARE

## 2022-03-09 VITALS
OXYGEN SATURATION: 99 % | TEMPERATURE: 97.2 F | SYSTOLIC BLOOD PRESSURE: 114 MMHG | DIASTOLIC BLOOD PRESSURE: 69 MMHG | BODY MASS INDEX: 22.33 KG/M2 | HEART RATE: 65 BPM | WEIGHT: 151.2 LBS

## 2022-03-09 DIAGNOSIS — M25.512 ACUTE PAIN OF LEFT SHOULDER: Primary | ICD-10-CM

## 2022-03-09 DIAGNOSIS — M25.619 LIMITED RANGE OF MOTION (ROM) OF SHOULDER: ICD-10-CM

## 2022-03-09 PROCEDURE — G8427 DOCREV CUR MEDS BY ELIG CLIN: HCPCS | Performed by: NURSE PRACTITIONER

## 2022-03-09 PROCEDURE — G8420 CALC BMI NORM PARAMETERS: HCPCS | Performed by: NURSE PRACTITIONER

## 2022-03-09 PROCEDURE — 3017F COLORECTAL CA SCREEN DOC REV: CPT | Performed by: NURSE PRACTITIONER

## 2022-03-09 PROCEDURE — G8484 FLU IMMUNIZE NO ADMIN: HCPCS | Performed by: NURSE PRACTITIONER

## 2022-03-09 PROCEDURE — 1036F TOBACCO NON-USER: CPT | Performed by: NURSE PRACTITIONER

## 2022-03-09 PROCEDURE — 1123F ACP DISCUSS/DSCN MKR DOCD: CPT | Performed by: NURSE PRACTITIONER

## 2022-03-09 PROCEDURE — 4040F PNEUMOC VAC/ADMIN/RCVD: CPT | Performed by: NURSE PRACTITIONER

## 2022-03-09 PROCEDURE — 99214 OFFICE O/P EST MOD 30 MIN: CPT | Performed by: NURSE PRACTITIONER

## 2022-03-09 SDOH — ECONOMIC STABILITY: FOOD INSECURITY: WITHIN THE PAST 12 MONTHS, THE FOOD YOU BOUGHT JUST DIDN'T LAST AND YOU DIDN'T HAVE MONEY TO GET MORE.: NEVER TRUE

## 2022-03-09 SDOH — ECONOMIC STABILITY: FOOD INSECURITY: WITHIN THE PAST 12 MONTHS, YOU WORRIED THAT YOUR FOOD WOULD RUN OUT BEFORE YOU GOT MONEY TO BUY MORE.: NEVER TRUE

## 2022-03-09 ASSESSMENT — SOCIAL DETERMINANTS OF HEALTH (SDOH): HOW HARD IS IT FOR YOU TO PAY FOR THE VERY BASICS LIKE FOOD, HOUSING, MEDICAL CARE, AND HEATING?: SOMEWHAT HARD

## 2022-03-09 ASSESSMENT — ENCOUNTER SYMPTOMS
COUGH: 0
SORE THROAT: 0
ABDOMINAL PAIN: 0
SHORTNESS OF BREATH: 0

## 2022-03-09 NOTE — PROGRESS NOTES
[address] PRIMARY CARE  [address] [place] [id number]  Dept: [phone]  Dept Fax: [phone]     3/9/2022      Benigno Perez   1955     Chief Complaint   Patient presents with    Arm Pain     LEFT ARM, elbow up to shoulder, x a few weeks       HPI     Patient presents with left arm and shoulder pain. Pain is in the anterior shoulder and down into upper arm at times; at times feels pain in posterior shoulder. He first noticed pain about 1 month ago when he was working out/weight lifting. Does not recall any feeling of tearing, popping. He has stopped working out to see if pain would resolve- and it has not. Pain is present with movement. Worse with lifting arm or crossing arm across body. Improves when not moving arm. This week has also noticed decreased range of motion with turning head to the left. Denies weakness, numbness, tingling. PHQ Scores 10/2/2021 10/5/2020 11/20/2019 11/9/2018 9/14/2017   PHQ2 Score 0 0 0 0 0   PHQ9 Score 0 0 0 0 0     Interpretation of Total Score Depression Severity: 1-4 = Minimal depression, 5-9 = Mild depression, 10-14 = Moderate depression, 15-19 = Moderately severe depression, 20-27 = Severe depression     Prior to Visit Medications    Medication Sig Taking?  Authorizing Provider   olmesartan (BENICAR) 40 MG tablet TAKE 1 TABLET DAILY Yes Evangelina Dance,    hydroCHLOROthiazide (HYDRODIURIL) 25 MG tablet Take 1 tablet by mouth daily Yes Evangelina Dance, DO   potassium chloride (KLOR-CON M) 10 MEQ extended release tablet TAKE 2 TABLETS DAILY Yes Ezequiel Bentley DO   acyclovir (ZOVIRAX) 400 MG tablet TAKE ONE TABLET BY MOUTH THREE TIMES A DAY FOR 10 DAYS FOR HERPES SIMPLEX FLARE-UP Yes Ezequiel Bentley DO   loratadine (CLARITIN) 10 MG tablet Take 10 mg by mouth daily Yes Historical Provider, MD   Misc Natural Products (GLUCOSAMINE CHOND DOUBLE STR PO) Take 1 tablet by mouth daily Yes Historical Provider, MD   Multiple Vitamin (MULTIVITAMIN PO) Take 1 capsule by mouth daily. Yes Historical Provider, MD       Past Medical History:   Diagnosis Date    Allergic rhinitis     GERD (gastroesophageal reflux disease)     Herpes simplex 2012    History of colon polyps 10/5/2020    Hypertension     Osteoarthritis     Bilateral knees        Social History     Tobacco Use    Smoking status: Former Smoker     Packs/day: 1.00     Years: 40.00     Pack years: 40.00     Types: Cigarettes     Quit date: 2020     Years since quittin.9    Smokeless tobacco: Never Used   Substance Use Topics    Alcohol use: Yes     Alcohol/week: 0.0 standard drinks     Comment: occassionally    Drug use: No        Past Surgical History:   Procedure Laterality Date    COLONOSCOPY      M. Michael, hemorrhoidal rectal bleeding, repeat 10 years    KNEE ARTHROSCOPY Right 2000    Meniscus repair    THYROID SURGERY      biopsy-benign colloid nodule        No Known Allergies     Family History   Problem Relation Age of Onset    Dementia Mother     Diabetes Mother     High Blood Pressure Mother     Kidney Disease Mother     Cancer Father         Oral    Cancer Sister         Breast    Cancer Sister         Patient's past medical history, surgical history, family history, medications, and allergies  were all reviewed and updated as appropriate today. Review of Systems   Constitutional: Negative for fatigue and fever. HENT: Negative for congestion and sore throat. Respiratory: Negative for cough and shortness of breath. Cardiovascular: Negative for chest pain. Gastrointestinal: Negative for abdominal pain. Genitourinary: Negative for difficulty urinating. Musculoskeletal: Positive for arthralgias. Neurological: Negative for dizziness and weakness. Hematological: Negative for adenopathy. Psychiatric/Behavioral: Negative.         /69 (Cuff Size: Medium Adult)   Pulse 65   Temp 97.2 °F (36.2 °C) (Temporal)   Wt 151 lb 3.2 oz (68.6 kg)   SpO2 99% Comment: room air  BMI 22.33 kg/m²      Physical Exam  Constitutional:       General: He is not in acute distress. Appearance: Normal appearance. HENT:      Head: Normocephalic. Cardiovascular:      Rate and Rhythm: Normal rate and regular rhythm. Heart sounds: Normal heart sounds. Pulmonary:      Effort: Pulmonary effort is normal.      Breath sounds: Normal breath sounds. Musculoskeletal:      Right shoulder: Normal.      Left shoulder: Tenderness present. No swelling. Decreased range of motion. Normal strength. Comments: Left shoulder: +Apley's scratch test; pain with shoulder adduction; limited ROM   Skin:     General: Skin is warm and dry. Neurological:      Mental Status: He is alert and oriented to person, place, and time. Psychiatric:         Mood and Affect: Mood normal.         Behavior: Behavior normal.         Assessment:  Encounter Diagnoses   Name Primary?  Acute pain of left shoulder Yes    Limited range of motion (ROM) of shoulder        Plan:  1. Acute pain of left shoulder  2. Limited range of motion (ROM) of shoulder  -Pain in left shoulder x 1 month; occurred while working out, has not improved. ROM limited by pain. Concern for rotator cuff injury. Refer to ortho for further evaluation and treatment. Instructed patient to rest shoulder/arm; no heavy lifting. May use ice/heat as needed. May use NSAIDs for pain. Precautions given. - [address] [facility]      Return if symptoms worsen or fail to improve.              Luis Fernando Adames, APRN - CNP

## 2022-03-09 NOTE — TELEPHONE ENCOUNTER
Received call from Davis Vera at [facility] with [employer] Complaint. Subjective: Caller states \"Left shoulder pain\"     Current Symptoms: Pain radiates from left AC to shoulder for a few weeks. Pain with movement. Sometimes has neck pain. Noticed the pain during a workout. Onset: 3 weeks ago; intermittent    Associated Symptoms: NA    Pain Severity: 8/10; sharp; intermittent    Temperature: Denies fever    What has been tried: Resting and ice    LMP: NA Pregnant: NA    Recommended disposition: Go to [facility]. UCC as backup plan. Care advice provided, patient verbalizes understanding; denies any other questions or concerns; instructed to call back for any new or worsening symptoms. Patient/Caller agrees with recommended disposition; writer provided warm transfer to [place] at [facility] for appointment scheduling     Attention Provider: Thank you for allowing me to participate in the care of your patient. The patient was connected to triage in response to information provided to the ECC/PSC. Please do not respond through this encounter as the response is not directed to a shared pool.     Reason for Disposition   SEVERE pain (e.g., excruciating, unable to do any normal activities)    Protocols used: ELBOW PAIN-ADULT-[place]

## 2022-03-09 NOTE — PATIENT INSTRUCTIONS
Patient Education        Rotator Cuff Injury: Care Instructions  Overview     The rotator cuff is a group of tendons and muscles around the shoulder that keeps the upper arm bone in place. It keeps the shoulder joint stable and allows you to raise and rotate your arm. Damage to the rotator cuff can be caused by overuse, a fall, or a direct blow to the shoulder area, which can tear the tendons. Over time, everyday wear can damage the tendons and make injury more likely. Treatment can depend on the type and amount of damage to the tendons. Your doctor may have you try physical therapy and exercise first. If physical therapy does not help, your doctor may recommend surgery. Follow-up care is a key part of your treatment and safety. Be sure to make and go to all appointments, and call your doctor if you are having problems. It's also a good idea to know your test results and keep a list of the medicines you take. How can you care for yourself at home? · Rest your shoulder as much as you can. If your doctor put your arm in a sling or shoulder immobilizer, wear it as directed. Do not take it off before your doctor tells you to. If it is too tight, loosen it. · Be safe with medicines. Read and follow all instructions on the label. ? If the doctor gave you a prescription medicine for pain, take it as prescribed. ? If you are not taking a prescription pain medicine, ask your doctor if you can take an over-the-counter medicine. · Put ice or a cold pack on your shoulder for 10 to 20 minutes at a time. Try to do this every 1 to 2 hours for the next 3 days (when you are awake). Put a thin cloth between the ice pack and your skin. · After 2 or 3 days, if you don't have swelling, apply heat. Put a warm water bottle, a heating pad set on low, or a warm cloth on your shoulder. Do not go to sleep with a heating pad on your skin. Put a thin cloth between the heating pad and your skin.   · While holding a warm, wet towel on your shoulder, lean forward so your arm hangs freely, and gently swing your arm back and forth like a pendulum. You also can do this standing under a warm shower. · Do not do anything that makes your pain worse. · Follow your doctor's advice about whether you need physical therapy. When should you call for help? Call your doctor now or seek immediate medical care if:    · You have severe pain.     · You cannot move your shoulder or arm.     · You have tingling or numbness in your arm or hand.     · Your arm or hand is cool or pale. Watch closely for changes in your health, and be sure to contact your doctor if:    · Your pain gets worse.     · You have new or worse swelling in your arm or hand.     · You do not get better as expected. Where can you learn more? Go to https://StyleSharepeelidaeb.Workbooks. org and sign in to your [employer] account. Enter [phone] in the MyPronostic box to learn more about \"Rotator Cuff Injury: Care Instructions. \"     If you do not have an account, please click on the \"Sign Up Now\" link. Current as of: July 1, 2021               Content Version: 13.1  © 4007-8983 Healthwise, [employer]. Care instructions adapted under license by [address] If you have questions about a medical condition or this instruction, always ask your healthcare professional. [address] any warranty or liability for your use of this information.

## 2022-03-10 ENCOUNTER — OFFICE VISIT (OUTPATIENT)
Dept: ORTHOPEDIC SURGERY | Age: 67
End: 2022-03-10
Payer: MEDICARE

## 2022-03-10 VITALS — HEIGHT: 69 IN | BODY MASS INDEX: 22.36 KG/M2 | WEIGHT: 151 LBS

## 2022-03-10 DIAGNOSIS — M25.512 LEFT SHOULDER PAIN, UNSPECIFIED CHRONICITY: ICD-10-CM

## 2022-03-10 DIAGNOSIS — M75.02 ADHESIVE CAPSULITIS OF LEFT SHOULDER: Primary | ICD-10-CM

## 2022-03-10 PROCEDURE — 20610 DRAIN/INJ JOINT/BURSA W/O US: CPT | Performed by: ORTHOPAEDIC SURGERY

## 2022-03-10 PROCEDURE — G8484 FLU IMMUNIZE NO ADMIN: HCPCS | Performed by: ORTHOPAEDIC SURGERY

## 2022-03-10 PROCEDURE — 99203 OFFICE O/P NEW LOW 30 MIN: CPT | Performed by: ORTHOPAEDIC SURGERY

## 2022-03-10 PROCEDURE — 3017F COLORECTAL CA SCREEN DOC REV: CPT | Performed by: ORTHOPAEDIC SURGERY

## 2022-03-10 PROCEDURE — 4040F PNEUMOC VAC/ADMIN/RCVD: CPT | Performed by: ORTHOPAEDIC SURGERY

## 2022-03-10 PROCEDURE — 1036F TOBACCO NON-USER: CPT | Performed by: ORTHOPAEDIC SURGERY

## 2022-03-10 PROCEDURE — G8420 CALC BMI NORM PARAMETERS: HCPCS | Performed by: ORTHOPAEDIC SURGERY

## 2022-03-10 PROCEDURE — 1123F ACP DISCUSS/DSCN MKR DOCD: CPT | Performed by: ORTHOPAEDIC SURGERY

## 2022-03-10 PROCEDURE — G8427 DOCREV CUR MEDS BY ELIG CLIN: HCPCS | Performed by: ORTHOPAEDIC SURGERY

## 2022-03-10 NOTE — PROGRESS NOTES
Chief Complaint    Shoulder Pain (NP LEFT SHOULDER)      History of Present Illness:  Erika Sanchez is a pleasant,  77 y.o. male here today for evaluation of left shoulder pain. He reports left shoulder pain since 1 month ago with no history of injury or trauma. He describes his pain as dull aching in quality, about 4/10 mostly with reaching above his head or behind his back. He states that his pain wake him up from sleep at night. He denies any pain at rest.  He also reports left shoulder stiffness. Otherwise he denies shoulder clicking, popping, or any other mechanical symptoms. Medical History:    No notes on file    Patient's medications, allergies, past medical, surgical, social and family histories were reviewed and updated as appropriate. Past Medical History:   Diagnosis Date    Allergic rhinitis     GERD (gastroesophageal reflux disease)     Herpes simplex 2012    History of colon polyps 10/5/2020    Hypertension     Osteoarthritis     Bilateral knees      Social History     Socioeconomic History    Marital status:      Spouse name: Not on file    Number of children: 2    Years of education: Not on file    Highest education level: Not on file   Occupational History    Not on file   Tobacco Use    Smoking status: Former Smoker     Packs/day: 1.00     Years: 40.00     Pack years: 40.00     Types: Cigarettes     Quit date: 2020     Years since quittin.9    Smokeless tobacco: Never Used   Substance and Sexual Activity    Alcohol use:  Yes     Alcohol/week: 0.0 standard drinks     Comment: occassionally    Drug use: No    Sexual activity: Not Currently   Other Topics Concern    Not on file   Social History Narrative    Not on file     Social Determinants of Health     Financial Resource Strain: Medium Risk    Difficulty of Paying Living Expenses: Somewhat hard   Food Insecurity: No Food Insecurity    Worried About Running Out of Food in the Last Year: Never true  Ran Out of Food in the Last Year: Never true   Transportation Needs:     Lack of Transportation (Medical): Not on file    Lack of Transportation (Non-Medical): Not on file   Physical Activity:     Days of Exercise per Week: Not on file    Minutes of Exercise per Session: Not on file   Stress:     Feeling of Stress : Not on file   Social Connections:     Frequency of Communication with Friends and Family: Not on file    Frequency of Social Gatherings with Friends and Family: Not on file    Attends Taoist Services: Not on file    Active Member of [address] [place] [address] or Organizations: Not on file    Attends Club or Organization Meetings: Not on file    Marital Status: Not on file   Intimate Partner Violence:     Fear of Current or Ex-Partner: Not on file    Emotionally Abused: Not on file    Physically Abused: Not on file    Sexually Abused: Not on file   Housing Stability:     Unable to Pay for Housing in the Last Year: Not on file    Number of [place] in the Last Year: Not on file    Unstable Housing in the Last Year: Not on file       No Known Allergies  Current Outpatient Medications on File Prior to Visit   Medication Sig Dispense Refill    olmesartan (BENICAR) 40 MG tablet TAKE 1 TABLET DAILY 90 tablet 3    hydroCHLOROthiazide (HYDRODIURIL) 25 MG tablet Take 1 tablet by mouth daily 90 tablet 1    potassium chloride (KLOR-CON M) 10 MEQ extended release tablet TAKE 2 TABLETS DAILY 180 tablet 1    acyclovir (ZOVIRAX) 400 MG tablet TAKE ONE TABLET BY MOUTH THREE TIMES A DAY FOR 10 DAYS FOR HERPES SIMPLEX FLARE-UP 90 tablet 1    loratadine (CLARITIN) 10 MG tablet Take 10 mg by mouth daily      Misc Natural Products (GLUCOSAMINE CHOND DOUBLE STR PO) Take 1 tablet by mouth daily      Multiple Vitamin (MULTIVITAMIN PO) Take 1 capsule by mouth daily. No current facility-administered medications on file prior to visit.        Review of Systems  A 14 point review of systems was completed by the patient on 3/10/2022 and is available in the media section of the scanned medical record and was reviewed on 3/10/2022. The review is negative with the exception of those things mentioned in the HPI and Past Medical History    Vital Signs:  Vitals:       General Exam:   Constitutional: Patient is adequately groomed with no evidence of malnutrition      Left shoulder Examination:    Inspection:  No skin lesions, no deformity, no swelling. Palpation: There is tenderness [facility] joint. There is tenderness over the rotator cuff footprint. There is no tenderness at the bicipital groove. Active Range of Motion: Forward Elevation 110, Abduction 105, External Rotation 20, Internal Rotation 25    Passive Range of Motion: Same as active    Strength:  External Rotation 4+/5, Internal Rotation 4+ 4+/5, Champagne Toast 4+/5, Supraspinatus 4+/5    Special Tests: Negative Izzy's, negative Hawkin's, No Hernando deformity. Cross body adduction is 30 cm versus 5 cm on the other side  Neurovascular: Palpable radial pulse, normal sensation in the median, ulnar, radial nerve distributions        Comparison right shoulder Examination:    Inspection:  No skin lesions, no deformity, no swelling. Palpation: No tenderness    Active Range of Motion: Full active range of motion    Passive Range of Motion: Full passive range of motion  Strength: Good strength with 5/5    Special Tests:  No Hernando Deformity    Neurovascular:  Palpable radial pulse, normal sensation in the median, ulnar, radial nerve distributions      Self assessment questionnaires were completed today. Radiology:     Plain radiographs of the left shoulder, comprising 3 views: AP, Scapular Y and Axillary lateral were  obtained and reviewed in the office:    Impression: No evidence of fracture or dislocation. Very mild glenohumeral osteoarthritis. Type II acromion.          Assessment :  Araceli Hitchcock is a pleasant 77 y.o. male with pain related to left shoulder adhesive capsulitis based on his clinical examination and symptoms. Impression:  Encounter Diagnoses   Name Primary?  Left shoulder pain, unspecified chronicity     Adhesive capsulitis of left shoulder Yes       Office Procedures:  Orders Placed This Encounter   Procedures    XR SHOULDER LEFT (MIN 2 VIEWS)     Standing Status:   Future     Number of Occurrences:   1     Standing Expiration Date:   3/10/2023    Amb External Referral To Physical Therapy     Referral Priority:   Routine     Referral Type:   Consult for Advice and Opinion     Referral Reason:   Patient Preference     Requested Specialty:   Physical Therapy     Number of Visits Requested:   1    HI ARTHROCENTESIS ASPIR&/INJ MAJOR JT/BURSA W/O US       Treatment Plan:  Paz Maier has had left shoulder pain beginning about a month ago. She denies any obvious traumatic events. His clinical examination shows decreased passive and active range of motion especially with cross body adduction and internal rotation. Otherwise he has very good rotator cuff muscle strength with negative impingement signs. We think at this time he has a diagnosis of left frozen shoulder based on his clinical presentation and examination. He has not tried any treatment for his shoulder. Plan:  1. He has received a steroid injection in his left shoulder. 2.  We will send him for physiotherapy to start on stretching exercise  3. Overall meloxicam    Paz Maier will follow up in 4 weeks and/or as needed. They were in agreement with this plan and all questions were answered to the patient's satisfaction. They were encouraged to call with any questions. Procedure Note  After discussing the risks and benefits of a corticosteroid injection, Michael Cruz did state an understanding and gave verbal consent to proceed.   After cleansing the injection site with Chlora-prep and using aseptic techniques,  2 CC of Depo Medrol 40mg/ml and 8 CC of 1% lidocaine were injected in the left glenohumeral joint. He  tolerated the procedure well with no immediate adverse sequelae after the injection. A bandage was placed over the injection site. Appropriate post injections instructions were given to the patient. 12:29 PM  3/10/2022      Miguel Arias MD  Clinical Fellow at [address] [place] [address]    During this examination, Adonis Wells MD functioned as a scribe for Dr. Carmelita Donnelly. This dictation was performed with a verbal recognition program (DRAGON) and it was checked for errors. It is possible that there are still dictated errors within this office note. If so, please bring any errors to my attention for an addendum. All efforts were made to ensure that this office note is accurate.  ______________  I was physically present and personally supervised the Orthopaedic Sports Medicine Fellow in the evaluation and development of a treatment plan for this patient. I personally interviewed the patient and performed a physical examination. In addition, I discussed the patient's condition and treatment options with them. I have also reviewed and agree with the past medical, family and social history unless otherwise noted. All of the patient's questions were answered. Betty Hough MD, PhD  3/10/2022

## 2022-03-10 NOTE — LETTER
Physical Therapy Rehabilitation Referral    Patient Name:  Maria C Younger      YOB: 1955    Diagnosis:    1. Left shoulder pain, unspecified chronicity        Precautions:     [x] Evaluate and Treat    Post Op Instructions:  [] Continuous passive motion (CPM) [] Elbow ROM  [x] Exercise in plane of scapula  []  Strengthening     [] Pulley and instruction   [x] Home exercise program (copy to patient)   [] Sling when arm at risk  [] Sling or brace at all times   [] AAROM: Forward elevation to  140            [] AAROM: External rotation  To  40    [] Isometric external rotator strengthening [] AAROM: internal rotation: up the back  [x] Isometric abductor strengthening  [] AAROM: Internal abduction   [] Isometric internal rotator strengthening [] AAROM: cross-body adduction             Stretching:     Strengthening:  [x] Four quadrant (FE, ER, IR, CBA)  [x] Rotator cuff (ER, IR, Abd)  [] Forward Elevation    [] External Rotators     [] External Rotation    [] Internal Rotators  [] Internal Rotation: up/back   [] Abductors     [] Internal Rotation: supine in abduction  [x] Sleeper Stretch    [] Flexors  [x] Cross-body abduction    [] Extensors  [] Pendulum (FE, Abd/Add, cw/ccw)  [x] Scapular Stabilizers   [] Wall-walking (FE, Abd)        [x] Shoulder shrugs     [] Table slides (FE)                [x] Rhomboid pinch  [] Elbow (flex, ext, pron, sup)        [] Lat.  Pull downs     [] Medial epicondylitis program       [] Forward punch   [] Lateral epicondylitis program       [] Internal rotators     [] Progressive resistive exercises  [] Bench Press        [] Bench press plus  Activities:     [] Lateral pull-downs  [] Rowing     [] Progressive two-hand supine press  [] Stepper/Exercise bike   [] Biceps: curls/supination  [] Swimming  [] Water exercises    Modalities:     Return to Sport:  [x] Of Choice      [] Plyometrics  [] Ultrasound     [] Rhythmic stabilization  [] Iontophoresis    [] Core strengthening   [] Moist heat     [] Sports specific program:   [] Massage         [x] Cryotherapy      [] Electrical stimulation     [] Paraffin  [] Whirlpool  [] TENS    [x] Home exercise program (copy to patient).         Perform exercises for:   15     minutes    3      times/day  [x] Supervised physical therapy  Frequency: []  1x week  [x] 2x week  [] 3x week  [] Other:   Duration: [] 2 weeks   [] 4 weeks  [x] 6 weeks  [] Other:     Additional Instructions:   Left frozen shoulder for stretching exercises and range of motion  Periscapular and rotator cuff muscle strengthening exercises    Sahara Castillo MD   Clinical Fellow [facility]

## 2022-03-11 ENCOUNTER — TELEPHONE (OUTPATIENT)
Dept: ORTHOPEDIC SURGERY | Age: 67
End: 2022-03-11

## 2022-03-11 DIAGNOSIS — I10 ESSENTIAL HYPERTENSION: ICD-10-CM

## 2022-03-11 RX ORDER — MELOXICAM 15 MG/1
15 TABLET ORAL DAILY
Qty: 30 TABLET | Refills: 1 | Status: SHIPPED | OUTPATIENT
Start: 2022-03-11 | End: 2022-07-12

## 2022-03-11 RX ORDER — HYDROCHLOROTHIAZIDE 25 MG/1
TABLET ORAL
Qty: 90 TABLET | Refills: 1 | Status: SHIPPED | OUTPATIENT
Start: 2022-03-11 | End: 2022-03-29 | Stop reason: SDUPTHER

## 2022-03-11 RX ORDER — POTASSIUM CHLORIDE 750 MG/1
TABLET, EXTENDED RELEASE ORAL
Qty: 180 TABLET | Refills: 1 | Status: CANCELLED | OUTPATIENT
Start: 2022-03-11

## 2022-03-11 RX ORDER — LIDOCAINE HYDROCHLORIDE 10 MG/ML
8 INJECTION, SOLUTION INFILTRATION; PERINEURAL ONCE
Status: COMPLETED | OUTPATIENT
Start: 2022-03-11 | End: 2022-03-11

## 2022-03-11 RX ORDER — POTASSIUM CHLORIDE 750 MG/1
TABLET, EXTENDED RELEASE ORAL
Qty: 180 TABLET | Refills: 1 | Status: SHIPPED | OUTPATIENT
Start: 2022-03-11 | End: 2022-03-14 | Stop reason: SDUPTHER

## 2022-03-11 RX ORDER — METHYLPREDNISOLONE ACETATE 40 MG/ML
80 INJECTION, SUSPENSION INTRA-ARTICULAR; INTRALESIONAL; INTRAMUSCULAR; SOFT TISSUE ONCE
Status: COMPLETED | OUTPATIENT
Start: 2022-03-11 | End: 2022-03-11

## 2022-03-11 RX ADMIN — METHYLPREDNISOLONE ACETATE 80 MG: 40 INJECTION, SUSPENSION INTRA-ARTICULAR; INTRALESIONAL; INTRAMUSCULAR; SOFT TISSUE at 12:29

## 2022-03-11 RX ADMIN — LIDOCAINE HYDROCHLORIDE 8 ML: 10 INJECTION, SOLUTION INFILTRATION; PERINEURAL at 12:29

## 2022-03-11 NOTE — TELEPHONE ENCOUNTER
Once this is sent to [facility] can you send to [facility] a 90 day supply please  LAST SEEN       NEXT APPOINTMENT            3/9/22(era) None

## 2022-03-11 NOTE — TELEPHONE ENCOUNTER
Pt calling about this refill for Potassium.   He says he is now out and can have a 90-day supply called in to [facility] while he waits for the mail order to arrive

## 2022-03-11 NOTE — TELEPHONE ENCOUNTER
Prescription Refill     Medication Name:  REQUESTING [address]: Teodora Cisneros, [place]  Patient Contact Number:  [phone]

## 2022-03-11 NOTE — TELEPHONE ENCOUNTER
Pt calling about his request for Potassium earlier today.     He says what [facility]ell has waiting for him is HCTZ    He is requesting for this to be corrected since he has no Potassium

## 2022-03-14 RX ORDER — POTASSIUM CHLORIDE 750 MG/1
TABLET, EXTENDED RELEASE ORAL
Qty: 30 TABLET | Refills: 0 | Status: SHIPPED | OUTPATIENT
Start: 2022-03-14 | End: 2022-10-24

## 2022-03-14 NOTE — TELEPHONE ENCOUNTER
Pt calling again about his Potassium going to [facility]. He says as of today, [facility] has nothing for him.   He can see it was refilled at the mail order on Friday    He thinks only 14 days need to be called in to [facility]

## 2022-03-15 ENCOUNTER — TELEMEDICINE (OUTPATIENT)
Dept: PHARMACY | Age: 67
End: 2022-03-15
Payer: MEDICARE

## 2022-03-15 ENCOUNTER — TELEPHONE (OUTPATIENT)
Dept: PRIMARY CARE CLINIC | Age: 67
End: 2022-03-15

## 2022-03-15 DIAGNOSIS — I10 ESSENTIAL HYPERTENSION: ICD-10-CM

## 2022-03-15 DIAGNOSIS — Z87.891 FORMER SMOKER: Primary | ICD-10-CM

## 2022-03-15 PROCEDURE — 99212 OFFICE O/P EST SF 10 MIN: CPT

## 2022-03-15 NOTE — PROGRESS NOTES
Disclaimer for Virtual Visits: We want to confirm that, for purposes of billing, this is a virtual visit with your provider for which we will submit a claim for reimbursement with your insurance company. You may be responsible for any copays, coinsurance amounts or other amounts not covered by your insurance company. If you do not accept this, unfortunately we will not be able to schedule a virtual visit with the provider. Do you accept? Yes. CLINICAL PHARMACY NOTE--Smoking Cessation    SUBJECTIVE/OBJECTIVE:   Lawson Borges is a 77 y.o. male with PMHx significant for GERD, HTN, pulmonary emphesema referred by Dr Zoran Boothe for smoking cessation counseling and medication management. Spoke with patient over the phone on 03/15/22. 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 30-60 min (previously immediately after waking)  Do you smoke 20 or more cigarettes each day? No  At times when you can't smoke or haven't got any cigarettes, do you feel a craving for one? Yes, but not if not keeping busy  Is it tough for you to keep from smoking for more than a few hours?  No, not if in a place where he can't smoke  When you are sick enough to stay in bed, to you still smoke? no  If yes to two or more questions, consider using NRT    Reasons for addiction: Touch and Handle, Stress-relief, Habit, Addiction, \"something to do\"  Triggers: meals, stress, smoke breaks from his work; stopped smoking while driving, with coffee, alcohol (already tried to dissociate smoking with certain activities, but picked it up with other activities)  Barriers: not confident (only 5 on a 1-10 scale); He is \"afraid\" to run out of cigarettes. Motivation for quitting: Has wanted to quit for years (in back of mind) because he doesn't like that it's in control of him (addiction), Health, family, money    12/19/19 update:  Did  Chantix from pharmacy ($40), but did not start. Fearful of side effects. Has history of bad dreams several years ago and is afraid it may cause vivid dreams. Has been able to cut back slightly on his own. Was able to eliminate the 1st cigarette of the day, is now working on the 2nd. Started playing pool to fill downtime. He has been able to avoid buying more cigarettes until completely out of current pack  It has been a busy week, and he hasn't been able to focus on quitting as much as he would like. He plans to go to [place] for [place]. 12/31/19 update:  Smoking \"a little less,\" but unable to give exact amount. Started Chantix yesterday. Has tolerated 2 doses so far. No side effects. Has been able to delay the 2nd cigarette more by switching AM routine (drink coffe before breakfast)  Sucking on mints  Used recumbent bike a couple times, but no routine yet. Moved cigarettes to basement (out of pocket) to make it more inconvenient. Has kept a list of things to do on his breaks. 1/14/20 update:  Continues Chantix 1 mg BID. Takes with food, but has been difficult because he does not eat at regular times during the day. Experiencing some nausea/ \"queesy\" stomach. Also having occasional slight anxiety, but nothing major and is able to tolerate. Denies depression/thoughts of suicide or harming himself. Smoking less, but is not counting exact number because he thinks it will make him think about smoking more. He knows he is not buying cigarettes as often.  He does not buy cigarettes until he is completely out of them. He needs a new RX for chantix. He is trying to stay busy and avoid long breaks which triggers him to smoke. He has been able to postpone the cigarette many times. 1/30/20 update:   Continues Chantix 1 mg BID. Takes with food. Experiencing some nausea/ \"queesy\" stomach, but tolerable. Also having occasional slight anxiety, but nothing major and is able to tolerate. Denies depression/thoughts of suicide or harming himself. Rides his recumbent bike 2x per week for 15 minutes. Smoking less, but is still not counting the exact number because he thinks it will make him think about smoking more. He is buying cigarettes less often and has placed them in his basement so has to go downstairs to get them when he wants to smoke. He does not like going down stairs. He has been able to walk back upstairs from the basement without bringing a cigarette with him. He made a list of reasons for quitting and placed it by his coat so he sees it when he is going outside to smoke. He does not smoke inside house or in car. He started using hard candies in place of cigarettes while he is preparing his meals. He often smokes right before and right after meals. He always brushes his teeth after meals. Patient feels that drinking coffee throughout the day would help keep him busy during breaks, and coffee has not been a trigger for him lately. 2/27/20  Has been able to reduce to 2 packs per week, but it was really hard. He surprised himself. Pt started drinking tea instead of coffee. Has been able to postpoine 1st cig at least a couple hours. Plans to avoid [facility], where he buys his cigs. Feels \"accomplished\" after exercising, but has only been able to exercise 2x per week. 3/12/20  Pt does not feel he did well with the goals we set at last visit 2/2 less focus and recent stressors (DDS, shingles vax, coronavirus). He didn't exercise as much.  He increased to 1 pack in 3 days, then cut back down to 1 pack in 3.5 days. His cravings are less frequent, but not necessarily less strong. The recent [address] outbreak is worrying him because he is high risk. This is more motivation for him to quit. Pt requests a Chantix refill. Tried 1-800-QUIT-NOW- [place] it was not for him. 3/26/20  Pt reports having \"Up and downs. \" Coronavirus has really concerned him. He stopped Chantix for 2 days due to depression surrounding the situation, but realized that not listening to the news helped him much more. Pt feels his mood is stable and would like to resume chantix. Started keeping track of when he smokes (exact #s). He smoked more for a couple days, but he has been able to limit to 4 cig/day the past 2 days! This equates to goal of no more than 1 pack every 5 days. 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 He increased exercise to 20 min 2x per week and started core exercises. He is listening to an old CD while he exercises, which helps. He feel accomplished after exercise. 3/31/20  Pt quit smoking 3 days ago on 3/28! Motivation was fear. Pt started back on 0.5 mg once daily. Will increase to 0.5 mg BID. Things that worked: prayer, staying occupied, stopped watching the news because it was depressing. 4/2/20  Will increase Chantix today to 0.5 mg BID  He did put lighter out of pocket/ out of sight  Doesn't have an jacquelyn tray, he put the cigarette butts in a planter pot. Suggested he move this to a different location and plant a new plant or flowers in it. His urges continue to be able piano and after programming. His biggest urge to overcome was when he had been working on programming or several months and finally had to test if it worked, and it did not work. He had a very strong urge to smoke but paced and prayed, then occupied himself with something different. He is not ready to throw away the cigarettes, but he did put them out of site and doesn't remember where they are.    Withdrawal sxs include: nasal drip, insomnia, irritability    4/7/20  11th day of ebing tobacco free. Scared that he might relapse. Reports mood is stable-- slightly anxious/worried about COVID19, but better than before. Withdrawal sxs include: nasal drip better, irritability, insomnia but not sure if 2/2 withdrawal or worry about COVID19. Increased water intake from 3 cups to 6 cups per day. 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 \" Urge lasted ~10 min. Craving rated 7/10.    2) after a really long phone call, paced, ate a piece of candy (max 1-2 pieces candy per day)  3) found himself at the door that he goes out to smoke; he is not sure what led to that moment; asked himself what am I doing? Was able to leave the situation    4/13/20  Pt on way to dentist for toothache. He watched star trek this weekend for 2 week reward. For next reward, he wants to buy new sheet music for piano since he cannot go to lessons. He would like to learn \"I can only imagine. \" Took 1 walk, bike x 2, but no core exercises. Will pick back up this week. 4/15/20  Root canal on 4/13. Started amoxicillin 500 mg TID. Exercise going well, except for past couple days. Unable to figure out what foods relieve nausea from Chantix. Urge 4/13: after leaving [facility], had 2 hours to kill before root canal, sat in car with nothing to do, feeling anxious, read his book and listened to music; always used to smoke when he needs to kills time. Missed Chantix on 4/13, took 1 dose on 4/13.     4/22/20  Still needs dental work done, fillings next week.  Core exercise 2x per week, bike 2-3x per week-- feeling stronger. Tobacco cravings \"up and down. \" Most days they are less frequent, intensity varies. Is able to overcome. Watched star trek this weekend as reward. 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 Sunday night craving: made food for the whole week and was on feet all day; had to clean up after, strong craving after he took a break, created a list of distractions when he has cravings: jigsaw puzzle, read book. Still having trouble sleeping: plans to read old spiritual book before bed. Drainage much better. Slightly more depressed due to [address]. Does not feel this is due to Chantix, just situational. 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 Pt denies any thought of harming himself or others. 4/29/20  Pt smoke free x 1 month! Watched an old [email or web address] game as reward. Sleeping improved now that he is reading 30 min before bed. Piano 1 hour 4x/per week--found a tutorial on how to play \"I can only imagine\" and he is trying to write [employer] own sheet music for it. Tried working on software again. He is trying to make a routine. Lifting COVID restrictions is worrying him. He has been busy looking for new insurance as his [employer] expires this month and he is inelligible for [employer] until oct. It has been time consuming. He has a pcp visit next week. Needs to make sure all his RXs are covered under new plan. 5/6/20  Mood is \"up and down\"- manly due to coronavirus pandemic, but feels it is up more often than down. Pt new [employer] insurance starts today. Cravings are rare with short duration, but still strong (7 out of 10). Saw PCP today. Walks outside. Has been on Chantix x 4 months. Is not sure if it's covered by new insurance. 5/20/20  No lapses. Still some strong urges avg once per day. Bike 20 minutes 3x per week, increasing intensity \"from 1 to 2\". Continues core exercises.  Working on writing sheet music for \"I can only imagine\". Increased amount of time spending on software, gradually. Increased nausea with Chantix. Mood is good, better than before. 5/27/20  No increase in cravings after cutting back slightly on Chantix. Nausea improved slightly possibly. A couple strong cravings but able to overcome. Increased exercise from 20 to 30 min on bike 3x per week, core exercises 1x per week, would like to increase to 2x per week. Feels good while riding bike, feels tired/accomplished after. Plays upbeat music. Still practicing piano, trying to write sheet music. Spends time on software, but not accomplishing a lot due to some limitations (space on hard drive). Going to [facility] [employer] today. Plans to cancel insurance at end of month because they do not cover Chantix or other needs (DDS, chiropractor, PCP). Arranged for [employer] to start 6/1/20 and Drs in [employer]. Sleeping better- was waking up at 3-4a and was anxious. Now not waking up anxious. 6/3/20  Decreased Chantix to 0.5 mg BID at end of last week, no increase in strength or frequency of cravings. Only a brief craving this AM while lying in bed, able to overcome easily. Needs refill on Chantix; has ~1 week left. Cut esomeprazole in half- still no GERD sxs. Still clearing throat (has happened since he quit smoking which he attributes to his lungs clearing out). Nausea has improved since reducing Chantix. Mood is good, he is less anxious and not waking in middle of night. He is drinking 6 glasses of water each day. 6/17/20  Continues Chantix 0.5 mg BID, still not smoking, slightly more frequent and stronger urges, but not too bad, in control of cravings. Nausea much better. Working more on programming which is sometimes a trigger during \"breaks. \"  Now sucks hard candy instead. Will start 1k puzzle. Goes for a walk every other day. Increased water to 6-7 glasses per day.    Only had one episode of GERD since cutting back on dose and stopping carafate. Pt is unsure why he started the medications. Looking back on med list, he has been on this at least 10 years. Per OV note from Dr Oriana Shook 2/28/11, \"Just 2 weeks ago he began to have hiccups and a feeling of acid around his throat. He is on Nexium every day. He had an EGD by Dr. Ethan Plata for this in ~ 2008 and had sucralfate prescribed then, which helped. He recently found the old med and  Has taken it for a week now and it has helped. He will take it for a few weeks and then stop and see what happens. \"    7/6/20  -Continues Chantix, remains smoke free >3 months, no changes in cravings, etc. Nauseous once because he forgot to eat. Is nervous to stop medication altogether.   -Pt reduced Nexium 40 mg to QOD ~1.5 wks ago, doing well with no increase in GERD sxs. Feels comfortable gradually tapering off. -/72 one time at home, but does not check daily   -Bike 3x per week, Core exercises 2x per week. 8/5/20  -Getting exercise, but not outside. Cut back on Chantix 0.5 mg once daily on most days. Had some stronger cravings some days, so took it BID. Strong craving on way to [facility]. It was the 1st time back to [facility] since he quit smoking. One of members smoked in front of him, which was difficult. He stopped where he normally would buy cigarettes on way to [facility] and bought candy instead. Checked BP yesterday 133/73. Doing well with PPI 40 mg QOD, but not ready to cut back to 20 mg QOD because he has a large supply of 40 mg tabs. 8/26/20  -Getting core/bike exercise, but not outside. Working toward goals.   -Cut back on Chantix 0.5 mg to QOD. Initially forgot dose, but then skipped doses intentionally.   -Triggers: frustration, on way to [facility], seeing people smoke on TV. -Plans to tell his friends at the [employer].  -Was able to not stop where he buys cigarettes on way to [facility] the last time he went.   -Doing well with PPI 40 mg QOD, but not ready to cut back to 20 mg QOD because he has a large supply of 40 mg tabs. Agreed to try MWF.  -6 glasses of water each day  -Echo submitted and approved by [employer].  -not checking BP daily    9/16/20  -Still taking Chantix QOD, occasional strong cravings due to triggers  -Triggers: will go back to [employer] this weekend.   -Hasn't told anyone at [employer] that he quit. -slowly increasing to 8 glasses water/day  -reports BP log: SBP range 116-131,  DBP range 66-76  -looking for better sense of smell     10/7/20  -Working to apply for medicare; still trying to figure out which plan is best.   -D/c Chantix 5 days ago, still has on hand to use prn. Has \"Ups and downs. \" Craving frequency and intensity leveled off.   -Quit 6 months ago!   -Told brother in law in [place] that he quit smoking.  -Improved smell    -Saw PCP who found blood in urine, which is worrying patient. Will return in 1 week for BP f/u after med changes made.   -Plans to continue PPI taper and will use Pepcid prn heartburn    11/3/20  -Remains smoke free  -Has not had to use Chantix since last appointment    -Still gets urges when seeing people smoke on TV. Will keep hard candies by the TV to avoid urge to smoke. -Got Nexium OTC instead of Pepcid, but only taking three times a week. Advised to get Pepcid instead as this is better for PRN use. 11/24/20  Cravings reduced significantly. Has not taken any Chantix. Has not switched from nexium QOD to pepcid yet, but bought at store. No GERD sxs. SBP mostly<130. Pt feeling well. Socially distanced. Staying healthy. 1/12/21  Put on an old jacket and had a pack of cigarettes in pocket. He put the pack directly in the trash without looking at it. He wasn't able to take out the trash right away, but didn't dig them out. He is not looking forward to when he is offered a cigarette in the future. Discussed how to prepare for his. Very excited because he got his echo published in [employer]. Stopped Nexium ~2 weeks ago.  Using pepcid prn, but hasn't needed to use this at all. Cut potassium down to once per day. 2/23/21  1 year piter of being tobacco free is coming up - March 28. Emphasized the enormity of this accomplishment. States he is staying busy with his software development, exercise, [employer] activity, and learning the piano. He still has triggers such as seeing someone smoking on tv or being frustrated. He was hoping after a year these triggers would be easier to handle. Encouraged patient to celebrate his success and focus on ways he used to avoid triggers when he was quitting. He states he often will pace, drink water, or just leave or turn away from the trigger. He states he is proud that his health has improved since quitting and he has been able to take less pills. Is also able to smell his coffee now. He is still nervous for when he is offered a cigarette in the future. Discussed how to prepare for this using by using his trigger-avoiding strategies and just leaving the room/situation if needed. 3/23/21  Patient reports he is still doing well and remaining smoke free, however he is \"afraid it wont last\". He has not been around his family members, [employer] members, or friends since [address]  started a year ago and reports he is nervous that when he is around them the temptation will be hard to overcome as many of them are smokers. Discussed methods to abstain from temptations such as staying inside when family members/friends go outside to smoke, making it physically difficult to smoke by having something else in his mouth (gum, mint, hard candies), not buying his own cigarettes, telling family / friends he has quit smoking so they do not unintentionally try to pressure him, and turning away from trigger. He states his family and friends are very supportive of him and will be happy to see how far he has come. Encouraged patient to celebrate his one year anniversary of being smoke free as this is a huge accomplishment.  Patient's family is having a celebration next week that will be virtual. Will call patient again in 1 month to follow-up, but encouraged patient to schedule appointment earlier if he feels he is getting overwhelmed with triggers before then. 4/20/21  Told family zoom celebration for late mom's birthday and told family he quit smoking. Brother in law wishes he could quit but hasn't set a quit date. Has not had much in person contact with family. Still not in social situations either, including [employer] volunteer work. Still virtual. Meets with group of inter-racial [employer] members and plans to meet in person next. One member smokes and he is worried it will be a trigger to smoke. Still doesn't consider himself a non-smoker, thinks he is still \"quitting\". Reminded patient that he did QUIT and has been a NONsmoker for over a year! His reward was getting his computer fixed/updated. He has been walking 2x per week and has a more consistent indoor exercise routine-- recumbent bike, weights, core exercises. Gets a \"reaction\" (not necessarily an urge) when sees people smoking on TV and nervous about going around family and friends. He will avoid triggers as discussed above. 5/18/21  Granddaughter graduated from college in [facility]. [employer] had celebration for her, and he attended. When he stepped outside of [employer], it reminded him of when he used to step out to take smoke breaks. Long drive to get to [employer], and stopped at one of places he normally stops to smoke, but instead he stopped to get gas. He didn't feel urge to smoke, just brought back memories. Pt states he is gaining weight, possibly snacking more and slowing metabolism. Still exercises, but not high intensity. Started [employer] exercises. Pt had a lot of questions about his last PCP visit, when to follow up, how to get labs, where to go, etc    7/13/21  Large family gathering on 4th of July. Smokers went outside on the lawn. Pt avoided them while they smoked.  Stopped at rest stop he normally stops at to smoke on 75 and was able to stop and not smoke. Behind on healthcare paperwork and slowly getting organized. Pt has many questions about how to get his labs and where to go.    8/31/21  Patient having cravings off and on but has not smoked since March 28, 2020. Cravings are usually when he sees someone else smoking, such as on TV. He states he just puts his mind on something else or changes the TV channel. Patient reports doing really well overall. He is very happy with himself for remaining smoke free. Discussed long term benefits of not smoking. Patient still trying to get CT scheduled with [facility] before his appointment with Dr. Lakeisha Trent in October. 9/28/21  Length of time between cravings is increasing. Seeing people on TV and being in situations where he used to smoke remain triggers. Planning to start walking more now that weather is getting nicer. Reports his brother in law is considering stopping smoking and patient very supportive of him. 10/26/21  Things are going well, remains smoke free. Cravings come and go but thinks this month has been worse than last, likely due to being around more people due to getting out more. Tries to avoid situations where he smoked previously. Recommended having coffee available in these situations as this helped him previously. Has put on weight since stopping smoking but states this has now leveled out. Wanting to increase exercise, been working out at home but knows he needs to be more disciplined. 11/23/21  Few social events, but masking and distancing. Got COVID booster. [employer] service once per month in person. Nicotine cravings still come and go, but a little less often. Carries a big water bottle with him. Drinks coffee in AM and on long drive to curb the craving and drinks more water. Exercises at home 3x per week (30-60 min), tries to increase to 4x. Anticipating a long drive to thanksgiving dinner and knows this will be a trigger.  Plans to bring coffee, water, not having an cig on hand. Working on [employer]--oh holy night. Working on a new shun to keep him busy. 1/18/22  Patient states that he is doing fine. He has been doing well this past month and reports improved nicotine cravings. As he looks back over this month he says it was better than last in terms of his cravings. The las month went very fast. With COVID he as not been around people smoking which has helped. If he sees people smoking  on TV or sees people smoking it reminds him of what it was like; he tries to avoid these situations. He says avoidance has been a good strategy for him. Did not have to drive long distance for Thanksgiving because it was cancelled. Has had other long drives though and used coffee to keep his hands and mouth occupied. Continues to do well and be successful with his smoking cessation. 2/15/22  Patient states that he is doing well. He has not smoked since the last visit and states that he does not have increased urges or cravings. He is fearful of having cravings or urgers as he begins to restart activities he has not done since COVID-19 started. He is particularly worried about the urge to smoke while driving the way up to [place]. He has a hour drive to [place]. We discussed coping and mitigation strategies for this. He is also worried about urge when he sees people at [place] smoking. We spoke about this at length. We discussed how he is now a non-smoke and can be proud of that. We discussed the potential of helping those around him quit smoking about opening a conversation about why he no longer smokes. He has bought a new Mac desktop which he is excited about. Overall he states that he is doing well, but would like to continue to meet monthly to keep up his motivation. 3/15/22  Started going back to [place] on Sundays, but doesn't see anyone smoking. No social gatherings after. Concerned about having a craving while driving, but hasn't had any. Starts PT next week for shoulder injury. Twice weekly. Given cortisone shot. Started meloxicam 2 days ago. No surgery needed. Checking BP at home. 120/69  105/66  116/67  98/65  Still working on shun for apple device, which is why he got a Mac computer. Discussed health maintenance below. He has gotten his covid booster. Health Maintenance Due   Topic Date Due    Colorectal Cancer Screen  08/01/2020    AAA screen  Never done    COVID-19 Vaccine (3 - Booster for Moderna series) 08/12/2021       Pharmacy: [place], [place]    Current Medication and Allergy List Reviewed and Updated:  Current Outpatient Medications   Medication Sig Dispense Refill    potassium chloride (KLOR-CON M) 10 MEQ extended release tablet TAKE 2 TABLETS DAILY 30 tablet 0    hydroCHLOROthiazide (HYDRODIURIL) 25 MG tablet TAKE 1 TABLET DAILY 90 tablet 1    meloxicam (MOBIC) 15 MG tablet Take 1 tablet by mouth daily 30 tablet 1    olmesartan (BENICAR) 40 MG tablet TAKE 1 TABLET DAILY 90 tablet 3    acyclovir (ZOVIRAX) 400 MG tablet TAKE ONE TABLET BY MOUTH THREE TIMES A DAY FOR 10 DAYS FOR HERPES SIMPLEX FLARE-UP 90 tablet 1    loratadine (CLARITIN) 10 MG tablet Take 10 mg by mouth daily      Misc Natural Products (GLUCOSAMINE CHOND DOUBLE STR PO) Take 1 tablet by mouth daily      Multiple Vitamin (MULTIVITAMIN PO) Take 1 capsule by mouth daily. No current facility-administered medications for this visit. Pertinent Labs/Vitals  Scr 0.9 (11/21/19)    ASSESSMENT/PLAN:   Patient has been tobacco free for over one year! Frequency and intensity of cravings has decreased significantly. --Has not used Chantix in >6 months but has if needed. Discussed health maintenance due- colonoscopy + AAA screening. Pt had covid booster. Discussed options for reducing pill burden.  Message sent to PCP re:   -decr potassium to 1 tab per day   -combine olmesartan + hctz into combo pill     Goals   Try walking outside more and continuing home workouts, doing better when the weather is warmer  Find healthier alternatives to current snacks (carrots, celery, etc), he has started eating more vegetables. Reward yourself with smoke free anniversaries (grill, new sheet music, star trek, jigsaw puzzle, book)- he has a new MacBook  Remind yourself that you are a non-smoker and be confident and proud of your accomplishment. Next appt:  8 wk- pt prefers to continue calls to hold him accountable and for the encouragement. Pt verbalized understanding of the above information and denied further questions or concerns. The total time of the visit was 30 min. Veto Tay, PharmD, [facility]  [facility]   [address] Ph: [phone]  Viktor White Ph: [phone]  3/15/2022 11:06 AM      For Pharmacy Admin Tracking Only     CPA in place:   Yes   Recommendation Provided To: Provider: 4 via Note to Provider and Patient/Caregiver: 1 via Virtual Visit   Intervention Detail: Discontinued Rx: 2, reason: Patient Preference, Dose Adjustment: 1, reason: Therapy De-escalation, New Rx: 1, reason: Improve Adherence and Scheduled Appointment   Gap Closed?: Yes    Intervention Accepted By: Provider: 4 and Patient/Caregiver: 1   Time Spent (min): 30

## 2022-03-15 NOTE — Clinical Note
Pt would like to minimize pill burden if possible. States his home BPs are:   120/69  105/66  116/67  98/65  Last OV /69    He is taking 2 x potassium 10 meq. Do you think he needs both? Last K+ was 4.2. What do you think about cutting back to one tab? Could he combine olmesartan/hctz? There is a 40/25 mg and 40/12.5 mg dose. He is not symptomatic with the BPs he is having. Let me know your thoughts!

## 2022-03-15 NOTE — TELEPHONE ENCOUNTER
----- Message from Emely Cifuentes sent at 3/12/2022 10:43 AM EST -----  Subject: Message to Provider    QUESTIONS  Information for Provider? Pt states that the potassium chloride (KLOR-CON   M) 10 MEQ extended release tablet was suppose to be called into the [address] because he is completely out of this medication. The RX was sent   to [facility] which is mail order and he wanted a supply sent to [facility]   so he could get them now. Would like a call back  ---------------------------------------------------------------------------  --------------  CALL BACK INFO  What is the best way for the office to contact you? OK to leave message on   voicemail  Preferred Call Back Phone Number? [phone]  ---------------------------------------------------------------------------  --------------  SCRIPT ANSWERS  Relationship to Patient?  Self

## 2022-03-29 DIAGNOSIS — I10 ESSENTIAL HYPERTENSION: ICD-10-CM

## 2022-03-29 RX ORDER — HYDROCHLOROTHIAZIDE 25 MG/1
TABLET ORAL
Qty: 90 TABLET | Refills: 1 | Status: SHIPPED | OUTPATIENT
Start: 2022-03-29 | End: 2022-09-22

## 2022-04-21 ENCOUNTER — OFFICE VISIT (OUTPATIENT)
Dept: ORTHOPEDIC SURGERY | Age: 67
End: 2022-04-21
Payer: MEDICARE

## 2022-04-21 DIAGNOSIS — M25.512 LEFT SHOULDER PAIN, UNSPECIFIED CHRONICITY: Primary | ICD-10-CM

## 2022-04-21 DIAGNOSIS — M75.02 ADHESIVE CAPSULITIS OF LEFT SHOULDER: ICD-10-CM

## 2022-04-21 DIAGNOSIS — M75.22 BICEPS TENDINITIS OF LEFT UPPER EXTREMITY: ICD-10-CM

## 2022-04-21 DIAGNOSIS — M50.30 DDD (DEGENERATIVE DISC DISEASE), CERVICAL: ICD-10-CM

## 2022-04-21 PROCEDURE — G8427 DOCREV CUR MEDS BY ELIG CLIN: HCPCS | Performed by: ORTHOPAEDIC SURGERY

## 2022-04-21 PROCEDURE — 1036F TOBACCO NON-USER: CPT | Performed by: ORTHOPAEDIC SURGERY

## 2022-04-21 PROCEDURE — G8420 CALC BMI NORM PARAMETERS: HCPCS | Performed by: ORTHOPAEDIC SURGERY

## 2022-04-21 PROCEDURE — 99213 OFFICE O/P EST LOW 20 MIN: CPT | Performed by: ORTHOPAEDIC SURGERY

## 2022-04-21 PROCEDURE — 3017F COLORECTAL CA SCREEN DOC REV: CPT | Performed by: ORTHOPAEDIC SURGERY

## 2022-04-21 PROCEDURE — 1123F ACP DISCUSS/DSCN MKR DOCD: CPT | Performed by: ORTHOPAEDIC SURGERY

## 2022-04-21 PROCEDURE — 4040F PNEUMOC VAC/ADMIN/RCVD: CPT | Performed by: ORTHOPAEDIC SURGERY

## 2022-04-21 NOTE — LETTER
Physical Therapy Rehabilitation Referral    Patient Name:  Melina Hernandez      YOB: 1955    Diagnosis:    1. Left shoulder pain, unspecified chronicity    2. Adhesive capsulitis of left shoulder    3. DDD (degenerative disc disease), cervical    4. Biceps tendinitis of left upper extremity        Precautions:     [x] Evaluate and Treat    Post Op Instructions:  [] Continuous passive motion (CPM)  [x] Neck ROM  [x] Exercise in plane of scapula  []  Strengthening     [x] Pulley and instruction   [x] Home exercise program (copy to patient)   [] Sling when arm at risk  [] Sling or brace at all times   [] AAROM: Forward elevation to  140            [] AAROM: External rotation  To  40    [] Isometric external rotator strengthening [] AAROM: internal rotation: up the back  [x] Isometric abductor strengthening  [] AAROM: Internal abduction   [] Isometric internal rotator strengthening [] AAROM: cross-body adduction             Stretching:     Strengthening:  [x] Four quadrant (FE, ER, IR, CBA)  [x] Rotator cuff (ER, IR, Abd)  [x] Forward Elevation    [] External Rotators     [x] External Rotation    [] Internal Rotators  [x] Internal Rotation: up/back   [] Abductors     [x] Internal Rotation: supine in abduction  [x] Sleeper Stretch    [] Flexors  [x] Cross-body abduction    [] Extensors  [x] Pendulum (FE, Abd/Add, cw/ccw)  [x] Scapular Stabilizers   [x] Wall-walking (FE, Abd)        [x] Shoulder shrugs     [x] Table slides (FE)                [x] Rhomboid pinch  [] Elbow (flex, ext, pron, sup)        [x] Lat.  Pull downs     [] Medial epicondylitis program       [x] Forward punch   [] Lateral epicondylitis program       [x] Internal rotators     [] Progressive resistive exercises  [] Bench Press        [] Bench press plus  Activities:     [] Lateral pull-downs  [] Rowing     [] Progressive two-hand supine press  [] Stepper/Exercise bike   [x] Biceps: curls/supination  [] Swimming  [] Water exercises    Modalities:     Return to Sport:  [x] Of Choice      [] Plyometrics  [] Ultrasound     [] Rhythmic stabilization  [] Iontophoresis    [] Core strengthening   [] Moist heat     [] Sports specific program:   [] Massage         [x] Cryotherapy      [] Electrical stimulation     [] Paraffin  [] Whirlpool  [] TENS    [x] Home exercise program (copy to patient). Perform exercises for:   15     minutes    3      times/day  [x] Supervised physical therapy  Frequency: [x]  1x week  [x] 2x week  [] 3x week  [] Other:   Duration: [] 2 weeks   [] 4 weeks  [x] 6 weeks  [] Other:     Additional Instructions:     Betty Crowley MD, PhD

## 2022-04-21 NOTE — PROGRESS NOTES
[facility]  History and Physical  Shoulder Pain    Date:  2022    Name:  Zeferino Regalado  Address:  [address] [place] [id number]    :  1955      Age:   77 y.o.    SSN:  [id number]      Medical Record Number:  [id number]    Reason for Visit:    Shoulder Pain (lt shoulder)      HPI:   Zeferino Regalado is a 77 y.o. male who presents to our office today for follow up of the left shoulder pain. He started conservative treatment for adhesive capsulitis of the left shoulder. He was given a corticosteroid injection and started physical therapy. Overall, the patient reports that he has had improvement in his pain levels as well as his range of motion. He denies any new injuries or setbacks since his last visit. Of note, he reports that he continues to have left-sided neck pain which gets worse when he is looking to his left. He endorses pain over the left trapezius. He denies any recent injuries to his neck. Pain Assessment  Location of Pain: Shoulder  Location Modifiers: Left  Severity of Pain: 3  Quality of Pain: Dull,Aching  Duration of Pain: Other (Comment)  Frequency of Pain: Intermittent    No notes on file    Review of Systems:  A 14 point review of systems available in the scanned medical record as documented by the patient. The review is negative with the exception of those things mentioned in the History of Present Illness and Past Medical History. Past Medical History:  Patient's medications, allergies, past medical, surgical, social and family histories were reviewed and updated as appropriate. Allergies:  No Known Allergies    Physical Exam:  There were no vitals filed for this visit. General: Zeferino Regalado is a healthy and well appearing 77 y.o. male who is sitting comfortably in our office in acute distress. Skin:  There are no skin lesions, cellulitis, or extreme edema.  The patient has warm and well-perfused Bilateral upper extremities with brisk capillary refill. Eyes: Extra-ocular muscles intact  Mouth: Oral mucosa moist. No perioral lesions  Pulm: Respirations unlabored and regular. Neuro: Alert and oriented x3    left Shoulder Exam:  Inspection:  No gross deformities, no signs of infection. Palpation: He has no tenderness over the shoulder    Active Range of Motion: Forward elevation of 150, abduction of 130, external rotation with elbow at the side 35, internal rotation to the back is T11    Passive Range of Motion: Similar to active. Strength: External rotation with resistance with elbow at the side +4/5, internal rotation with resistance with elbow at the side +4/5, supraspinatus testing +4/5    Special Tests:   No Hernando muscle deformity. Neurovascular: Sensation to light touch is intact, no motor deficits, palpable radial pulses 2+    CERVICAL SPINE EXAMINATION:     Inspection:  Local inspection shows no step-off or bruising. Cervical alignment is normal.     Palpation: Has tenderness over the left trapezius. There is mild paraspinal spasm. Range of Motion: Has restriction with cervical extension and left lateral rotation    Strength: 5/5 bilateral upper extremities     Skin: Intact without rashes ulcerations or lesions. Sensation: Intact to light touch    Additional Examinations:    Examination of the contralateral extremity does not show any tenderness, deformity or injury. Range of motion is unremarkable. There is no gross instability. There are no rashes, ulcerations or lesions. Strength and tone are normal.      Radiographic:  Cervical spine x-rays 3/27/2017  FINDINGS:    Prevertebral soft tissues are normal. There is mild multilevel spondylitic  change with disc space narrowing and associated spurring from C4-5 through C6-7. Mild foraminal narrowing at C6 and C7 LEFT greater than RIGHT is due to  spurring.  The odontoid is normal    Impression   Mild multilevel spondylitic change. If there is strong concern for radiculopathy  or myelopathy then MRI is the study of choice      Assessment:  Kay Gr is a 77 y.o. male left shoulder pain related to adhesive capsulitis that is responding to conservative treatment. He does have some neck pain related to degenerative changes in his cervical spine    Impression:  Encounter Diagnoses   Name Primary?  Left shoulder pain, unspecified chronicity Yes    Adhesive capsulitis of left shoulder     DDD (degenerative disc disease), cervical     Biceps tendinitis of left upper extremity        Office Procedures:  Orders Placed This Encounter   Procedures    Amb External Referral To Physical Therapy     Referral Priority:   Routine     Referral Type:   Consult for Advice and Opinion     Referral Reason:   Patient Preference     Requested Specialty:   Physical Therapy     Number of Visits Requested:   1       Plan: We had a thorough discussion with Kay Gr today. We believe that he would benefit from targeted physical therapy for both the neck and his left shoulder. We recommend that he continue to work on endrange movements as well as his strength. Physical therapy orders were updated and printout was handed to patient today. He may continue to take the meloxicam that was previously prescribed. We will have him make an appointment 6 weeks that he can follow-up should he continue to have persistent symptoms. All of his questions were fully answered today. 4/21/2022  11:49 AM      Nathan Tim PA-C  Orthopaedic Sports Medicine Physician Assistant    During this examination, I, Nathan Tim PA-C, functioned as a scribe for Dr. Nicholas Mckeon. This dictation was performed with a verbal recognition program (DRAGON) and it was checked for errors. It is possible that there are still dictated errors within this office note. If so, please bring any errors to my attention for an addendum.   All efforts were made to ensure that this office note is accurate.  ______________  I, Dr. Felipe Cohen, personally performed the services described in this documentation as described by Cecilia Hitchcock PA-C in my presence, and it is both accurate and complete. Betty Monk MD, PhD  4/21/2022

## 2022-05-10 ENCOUNTER — TELEMEDICINE (OUTPATIENT)
Dept: PHARMACY | Age: 67
End: 2022-05-10
Payer: MEDICARE

## 2022-05-10 DIAGNOSIS — Z87.891 FORMER SMOKER: Primary | ICD-10-CM

## 2022-05-10 PROCEDURE — 99211 OFF/OP EST MAY X REQ PHY/QHP: CPT

## 2022-05-10 NOTE — PROGRESS NOTES
Disclaimer for Virtual Visits: We want to confirm that, for purposes of billing, this is a virtual visit with your provider for which we will submit a claim for reimbursement with your insurance company. You may be responsible for any copays, coinsurance amounts or other amounts not covered by your insurance company. If you do not accept this, unfortunately we will not be able to schedule a virtual visit with the provider. Do you accept? Yes. CLINICAL PHARMACY NOTE--Smoking Cessation    SUBJECTIVE/OBJECTIVE:   Julio C Hines is a 77 y.o. male with PMHx significant for GERD, HTN, pulmonary emphesema referred by Dr Jaclyn Cardoso for smoking cessation counseling and medication management. Spoke with patient over the phone on 05/10/22. 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 30-60 min (previously immediately after waking)  Do you smoke 20 or more cigarettes each day? No  At times when you can't smoke or haven't got any cigarettes, do you feel a craving for one? Yes, but not if not keeping busy  Is it tough for you to keep from smoking for more than a few hours?  No, not if in a place where he can't smoke  When you are sick enough to stay in bed, to you still smoke? no  If yes to two or more questions, consider using NRT    Reasons for addiction: Touch and Handle, Stress-relief, Habit, Addiction, \"something to do\"  Triggers: meals, stress, smoke breaks from his work; stopped smoking while driving, with coffee, alcohol (already tried to dissociate smoking with certain activities, but picked it up with other activities)  Barriers: not confident (only 5 on a 1-10 scale); He is \"afraid\" to run out of cigarettes. Motivation for quitting: Has wanted to quit for years (in back of mind) because he doesn't like that it's in control of him (addiction), Health, family, money    12/19/19 update:  Did  Chantix from pharmacy ($40), but did not start. Fearful of side effects. Has history of bad dreams several years ago and is afraid it may cause vivid dreams. Has been able to cut back slightly on his own. Was able to eliminate the 1st cigarette of the day, is now working on the 2nd. Started playing pool to fill downtime. He has been able to avoid buying more cigarettes until completely out of current pack  It has been a busy week, and he hasn't been able to focus on quitting as much as he would like. He plans to go to [place] for [place]. 12/31/19 update:  Smoking \"a little less,\" but unable to give exact amount. Started Chantix yesterday. Has tolerated 2 doses so far. No side effects. Has been able to delay the 2nd cigarette more by switching AM routine (drink coffe before breakfast)  Sucking on mints  Used recumbent bike a couple times, but no routine yet. Moved cigarettes to basement (out of pocket) to make it more inconvenient. Has kept a list of things to do on his breaks. 1/14/20 update:  Continues Chantix 1 mg BID. Takes with food, but has been difficult because he does not eat at regular times during the day. Experiencing some nausea/ \"queesy\" stomach. Also having occasional slight anxiety, but nothing major and is able to tolerate. Denies depression/thoughts of suicide or harming himself. Smoking less, but is not counting exact number because he thinks it will make him think about smoking more. He knows he is not buying cigarettes as often.  He does not buy cigarettes until he is completely out of them. He needs a new RX for chantix. He is trying to stay busy and avoid long breaks which triggers him to smoke. He has been able to postpone the cigarette many times. 1/30/20 update:   Continues Chantix 1 mg BID. Takes with food. Experiencing some nausea/ \"queesy\" stomach, but tolerable. Also having occasional slight anxiety, but nothing major and is able to tolerate. Denies depression/thoughts of suicide or harming himself. Rides his recumbent bike 2x per week for 15 minutes. Smoking less, but is still not counting the exact number because he thinks it will make him think about smoking more. He is buying cigarettes less often and has placed them in his basement so has to go downstairs to get them when he wants to smoke. He does not like going down stairs. He has been able to walk back upstairs from the basement without bringing a cigarette with him. He made a list of reasons for quitting and placed it by his coat so he sees it when he is going outside to smoke. He does not smoke inside house or in car. He started using hard candies in place of cigarettes while he is preparing his meals. He often smokes right before and right after meals. He always brushes his teeth after meals. Patient feels that drinking coffee throughout the day would help keep him busy during breaks, and coffee has not been a trigger for him lately. 2/27/20  Has been able to reduce to 2 packs per week, but it was really hard. He surprised himself. Pt started drinking tea instead of coffee. Has been able to postpoine 1st cig at least a couple hours. Plans to avoid [facility], where he buys his cigs. Feels \"accomplished\" after exercising, but has only been able to exercise 2x per week. 3/12/20  Pt does not feel he did well with the goals we set at last visit 2/2 less focus and recent stressors (DDS, shingles vax, coronavirus). He didn't exercise as much.  He increased to 1 pack in 3 days, then cut back down to 1 pack in 3.5 days. His cravings are less frequent, but not necessarily less strong. The recent [address] outbreak is worrying him because he is high risk. This is more motivation for him to quit. Pt requests a Chantix refill. Tried 1-800-QUIT-NOW- [place] it was not for him. 3/26/20  Pt reports having \"Up and downs. \" Coronavirus has really concerned him. He stopped Chantix for 2 days due to depression surrounding the situation, but realized that not listening to the news helped him much more. Pt feels his mood is stable and would like to resume chantix. Started keeping track of when he smokes (exact #s). He smoked more for a couple days, but he has been able to limit to 4 cig/day the past 2 days! This equates to goal of no more than 1 pack every 5 days. 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 He increased exercise to 20 min 2x per week and started core exercises. He is listening to an old CD while he exercises, which helps. He feel accomplished after exercise. 3/31/20  Pt quit smoking 3 days ago on 3/28/20! Motivation was fear. Pt started back on 0.5 mg once daily. Will increase to 0.5 mg BID. Things that worked: prayer, staying occupied, stopped watching the news because it was depressing. 4/2/20  Will increase Chantix today to 0.5 mg BID  He did put lighter out of pocket/ out of sight  Doesn't have an jacquelyn tray, he put the cigarette butts in a planter pot. Suggested he move this to a different location and plant a new plant or flowers in it. His urges continue to be able piano and after programming. His biggest urge to overcome was when he had been working on programming or several months and finally had to test if it worked, and it did not work. He had a very strong urge to smoke but paced and prayed, then occupied himself with something different. He is not ready to throw away the cigarettes, but he did put them out of site and doesn't remember where they are.    Withdrawal sxs include: nasal drip, insomnia, irritability    4/7/20  11th day of ebing tobacco free. Scared that he might relapse. Reports mood is stable-- slightly anxious/worried about COVID19, but better than before. Withdrawal sxs include: nasal drip better, irritability, insomnia but not sure if 2/2 withdrawal or worry about COVID19. Increased water intake from 3 cups to 6 cups per day. 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 \" Urge lasted ~10 min. Craving rated 7/10.    2) after a really long phone call, paced, ate a piece of candy (max 1-2 pieces candy per day)  3) found himself at the door that he goes out to smoke; he is not sure what led to that moment; asked himself what am I doing? Was able to leave the situation    4/13/20  Pt on way to dentist for toothache. He watched star trek this weekend for 2 week reward. For next reward, he wants to buy new sheet music for piano since he cannot go to lessons. He would like to learn \"I can only imagine. \" Took 1 walk, bike x 2, but no core exercises. Will pick back up this week. 4/15/20  Root canal on 4/13. Started amoxicillin 500 mg TID. Exercise going well, except for past couple days. Unable to figure out what foods relieve nausea from Chantix. Urge 4/13: after leaving S, had 2 hours to kill before root canal, sat in car with nothing to do, feeling anxious, read his book and listened to music; always used to smoke when he needs to kills time. Missed Chantix on 4/13, took 1 dose on 4/13.     4/22/20  Still needs dental work done, fillings next week.  Core exercise 2x per week, bike 2-3x per week-- feeling stronger. Tobacco cravings \"up and down. \" Most days they are less frequent, intensity varies. Is able to overcome. Watched star trek this weekend as reward. 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 Sunday night craving: made food for the whole week and was on feet all day; had to clean up after, strong craving after he took a break, created a list of distractions when he has cravings: jigsaw puzzle, read book. Still having trouble sleeping: plans to read old spiritual book before bed. Drainage much better. Slightly more depressed due to [address]. Does not feel this is due to Chantix, just situational. 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 Pt denies any thought of harming himself or others. 4/29/20  Pt smoke free x 1 month! Watched an old [employer] game as reward. Sleeping improved now that he is reading 30 min before bed. Piano 1 hour 4x/per week--found a tutorial on how to play \"I can only imagine\" and he is trying to write [employer] sheet music for it. Tried working on software again. He is trying to make a routine. Lifting COVID restrictions is worrying him. He has been busy looking for new insurance as his [employer] expires this month and he is inelligible for [employer] until oct. It has been time consuming. He has a pcp visit next week. Needs to make sure all his RXs are covered under new plan. 5/6/20  Mood is \"up and down\"- manly due to coronavirus pandemic, but feels it is up more often than down. Pt new [employer] insurance starts today. Cravings are rare with short duration, but still strong (7 out of 10). Saw PCP today. Walks outside. Has been on Chantix x 4 months. Is not sure if it's covered by new insurance. 5/20/20  No lapses. Still some strong urges avg once per day. Bike 20 minutes 3x per week, increasing intensity \"from 1 to 2\". Continues core exercises.  Working on writing sheet music for \"I can only imagine\". Increased amount of time spending on software, gradually. Increased nausea with Chantix. Mood is good, better than before. 5/27/20  No increase in cravings after cutting back slightly on Chantix. Nausea improved slightly possibly. A couple strong cravings but able to overcome. Increased exercise from 20 to 30 min on bike 3x per week, core exercises 1x per week, would like to increase to 2x per week. Feels good while riding bike, feels tired/accomplished after. Plays upbeat music. Still practicing piano, trying to write sheet music. Spends time on software, but not accomplishing a lot due to some limitations (space on hard drive). Going to [facility] [facility] [employer] today. Plans to cancel insurance at end of month because they do not cover Chantix or other needs (DDS, chiropractor, PCP). Arranged for [employer] to start 6/1/20 and Drs in [employer]. Sleeping better- was waking up at 3-4a and was anxious. Now not waking up anxious. 6/3/20  Decreased Chantix to 0.5 mg BID at end of last week, no increase in strength or frequency of cravings. Only a brief craving this AM while lying in bed, able to overcome easily. Needs refill on Chantix; has ~1 week left. Cut esomeprazole in half- still no GERD sxs. Still clearing throat (has happened since he quit smoking which he attributes to his lungs clearing out). Nausea has improved since reducing Chantix. Mood is good, he is less anxious and not waking in middle of night. He is drinking 6 glasses of water each day. 6/17/20  Continues Chantix 0.5 mg BID, still not smoking, slightly more frequent and stronger urges, but not too bad, in control of cravings. Nausea much better. Working more on programming which is sometimes a trigger during \"breaks. \"  Now sucks hard candy instead. Will start 1k puzzle. Goes for a walk every other day. Increased water to 6-7 glasses per day.    Only had one episode of GERD since cutting back on dose and stopping carafate. Pt is unsure why he started the medications. Looking back on med list, he has been on this at least 10 years. Per OV note from Dr Lani Huerta 2/28/11, \"Just 2 weeks ago he began to have hiccups and a feeling of acid around his throat. He is on Nexium every day. He had an EGD by Dr. Luis Enrique Mason for this in ~ 2008 and had sucralfate prescribed then, which helped. He recently found the old med and  Has taken it for a week now and it has helped. He will take it for a few weeks and then stop and see what happens. \"    7/6/20  -Continues Chantix, remains smoke free >3 months, no changes in cravings, etc. Nauseous once because he forgot to eat. Is nervous to stop medication altogether.   -Pt reduced Nexium 40 mg to QOD ~1.5 wks ago, doing well with no increase in GERD sxs. Feels comfortable gradually tapering off. -/72 one time at home, but does not check daily   -Bike 3x per week, Core exercises 2x per week. 8/5/20  -Getting exercise, but not outside. Cut back on Chantix 0.5 mg once daily on most days. Had some stronger cravings some days, so took it BID. Strong craving on way to [employer]. It was the 1st time back to [employer] since he quit smoking. One of members smoked in front of him, which was difficult. He stopped where he normally would buy cigarettes on way to [employer] and bought candy instead. Checked BP yesterday 133/73. Doing well with PPI 40 mg QOD, but not ready to cut back to 20 mg QOD because he has a large supply of 40 mg tabs. 8/26/20  -Getting core/bike exercise, but not outside. Working toward goals.   -Cut back on Chantix 0.5 mg to QOD. Initially forgot dose, but then skipped doses intentionally.   -Triggers: frustration, on way to [employer], seeing people smoke on TV. -Plans to tell his friends at the [employer].  -Was able to not stop where he buys cigarettes on way to [employer] the last time he went.   -Doing well with PPI 40 mg QOD, but not ready to cut back to 20 mg QOD because he has a large supply of 40 mg tabs. Agreed to try MWF.  -6 glasses of water each day  -Echo submitted and approved by [employer].  -not checking BP daily    9/16/20  -Still taking Chantix QOD, occasional strong cravings due to triggers  -Triggers: will go back to [employer] this weekend.   -Hasn't told anyone at [employer] that he quit. -slowly increasing to 8 glasses water/day  -reports BP log: SBP range 116-131,  DBP range 66-76  -looking for better sense of smell     10/7/20  -Working to apply for medicare; still trying to figure out which plan is best.   -D/c Chantix 5 days ago, still has on hand to use prn. Has \"Ups and downs. \" Craving frequency and intensity leveled off.   -Quit 6 months ago!   -Told brother in law in [place] that he quit smoking.  -Improved smell    -Saw PCP who found blood in urine, which is worrying patient. Will return in 1 week for BP f/u after med changes made.   -Plans to continue PPI taper and will use Pepcid prn heartburn    11/3/20  -Remains smoke free  -Has not had to use Chantix since last appointment    -Still gets urges when seeing people smoke on TV. Will keep hard candies by the TV to avoid urge to smoke. -Got Nexium OTC instead of Pepcid, but only taking three times a week. Advised to get Pepcid instead as this is better for PRN use. 11/24/20  Cravings reduced significantly. Has not taken any Chantix. Has not switched from nexium QOD to pepcid yet, but bought at store. No GERD sxs. SBP mostly<130. Pt feeling well. Socially distanced. Staying healthy. 1/12/21  Put on an old jacket and had a pack of cigarettes in pocket. He put the pack directly in the trash without looking at it. He wasn't able to take out the trash right away, but didn't dig them out. He is not looking forward to when he is offered a cigarette in the future. Discussed how to prepare for his. Very excited because he got his echo published in [employer]. Stopped Nexium ~2 weeks ago.  Using pepcid prn, but hasn't needed to use this at all. Cut potassium down to once per day. 2/23/21  1 year piter of being tobacco free is coming up - March 28. Emphasized the enormity of this accomplishment. States he is staying busy with his software development, exercise, [employer] activity, and learning the piano. He still has triggers such as seeing someone smoking on tv or being frustrated. He was hoping after a year these triggers would be easier to handle. Encouraged patient to celebrate his success and focus on ways he used to avoid triggers when he was quitting. He states he often will pace, drink water, or just leave or turn away from the trigger. He states he is proud that his health has improved since quitting and he has been able to take less pills. Is also able to smell his coffee now. He is still nervous for when he is offered a cigarette in the future. Discussed how to prepare for this using by using his trigger-avoiding strategies and just leaving the room/situation if needed. 3/23/21  Patient reports he is still doing well and remaining smoke free, however he is \"afraid it wont last\". He has not been around his family members, [employer] members, or friends since [address] started a year ago and reports he is nervous that when he is around them the temptation will be hard to overcome as many of them are smokers. Discussed methods to abstain from temptations such as staying inside when family members/friends go outside to smoke, making it physically difficult to smoke by having something else in his mouth (gum, mint, hard candies), not buying his own cigarettes, telling family / friends he has quit smoking so they do not unintentionally try to pressure him, and turning away from trigger. He states his family and friends are very supportive of him and will be happy to see how far he has come. Encouraged patient to celebrate his one year anniversary of being smoke free as this is a huge accomplishment.  Patient's family is having a celebration next week that will be virtual. Will call patient again in 1 month to follow-up, but encouraged patient to schedule appointment earlier if he feels he is getting overwhelmed with triggers before then. 4/20/21  Told family zoom celebration for late mom's birthday and told family he quit smoking. Brother in law wishes he could quit but hasn't set a quit date. Has not had much in person contact with family. Still not in social situations either, including [employer] volunteer work. Still virtual. Meets with group of inter-racial [employer] members and plans to meet in person next. One member smokes and he is worried it will be a trigger to smoke. Still doesn't consider himself a non-smoker, thinks he is still \"quitting\". Reminded patient that he did QUIT and has been a NONsmoker for over a year! His reward was getting his computer fixed/updated. He has been walking 2x per week and has a more consistent indoor exercise routine-- recumbent bike, weights, core exercises. Gets a \"reaction\" (not necessarily an urge) when sees people smoking on TV and nervous about going around family and friends. He will avoid triggers as discussed above. 5/18/21  Granddaughter graduated from college in [facility]. Dana Gallardo had celebration for her, and he attended. When he stepped outside of [employer], it reminded him of when he used to step out to take smoke breaks. Long drive to get to [employer], and stopped at one of places he normally stops to smoke, but instead he stopped to get gas. He didn't feel urge to smoke, just brought back memories. Pt states he is gaining weight, possibly snacking more and slowing metabolism. Still exercises, but not high intensity. Started [employer] exercises. Pt had a lot of questions about his last PCP visit, when to follow up, how to get labs, where to go, etc    7/13/21  Large family gathering on 4th of July. Smokers went outside on the lawn. Pt avoided them while they smoked.  Stopped at rest stop he normally stops at to smoke on 75 and was able to stop and not smoke. Behind on healthcare paperwork and slowly getting organized. Pt has many questions about how to get his labs and where to go.    8/31/21  Patient having cravings off and on but has not smoked since March 28, 2020. Cravings are usually when he sees someone else smoking, such as on TV. He states he just puts his mind on something else or changes the TV channel. Patient reports doing really well overall. He is very happy with himself for remaining smoke free. Discussed long term benefits of not smoking. Patient still trying to get CT scheduled with [facility] before his appointment with Dr. Mikaela Wyatt in October. 9/28/21  Length of time between cravings is increasing. Seeing people on TV and being in situations where he used to smoke remain triggers. Planning to start walking more now that weather is getting nicer. Reports his brother in law is considering stopping smoking and patient very supportive of him. 10/26/21  Things are going well, remains smoke free. Cravings come and go but thinks this month has been worse than last, likely due to being around more people due to getting out more. Tries to avoid situations where he smoked previously. Recommended having coffee available in these situations as this helped him previously. Has put on weight since stopping smoking but states this has now leveled out. Wanting to increase exercise, been working out at home but knows he needs to be more disciplined. 11/23/21  Few social events, but masking and distancing. Got COVID booster. [employer] service once per month in person. Nicotine cravings still come and go, but a little less often. Carries a big water bottle with him. Drinks coffee in AM and on long drive to curb the craving and drinks more water. Exercises at home 3x per week (30-60 min), tries to increase to 4x. Anticipating a long drive to thanksgiving dinner and knows this will be a trigger. Plans to bring coffee, water, not having an cig on hand. Working on [employer]--oh holy night. Working on a new shun to keep him busy. 1/18/22  Patient states that he is doing fine. He has been doing well this past month and reports improved nicotine cravings. As he looks back over this month he says it was better than last in terms of his cravings. The las month went very fast. With COVID he as not been around people smoking which has helped. If he sees people smoking  on TV or sees people smoking it reminds him of what it was like; he tries to avoid these situations. He says avoidance has been a good strategy for him. Did not have to drive long distance for Thanksgiving because it was cancelled. Has had other long drives though and used coffee to keep his hands and mouth occupied. Continues to do well and be successful with his smoking cessation. 2/15/22  Patient states that he is doing well. He has not smoked since the last visit and states that he does not have increased urges or cravings. He is fearful of having cravings or urgers as he begins to restart activities he has not done since COVID-19 started. He is particularly worried about the urge to smoke while driving the way up to [place]. He has a hour drive to his [place]. We discussed coping and mitigation strategies for this. He is also worried about urge when he sees people at [place] smoking. We spoke about this at length. We discussed how he is now a non-smoke and can be proud of that. We discussed the potential of helping those around him quit smoking about opening a conversation about why he no longer smokes. He has bought a new Mac desktop which he is excited about. Overall he states that he is doing well, but would like to continue to meet monthly to keep up his motivation. 3/15/22  Started going back to [place] on Sundays, but doesn't see anyone smoking. No social gatherings after.  Concerned about having a craving while driving, but hasn't had any. Starts PT next week for shoulder injury. Twice weekly. Given cortisone shot. Started meloxicam 2 days ago. No surgery needed. Checking BP at home. 120/69  105/66  116/67  98/65  Still working on shun for [employer] device, which is why he got a Mac computer. Discussed health maintenance below. He has gotten his covid booster. 5/10/22  Started PT for shoulder. Pain improved. 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 Going to [place] tomorrow with sister. 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 He wonders if he will have the desire to step outside to smoke, as this was always a trigger for him in the past. He plans to get a drink with a straw to keep his mouth occupied. He did not realized it has been over 2 years since he quit smoking. Health Maintenance Due   Topic Date Due    Colorectal Cancer Screen  08/01/2020    AAA screen  Never done    COVID-19 Vaccine (3 - Booster for Moderna series) 08/12/2021       [facility]: [facility]    Current Medication and Allergy List Reviewed and Updated:  Current Outpatient Medications   Medication Sig Dispense Refill    hydroCHLOROthiazide (HYDRODIURIL) 25 MG tablet TAKE 1 TABLET DAILY 90 tablet 1    potassium chloride (KLOR-CON M) 10 MEQ extended release tablet TAKE 2 TABLETS DAILY 30 tablet 0    meloxicam (MOBIC) 15 MG tablet Take 1 tablet by mouth daily 30 tablet 1    olmesartan (BENICAR) 40 MG tablet TAKE 1 TABLET DAILY 90 tablet 3    acyclovir (ZOVIRAX) 400 MG tablet TAKE ONE TABLET BY MOUTH THREE TIMES A DAY FOR 10 DAYS FOR HERPES SIMPLEX FLARE-UP 90 tablet 1    loratadine (CLARITIN) 10 MG tablet Take 10 mg by mouth daily      Misc Natural Products (GLUCOSAMINE CHOND DOUBLE STR PO) Take 1 tablet by mouth daily      Multiple Vitamin (MULTIVITAMIN PO) Take 1 capsule by mouth daily. No current facility-administered medications for this visit.      Pertinent Labs/Vitals  Scr 0.9 (11/21/19)    ASSESSMENT/PLAN:   Patient has been tobacco free for over 2 years! Frequency and intensity of cravings has decreased significantly. --Has not used Chantix in >12 months but has if needed. Discussed health maintenance due- colonoscopy + AAA screening. Pt had covid booster. Discussed options for reducing pill burden. Message sent to PCP re:   -decr potassium to 1 tab per day   -combine olmesartan + hctz into combo pill     Goals   Try walking outside more and continuing home workouts, doing better when the weather is warmer  Find healthier alternatives to current snacks (carrots, celery, etc), he has started eating more vegetables. Reward yourself with smoke free anniversaries (grill, new sheet music, star trek, jigsaw puzzle, book)- he has a new MacBook  Remind yourself that you are a non-smoker and be confident and proud of your accomplishment. Try carrot or pepper strips for snacks instead of bags of chips. Next appt:  8 wk- pt prefers to continue calls to hold him accountable and for the encouragement. Pt verbalized understanding of the above information and denied further questions or concerns. The total time of the visit was 30 min. Antionette Peoples, PharmD, [facility]  [facility]   Claudia Ramires Ph: [phone]  Karen Hilliard Ph: [phone]  5/10/2022 11:01 AM      For Pharmacy Admin Tracking Only     CPA in place:   Yes   Recommendation Provided To: Patient/Caregiver: 1 via Virtual Visit   Intervention Detail: Scheduled Appointment   Gap Closed?: Yes    Intervention Accepted By: Patient/Caregiver: 1   Time Spent (min): 30

## 2022-05-31 ENCOUNTER — OFFICE VISIT (OUTPATIENT)
Dept: ORTHOPEDIC SURGERY | Age: 67
End: 2022-05-31
Payer: MEDICARE

## 2022-05-31 VITALS — WEIGHT: 151 LBS | HEIGHT: 69 IN | BODY MASS INDEX: 22.36 KG/M2

## 2022-05-31 DIAGNOSIS — M75.02 ADHESIVE CAPSULITIS OF LEFT SHOULDER: Primary | ICD-10-CM

## 2022-05-31 PROCEDURE — 1123F ACP DISCUSS/DSCN MKR DOCD: CPT | Performed by: ORTHOPAEDIC SURGERY

## 2022-05-31 PROCEDURE — 99213 OFFICE O/P EST LOW 20 MIN: CPT | Performed by: ORTHOPAEDIC SURGERY

## 2022-05-31 PROCEDURE — G8420 CALC BMI NORM PARAMETERS: HCPCS | Performed by: ORTHOPAEDIC SURGERY

## 2022-05-31 PROCEDURE — 1036F TOBACCO NON-USER: CPT | Performed by: ORTHOPAEDIC SURGERY

## 2022-05-31 PROCEDURE — 3017F COLORECTAL CA SCREEN DOC REV: CPT | Performed by: ORTHOPAEDIC SURGERY

## 2022-05-31 PROCEDURE — G8427 DOCREV CUR MEDS BY ELIG CLIN: HCPCS | Performed by: ORTHOPAEDIC SURGERY

## 2022-05-31 NOTE — LETTER
Physical Therapy Rehabilitation Referral    Patient Name:  Tomas Osorio      YOB: 1955    Diagnosis:    1. Adhesive capsulitis of left shoulder        Precautions:     [x] Evaluate and Treat    Post Op Instructions:  [] Continuous passive motion (CPM)  [x] Neck ROM  [x] Exercise in plane of scapula  []  Strengthening     [x] Pulley and instruction   [x] Home exercise program (copy to patient)   [] Sling when arm at risk  [] Sling or brace at all times   [] AAROM: Forward elevation to  140            [] AAROM: External rotation  To  40    [] Isometric external rotator strengthening [] AAROM: internal rotation: up the back  [x] Isometric abductor strengthening  [] AAROM: Internal abduction   [] Isometric internal rotator strengthening [] AAROM: cross-body adduction             Stretching:     Strengthening:  [x] Four quadrant (FE, ER, IR, CBA)  [x] Rotator cuff (ER, IR, Abd)  [x] Forward Elevation    [] External Rotators     [x] External Rotation    [] Internal Rotators  [x] Internal Rotation: up/back   [] Abductors     [x] Internal Rotation: supine in abduction  [x] Sleeper Stretch    [] Flexors  [x] Cross-body abduction    [] Extensors  [x] Pendulum (FE, Abd/Add, cw/ccw)  [x] Scapular Stabilizers   [x] Wall-walking (FE, Abd)        [x] Shoulder shrugs     [x] Table slides (FE)                [x] Rhomboid pinch  [] Elbow (flex, ext, pron, sup)        [x] Lat.  Pull downs     [] Medial epicondylitis program       [x] Forward punch   [] Lateral epicondylitis program       [x] Internal rotators     [] Progressive resistive exercises  [] Bench Press        [] Bench press plus  Activities:     [] Lateral pull-downs  [] Rowing     [] Progressive two-hand supine press  [] Stepper/Exercise bike   [x] Biceps: curls/supination  [] Swimming  [] Water exercises    Modalities:     Return to Sport:  [x] Of Choice      [] Plyometrics  [] Ultrasound     [] Rhythmic stabilization  [] Iontophoresis    [] Core strengthening   [] Moist heat     [] Sports specific program:   [] Massage         [x] Cryotherapy      [] Electrical stimulation     [] Paraffin  [] Whirlpool  [] TENS    [x] Home exercise program (copy to patient). Perform exercises for:   15     minutes    3      times/day  [x] Supervised physical therapy  Frequency: [x]  1x week  [x] 2x week  [] 3x week  [] Other:   Duration: [] 2 weeks   [] 4 weeks  [x] 6 weeks  [] Other:     Additional Instructions:     Betty Barrios MD, PhD

## 2022-05-31 NOTE — PROGRESS NOTES
Chief Complaint     Shoulder Pain (F/U LEFT SHOULDER)      History of Present Illness:  Marlon Boyd is a pleasant, 77 y.o., male, here today for follow up of his left shoulder. This pateitn has been in a conservative treatment program for left shoulder adhesive capsulitis. He was doing therapy at [facility] in [place] but he states his therapist felt he had reached a plateau and he was discharged to a home exercise program. He does feel his shoulder is trending in the right direction. He is able to do more with less pain in the shoulder. He does continue to have pain with sudden movements and end range pain. He reports no new injuries or setbacks. Pain Assessment  Location of Pain: Shoulder  Location Modifiers: Left  Severity of Pain: 2  Quality of Pain: Aching  Frequency of Pain: Intermittent  Aggravating Factors: Other (Comment)  Limiting Behavior: Some  Relieving Factors: Rest,Exercise  Work-Related Injury: No  Are there other pain locations you wish to document?: No      Medical History:  Patient's medications, allergies, past medical, surgical, social and family histories were reviewed and updated as appropriate. Review of Systems  A 14 point review of systems was completed by the patient on 5/31/2022 and is available in the media section of the scanned medical record and was reviewed on 5/31/2022. The review is negative with the exception of those things mentioned in the HPI and Past Medical History    Vital Signs: There were no vitals filed for this visit. General/Appearance: Alert and oriented and in no apparent distress. Skin:  There are no skin lesions, cellulitis, or extreme edema. The patient has warm and well-perfused Bilateral upper extremities with brisk capillary refill. Left Shoulder Exam:  Inspection No gross deformities, no signs of infection. Palpation: Diffuse tenderness    Active Range of Motion:   Forward Elevation 140 vs 160, Abduction 130, Internal Rotation T10 vs T7    Passive Range of Motion: External Rotation 90 in abdcution, Internal Rotation 10 in abduction, cross arm adduction is limited to 30cm    Strength: Deferred    Special Tests: No Hernando muscle deformity. Neurovascular: Sensation to light touch is intact, no motor deficits, palpable radial pulses 2+      Radiology:     No new XR obtained at this time. Assessment :  Mr. Nadia Pena is a pleasant, 77 y.o. patient with left shoulder adhesive capsulitis. He is progressing in conservative treatment. Impression:  Encounter Diagnosis   Name Primary?  Adhesive capsulitis of left shoulder Yes       Office Procedures:  Orders Placed This Encounter   Procedures   [facility]     Referral Priority:   Routine     Referral Type:   Eval and Treat     Referral Reason:   Specialty Services Required     Requested Specialty:   Physical Therapist     Number of Visits Requested:   1       Treatment Plan: This patient is responding well to conservative treatment. We do feel this patient will benefit from hands on stretching and guidance of a physical therapist. We recommend he meet with one of our therapists at the [facility] office to complete his rehabilitation. A new physical therapy letter was documented in [facility] today. We discussed a repeat steroid injection today, however he would like to hold on that today. We can consider this in the future if needed. We will see Duane Holts back in 4 weeks or sooner for an injection if needed. All questions were answered to patient's satisfaction and He was encouraged to call with any further questions or concerns. Nadia Pena is in agreement with this plan. 5/31/2022  10:54 AM    Kyle Dobbs, [facility]  [facility] [address]    During this examination, I, Mihaela Payan, functioned as a scribe for Dr. Morgan Baca.  The history taking and physical examination were performed by Dr. Anju Alexandra. All counseling during the appointment was performed between the patient and Dr. Anju Alexandra. 5/31/22  ______________    I, Dr. Nikko Bahena, personally performed the services described in this documentation as described by Candice Mora ATC in my presence, and it is both accurate and complete. Sameinna Alexandra MD, PhD  5/31/2022

## 2022-06-06 ENCOUNTER — OFFICE VISIT (OUTPATIENT)
Dept: PHYSICAL THERAPY | Age: 67
End: 2022-06-06
Payer: MEDICARE

## 2022-06-06 DIAGNOSIS — M25.512 LEFT SHOULDER PAIN, UNSPECIFIED CHRONICITY: ICD-10-CM

## 2022-06-06 DIAGNOSIS — M75.02 ADHESIVE CAPSULITIS OF LEFT SHOULDER: Primary | ICD-10-CM

## 2022-06-06 DIAGNOSIS — M50.30 DDD (DEGENERATIVE DISC DISEASE), CERVICAL: ICD-10-CM

## 2022-06-06 DIAGNOSIS — M25.612 DECREASED ROM OF LEFT SHOULDER: ICD-10-CM

## 2022-06-06 PROCEDURE — 97110 THERAPEUTIC EXERCISES: CPT | Performed by: PHYSICAL THERAPIST

## 2022-06-06 PROCEDURE — 97530 THERAPEUTIC ACTIVITIES: CPT | Performed by: PHYSICAL THERAPIST

## 2022-06-06 PROCEDURE — 97161 PT EVAL LOW COMPLEX 20 MIN: CPT | Performed by: PHYSICAL THERAPIST

## 2022-06-06 PROCEDURE — G8427 DOCREV CUR MEDS BY ELIG CLIN: HCPCS | Performed by: PHYSICAL THERAPIST

## 2022-06-06 PROCEDURE — G8539 DOC FUNCT AND CARE PLAN: HCPCS | Performed by: PHYSICAL THERAPIST

## 2022-06-06 PROCEDURE — 1101F PT FALLS ASSESS-DOCD LE1/YR: CPT | Performed by: PHYSICAL THERAPIST

## 2022-06-06 NOTE — PROGRESS NOTES
[facility]   Phone: [phone]    Fax: [phone]     Physical Therapy Certification    Dear Referring Practitioner: Dr. Sunitha Madrigal,    We had the pleasure of evaluating the following patient for physical therapy services at [address] [place] [address]. A summary of our findings can be found in the initial assessment below. This includes our plan of care. If you have any questions or concerns regarding these findings, please do not hesitate to contact me at the office phone number checked above. Thank you for the referral.       Physician Signature:_______________________________Date:__________________  By signing above (or electronic signature), therapists plan is approved by physician      Patient: Marlon Boyd   : 1955   MRN: [id number]  Referring Physician: Referring Practitioner: Dr. Sunitha Madrigal      Evaluation Date: 2022      Medical Diagnosis Information:  Diagnosis: L shoulder adhesive capsulitis                                             Insurance information: PT Insurance Information: Medicare    Precautions/ Contra-indications: L shoulder adhesive capsulitis, DDD of cervical spine  Latex Allergy:  [x]NO      []YES    C-SSRS Triggered by Intake questionnaire (Past 2 wk assessment):   [x] No, Questionnaire did not trigger screening.   [] Yes, Patient intake triggered further evaluation      [] C-SSRS Screening completed  [] PCP notified via Plan of Care  [] Emergency services notified     Preferred Language for Healthcare:   [x]English       []other:      SUBJECTIVE: Patient stated complaint: Patient reports that about 3 months ago he noticed pain and limitations in the L shoulder while strength training. He notes that he took a month off from strength training and the shoulder did not feel better, it actually felt worse. He notes that he went  See a doctor initially and completed six weeks of physical therapy at the .  He reports that he saw some improvement but the shoulder and neck are still bothering. He notes that the has had some pain with the neck for a longer period that the shoulder. He reports that he would like to improve shoulder and neck mobility and decrease pain. Relevant Medical History:HTN, OA  Functional Disability Index: FOTO score: 54    Pain Scale: 2/10  At rest, 7/10 at worst  Easing factors: rest, ice, Meloxicam (previously)  Provocative factors: turning over L shoulder, reaching across body to R shoulder, reaching behind back with L UE, golfing, sleeping on the L side, piano, typing     Type: [x]Constant   []Intermittent  []Radiating []Localized []other:     Numbness/Tingling: Patient reports intermittent tingling in B UE with sustained OH positions    Occupation/School: Patient is retired    Living Status/Prior Level of Function: Independent with ADLs and IADLs,   Patient is R handed    OBJECTIVE:     ROM PROM AROM  Comment:  6/6/2022    L R L R    Flexion   153°* 152°    Abduction   153°* 152°    ER   T1* T3    IR   T10* T9    Cervical flexion     80%   Cervical extension     75%   Cervical rotation   50% 90%    Cervical  Side bending   50% 50%        Strength L R Comment:  6/6/2022   Flexion 4+/5 5/5    Abduction 4+/5* 5/5    ER 4+/5 5/5    IR 4+/5 5/5        Special Tests Results/Comment:  6/6/2022   Soria-Tao Positive on L   Neers Positive on L   OBriens Positive on L   Other: empty can Positive on L         Reflexes/Sensation:    []Dermatomes/Myotomes intact    []Reflexes equal and normal bilaterally   [x]Other: sensation intact to light touch    Joint mobility:    []Normal    [x]Hypo   []Hyper    Palpation: +1 TTP in posterior L shoulder over RTC musculature    Functional Mobility/Transfers: Patient has limited activity tolerance secondary to pain in neck and L shoulder, as well as ROM restrictions impacting ADLs.      Posture: forward head and rounded shoulders    Bandages/Dressings/Incisions: n/a    Gait: (include devices/WB status): WNL    Orthopedic Special Tests: see above                       [x] Patient history, allergies, meds reviewed. Medical chart reviewed. See intake form. Review Of Systems (ROS):  [x]Performed Review of systems (Integumentary, CardioPulmonary, Neurological) by intake and observation. Intake form has been scanned into medical record. Patient has been instructed to contact their primary care physician regarding ROS issues if not already being addressed at this time.       Co-morbidities/Complexities (which will affect course of rehabilitation):   []None           Arthritic conditions   []Rheumatoid arthritis (M05.9)  [x]Osteoarthritis (M19.91)   Cardiovascular conditions   [x]Hypertension (I10)  []Hyperlipidemia (E78.5)  []Angina pectoris (I20)  []Atherosclerosis (I70)   Musculoskeletal conditions   []Disc pathology   []Congenital spine pathologies   []Prior surgical intervention  []Osteoporosis (M81.8)  []Osteopenia (M85.8)   Endocrine conditions   []Hypothyroid (E03.9)  []Hyperthyroid Gastrointestinal conditions   []Constipation (Y36.87)   Metabolic conditions   []Morbid obesity (E66.01)  []Diabetes type 1(E10.65) or 2 (E11.65)   []Neuropathy (G60.9)     Pulmonary conditions   []Asthma (J45)  []Coughing   []COPD (J44.9)   Psychological Disorders  []Anxiety (F41.9)  []Depression (F32.9)   []Other:   [x]Other:   Hx of knee surgery       Barriers to/and or personal factors that will affect rehab potential:              []Age  []Sex              [x]Motivation/Lack of Motivation: Patient demonstrates high motivation                        []Co-Morbidities              []Cognitive Function, education/learning barriers              []Environmental, home barriers              []profession/work barriers  [x]past PT/medical experience: Patient has had prior skilled PT experience and demonstrates good understanding of POC  []other:  Justification: see above     Falls Risk Assessment (30 days): [x] Falls Risk assessed and no intervention required. [] Falls Risk assessed and Patient requires intervention due to being higher risk   TUG score (>12s at risk):     [] Falls education provided, including      Patient Height: 5'9\"    Patient Weight: 151 lbs    Patient BMI: 22.30 kg/m²      Pain  [x]  Pain diagram was completed, pain was present and a follow up plan to address the pain is in the plan of care. ()   []  Pain diagram was completed and there was no pain present and therefore no follow up plan to address pain in the plan of care. ()   []  Pain diagram was not completed and the reason for not completing the pain diagram is in the medical chart ()  []  Patient refused to participate   []  Severe mental or physical capacity, patient is in urgent emergent situation and to complete the questionnaire would jeopardize the patient's health status        Functional Questionnaire  [x]  Patient completed the functional outcome questionnaire and deficiencies were addressed in the plan of care. ()   []  Patient completed the outcome questionnaire and there were no functional deficits identified and therefore no plan of care is required. ()   []  Patient did not complete the functional outcome questionnaire and the reason why is documented in the medical chart. ()  []  Patient refused to participate   []  Patient unable to complete the questionnaire   []   A functional outcome assessment has been reported on within the last 30 days        Falls   [x]  The patient has had no falls or 1 fall without injury in the past year. (1101F)   []  There is no documentation of fall in the medical chart (1101F,8P)     [] The patient has had 2 or more falls or one fall with injury in the past year that is documented in the medical chart. (1100F)  []  A falls risk assessment was completed and documented in the medical chart. (2628Y)   []  Falls were addressed in the plan of care.  (1613N)   []  A falls risk assessment was not completed and documented in the medical chart (1828J,2H)   []   Falls were not addressed in the plan of care and reason was documented in the plan of care. (8743Z 1P)        Medication     [x] A list of current medications (including prescription, over the counter, herbal supplements, vitamin supplements, mineral supplements, dietary supplements) was reviewed with the patient and documented in the medical chart. ()      ASSESSMENT:   Functional Impairments   []Noted spinal or UE joint hypomobility   []Noted spinal or UE joint hypermobility   [x]Decreased UE functional ROM   [x]Decreased UE functional strength   []Abnormal reflexes/sensation/myotomal/dermatomal deficits   [x]Decreased RC/scapular/core strength and neuromuscular control   []other:      Functional Activity Limitations (from functional questionnaire and intake)   [x]Reduced ability to tolerate prolonged functional positions   [x]Reduced ability or difficulty with changes of positions or transfers between positions   [x]Reduced ability to maintain good posture and demonstrate good body mechanics with sitting, bending, and lifting   [x] Reduced ability or tolerance with driving and/or computer work   [x]Reduced ability to sleep   [x]Reduced ability to perform lifting, reaching, carrying tasks   []Reduced ability to tolerate impact through UE   [x]Reduced ability to reach behind back   []Reduced ability to  or hold objects   []Reduced ability to throw or toss an object   []other:    Participation Restrictions   [x]Reduced participation in self care activities   [x]Reduced participation in home management activities   []Reduced participation in work activities   [x]Reduced participation in social activities. []Reduced participation in sport/recreation activities. Classification:   []Signs/symptoms consistent with post-surgical status including decreased ROM, strength and function.   []Signs/symptoms consistent with joint sprain/strain   []Signs/symptoms consistent with shoulder impingement   []Signs/symptoms consistent with shoulder/elbow/wrist tendinopathy   []Signs/symptoms consistent with Rotator cuff tear   []Signs/symptoms consistent with labral tear   []Signs/symptoms consistent with postural dysfunction    []Signs/symptoms consistent with Glenohumeral IR Deficit - <45 degrees   []Signs/symptoms consistent with facet dysfunction of cervical/thoracic spine    []Signs/symptoms consistent with pathology which may benefit from Dry needling     [x]other:  Signs/symptoms consistent with adhesive capsulitis of shoulder    Prognosis/Rehab Potential:      []Excellent   [x]Good    []Fair   []Poor    Tolerance of evaluation/treatment:    []Excellent   [x]Good    []Fair   []Poor    Physical Therapy Evaluation Complexity Justification  [x] A history of present problem with:  [] no personal factors and/or comorbidities that impact the plan of care;  [x]1-2 personal factors and/or comorbidities that impact the plan of care  []3 personal factors and/or comorbidities that impact the plan of care  [x] An examination of body systems using standardized tests and measures addressing any of the following: body structures and functions (impairments), activity limitations, and/or participation restrictions;:  [] a total of 1-2 or more elements   [x] a total of 3 or more elements   [] a total of 4 or more elements   [x] A clinical presentation with:  [x] stable and/or uncomplicated characteristics   [] evolving clinical presentation with changing characteristics  [] unstable and unpredictable characteristics;   [x] Clinical decision making of [x] low, [] moderate, [] high complexity using standardized patient assessment instrument and/or measurable assessment of functional outcome.     [x] EVAL (LOW) 40599 (typically 20 minutes face-to-face)  [] EVAL (MOD) 83652 (typically 30 minutes face-to-face)  [] EVAL (HIGH) 46058 (typically 45 minutes face-to-face)  [] RE-EVAL     PLAN:  Frequency/Duration:  2-3 days per week for 4-6 Weeks:  INTERVENTIONS:  [x] Therapeutic exercise including: strength training, ROM, for Upper extremity and core   [x]  NMR activation and proprioception for UE, scap and Core   [x] Manual therapy as indicated for shoulder, scapula and spine to include: Dry Needling/IASTM, STM, PROM, Gr I-IV mobilizations, manipulation. [x] Modalities as needed that may include: thermal agents, E-stim, Biofeedback, US, iontophoresis as indicated  [x] Patient education on joint protection, postural re-education, activity modification, progression of HEP. HEP instruction: Patient returned proper demonstration of HEP. Issued patient written program clearly stating sets/reps/sessions per day. Written program was scanned into media section of medical record. (see scanned forms)    GOALS:  Patient stated goal: Swing golf club without pain in L shoulder and looking over L shoulder without pain in neck  [] Progressing: [] Met: [] Not Met: [] Adjusted    Therapist goals for Patient:   Short Term Goals: To be achieved in: 2 weeks  1. Independent in HEP and progression per patient tolerance, in order to prevent re-injury. [] Progressing: [] Met: [] Not Met: [] Adjusted  2. Patient will have a decrease in pain to facilitate improvement in movement, function, and ADLs as indicated by Functional Deficits. [] Progressing: [] Met: [] Not Met: [] Adjusted    Long Term Goals: To be achieved in: 6 weeks  1. Functional index score of 64 or more for FOTO to assist with reaching prior level of function. [] Progressing: [] Met: [] Not Met: [] Adjusted  2. Patient will demonstrate increased In L shoulder flexion, abduction, ER, and IR AROM to >155°, >155°, T3, and T8 respectively to allow for proper joint functioning as indicated by patients Functional Deficits. [] Progressing: [] Met: [] Not Met: [] Adjusted  3.  Patient will demonstrate an increase in L shoulder strength in all planes to 4+/5 or greater to allow for proper functional mobility as indicated by patients Functional Deficits. [] Progressing: [] Met: [] Not Met: [] Adjusted  4. Patient will return to sleeping on the L slide with 1/10 pain or less in L shoulder so that he may return to PLOF. [] Progressing: [] Met: [] Not Met: [] Adjusted  5. Patient will demonstrate increased In cervical spine flexion, extension, B rotation, and B side bending AROM to >80%, >70%, >75%, and >75% to allow for proper joint functioning as indicated by patients Functional Deficits.     [] Progressing: [] Met: [] Not Met: [] Adjusted     Electronically signed by:  Brooks Mckee, PT, DPT, OCS, OMT-C    Board-Certified Orthopaedic Clinical Specialist    [place] PT license: [id number]  [place] PT license: [id number]

## 2022-06-06 NOTE — PROGRESS NOTES
[facility]   Phone: [phone]    Fax: [phone]      Physical Therapy Treatment Note/ Progress Report:           Date:  2022    Patient Name:  Odalys Gutierrez    :  1955  MRN: [id number]  Restrictions/Precautions:    Medical/Treatment Diagnosis Information:  · Diagnosis: L shoulder adhesive capsulitis  ·    Insurance/Certification information:  PT Insurance Information: Medicare  Physician Information:  Referring Practitioner: Dr. Eugenio Becerra  Has the plan of care been signed (Y/N):        []  Yes  [x]  No     Date of Patient follow up with Physician: 2022      Is this a Progress Report:     [x]  Yes  []  No        If Yes:  Date Range for reporting period:  Beginnin2022  Endin2022    Progress report will be due (10 Rx or 30 days whichever is less): 1738       Recertification will be due (POC Duration  / 90 days whichever is less): 2022         Visit # Insurance Allowable Auth Required   1 Med nec []  Yes [x]  No        Functional Scale:  FOTO score: 54   Date assessed: 2022     PQRS:   Reviewed medication list with patient. No changes since last PT visit.   2022    Latex Allergy:  [x]NO      []YES  Preferred Language for Healthcare:   [x]English       []other:      Pain level:  2/10  At rest, 7-8/10 at worst     SUBJECTIVE:  See eval    OBJECTIVE: See eval       ROM PROM AROM  Comment:  2022     L R L R     Flexion     153°* 152°     Abduction     153°* 152°     ER     T1* T3     IR     T10* T9     Cervical flexion         80%   Cervical extension         75%   Cervical rotation     50% 90%     Cervical  Side bending     50% 50%           Strength L R Comment:  2022   Flexion 4+/5 5/5     Abduction 4+/5* 5/5     ER 4+/5 5/5     IR 4+/5 5/5           Special Tests Results/Comment:  2022   Emmy Positive on L   Neers Positive on L   OBriens Positive on L   Other: empty can Positive on L     RESTRICTIONS/PRECAUTIONS: Cervical spine DDD and L shoulder adhesive capsulitis    Exercises/Interventions:     Therapeutic Ex (10495) Sets/sec Reps Notes/CUES   AD UE BIKE            STRETCHING/ROM      Pulleys      Table Slides-Flexion      Table Slides-Scaption      Table Slides-Abduction      Walk back stretch at counter      Wand-ER at 0      Wand-Supine ER at 39      Wand-Supine Flexion      UE Glennie      Pendulum      Doorway ER 10\" x10    Wall Slides 10\" x10    CBA Add NPV     TP BB      Sleeper       Elbow PROM/AROM      Cervical extension SNAG 5\" x10    Cervical rotation SNAG to L 5\" x10                            ISOMETRICS      Gripping      Scap Retraction 2-3\" x20    Cervical retraction 2-3\" x20    Abduction      Flexion      Internal Rotation      External Rotation            STRENGTHENING-PREs      Flexion      Abduction      Internal Rotation      External Rotation      Shrugs      Biceps      Triceps      Retraction      Extension      Horizontal Abduction in ER      Serratus                        THERABANDS/CABLE COLUMN      Rows      Lats      Extension      Internal Rotation      External Rotation      Biceps      Triceps      PNF                                    Manual Intervention (23116)      Scar Massage      STM      Hawkgrips      GH joint mobilizations      Foamroll                  NMR re-education (12406)   CUES NEEDED   Plyoback      Therabar oscillations      Body Blade       Rhythmic Stabilization      Ball on the wall                              Therapeutic Activity (77292)      Core training      Swiss ball activities      Education                                Therapeutic Exercise and NMR EXR  [x] (66674) Provided verbal/tactile cueing for activities related to strengthening, flexibility, endurance, ROM  for improvements in scapular, scapulothoracic and UE control with self care, reaching, carrying, lifting, house/yardwork, driving/computer work.     [x] (90040) Provided verbal/tactile cueing for activities related to improving balance, coordination, kinesthetic sense, posture, motor skill, proprioception  to assist with  scapular, scapulothoracic and UE control with self care, reaching, carrying, lifting, house/yardwork, driving/computer work. Therapeutic Activities:    [x] (54258 or 38631) Provided verbal/tactile cueing for activities related to improving balance, coordination, kinesthetic sense, posture, motor skill, proprioception and motor activation to allow for proper function of scapular, scapulothoracic and UE control with self care, carrying, lifting, driving/computer work. Home Exercise Program:    [x] (52174) Reviewed/Progressed HEP activities related to strengthening, flexibility, endurance, ROM of scapular, scapulothoracic and UE control with self care, reaching, carrying, lifting, house/yardwork, driving/computer work  [] (61565) Reviewed/Progressed HEP activities related to improving balance, coordination, kinesthetic sense, posture, motor skill, proprioception of scapular, scapulothoracic and UE control with self care, reaching, carrying, lifting, house/yardwork, driving/computer work      Manual Treatments:  PROM / STM / Oscillations-Mobs:  G-I, II, III, IV (PA's, Inf., Post.)  [] (56943) Provided manual therapy to mobilize soft tissue/joints of cervical/CT, scapular GHJ and UE for the purpose of modulating pain, promoting relaxation,  increasing ROM, reducing/eliminating soft tissue swelling/inflammation/restriction, improving soft tissue extensibility and allowing for proper ROM for normal function with self care, reaching, carrying, lifting, house/yardwork, driving/computer work    Modalities:  Patient declined   [] GAME READY (VASO)- for significant edema, swelling, pain control.     Charges:  Timed Code Treatment Minutes: 30   Total Treatment Minutes: 48      [x] EVAL (LOW) 19693 (typically 20 minutes face-to-face) (18')  [] EVAL (MOD) 64058 (typically 30 minutes face-to-face)  [] EVAL (HIGH) 27693 (typically 45 minutes face-to-face)  [] RE-EVAL     [x] HT(57244) x 1 (18') [] IONTO  [] NMR (92498) x     [] VASO  [] Manual (59212) x     [] Other:  [x] TA x  1 (12')  [] Mech Traction (48198)  [] ES(attended) (37523)      [] ES (un) (33893):         ASSESSMENT:  See eval      GOALS:  Patient stated goal: Swing golf club without pain in L shoulder and looking over L shoulder without pain in neck  [] Progressing: [] Met: [] Not Met: [] Adjusted    Therapist goals for Patient:   Short Term Goals: To be achieved in: 2 weeks  1. Independent in HEP and progression per patient tolerance, in order to prevent re-injury. [] Progressing: [] Met: [] Not Met: [] Adjusted  2. Patient will have a decrease in pain to facilitate improvement in movement, function, and ADLs as indicated by Functional Deficits. [] Progressing: [] Met: [] Not Met: [] Adjusted    Long Term Goals: To be achieved in: 6 weeks  1. Functional index score of 64 or more for FOTO to assist with reaching prior level of function. [] Progressing: [] Met: [] Not Met: [] Adjusted  2. Patient will demonstrate increased In L shoulder flexion, abduction, ER, and IR AROM to >155°, >155°, T3, and T8 respectively to allow for proper joint functioning as indicated by patients Functional Deficits. [] Progressing: [] Met: [] Not Met: [] Adjusted  3. Patient will demonstrate an increase in L shoulder strength in all planes to 4+/5 or greater to allow for proper functional mobility as indicated by patients Functional Deficits. [] Progressing: [] Met: [] Not Met: [] Adjusted  4. Patient will return to sleeping on the L slide with 1/10 pain or less in L shoulder so that he may return to PLOF. [] Progressing: [] Met: [] Not Met: [] Adjusted  5.  Patient will demonstrate increased In cervical spine flexion, extension, B rotation, and B side bending AROM to >80%, >70%, >75%, and >75% to allow for proper joint functioning as indicated by patients Functional Deficits. [] Progressing: [] Met: [] Not Met: [] Adjusted    Overall Progression Towards Functional goals/ Treatment Progress Update:  [] Patient is progressing as expected towards functional goals listed. [] Progression is slowed due to complexities/Impairments listed. [] Progression has been slowed due to co-morbidities. [x] Plan just implemented, too soon to assess goals progression <30days   [] Goals require adjustment due to lack of progress  [] Patient is not progressing as expected and requires additional follow up with physician  [] Other    Prognosis for POC: [x] Good [] Fair  [] Poor      Patient requires continued skilled intervention: [x] Yes  [] No    Treatment/Activity Tolerance:  [x] Patient able to complete treatment  [] Patient limited by fatigue  [] Patient limited by pain    [] Patient limited by other medical complications  [] Other:           PLAN: See eval  [] Continue per plan of care [] Alter current plan   [x] Plan of care initiated [] Hold pending MD visit [] Discharge      Electronically signed by:  Terry Wayne, PT, DPT, OCS, OMT-C    Board-Certified Orthopaedic Clinical Specialist    [place] PT license: [id number]  [place] PT license: [id number]      Note: If patient does not return for scheduled/ recommended follow up visits, this note will serve as a discharge from care along with most recent update on progress.

## 2022-06-14 ENCOUNTER — TREATMENT (OUTPATIENT)
Dept: PHYSICAL THERAPY | Age: 67
End: 2022-06-14
Payer: MEDICARE

## 2022-06-14 DIAGNOSIS — M25.612 DECREASED ROM OF LEFT SHOULDER: ICD-10-CM

## 2022-06-14 DIAGNOSIS — M75.02 ADHESIVE CAPSULITIS OF LEFT SHOULDER: Primary | ICD-10-CM

## 2022-06-14 DIAGNOSIS — M50.30 DDD (DEGENERATIVE DISC DISEASE), CERVICAL: ICD-10-CM

## 2022-06-14 DIAGNOSIS — M25.512 LEFT SHOULDER PAIN, UNSPECIFIED CHRONICITY: ICD-10-CM

## 2022-06-14 PROCEDURE — G8427 DOCREV CUR MEDS BY ELIG CLIN: HCPCS | Performed by: PHYSICAL THERAPIST

## 2022-06-14 PROCEDURE — 97112 NEUROMUSCULAR REEDUCATION: CPT | Performed by: PHYSICAL THERAPIST

## 2022-06-14 PROCEDURE — 97530 THERAPEUTIC ACTIVITIES: CPT | Performed by: PHYSICAL THERAPIST

## 2022-06-14 PROCEDURE — 97110 THERAPEUTIC EXERCISES: CPT | Performed by: PHYSICAL THERAPIST

## 2022-06-14 PROCEDURE — 97140 MANUAL THERAPY 1/> REGIONS: CPT | Performed by: PHYSICAL THERAPIST

## 2022-06-14 NOTE — PROGRESS NOTES
[facility]   Phone: [phone]    Fax: [phone]      Physical Therapy Treatment Note/ Progress Report:           Date:  2022    Patient Name:  Julio C Hines    :  1955  MRN: [id number]  Restrictions/Precautions:    Medical/Treatment Diagnosis Information:  · Diagnosis: L shoulder adhesive capsulitis     Insurance/Certification information:  PT Insurance Information: Medicare  Physician Information:  Referring Practitioner: Dr. Anders Hurst  Has the plan of care been signed (Y/N):        [x]  Yes  []  No     Date of Patient follow up with Physician: 2022      Is this a Progress Report:     []  Yes  [x]  No        If Yes:  Date Range for reporting period:  Beginnin2022  Endin2022    Progress report will be due (10 Rx or 30 days whichever is less): 5408       Recertification will be due (POC Duration  / 90 days whichever is less): 2022         Visit # Insurance Allowable Auth Required   2 Med nec []  Yes [x]  No        Functional Scale:  FOTO score: 54   Date assessed: 2022     PQRS:   Reviewed medication list with patient. No changes since last PT visit. 2022    Latex Allergy:  [x]NO      []YES  Preferred Language for Healthcare:   [x]English       []other:      Pain level:  2/10  At rest, 7-8/10 at worst     SUBJECTIVE:  Patient reports that the neck and L shoulder remain \"about the same\" today. He notes full compliance with HEP and no increased pain with any of the exercises.     OBJECTIVE: See eval               ROM PROM AROM  Comment:  2022     L R L R     Flexion     153°* 152°     Abduction     153°* 152°     ER     T1* T3     IR     T10* T9     Cervical flexion         80%   Cervical extension         75%   Cervical rotation     50% 90%     Cervical  Side bending     50% 50%           Strength L R Comment:  2022   Flexion 4+/5 5/5     Abduction 4+/5* 5/5     ER 4+/5 5/5     IR 4+/5 5/5           Special Tests Results/Comment:  6/6/2022   Emmy Positive on L   Neers Positive on L   OBriens Positive on L   Other: empty can Positive on L            RESTRICTIONS/PRECAUTIONS: Cervical spine DDD and L shoulder adhesive capsulitis    Exercises/Interventions:     Therapeutic Ex (03838) Sets/sec Reps Notes/CUES   AD UE BIKE            STRETCHING/ROM      Pulleys 10\" x10    Table Slides-Flexion      Table Slides-Scaption      Table Slides-Abduction      Walk back stretch at counter      Wand-ER at 0      Wand-Supine ER at 45      Wand-Supine Flexion      UE [place]      Pendulum      Doorway ER 10\" x10    Wall Slides 10\" x10    CBA 10\" x10 Blocking scap   BBIR 10\" x10    Sleeper       Elbow PROM/AROM      Cervical extension SNAG 5\" x10    Cervical rotation SNAG to L 5\" x10                            ISOMETRICS      Gripping      Scap Retraction 2-3\" x20    Cervical retraction 2-3\" x20    Abduction      Flexion      Internal Rotation      External Rotation            STRENGTHENING-PREs      Flexion      Abduction      Internal Rotation      External Rotation      Shrugs      Biceps      Triceps      Retraction      Extension      Horizontal Abduction in ER      Serratus                        THERABANDS/CABLE COLUMN      Rows Blue, 2 x10    Lats      Extension Blue, 2 x10    Internal Rotation      External Rotation      Biceps      Triceps      PNF                                    Manual Intervention (26402)      Scar Massage      STM to L infraspinatus, biceps, deltoid, UT and cervical musculature x10'     Hawkgrips      GH joint mobilizations      Foamroll                  NMR re-education (10715)   CUES NEEDED   Plyoback      Therabar oscillations      Body Blade       Rhythmic Stabilization      Ball on the wall                              Therapeutic Activity (39015)      Core training      Swiss ball activities      Education                                Therapeutic Exercise and NMR EXR  [x] (35179) Provided verbal/tactile cueing for activities related to strengthening, flexibility, endurance, ROM  for improvements in scapular, scapulothoracic and UE control with self care, reaching, carrying, lifting, house/yardwork, driving/computer work. [x] (40172) Provided verbal/tactile cueing for activities related to improving balance, coordination, kinesthetic sense, posture, motor skill, proprioception  to assist with  scapular, scapulothoracic and UE control with self care, reaching, carrying, lifting, house/yardwork, driving/computer work. Therapeutic Activities:    [x] (54440 or 37734) Provided verbal/tactile cueing for activities related to improving balance, coordination, kinesthetic sense, posture, motor skill, proprioception and motor activation to allow for proper function of scapular, scapulothoracic and UE control with self care, carrying, lifting, driving/computer work.      Home Exercise Program:    [x] (85632) Reviewed/Progressed HEP activities related to strengthening, flexibility, endurance, ROM of scapular, scapulothoracic and UE control with self care, reaching, carrying, lifting, house/yardwork, driving/computer work    [] (99070) Reviewed/Progressed HEP activities related to improving balance, coordination, kinesthetic sense, posture, motor skill, proprioception of scapular, scapulothoracic and UE control with self care, reaching, carrying, lifting, house/yardwork, driving/computer work      Manual Treatments:  PROM / STM / Oscillations-Mobs:  G-I, II, III, IV (PA's, Inf., Post.)  [] (08670) Provided manual therapy to mobilize soft tissue/joints of cervical/CT, scapular GHJ and UE for the purpose of modulating pain, promoting relaxation,  increasing ROM, reducing/eliminating soft tissue swelling/inflammation/restriction, improving soft tissue extensibility and allowing for proper ROM for normal function with self care, reaching, carrying, lifting, house/yardwork, driving/computer work    Modalities: Patient declined   [] GAME READY (VASO)- for significant edema, swelling, pain control. Charges:  Timed Code Treatment Minutes: 54   Total Treatment Minutes: 64      [] EVAL (LOW) 04319 (typically 20 minutes face-to-face)   [] EVAL (MOD) 64059 (typically 30 minutes face-to-face)  [] EVAL (HIGH) 53295 (typically 45 minutes face-to-face)  [] RE-EVAL     [x] NT(70213) x 1 (20') [] IONTO  [x] NMR (43769) x 1 (10') [] VASO  [x] Manual (73072) x 1 (10')    [] Other:  [x] TA x  1 (14')  [] Mech Traction (66058)  [] ES(attended) (69956)      [] ES (un) (71436):         ASSESSMENT:  Patient demonstrates good carry over with all exercises from HEP and initial visit. He exhibits significant tightness with CBA stretch today, and PT instructed him to perform CBA stretch while blocking scapula against the edge of the wall. He has +2 TTP in the L infraspinatus, deltoid, and cervical musculature noted during manual STM by PT today. He was instructed in icing again tonight and as needed for pain and soreness control. Plan to progress activity per patient tolerance. He was provided with updated HEP handout today. GOALS:  Patient stated goal: Swing golf club without pain in L shoulder and looking over L shoulder without pain in neck  [] Progressing: [] Met: [] Not Met: [] Adjusted    Therapist goals for Patient:   Short Term Goals: To be achieved in: 2 weeks  1. Independent in HEP and progression per patient tolerance, in order to prevent re-injury. [] Progressing: [] Met: [] Not Met: [] Adjusted  2. Patient will have a decrease in pain to facilitate improvement in movement, function, and ADLs as indicated by Functional Deficits. [] Progressing: [] Met: [] Not Met: [] Adjusted    Long Term Goals: To be achieved in: 6 weeks  1. Functional index score of 64 or more for FOTO to assist with reaching prior level of function. [] Progressing: [] Met: [] Not Met: [] Adjusted  2.  Patient will demonstrate increased In L shoulder flexion, abduction, ER, and IR AROM to >155°, >155°, T3, and T8 respectively to allow for proper joint functioning as indicated by patients Functional Deficits. [] Progressing: [] Met: [] Not Met: [] Adjusted  3. Patient will demonstrate an increase in L shoulder strength in all planes to 4+/5 or greater to allow for proper functional mobility as indicated by patients Functional Deficits. [] Progressing: [] Met: [] Not Met: [] Adjusted  4. Patient will return to sleeping on the L slide with 1/10 pain or less in L shoulder so that he may return to PLOF. [] Progressing: [] Met: [] Not Met: [] Adjusted  5. Patient will demonstrate increased In cervical spine flexion, extension, B rotation, and B side bending AROM to >80%, >70%, >75%, and >75% to allow for proper joint functioning as indicated by patients Functional Deficits. [] Progressing: [] Met: [] Not Met: [] Adjusted    Overall Progression Towards Functional goals/ Treatment Progress Update:  [] Patient is progressing as expected towards functional goals listed. [] Progression is slowed due to complexities/Impairments listed. [] Progression has been slowed due to co-morbidities.   [x] Plan just implemented, too soon to assess goals progression <30days   [] Goals require adjustment due to lack of progress  [] Patient is not progressing as expected and requires additional follow up with physician  [] Other    Prognosis for POC: [x] Good [] Fair  [] Poor      Patient requires continued skilled intervention: [x] Yes  [] No    Treatment/Activity Tolerance:  [x] Patient able to complete treatment  [] Patient limited by fatigue  [] Patient limited by pain    [] Patient limited by other medical complications  [] Other:           PLAN: See eval  [x] Continue per plan of care [] Alter current plan   [] Plan of care initiated [] Hold pending MD visit [] Discharge      Electronically signed by:  Ann Edmondson, PT, DPT, OCS, OMT-C    Board-Certified Orthopaedic Clinical Specialist    [place] PT license: [id number]  [place] PT license: [id number]      Note: If patient does not return for scheduled/ recommended follow up visits, this note will serve as a discharge from care along with most recent update on progress.

## 2022-06-20 ENCOUNTER — TREATMENT (OUTPATIENT)
Dept: PHYSICAL THERAPY | Age: 67
End: 2022-06-20
Payer: MEDICARE

## 2022-06-20 DIAGNOSIS — M50.30 DDD (DEGENERATIVE DISC DISEASE), CERVICAL: ICD-10-CM

## 2022-06-20 DIAGNOSIS — M75.02 ADHESIVE CAPSULITIS OF LEFT SHOULDER: Primary | ICD-10-CM

## 2022-06-20 DIAGNOSIS — M25.512 LEFT SHOULDER PAIN, UNSPECIFIED CHRONICITY: ICD-10-CM

## 2022-06-20 DIAGNOSIS — M25.612 DECREASED ROM OF LEFT SHOULDER: ICD-10-CM

## 2022-06-20 PROCEDURE — 97112 NEUROMUSCULAR REEDUCATION: CPT | Performed by: PHYSICAL THERAPIST

## 2022-06-20 PROCEDURE — G8427 DOCREV CUR MEDS BY ELIG CLIN: HCPCS | Performed by: PHYSICAL THERAPIST

## 2022-06-20 PROCEDURE — 97110 THERAPEUTIC EXERCISES: CPT | Performed by: PHYSICAL THERAPIST

## 2022-06-20 PROCEDURE — 97140 MANUAL THERAPY 1/> REGIONS: CPT | Performed by: PHYSICAL THERAPIST

## 2022-06-20 PROCEDURE — 97530 THERAPEUTIC ACTIVITIES: CPT | Performed by: PHYSICAL THERAPIST

## 2022-06-20 NOTE — PROGRESS NOTES
[facility]   Phone: [phone]    Fax: [phone]      Physical Therapy Treatment Note/ Progress Report:           Date:  2022    Patient Name:  Kris Roldan    :  1955  MRN: [id number]  Restrictions/Precautions:    Medical/Treatment Diagnosis Information:  · Diagnosis: L shoulder adhesive capsulitis     Insurance/Certification information:  PT Insurance Information: Medicare  Physician Information:  Referring Practitioner: Dr. Hoffmann Pass  Has the plan of care been signed (Y/N):        [x]  Yes  []  No     Date of Patient follow up with Physician: 2022      Is this a Progress Report:     []  Yes  [x]  No        If Yes:  Date Range for reporting period:  Beginnin2022  Endin2022    Progress report will be due (10 Rx or 30 days whichever is less): 5183       Recertification will be due (POC Duration  / 90 days whichever is less): 2022         Visit # Insurance Allowable Auth Required   3 Med nec []  Yes [x]  No        Functional Scale:  FOTO score: 54   Date assessed: 2022     PQRS:   Reviewed medication list with patient. No changes since last PT visit. 2022    Latex Allergy:  [x]NO      []YES  Preferred Language for Healthcare:   [x]English       []other:      Pain level:  2/10  At rest, 6-7/10 at worst     SUBJECTIVE:  Patient reports that he did not sleep well last night, but doesn't think it is related to the neck or L shoulder. He states that he is progressing with wall slide, and feels he is getting higher on the wall. He also notes that he feels pain inn the neck may be getting a little better.     OBJECTIVE: See eval               ROM PROM AROM  Comment:  2022     L R L R     Flexion     153°* 152°     Abduction     153°* 152°     ER     T1* T3     IR     T10* T9     Cervical flexion         80%   Cervical extension         75%   Cervical rotation     50% 90%     Cervical  Side bending     50% 50%   Activity (71144)      Core training      Swiss ball activities      Education                                Therapeutic Exercise and NMR EXR  [x] (12538) Provided verbal/tactile cueing for activities related to strengthening, flexibility, endurance, ROM  for improvements in scapular, scapulothoracic and UE control with self care, reaching, carrying, lifting, house/yardwork, driving/computer work. [x] (83135) Provided verbal/tactile cueing for activities related to improving balance, coordination, kinesthetic sense, posture, motor skill, proprioception  to assist with  scapular, scapulothoracic and UE control with self care, reaching, carrying, lifting, house/yardwork, driving/computer work. Therapeutic Activities:    [x] (38916 or 91239) Provided verbal/tactile cueing for activities related to improving balance, coordination, kinesthetic sense, posture, motor skill, proprioception and motor activation to allow for proper function of scapular, scapulothoracic and UE control with self care, carrying, lifting, driving/computer work. Home Exercise Program:    [x] (40553) Reviewed/Progressed HEP activities related to strengthening, flexibility, endurance, ROM of scapular, scapulothoracic and UE control with self care, reaching, carrying, lifting, house/yardwork, driving/computer work    Access Code: [email or web address]  URL: [email or web address]  Date: 06/20/2022  Prepared by: Linda Rascon    Exercises  Seated Passive Cervical Retraction - 2-3 x daily - 5-7 x weekly - 1 sets - 20 reps  Seated Scapular Retraction - 2-3 x daily - 5-7 x weekly - 1 sets - 20 reps  Seated Assisted Cervical Rotation with Towel (Mirrored) - 2-3 x daily - 5-7 x weekly - 1 sets - 10 reps - 5 seconds hold  Cervical Extension AROM with Strap - 2-3 x daily - 5-7 x weekly - 1 sets - 10 reps - 5 seconds hold  Shoulder Flexion Wall Slide with Towel (Mirrored) - 2-3 x daily - 5-7 x weekly - 1 sets - 10 reps - 10 seconds hold  Shoulder ER Stretch in Abduction (Mirrored) - 2-3 x daily - 5-7 x weekly - 1 sets - 10 reps - 10 seconds hold  Shoulder horizontal adduction stretch on back - 1-2 x daily - 5-7 x weekly - 1 sets - 10 reps - 10 seconds hold  Standing Shoulder Internal Rotation Stretch with Towel (Mirrored) - 1-2 x daily - 5-7 x weekly - 1 sets - 10 reps - 10 seconds hold  Standing Shoulder Row with Anchored Resistance - 1-2 x daily - 5-7 x weekly - 2-3 sets - 10 reps  Shoulder extension with resistance - Neutral - 1-2 x daily - 5-7 x weekly - 2-3 sets - 10 reps  Shoulder Internal Rotation with Resistance (Mirrored) - 1-2 x daily - 5-7 x weekly - 2-3 sets - 10 reps  Standing Wall [employer] with Mini Swiss Ball - 1-2 x daily - 5-7 x weekly - 1 sets - 20 circles clockwise      [] (71320) Reviewed/Progressed HEP activities related to improving balance, coordination, kinesthetic sense, posture, motor skill, proprioception of scapular, scapulothoracic and UE control with self care, reaching, carrying, lifting, house/yardwork, driving/computer work      Manual Treatments:  PROM / STM / Oscillations-Mobs:  G-I, II, III, IV (PA's, Inf., Post.)  [] (44890) Provided manual therapy to mobilize soft tissue/joints of cervical/CT, scapular GHJ and UE for the purpose of modulating pain, promoting relaxation,  increasing ROM, reducing/eliminating soft tissue swelling/inflammation/restriction, improving soft tissue extensibility and allowing for proper ROM for normal function with self care, reaching, carrying, lifting, house/yardwork, driving/computer work    Modalities:  Patient declined   [] GAME READY (VASO)- for significant edema, swelling, pain control.     Charges:  Timed Code Treatment Minutes: 53   Total Treatment Minutes: 63      [] EVAL (LOW) 31529 (typically 20 minutes face-to-face)   [] EVAL (MOD) 24302 (typically 30 minutes face-to-face)  [] EVAL (HIGH) 60336 (typically 45 minutes face-to-face)  [] RE-EVAL     [x] (55541) x 1 (20') [] IONTO  [x] NMR (16543) x 1 (8') [] VASO  [x] Manual (03298) x 1 (10')    [] Other:  [x] TA x  1 (15')  [] Mech Traction (25791)  [] ES(attended) (85496)      [] ES (un) (75689):         ASSESSMENT:  Patient demonstrates good form with all exercises today, requiring minimal cuing or corrections from PT throughout session. He asks PT to correct check his form for his CBA stretch at the wall. He exhibits moderate fatigue with introduction of TB IR and ball on wall exercises today, but he reports no increased pain. Significant palpable muscle tightness in the cervical spine musculature and L UT and infraspinatus, noted during manual STM by PT. Mild decrease in muscle tightness and reported TTP in infraspinatus today. GOALS:  Patient stated goal: Swing golf club without pain in L shoulder and looking over L shoulder without pain in neck  [] Progressing: [] Met: [] Not Met: [] Adjusted    Therapist goals for Patient:   Short Term Goals: To be achieved in: 2 weeks  1. Independent in HEP and progression per patient tolerance, in order to prevent re-injury. [] Progressing: [] Met: [] Not Met: [] Adjusted  2. Patient will have a decrease in pain to facilitate improvement in movement, function, and ADLs as indicated by Functional Deficits. [] Progressing: [] Met: [] Not Met: [] Adjusted    Long Term Goals: To be achieved in: 6 weeks  1. Functional index score of 64 or more for FOTO to assist with reaching prior level of function. [] Progressing: [] Met: [] Not Met: [] Adjusted  2. Patient will demonstrate increased In L shoulder flexion, abduction, ER, and IR AROM to >155°, >155°, T3, and T8 respectively to allow for proper joint functioning as indicated by patients Functional Deficits. [] Progressing: [] Met: [] Not Met: [] Adjusted  3. Patient will demonstrate an increase in L shoulder strength in all planes to 4+/5 or greater to allow for proper functional mobility as indicated by patients Functional Deficits.    [] Progressing: [] Met: [] Not Met: [] Adjusted  4. Patient will return to sleeping on the L slide with 1/10 pain or less in L shoulder so that he may return to PLOF. [] Progressing: [] Met: [] Not Met: [] Adjusted  5. Patient will demonstrate increased In cervical spine flexion, extension, B rotation, and B side bending AROM to >80%, >70%, >75%, and >75% to allow for proper joint functioning as indicated by patients Functional Deficits. [] Progressing: [] Met: [] Not Met: [] Adjusted    Overall Progression Towards Functional goals/ Treatment Progress Update:  [] Patient is progressing as expected towards functional goals listed. [] Progression is slowed due to complexities/Impairments listed. [] Progression has been slowed due to co-morbidities. [x] Plan just implemented, too soon to assess goals progression <30days   [] Goals require adjustment due to lack of progress  [] Patient is not progressing as expected and requires additional follow up with physician  [] Other    Prognosis for POC: [x] Good [] Fair  [] Poor      Patient requires continued skilled intervention: [x] Yes  [] No    Treatment/Activity Tolerance:  [x] Patient able to complete treatment  [] Patient limited by fatigue  [] Patient limited by pain    [] Patient limited by other medical complications  [] Other:           PLAN: See eval  [x] Continue per plan of care [] Alter current plan   [] Plan of care initiated [] Hold pending MD visit [] Discharge      Electronically signed by:  Nalini Lewis, PT, DPT, OCS, OMT-C    Board-Certified Orthopaedic Clinical Specialist    [address] [address] PT license: [id number]  [place] PT license: [id number]      Note: If patient does not return for scheduled/ recommended follow up visits, this note will serve as a discharge from care along with most recent update on progress.

## 2022-06-29 ENCOUNTER — TREATMENT (OUTPATIENT)
Dept: PHYSICAL THERAPY | Age: 67
End: 2022-06-29
Payer: MEDICARE

## 2022-06-29 DIAGNOSIS — M25.612 DECREASED ROM OF LEFT SHOULDER: ICD-10-CM

## 2022-06-29 DIAGNOSIS — M50.30 DDD (DEGENERATIVE DISC DISEASE), CERVICAL: ICD-10-CM

## 2022-06-29 DIAGNOSIS — M25.512 LEFT SHOULDER PAIN, UNSPECIFIED CHRONICITY: ICD-10-CM

## 2022-06-29 DIAGNOSIS — M75.02 ADHESIVE CAPSULITIS OF LEFT SHOULDER: Primary | ICD-10-CM

## 2022-06-29 PROCEDURE — 97110 THERAPEUTIC EXERCISES: CPT | Performed by: PHYSICAL THERAPIST

## 2022-06-29 PROCEDURE — 97140 MANUAL THERAPY 1/> REGIONS: CPT | Performed by: PHYSICAL THERAPIST

## 2022-06-29 PROCEDURE — G8427 DOCREV CUR MEDS BY ELIG CLIN: HCPCS | Performed by: PHYSICAL THERAPIST

## 2022-06-29 PROCEDURE — 97530 THERAPEUTIC ACTIVITIES: CPT | Performed by: PHYSICAL THERAPIST

## 2022-06-29 NOTE — PROGRESS NOTES
[facility]   Phone: [phone]    Fax: [phone]   Physical Therapy Re-Certification Plan of Care    Dear Dr. Vasiliy Barrios,    We had the pleasure of treating the following patient for physical therapy services at [address] [place] [address]. A summary of our findings can be found in the updated assessment below. This includes our plan of care. If you have any questions or concerns regarding these findings, please do not hesitate to contact me at the office phone number checked above.   Thank you for the referral.     Physician Signature:________________________________Date:__________________  By signing above (or electronic signature), therapists plan is approved by physician      Overall Response to Treatment:   [x]Patient is responding well to treatment and improvement is noted with regards  to goals   [x]Patient should continue to improve in reasonable time if they continue HEP   []Patient has plateaued and is no longer responding to skilled PT intervention    []Patient is getting worse and would benefit from return to referring MD   []Patient unable to adhere to initial POC   []Other:       Physical Therapy Treatment Note/ Progress Report:           Date:  2022    Patient Name:  Caroline Kamara    :  1955  MRN: [id number]  Restrictions/Precautions:    Medical/Treatment Diagnosis Information:  · Diagnosis: L shoulder adhesive capsulitis     Insurance/Certification information:  PT Insurance Information: Medicare  Physician Information:  Referring Practitioner: Dr. Vasiliy Barrios  Has the plan of care been signed (Y/N):        []  Yes  [x]  No     Date of Patient follow up with Physician: 2022      Is this a Progress Report:     [x]  Yes  []  No        If Yes:  Date Range for reporting period:  Beginnin2022  Endin2022    Progress report will be due (10 Rx or 30 days whichever is less): 8036       Recertification will be due (POC Duration  / 90 days whichever is less): 7/29/2022         Visit # Insurance Allowable Auth Required   4 Med nec []  Yes [x]  No        Functional Scale:  FOTO score: 56   Date assessed: 6/29/2022      PQRS:   Reviewed medication list with patient. No changes since last PT visit. 6/29/2022    Latex Allergy:  [x]NO      []YES  Preferred Language for Healthcare:   [x]English       []other:      Pain level:  0/10  At rest, 4-5/10 at worst for neck, 2-3/10 for shoulder     SUBJECTIVE:  Patient reports that he feels he is reaching farther over head with L UE and reaching across his body better with less pain. He reports pain in the L shoulder when completing a \"windmill\" style motion that he demonstrates for PT. He states that he still has pain with reaching behind his back as well.  He also reports that his neck feels \"about the same\" overall, with greatest pain rotating toward the L and side bending to the L.    OBJECTIVE: See eval               ROM PROM AROM  Comment:  6/6/2022     L R L R     Flexion     165°* 152°     Abduction     160°* 152°     ER     T3 T3     IR     T9* T9     Cervical flexion         90%   Cervical extension         85%   Cervical rotation     75%* 90%     Cervical  Side bending     50%* 60%           Strength L R Comment:  6/6/2022   Flexion 4+/5 5/5     Abduction 4+/5* 5/5     ER 5/5 5/5     IR 5/5 5/5           Special Tests Results/Comment:  6/6/2022   Emmy Negative on L   Neers Negative on L   OBriens Negative on L   Other: empty can Negative on L            RESTRICTIONS/PRECAUTIONS: Cervical spine DDD and L shoulder adhesive capsulitis    Exercises/Interventions:     Therapeutic Ex (73027) Sets/sec Reps Notes/CUES   AD UE BIKE            STRETCHING/ROM      Pulleys 10\" x10    Table Slides-Flexion      Table Slides-Scaption      Table Slides-Abduction      Walk back stretch at counter      [facility]      Wand-Supine Flexion      UE [place] Pendulum      Doorway ER 10\" x10    Wall Slides 10\" x10    CBA 10\" x10 Blocking scap   BBIR 10\" x10    Sleeper  10\" x10    Elbow PROM/AROM      Cervical extension SNAG 5\" x10    Cervical rotation SNAG to L 5\" x10                            ISOMETRICS      Gripping      Scap Retraction 2-3\" x20    Cervical retraction 2-3\" x20    Abduction      Flexion      Internal Rotation      External Rotation            STRENGTHENING-PREs      Flexion      Abduction      Internal Rotation      External Rotation      Shrugs      Biceps      Triceps      Retraction      Extension      Horizontal Abduction in ER      Serratus                        THERABANDS/CABLE COLUMN      Rows Black, 2 x10    Lats      Extension Black, 2 x10    Internal Rotation Black, 2 x10    External Rotation Blue, 2 x10    Biceps      Triceps      PNF                                    Manual Intervention (99637)      Scar Massage      STM to L infraspinatus, biceps, deltoid, UT and cervical musculature x10'     Hawkgrips      GH joint mobilizations      Foamroll                  NMR re-education (33356)   CUES NEEDED   Plyoback      Therabar oscillations      Body Blade       Rhythmic Stabilization      Ball on the wall  x20 cw/ccw                           Therapeutic Activity (44549)      Core training      Swiss ball activities      Education                                Therapeutic Exercise and NMR EXR  [x] (71346) Provided verbal/tactile cueing for activities related to strengthening, flexibility, endurance, ROM  for improvements in scapular, scapulothoracic and UE control with self care, reaching, carrying, lifting, house/yardwork, driving/computer work.     [x] (17187) Provided verbal/tactile cueing for activities related to improving balance, coordination, kinesthetic sense, posture, motor skill, proprioception  to assist with  scapular, scapulothoracic and UE control with self care, reaching, carrying, lifting, house/yardwork, driving/computer work.    Therapeutic Activities:    [x] (67982 or 57565) Provided verbal/tactile cueing for activities related to improving balance, coordination, kinesthetic sense, posture, motor skill, proprioception and motor activation to allow for proper function of scapular, scapulothoracic and UE control with self care, carrying, lifting, driving/computer work. Home Exercise Program:    [x] (81411) Reviewed/Progressed HEP activities related to strengthening, flexibility, endurance, ROM of scapular, scapulothoracic and UE control with self care, reaching, carrying, lifting, house/yardwork, driving/computer work    Access Code: [email or web address]  URL: [email or web address]  Date: 06/20/2022  Prepared by: Sushila Mendez    Exercises  Seated Passive Cervical Retraction - 2-3 x daily - 5-7 x weekly - 1 sets - 20 reps  Seated Scapular Retraction - 2-3 x daily - 5-7 x weekly - 1 sets - 20 reps  Seated Assisted Cervical Rotation with Towel (Mirrored) - 2-3 x daily - 5-7 x weekly - 1 sets - 10 reps - 5 seconds hold  Cervical Extension AROM with Strap - 2-3 x daily - 5-7 x weekly - 1 sets - 10 reps - 5 seconds hold  Shoulder Flexion Wall Slide with Towel (Mirrored) - 2-3 x daily - 5-7 x weekly - 1 sets - 10 reps - 10 seconds hold  Shoulder ER Stretch in Abduction (Mirrored) - 2-3 x daily - 5-7 x weekly - 1 sets - 10 reps - 10 seconds hold  Shoulder horizontal adduction stretch on back - 1-2 x daily - 5-7 x weekly - 1 sets - 10 reps - 10 seconds hold  Standing Shoulder Internal Rotation Stretch with Towel (Mirrored) - 1-2 x daily - 5-7 x weekly - 1 sets - 10 reps - 10 seconds hold  Standing Shoulder Row with Anchored Resistance - 1-2 x daily - 5-7 x weekly - 2-3 sets - 10 reps  Shoulder extension with resistance - Neutral - 1-2 x daily - 5-7 x weekly - 2-3 sets - 10 reps  Shoulder Internal Rotation with Resistance (Mirrored) - 1-2 x daily - 5-7 x weekly - 2-3 sets - 10 reps  Standing Wall [employer] with [employer] - 1-2 x daily - 5-7 x weekly - 1 sets - 20 circles clockwise      [] (41202) Reviewed/Progressed HEP activities related to improving balance, coordination, kinesthetic sense, posture, motor skill, proprioception of scapular, scapulothoracic and UE control with self care, reaching, carrying, lifting, house/yardwork, driving/computer work      Manual Treatments:  PROM / STM / Oscillations-Mobs:  G-I, II, III, IV (PA's, Inf., Post.)  [x] (19615) Provided manual therapy to mobilize soft tissue/joints of cervical/CT, scapular GHJ and UE for the purpose of modulating pain, promoting relaxation,  increasing ROM, reducing/eliminating soft tissue swelling/inflammation/restriction, improving soft tissue extensibility and allowing for proper ROM for normal function with self care, reaching, carrying, lifting, house/yardwork, driving/computer work    Modalities:  Patient declined   [] GAME READY (VASO)- for significant edema, swelling, pain control. Charges:  Timed Code Treatment Minutes: 50   Total Treatment Minutes: 60      [] EVAL (LOW) 37918 (typically 20 minutes face-to-face)   [] EVAL (MOD) 12532 (typically 30 minutes face-to-face)  [] EVAL (HIGH) 64347 (typically 45 minutes face-to-face)  [] RE-EVAL     [x] DELUCA(18328) x 1 (22') [] IONTO  [] NMR (67533) x   [] VASO  [x] Manual (26115) x 1 (10')    [] Other:  [x] TA x  1 (18')  [] [facility] T[facility] (03056)  [] ES(attended) (59290)      [] ES (un) (07028):         ASSESSMENT: Patient demonstrates gains in AROM in all planes with L shoulder today. He remains most restricted in IR and abduction ROM of the L shoulder. His cervical spine ROM is improving slowly but he continues to report pain in the L side. He has significant fatigue and muscle burn with introduction of TB ER today. He requires moderate verbal and tactile cuing from PT to achieve and maintain proper form with sleeper stretch today.  Patient would benefit from continued skilled PT services to address remaining deficits in L shoulder and C/S ROM, strength, and function so that he may reurn to PLOF for all ADLs. GOALS:  Patient stated goal: Swing golf club without pain in L shoulder and looking over L shoulder without pain in neck  [x] Progressing: [] Met: [] Not Met: [] Adjusted    Therapist goals for Patient:   Short Term Goals: To be achieved in: 2 weeks  1. Independent in HEP and progression per patient tolerance, in order to prevent re-injury. [] Progressing: [x] Met: [] Not Met: [] Adjusted  2. Patient will have a decrease in pain to facilitate improvement in movement, function, and ADLs as indicated by Functional Deficits. [x] Progressing: [] Met: [] Not Met: [] Adjusted    Long Term Goals: To be achieved in: 6 weeks  1. Functional index score of 64 or more for FOTO to assist with reaching prior level of function. [x] Progressing: [] Met: [] Not Met: [] Adjusted  2. Patient will demonstrate increased In L shoulder flexion, abduction, ER, and IR AROM to >155°, >155°, T3, and T8 respectively to allow for proper joint functioning as indicated by patients Functional Deficits. [x] Progressing: [] Met: [] Not Met: [] Adjusted  3. Patient will demonstrate an increase in L shoulder strength in all planes to 4+/5 or greater to allow for proper functional mobility as indicated by patients Functional Deficits. [x] Progressing: [] Met: [] Not Met: [] Adjusted  4. Patient will return to sleeping on the L slide with 1/10 pain or less in L shoulder so that he may return to PLOF. [x] Progressing: [] Met: [] Not Met: [] Adjusted  5. Patient will demonstrate increased In cervical spine flexion, extension, B rotation, and B side bending AROM to >80%, >70%, >75%, and >75% to allow for proper joint functioning as indicated by patients Functional Deficits.     [x] Progressing: [] Met: [] Not Met: [] Adjusted    Overall Progression Towards Functional goals/ Treatment Progress Update:  [x] Patient is progressing as expected towards functional goals listed. [x] Progression is slowed due to complexities/Impairments listed. [] Progression has been slowed due to co-morbidities. [] Plan just implemented, too soon to assess goals progression <30days   [] Goals require adjustment due to lack of progress  [] Patient is not progressing as expected and requires additional follow up with physician  [] Other    Prognosis for POC: [x] Good [] Fair  [] Poor      Patient requires continued skilled intervention: [x] Yes  [] No    Treatment/Activity Tolerance:  [x] Patient able to complete treatment  [] Patient limited by fatigue  [] Patient limited by pain    [] Patient limited by other medical complications  [] Other:           PLAN: 1-2x/week for 4 weeks  [x] Continue per plan of care [] Alter current plan   [] Plan of care initiated [] Hold pending MD visit [] Discharge      Electronically signed by:  Jen Suazo, PT, DPT, OCS, OMT-C    Board-Certified Orthopaedic Clinical Specialist    [id number] [address] PT license: [id number]  [place] PT license: [id number]      Note: If patient does not return for scheduled/ recommended follow up visits, this note will serve as a discharge from care along with most recent update on progress.

## 2022-07-07 ENCOUNTER — TREATMENT (OUTPATIENT)
Dept: PHYSICAL THERAPY | Age: 67
End: 2022-07-07
Payer: MEDICARE

## 2022-07-07 ENCOUNTER — OFFICE VISIT (OUTPATIENT)
Dept: ORTHOPEDIC SURGERY | Age: 67
End: 2022-07-07
Payer: MEDICARE

## 2022-07-07 VITALS — HEIGHT: 69 IN | BODY MASS INDEX: 22.36 KG/M2 | WEIGHT: 151 LBS

## 2022-07-07 DIAGNOSIS — M54.2 NECK PAIN: ICD-10-CM

## 2022-07-07 DIAGNOSIS — M50.30 DDD (DEGENERATIVE DISC DISEASE), CERVICAL: ICD-10-CM

## 2022-07-07 DIAGNOSIS — M75.02 ADHESIVE CAPSULITIS OF LEFT SHOULDER: Primary | ICD-10-CM

## 2022-07-07 DIAGNOSIS — M25.512 LEFT SHOULDER PAIN, UNSPECIFIED CHRONICITY: ICD-10-CM

## 2022-07-07 DIAGNOSIS — M25.612 DECREASED ROM OF LEFT SHOULDER: ICD-10-CM

## 2022-07-07 PROCEDURE — 1123F ACP DISCUSS/DSCN MKR DOCD: CPT | Performed by: ORTHOPAEDIC SURGERY

## 2022-07-07 PROCEDURE — G8427 DOCREV CUR MEDS BY ELIG CLIN: HCPCS | Performed by: PHYSICAL THERAPIST

## 2022-07-07 PROCEDURE — G8427 DOCREV CUR MEDS BY ELIG CLIN: HCPCS | Performed by: ORTHOPAEDIC SURGERY

## 2022-07-07 PROCEDURE — 3017F COLORECTAL CA SCREEN DOC REV: CPT | Performed by: ORTHOPAEDIC SURGERY

## 2022-07-07 PROCEDURE — G8420 CALC BMI NORM PARAMETERS: HCPCS | Performed by: ORTHOPAEDIC SURGERY

## 2022-07-07 PROCEDURE — 99213 OFFICE O/P EST LOW 20 MIN: CPT | Performed by: ORTHOPAEDIC SURGERY

## 2022-07-07 PROCEDURE — 97530 THERAPEUTIC ACTIVITIES: CPT | Performed by: PHYSICAL THERAPIST

## 2022-07-07 PROCEDURE — 1036F TOBACCO NON-USER: CPT | Performed by: ORTHOPAEDIC SURGERY

## 2022-07-07 PROCEDURE — 97110 THERAPEUTIC EXERCISES: CPT | Performed by: PHYSICAL THERAPIST

## 2022-07-07 PROCEDURE — 97140 MANUAL THERAPY 1/> REGIONS: CPT | Performed by: PHYSICAL THERAPIST

## 2022-07-07 NOTE — PROGRESS NOTES
[facility]  History and Physical  Shoulder Pain    Date:  2022    Name:  Marlene Elizondo  Address:  [address] [place] [address]    :  1955      Age:   77 y.o.    SSN:  [id number]      Medical Record Number:  [id number]    Reason for Visit:    Shoulder Pain (F/U LEFT SHOULDER)      HPI:   Marlene Elizondo is a 77 y.o. male who presents to our office today for follow up of the left shoulder pain. Patient has been undergoing conservative treatment for adhesive capsulitis of his left shoulder. Overall he feels that the shoulder is doing much better than it had been. He feels that his range of motion has improved along with his pain levels at the shoulder. He is having some achy pain over the left aspect of his neck. The pain worsens with right lateral rotation and on occasion he has some tingling down the arm. The patient denies any injuries to his neck. He continues to go to physical therapy for the shoulder and does have a few more sessions to attend      Pain Assessment  Location of Pain: Shoulder  Location Modifiers: Left  Severity of Pain: 3  Quality of Pain: Aching  Duration of Pain: Persistent  Frequency of Pain: Intermittent  Aggravating Factors: Stretching,Straightening,Exercise  Limiting Behavior: Some  Relieving Factors: Rest  Work-Related Injury: No  Are there other pain locations you wish to document?: No    No notes on file    Review of Systems:  A 14 point review of systems available in the scanned medical record as documented by the patient on 22. The review is negative with the exception of those things mentioned in the History of Present Illness and Past Medical History. Past Medical History:  Patient's medications, allergies, past medical, surgical, social and family histories were reviewed and updated as appropriate.     Allergies:  No Known Allergies    Physical Exam:  There were no vitals filed for this visit. General: Renzo Cordova is a healthy and well appearing 77 y.o. male who is sitting comfortably in our office in acute distress. Skin:  There are no skin lesions, cellulitis, or extreme edema. The patient has warm and well-perfused Bilateral upper extremities with brisk capillary refill. Eyes: Extra-ocular muscles intact  Mouth: Oral mucosa moist. No perioral lesions  Pulm: Respirations unlabored and regular. Neuro: Alert and oriented x3    left Shoulder Exam:  Inspection:  No gross deformities, no signs of infection. Palpation: No crepitus of passive movement    Active Range of Motion: Forward elevation of 140 versus 160, external rotation with elbow at the side 50 versus 60 on the right, internal rotation to the back is T9 versus T7 on the right    Passive Range of Motion: Passively forward elevation can be further increased to 145. Strength: deferred    Special Tests:   No Hernando muscle deformity. Neurovascular: Sensation to light touch is intact, no motor deficits, palpable radial pulses 2+    Additional Examinations:    Examination of the contralateral extremity does not show any tenderness, deformity or injury. Range of motion is unremarkable. There is no gross instability. There are no rashes, ulcerations or lesions. Strength and tone are normal.      Radiographic:  X-ray not obtained today. Assessment:  Renzo Cordova is a 77 y.o. male with resolving adhesive capsulitis of the left shoulder with conservative treatment. He does have cervical pathology that is providing some lingering symptoms of discomfort. .    Impression:  Encounter Diagnoses   Name Primary?  Adhesive capsulitis of left shoulder Yes    Neck pain        Office Procedures:  No orders of the defined types were placed in this encounter. Plan: At this point we feel that he can work with the physical therapist and eventually transition to a home-based program when he is ready.   We will also provide him information regarding a spine specialist, Dr. Roverto West of his should he want to explore those symptoms further. All his questions were fully answered today. We will see him back on an as-needed basis should he have worsening symptoms of his shoulders. He was agreeable to the plan. 7/7/2022  2:01 PM      Janice Taylor PA-C  Orthopaedic Sports Medicine Physician Assistant    During this examination, I, Janice Taylor PA-C, functioned as a scribe for Dr. Hattie Garcia. This dictation was performed with a verbal recognition program (DRAGON) and it was checked for errors. It is possible that there are still dictated errors within this office note. If so, please bring any errors to my attention for an addendum. All efforts were made to ensure that this office note is accurate.  ________________  I, Dr. Hattie Garcia, personally performed the services described in this documentation as described by Janice Taylor PA-C in my presence, and it is both accurate and complete. Betty Galvan MD, PhD  7/7/2022

## 2022-07-07 NOTE — PROGRESS NOTES
75%* 90%     Cervical  Side bending     50%* 60%           Strength L R Comment:  6/29/2022   Flexion 4+/5 5/5     Abduction 4+/5* 5/5     ER 5/5 5/5     IR 5/5 5/5           Special Tests Results/Comment:  6/29/2022   Emmy Negative on L   Neers Negative on L   OBriens Negative on L   Other: empty can Negative on L            RESTRICTIONS/PRECAUTIONS: Cervical spine DDD and L shoulder adhesive capsulitis    Exercises/Interventions:     Therapeutic Ex (94666) Sets/sec Reps Notes/CUES   AD UE BIKE            STRETCHING/ROM      Pulleys 10\" x10    Table Slides-Flexion      Table Slides-Scaption      Table Slides-Abduction      Walk back stretch at counter      Wand-ER at 0      Wand-Supine ER at 45      Wand-Supine Flexion      UE [place]      Pendulum      Doorway ER 10\" x10    Wall Slides 10\" x10    CBA 10\" x10 Blocking scap   BBIR 10\" x10    Sleeper  10\" x10    Elbow PROM/AROM      Cervical extension SNAG 5\" x10    Cervical rotation SNAG to L 5\" x10                            ISOMETRICS      Gripping      Scap Retraction 2-3\" x20    Cervical retraction 2-3\" x20    Abduction      Flexion      Internal Rotation      External Rotation            STRENGTHENING-PREs      Flexion      Abduction      Internal Rotation      External Rotation      Shrugs      Biceps      Triceps      Retraction      Extension      Horizontal Abduction in ER      Serratus Add NPV                       THERABANDS/CABLE COLUMN      Rows Black, 2 x10    Lats      Extension Black, 2 x10    Internal Rotation Black, 2 x10    External Rotation Blue, 2 x10    Biceps      Triceps      PNF                                    Manual Intervention (11121)      Scar Massage      STM to L infraspinatus, biceps, deltoid, UT and cervical musculature x8'     Hawkgrips      GH joint mobilizations      Foamroll                  NMR re-education (89566)   CUES NEEDED   Plyoback      Therabar oscillations      Body Blade       Rhythmic Stabilization Minutes: 53   Total Treatment Minutes: 63      [] EVAL (LOW) 02711 (typically 20 minutes face-to-face)   [] EVAL (MOD) 30121 (typically 30 minutes face-to-face)  [] EVAL (HIGH) 68330 (typically 45 minutes face-to-face)  [] RE-EVAL     [x] LH(14249) x 1 (19') [] IONTO  [] NMR (14994) x   [] VASO  [x] Manual (10775) x 1 (8')    [] Other:  [x] TA x  2 (26')  [] Mech Traction (44715)  [] ES(attended) (79850)      [] ES (un) (28950):         ASSESSMENT: Progressions held today secondary to patient being slightly sore and fatigued following MD appointment immediately prior to his session. He fatigues moderately with TB ER today, and requires moderate verbal cuing from PT for maintaining scapular retraction with TB ER as well. He demonstrates good form and knowledge of other exercises today. PT provided patient with updated HEP hand out to include TB ER and sleeper stretch today. Plan to progress activity per patient tolerance. GOALS:  Patient stated goal: Swing golf club without pain in L shoulder and looking over L shoulder without pain in neck  [x] Progressing: [] Met: [] Not Met: [] Adjusted    Therapist goals for Patient:   Short Term Goals: To be achieved in: 2 weeks  1. Independent in HEP and progression per patient tolerance, in order to prevent re-injury. [] Progressing: [x] Met: [] Not Met: [] Adjusted  2. Patient will have a decrease in pain to facilitate improvement in movement, function, and ADLs as indicated by Functional Deficits. [x] Progressing: [] Met: [] Not Met: [] Adjusted    Long Term Goals: To be achieved in: 6 weeks  1. Functional index score of 64 or more for FOTO to assist with reaching prior level of function. [x] Progressing: [] Met: [] Not Met: [] Adjusted  2. Patient will demonstrate increased In L shoulder flexion, abduction, ER, and IR AROM to >155°, >155°, T3, and T8 respectively to allow for proper joint functioning as indicated by patients Functional Deficits.    [x] Progressing: [] Met: [] Not Met: [] Adjusted  3. Patient will demonstrate an increase in L shoulder strength in all planes to 4+/5 or greater to allow for proper functional mobility as indicated by patients Functional Deficits. [x] Progressing: [] Met: [] Not Met: [] Adjusted  4. Patient will return to sleeping on the L slide with 1/10 pain or less in L shoulder so that he may return to PLOF. [x] Progressing: [] Met: [] Not Met: [] Adjusted  5. Patient will demonstrate increased In cervical spine flexion, extension, B rotation, and B side bending AROM to >80%, >70%, >75%, and >75% to allow for proper joint functioning as indicated by patients Functional Deficits. [x] Progressing: [] Met: [] Not Met: [] Adjusted    Overall Progression Towards Functional goals/ Treatment Progress Update:  [x] Patient is progressing as expected towards functional goals listed. [x] Progression is slowed due to complexities/Impairments listed. [] Progression has been slowed due to co-morbidities.   [] Plan just implemented, too soon to assess goals progression <30days   [] Goals require adjustment due to lack of progress  [] Patient is not progressing as expected and requires additional follow up with physician  [] Other    Prognosis for POC: [x] Good [] Fair  [] Poor      Patient requires continued skilled intervention: [x] Yes  [] No    Treatment/Activity Tolerance:  [x] Patient able to complete treatment  [] Patient limited by fatigue  [] Patient limited by pain    [] Patient limited by other medical complications  [] Other:           PLAN: 1-2x/week for 4 weeks  [x] Continue per plan of care [] Alter current plan   [] Plan of care initiated [] Hold pending MD visit [] Discharge      Electronically signed by:  Diana Payton, PT, DPT, OCS, OMT-C    Board-Certified Orthopaedic Clinical Specialist    [place] PT license: [id number]  [place] PT license: [id number]      Note: If patient does not return for scheduled/ recommended follow up visits, this note will serve as a discharge from care along with most recent update on progress.

## 2022-07-07 NOTE — LETTER
Physical Therapy Rehabilitation Referral    Patient Name:  Brandon Jorgensen      YOB: 1955    Diagnosis:    1. Adhesive capsulitis of left shoulder        Precautions:     [x] Evaluate and Treat    Post Op Instructions:  [] Continuous passive motion (CPM)  [x] Neck ROM  [x] Exercise in plane of scapula  []  Strengthening     [] Pulley and instruction   [x] Home exercise program (copy to patient)   [] Sling when arm at risk  [] Sling or brace at all times   [] AAROM: Forward elevation to  140            [] AAROM: External rotation  To  40    [] Isometric external rotator strengthening [] AAROM: internal rotation: up the back  [x] Isometric abductor strengthening  [] AAROM: Internal abduction   [] Isometric internal rotator strengthening [] AAROM: cross-body adduction             Stretching:     Strengthening:  [x] Four quadrant (FE, ER, IR, CBA)  [x] Rotator cuff (ER, IR, Abd)  [] Forward Elevation    [] External Rotators     [] External Rotation    [] Internal Rotators  [] Internal Rotation: up/back   [] Abductors     [] Internal Rotation: supine in abduction  [] Sleeper Stretch    [] Flexors  [] Cross-body abduction    [] Extensors  [] Pendulum (FE, Abd/Add, cw/ccw)  [x] Scapular Stabilizers   [] Wall-walking (FE, Abd)        [x] Shoulder shrugs     [] Table slides (FE)                [x] Rhomboid pinch  [] Elbow (flex, ext, pron, sup)        [] Lat.  Pull downs     [] Medial epicondylitis program       [] Forward punch   [] Lateral epicondylitis program       [] Internal rotators     [] Progressive resistive exercises  [] Bench Press        [] Bench press plus  Activities:     [] Lateral pull-downs  [] Rowing     [] Progressive two-hand supine press  [] Stepper/Exercise bike   [x] Biceps: curls/supination  [] Swimming  [] Water exercises    Modalities:     Return to Sport:  [x] Of Choice      [] Plyometrics  [] Ultrasound     [] Rhythmic stabilization  [] Iontophoresis    [] Core strengthening   [] Moist heat     [] Sports specific program:   [] Massage         [x] Cryotherapy      [] Electrical stimulation     [] Paraffin  [] Whirlpool  [] TENS    [x] Home exercise program (copy to patient). Perform exercises for:   15     minutes    3      times/day  [x] Supervised physical therapy  Frequency: []  1x week  [x] 2x week  [] 3x week  [] Other:   Duration: [] 2 weeks   [] 4 weeks  [x] 6 weeks  [] Other:     Additional Instructions:     Betty Ballard MD, PhD

## 2022-07-12 ENCOUNTER — SCHEDULED TELEPHONE ENCOUNTER (OUTPATIENT)
Dept: PHARMACY | Age: 67
End: 2022-07-12
Payer: MEDICARE

## 2022-07-12 DIAGNOSIS — F17.209 NICOTINE DEPENDENCE WITH NICOTINE-INDUCED DISORDER, UNSPECIFIED NICOTINE PRODUCT TYPE: Primary | ICD-10-CM

## 2022-07-12 PROCEDURE — 99211 OFF/OP EST MAY X REQ PHY/QHP: CPT | Performed by: PHARMACIST

## 2022-07-12 NOTE — PROGRESS NOTES
Disclaimer for Virtual Visits: We want to confirm that, for purposes of billing, this is a virtual visit with your provider for which we will submit a claim for reimbursement with your insurance company. You may be responsible for any copays, coinsurance amounts or other amounts not covered by your insurance company. If you do not accept this, unfortunately we will not be able to schedule a virtual visit with the provider. Do you accept? Yes. CLINICAL PHARMACY NOTE--Smoking Cessation    SUBJECTIVE/OBJECTIVE:   Diego Abdi is a 77 y.o. male with PMHx significant for GERD, HTN, pulmonary emphesema referred by Dr Jennifer Montalvo for smoking cessation counseling and medication management. Spoke with patient over the phone on 07/12/22. 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 30-60 min (previously immediately after waking)  Do you smoke 20 or more cigarettes each day? No  At times when you can't smoke or haven't got any cigarettes, do you feel a craving for one? Yes, but not if not keeping busy  Is it tough for you to keep from smoking for more than a few hours?  No, not if in a place where he can't smoke  When you are sick enough to stay in bed, to you still smoke? no  If yes to two or more questions, consider using NRT    Reasons for addiction: Touch and Handle, Stress-relief, Habit, Addiction, \"something to do\"  Triggers: meals, stress, smoke breaks from his work; stopped smoking while driving, with coffee, alcohol (already tried to dissociate smoking with certain activities, but picked it up with other activities)  Barriers: not confident (only 5 on a 1-10 scale); He is \"afraid\" to run out of cigarettes. Motivation for quitting: Has wanted to quit for years (in back of mind) because he doesn't like that it's in control of him (addiction), Health, family, money    12/19/19 update:  Did  Chantix from pharmacy ($40), but did not start. Fearful of side effects. Has history of bad dreams several years ago and is afraid it may cause vivid dreams. Has been able to cut back slightly on his own. Was able to eliminate the 1st cigarette of the day, is now working on the 2nd. Started playing pool to fill downtime. He has been able to avoid buying more cigarettes until completely out of current pack  It has been a busy week, and he hasn't been able to focus on quitting as much as he would like. [facility]. 12/31/19 update:  Smoking \"a little less,\" but unable to give exact amount. Started Chantix yesterday. Has tolerated 2 doses so far. No side effects. Has been able to delay the 2nd cigarette more by switching AM routine (drink coffe before breakfast)  Sucking on mints  Used recumbent bike a couple times, but no routine yet. Moved cigarettes to basement (out of pocket) to make it more inconvenient. Has kept a list of things to do on his breaks. 1/14/20 update:  Continues Chantix 1 mg BID. Takes with food, but has been difficult because he does not eat at regular times during the day. Experiencing some nausea/ \"queesy\" stomach. Also having occasional slight anxiety, but nothing major and is able to tolerate. Denies depression/thoughts of suicide or harming himself. Smoking less, but is not counting exact number because he thinks it will make him think about smoking more. He knows he is not buying cigarettes as often.  He does not buy cigarettes until he is completely out of them. He needs a new RX for chantix. He is trying to stay busy and avoid long breaks which triggers him to smoke. He has been able to postpone the cigarette many times. 1/30/20 update:   Continues Chantix 1 mg BID. Takes with food. Experiencing some nausea/ \"queesy\" stomach, but tolerable. Also having occasional slight anxiety, but nothing major and is able to tolerate. Denies depression/thoughts of suicide or harming himself. Rides his recumbent bike 2x per week for 15 minutes. Smoking less, but is still not counting the exact number because he thinks it will make him think about smoking more. He is buying cigarettes less often and has placed them in his basement so has to go downstairs to get them when he wants to smoke. He does not like going down stairs. He has been able to walk back upstairs from the basement without bringing a cigarette with him. He made a list of reasons for quitting and placed it by his coat so he sees it when he is going outside to smoke. He does not smoke inside house or in car. He started using hard candies in place of cigarettes while he is preparing his meals. He often smokes right before and right after meals. He always brushes his teeth after meals. Patient feels that drinking coffee throughout the day would help keep him busy during breaks, and coffee has not been a trigger for him lately. 2/27/20  Has been able to reduce to 2 packs per week, but it was really hard. He surprised himself. Pt started drinking tea instead of coffee. Has been able to postpoine 1st cig at least a couple hours. Plans to avoid [facility], where he buys his cigs. Feels \"accomplished\" after exercising, but has only been able to exercise 2x per week. 3/12/20  Pt does not feel he did well with the goals we set at last visit 2/2 less focus and recent stressors (DDS, shingles vax, coronavirus). He didn't exercise as much.  He increased to 1 pack in 3 days, then cut back down to 1 pack in 3.5 days. His cravings are less frequent, but not necessarily less strong. The recent [address] outbreak is worrying him because he is high risk. This is more motivation for him to quit. Pt requests a Chantix refill. Tried 1-800-QUIT-NOW- [place] it was not for him. 3/26/20  Pt reports having \"Up and downs. \" Coronavirus has really concerned him. He stopped Chantix for 2 days due to depression surrounding the situation, but realized that not listening to the news helped him much more. Pt feels his mood is stable and would like to resume chantix. Started keeping track of when he smokes (exact #s). He smoked more for a couple days, but he has been able to limit to 4 cig/day the past 2 days! This equates to goal of no more than 1 pack every 5 days. 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 He increased exercise to 20 min 2x per week and started core exercises. He is listening to an old CD while he exercises, which helps. He feel accomplished after exercise. 3/31/20  Pt quit smoking 3 days ago on 3/28/20! Motivation was fear. Pt started back on 0.5 mg once daily. Will increase to 0.5 mg BID. Things that worked: prayer, staying occupied, stopped watching the news because it was depressing. 4/2/20  Will increase Chantix today to 0.5 mg BID  He did put lighter out of pocket/ out of sight  Doesn't have an jacquelyn tray, he put the cigarette butts in a planter pot. Suggested he move this to a different location and plant a new plant or flowers in it. His urges continue to be able piano and after programming. His biggest urge to overcome was when he had been working on programming or several months and finally had to test if it worked, and it did not work. He had a very strong urge to smoke but paced and prayed, then occupied himself with something different. He is not ready to throw away the cigarettes, but he did put them out of site and doesn't remember where they are.    Withdrawal sxs include: nasal drip, insomnia, irritability    4/7/20  11th day of ebing tobacco free. Scared that he might relapse. Reports mood is stable-- slightly anxious/worried about COVID19, but better than before. Withdrawal sxs include: nasal drip better, irritability, insomnia but not sure if 2/2 withdrawal or worry about COVID19. Increased water intake from 3 cups to 6 cups per day. 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 \" Urge lasted ~10 min. Craving rated 7/10.    2) after a really long phone call, paced, ate a piece of candy (max 1-2 pieces candy per day)  3) found himself at the door that he goes out to smoke; he is not sure what led to that moment; asked himself what am I doing? Was able to leave the situation    4/13/20  Pt on way to dentist for toothache. He watched star trek this weekend for 2 week reward. For next reward, he wants to buy new sheet music for piano since he cannot go to lessons. He would like to learn \"I can only imagine. \" Took 1 walk, bike x 2, but no core exercises. Will pick back up this week. 4/15/20  Root canal on 4/13. Started amoxicillin 500 mg TID. Exercise going well, except for past couple days. Unable to figure out what foods relieve nausea from Chantix. Urge 4/13: after leaving [place], had 2 hours to kill before root canal, sat in car with nothing to do, feeling anxious, read his book and listened to music; always used to smoke when he needs to kills time. Missed Chantix on 4/13, took 1 dose on 4/13.     4/22/20  Still needs dental work done, fillings next week.  Core exercise 2x per week, bike 2-3x per week-- feeling stronger. Tobacco cravings \"up and down. \" Most days they are less frequent, intensity varies. Is able to overcome. Watched star trek this weekend as reward. 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 Sunday night craving: made food for the whole week and was on feet all day; had to clean up after, strong craving after he took a break, created a list of distractions when he has cravings: jigsaw puzzle, read book. Still having trouble sleeping: plans to read old spiritual book before bed. Drainage much better. Slightly more depressed due to [address]. Does not feel this is due to Chantix, just situational. 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 Pt denies any thought of harming himself or others. 4/29/20  Pt smoke free x 1 month! Watched an old [employer] game as reward. Sleeping improved now that he is reading 30 min before bed. Piano 1 hour 4x/per week--found a tutorial on how to play \"I can only imagine\" and he is trying to write [employer] sheet music for it. Tried working on software again. He is trying to make a routine. Lifting COVID restrictions is worrying him. He has been busy looking for new insurance as his [employer] expires this month and he is inelligible for [employer] until oct. It has been time consuming. He has a pcp visit next week. Needs to make sure all his RXs are covered under new plan. 5/6/20  Mood is \"up and down\"- manly due to coronavirus pandemic, but feels it is up more often than down. Pt new [employer] insurance starts today. Cravings are rare with short duration, but still strong (7 out of 10). Saw PCP today. Walks outside. Has been on Chantix x 4 months. Is not sure if it's covered by new insurance. 5/20/20  No lapses. Still some strong urges avg once per day. Bike 20 minutes 3x per week, increasing intensity \"from 1 to 2\". Continues core exercises.  Working on writing sheet music for \"I can only imagine\". Increased amount of time spending on software, gradually. Increased nausea with Chantix. Mood is good, better than before. 5/27/20  No increase in cravings after cutting back slightly on Chantix. Nausea improved slightly possibly. A couple strong cravings but able to overcome. Increased exercise from 20 to 30 min on bike 3x per week, core exercises 1x per week, would like to increase to 2x per week. Feels good while riding bike, feels tired/accomplished after. Plays upbeat music. Still practicing piano, trying to write sheet music. Spends time on software, but not accomplishing a lot due to some limitations (space on hard drive). Going to [facility] for [employer] today. Plans to cancel insurance at end of month because they do not cover Chantix or other needs (DDS, chiropractor, PCP). Arranged for [employer] to start 6/1/20 and Drs in [employer]. Sleeping better- was waking up at 3-4a and was anxious. Now not waking up anxious. 6/3/20  Decreased Chantix to 0.5 mg BID at end of last week, no increase in strength or frequency of cravings. Only a brief craving this AM while lying in bed, able to overcome easily. Needs refill on Chantix; has ~1 week left. Cut esomeprazole in half- still no GERD sxs. Still clearing throat (has happened since he quit smoking which he attributes to his lungs clearing out). Nausea has improved since reducing Chantix. Mood is good, he is less anxious and not waking in middle of night. He is drinking 6 glasses of water each day. 6/17/20  Continues Chantix 0.5 mg BID, still not smoking, slightly more frequent and stronger urges, but not too bad, in control of cravings. Nausea much better. Working more on programming which is sometimes a trigger during \"breaks. \"  Now sucks hard candy instead. Will start 1k puzzle. Goes for a walk every other day. Increased water to 6-7 glasses per day.    Only had one episode of GERD since cutting back on dose and stopping carafate. Pt is unsure why he started the medications. Looking back on med list, he has been on this at least 10 years. Per OV note from Dr Ada Marquez 2/28/11, \"Just 2 weeks ago he began to have hiccups and a feeling of acid around his throat. He is on Nexium every day. He had an EGD by Dr. Kristen Garcia for this in ~ 2008 and had sucralfate prescribed then, which helped. He recently found the old med and  Has taken it for a week now and it has helped. He will take it for a few weeks and then stop and see what happens. \"    7/6/20  -Continues Chantix, remains smoke free >3 months, no changes in cravings, etc. Nauseous once because he forgot to eat. Is nervous to stop medication altogether.   -Pt reduced Nexium 40 mg to QOD ~1.5 wks ago, doing well with no increase in GERD sxs. Feels comfortable gradually tapering off. -/72 one time at home, but does not check daily   -Bike 3x per week, Core exercises 2x per week. 8/5/20  -Getting exercise, but not outside. Cut back on Chantix 0.5 mg once daily on most days. Had some stronger cravings some days, so took it BID. Strong craving on way to [facility]. It was the 1st time back to [facility] since he quit smoking. One of members smoked in front of him, which was difficult. He stopped where he normally would buy cigarettes on way to [facility] and bought candy instead. Checked BP yesterday 133/73. Doing well with PPI 40 mg QOD, but not ready to cut back to 20 mg QOD because he has a large supply of 40 mg tabs. 8/26/20  -Getting core/bike exercise, but not outside. Working toward goals.   -Cut back on Chantix 0.5 mg to QOD. Initially forgot dose, but then skipped doses intentionally.   -Triggers: frustration, on way to [facility], seeing people smoke on TV. -Plans to tell his friends at the [facility].  -Was able to not stop where he buys cigarettes on way to [facility] the last time he went.   -Doing well with PPI 40 mg QOD, but not ready to cut back to 20 mg QOD because he has a large supply of 40 mg tabs. Agreed to try MWF.  -6 glasses of water each day  -Echo submitted and approved by [employer].  -not checking BP daily    9/16/20  -Still taking Chantix QOD, occasional strong cravings due to triggers  -Triggers: will go back to [facility] this weekend.   -Hasn't told anyone at [employer] that he quit. -slowly increasing to 8 glasses water/day  -reports BP log: SBP range 116-131,  DBP range 66-76  -looking for better sense of smell     10/7/20  -Working to apply for medicare; still trying to figure out which plan is best.   -D/c Chantix 5 days ago, still has on hand to use prn. Has \"Ups and downs. \" Craving frequency and intensity leveled off.   -Quit 6 months ago!   -Told brother in law in [place] that he quit smoking.  -Improved smell    -Saw PCP who found blood in urine, which is worrying patient. Will return in 1 week for BP f/u after med changes made.   -Plans to continue PPI taper and will use Pepcid prn heartburn    11/3/20  -Remains smoke free  -Has not had to use Chantix since last appointment    -Still gets urges when seeing people smoke on TV. Will keep hard candies by the TV to avoid urge to smoke. -Got Nexium OTC instead of Pepcid, but only taking three times a week. Advised to get Pepcid instead as this is better for PRN use. 11/24/20  Cravings reduced significantly. Has not taken any Chantix. Has not switched from nexium QOD to pepcid yet, but bought at store. No GERD sxs. SBP mostly<130. Pt feeling well. Socially distanced. Staying healthy. 1/12/21  Put on an old jacket and had a pack of cigarettes in pocket. He put the pack directly in the trash without looking at it. He wasn't able to take out the trash right away, but didn't dig them out. He is not looking forward to when he is offered a cigarette in the future. Discussed how to prepare for his. Very excited because he got his echo published in [place]. Stopped Nexium ~2 weeks ago.  Using pepcid prn, but hasn't needed to use this at all. Cut potassium down to once per day. 2/23/21  1 year piter of being tobacco free is coming up - March 28. Emphasized the enormity of this accomplishment. States he is staying busy with his software development, exercise, [employer] activity, and learning the piano. He still has triggers such as seeing someone smoking on tv or being frustrated. He was hoping after a year these triggers would be easier to handle. Encouraged patient to celebrate his success and focus on ways he used to avoid triggers when he was quitting. He states he often will pace, drink water, or just leave or turn away from the trigger. He states he is proud that his health has improved since quitting and he has been able to take less pills. Is also able to smell his coffee now. He is still nervous for when he is offered a cigarette in the future. Discussed how to prepare for this using by using his trigger-avoiding strategies and just leaving the room/situation if needed. 3/23/21  Patient reports he is still doing well and remaining smoke free, however he is \"afraid it wont last\". He has not been around his family members, [employer] members, or friends since [address] started a year ago and reports he is nervous that when he is around them the temptation will be hard to overcome as many of them are smokers. Discussed methods to abstain from temptations such as staying inside when family members/friends go outside to smoke, making it physically difficult to smoke by having something else in his mouth (gum, mint, hard candies), not buying his own cigarettes, telling family / friends he has quit smoking so they do not unintentionally try to pressure him, and turning away from trigger. He states his family and friends are very supportive of him and will be happy to see how far he has come. Encouraged patient to celebrate his one year anniversary of being smoke free as this is a huge accomplishment.  Patient's family is having a celebration next week that will be virtual. Will call patient again in 1 month to follow-up, but encouraged patient to schedule appointment earlier if he feels he is getting overwhelmed with triggers before then. 4/20/21  Told family zoom celebration for late mom's birthday and told family he quit smoking. Brother in law wishes he could quit but hasn't set a quit date. Has not had much in person contact with family. Still not in social situations either, including [employer] volunteer work. Still virtual. Meets with group of inter-racial [employer] members and plans to meet in person next. One member smokes and he is worried it will be a trigger to smoke. Still doesn't consider himself a non-smoker, thinks he is still \"quitting\". Reminded patient that he did QUIT and has been a NONsmoker for over a year! His reward was getting his computer fixed/updated. He has been walking 2x per week and has a more consistent indoor exercise routine-- recumbent bike, weights, core exercises. Gets a \"reaction\" (not necessarily an urge) when sees people smoking on TV and nervous about going around family and friends. He will avoid triggers as discussed above. 5/18/21  Granddaughter graduated from college in [facility]. Michelle Pantoja had celebration for her, and he attended. When he stepped outside of [employer], it reminded him of when he used to step out to take smoke breaks. Long drive to get to [employer], and stopped at one of places he normally stops to smoke, but instead he stopped to get gas. He didn't feel urge to smoke, just brought back memories. Pt states he is gaining weight, possibly snacking more and slowing metabolism. Still exercises, but not high intensity. Started [employer] exercises. Pt had a lot of questions about his last PCP visit, when to follow up, how to get labs, where to go, etc    7/13/21  Large family gathering on 4th of July. Smokers went outside on the lawn. Pt avoided them while they smoked.  Stopped at rest stop he Plans to bring coffee, water, not having an cig on hand. Working on [employer]--oh holy night. Working on a new shun to keep him busy. 1/18/22  Patient states that he is doing fine. He has been doing well this past month and reports improved nicotine cravings. As he looks back over this month he says it was better than last in terms of his cravings. The las month went very fast. With COVID he as not been around people smoking which has helped. If he sees people smoking  on TV or sees people smoking it reminds him of what it was like; he tries to avoid these situations. He says avoidance has been a good strategy for him. Did not have to drive long distance for Thanksgiving because it was cancelled. Has had other long drives though and used coffee to keep his hands and mouth occupied. Continues to do well and be successful with his smoking cessation. 2/15/22  Patient states that he is doing well. He has not smoked since the last visit and states that he does not have increased urges or cravings. He is fearful of having cravings or urgers as he begins to restart activities he has not done since COVID-19 started. He is particularly worried about the urge to smoke while driving the way up to [employer]. He has a hour drive to his [employer]. We discussed coping and mitigation strategies for this. He is also worried about urge when he sees people at [employer] smoking. We spoke about this at length. We discussed how he is now a non-smoke and can be proud of that. We discussed the potential of helping those around him quit smoking about opening a conversation about why he no longer smokes. He has bought a new Mac desktop which he is excited about. Overall he states that he is doing well, but would like to continue to meet monthly to keep up his motivation. 3/15/22  Started going back to [employer] on Sundays, but doesn't see anyone smoking. No social gatherings after.  Concerned about having a craving while driving, but hasn't had Take 1 tablet by mouth daily      Multiple Vitamin (MULTIVITAMIN PO) Take 1 capsule by mouth daily. No current facility-administered medications for this visit. Pertinent Labs/Vitals  Scr 0.9 (11/21/19)    ASSESSMENT/PLAN:   Patient has been tobacco free for over 2 years! Frequency and intensity of cravings has decreased significantly. --Has not used Chantix in >12 months but has if needed. Discussed health maintenance due- colonoscopy + AAA screening. Pt had covid booster. Discussed options for reducing pill burden. Message sent to PCP re:   -decr potassium to 1 tab per day   -combine olmesartan + hctz into combo pill    -- As of 7/12/22 patient is still taking same medications     Goals   Try walking outside more and continuing home workouts, doing better when the weather is warmer  Find healthier alternatives to current snacks (carrots, celery, etc), he has started eating more vegetables. Reward yourself with smoke free anniversaries (grill, new sheet music, star trek, jigsaw puzzle, book)- he has a new MacBook  Remind yourself that you are a non-smoker and be confident and proud of your accomplishment. Try carrot or pepper strips for snacks instead of bags of chips. Next appt:  8 wk- pt prefers to continue calls to hold him accountable and for the encouragement. Pt verbalized understanding of the above information and denied further questions or concerns. The total time of the visit was 30 min. Ravi Alcazar, LulaD, [facility]  [facility] [address] [place] [address]: [phone]  [facility]: [phone]  7/12/2022 11:31 AM    For Pharmacy Admin Tracking Only     CPA in place:   Yes   Recommendation Provided To: Patient/Caregiver: 1 via Virtual Visit   Intervention Detail: Scheduled Appointment   Gap Closed?: Yes    Intervention Accepted By: Patient/Caregiver: 1   Time Spent (min): 30

## 2022-07-13 ENCOUNTER — TREATMENT (OUTPATIENT)
Dept: PHYSICAL THERAPY | Age: 67
End: 2022-07-13
Payer: MEDICARE

## 2022-07-13 DIAGNOSIS — M75.02 ADHESIVE CAPSULITIS OF LEFT SHOULDER: Primary | ICD-10-CM

## 2022-07-13 DIAGNOSIS — M25.512 LEFT SHOULDER PAIN, UNSPECIFIED CHRONICITY: ICD-10-CM

## 2022-07-13 DIAGNOSIS — M25.612 DECREASED ROM OF LEFT SHOULDER: ICD-10-CM

## 2022-07-13 DIAGNOSIS — M50.30 DDD (DEGENERATIVE DISC DISEASE), CERVICAL: ICD-10-CM

## 2022-07-13 PROCEDURE — 97140 MANUAL THERAPY 1/> REGIONS: CPT | Performed by: PHYSICAL THERAPIST

## 2022-07-13 PROCEDURE — 97110 THERAPEUTIC EXERCISES: CPT | Performed by: PHYSICAL THERAPIST

## 2022-07-13 PROCEDURE — G8427 DOCREV CUR MEDS BY ELIG CLIN: HCPCS | Performed by: PHYSICAL THERAPIST

## 2022-07-13 PROCEDURE — 97530 THERAPEUTIC ACTIVITIES: CPT | Performed by: PHYSICAL THERAPIST

## 2022-07-13 NOTE — PROGRESS NOTES
[facility]   Phone: [phone]    Fax: [phone]        Physical Therapy Treatment Note/ Progress Report:           Date:  2022    Patient Name:  Haim Quesada    :  1955  MRN: [id number]  Restrictions/Precautions:    Medical/Treatment Diagnosis Information:  · Diagnosis: L shoulder adhesive capsulitis     Insurance/Certification information:  PT Insurance Information: Medicare  Physician Information:  Referring Practitioner: Dr. Myles Lopez  Has the plan of care been signed (Y/N):        [x]  Yes  []  No     Date of Patient follow up with Physician: PRN with Dr. Myles Lopez      Is this a Progress Report:     []  Yes  [x]  No        If Yes:  Date Range for reporting period:  Beginnin2022  Endin2022    Progress report will be due (10 Rx or 30 days whichever is less):        Recertification will be due (POC Duration  / 90 days whichever is less): 2022         Visit # Insurance Allowable Auth Required   6 Med nec []  Yes [x]  No        Functional Scale:  FOTO score: 56   Date assessed: 2022      PQRS:   Reviewed medication list with patient. No changes since last PT visit. 2022    Latex Allergy:  [x]NO      []YES  Preferred Language for Healthcare:   [x]English       []other:      Pain level:  0/10  At rest, 3-4/10 at worst for neck, 2-3/10 for shoulder     SUBJECTIVE:  Patient reports that L shoulder and neck feel \"about the same. \" He states that the neck remains most sore when turning to look over the L shoulder. He states that he is going to call Dr. Zac Billings office today to schedule per Dr. Alexander Wylie recommendation.     OBJECTIVE: See eval               ROM PROM AROM  Comment:  2022     L R L R     Flexion     165°* 152°     Abduction     160°* 152°     ER     T3 T3     IR     T9* T9     Cervical flexion         90%   Cervical extension         85%   Cervical rotation     75%* 90%     Cervical  Side bending     50%* 60%           Strength L R Comment:  6/29/2022   Flexion 4+/5 5/5     Abduction 4+/5* 5/5     ER 5/5 5/5     IR 5/5 5/5           Special Tests Results/Comment:  6/29/2022   Emmy Negative on L   Neers Negative on L   OBriens Negative on L   Other: empty can Negative on L            RESTRICTIONS/PRECAUTIONS: Cervical spine DDD and L shoulder adhesive capsulitis    Exercises/Interventions:     Therapeutic Ex (56587) Sets/sec Reps Notes/CUES   AD UE BIKE            STRETCHING/ROM      Pulleys 10\" x10    Table Slides-Flexion      Table Slides-Scaption      Table Slides-Abduction      Walk back stretch at counter      Wand-ER at 0      Wand-Supine ER at 45      Wand-Supine Flexion      UE [place]      Pendulum      Doorway ER 10\" x10    Wall Slides 10\" x10    CBA 10\" x10 Blocking scap   BBIR 10\" x10    Sleeper  10\" x10    Elbow PROM/AROM      Cervical extension SNAG 5\" x10    Cervical rotation SNAG to L 5\" x10                            ISOMETRICS      Gripping      Scap Retraction 2-3\" x20    Cervical retraction 2-3\" x20    Abduction      Flexion      Internal Rotation      External Rotation            STRENGTHENING-PREs      Flexion      Abduction      Internal Rotation      External Rotation      Shrugs      Biceps      Triceps      Retraction      Extension      Horizontal Abduction in ER      Serratus 3#, 3 x10                      THERABANDS/CABLE COLUMN      Rows Black, 2 x10    Lats      Extension Black, 2 x10    Internal Rotation Black, 2 x10    External Rotation Blue, 2 x10    Biceps      Triceps      PNF                                    Manual Intervention (73380)      Scar Massage      STM to L infraspinatus, biceps, deltoid, UT and cervical musculature x8'     Hawkgrips      GH joint mobilizations      Foamroll                  NMR re-education (77779)   CUES NEEDED   Plyoback      Therabar oscillations      Body Blade       Rhythmic Stabilization      Ball on the wall  x20 cw/ccw Therapeutic Activity (13518)      Core training      Swiss ball activities      Education                                Therapeutic Exercise and NMR EXR  [x] (68925) Provided verbal/tactile cueing for activities related to strengthening, flexibility, endurance, ROM  for improvements in scapular, scapulothoracic and UE control with self care, reaching, carrying, lifting, house/yardwork, driving/computer work. [x] (29378) Provided verbal/tactile cueing for activities related to improving balance, coordination, kinesthetic sense, posture, motor skill, proprioception  to assist with  scapular, scapulothoracic and UE control with self care, reaching, carrying, lifting, house/yardwork, driving/computer work. Therapeutic Activities:    [x] (96982 or 35022) Provided verbal/tactile cueing for activities related to improving balance, coordination, kinesthetic sense, posture, motor skill, proprioception and motor activation to allow for proper function of scapular, scapulothoracic and UE control with self care, carrying, lifting, driving/computer work. Home Exercise Program:    [x] (63996) Reviewed/Progressed HEP activities related to strengthening, flexibility, endurance, ROM of scapular, scapulothoracic and UE control with self care, reaching, carrying, lifting, house/yardwork, driving/computer work    Access Code: [email or web address]  URL: [email or web address]  Date: 07/07/2022  Prepared by: Olga Anderson    Exercises  Seated Passive Cervical Retraction - 2-3 x daily - 5-7 x weekly - 1 sets - 20 reps  Seated Scapular Retraction - 2-3 x daily - 5-7 x weekly - 1 sets - 20 reps  Seated Assisted Cervical Rotation with Towel (Mirrored) - 2-3 x daily - 5-7 x weekly - 1 sets - 10 reps - 5 seconds hold  Cervical Extension AROM with Strap - 2-3 x daily - 5-7 x weekly - 1 sets - 10 reps - 5 seconds hold  Shoulder Flexion Wall Slide with Towel (Mirrored) - 2-3 x daily - 5-7 x weekly - 1 sets - 10 reps - 10 seconds hold  Shoulder ER Stretch in Abduction (Mirrored) - 2-3 x daily - 5-7 x weekly - 1 sets - 10 reps - 10 seconds hold  Shoulder horizontal adduction stretch on back - 1-2 x daily - 5-7 x weekly - 1 sets - 10 reps - 10 seconds hold  Standing Shoulder Internal Rotation Stretch with Towel (Mirrored) - 1-2 x daily - 5-7 x weekly - 1 sets - 10 reps - 10 seconds hold  Standing Shoulder Row with Anchored Resistance - 1-2 x daily - 5-7 x weekly - 2-3 sets - 10 reps  Shoulder extension with resistance - Neutral - 1-2 x daily - 5-7 x weekly - 2-3 sets - 10 reps  Shoulder Internal Rotation with Resistance (Mirrored) - 1-2 x daily - 5-7 x weekly - 2-3 sets - 10 reps  Standing Wall [employer] with Mini Swiss Ball - 1-2 x daily - 5-7 x weekly - 1 sets - 20 circles clockwise  Sleeper Stretch - 1-2 x daily - 5-7 x weekly - 1 sets - 10 reps - 10 seconds hold  Shoulder External Rotation with Anchored Resistance (Mirrored) - 1-2 x daily - 5-7 x weekly - 2 sets - 10 reps        [] (40538) Reviewed/Progressed HEP activities related to improving balance, coordination, kinesthetic sense, posture, motor skill, proprioception of scapular, scapulothoracic and UE control with self care, reaching, carrying, lifting, house/yardwork, driving/computer work      Manual Treatments:  PROM / STM / Oscillations-Mobs:  G-I, II, III, IV (PA's, Inf., Post.)  [x] (18558) Provided manual therapy to mobilize soft tissue/joints of cervical/CT, scapular GHJ and UE for the purpose of modulating pain, promoting relaxation,  increasing ROM, reducing/eliminating soft tissue swelling/inflammation/restriction, improving soft tissue extensibility and allowing for proper ROM for normal function with self care, reaching, carrying, lifting, house/yardwork, driving/computer work    Modalities:  Patient declined   [] GAME READY (VASO)- for significant edema, swelling, pain control.     Charges:  Timed Code Treatment Minutes: 53   Total Treatment Minutes: 11 [] EVAL (LOW) 16241 (typically 20 minutes face-to-face)   [] EVAL (MOD) 67056 (typically 30 minutes face-to-face)  [] EVAL (HIGH) 31070 (typically 45 minutes face-to-face)  [] RE-EVAL     [x] XL(42290) x 1 (19') [] IONTO  [] NMR (59371) x   [] VASO  [x] Manual (13089) x 1 (8')    [] Other:  [x] TA x  2 (26')  [] Mech Traction (50847)  [] ES(attended) (47517)      [] ES (un) (36477):         ASSESSMENT: Patient has greatest restrictions and reports of pain when attempting active L cervical rotation today. He demonstrates good knowledge of his program, and requires minimal cuing from PT throughout session to transition between exercises and to correct form. He demonstrates good form with sleeper stretch today, with no cuing required from PT. He tolerates introduction of SA punches well today, with no reprots of increased pain. GOALS:  Patient stated goal: Swing golf club without pain in L shoulder and looking over L shoulder without pain in neck  [x] Progressing: [] Met: [] Not Met: [] Adjusted    Therapist goals for Patient:   Short Term Goals: To be achieved in: 2 weeks  1. Independent in HEP and progression per patient tolerance, in order to prevent re-injury. [] Progressing: [x] Met: [] Not Met: [] Adjusted  2. Patient will have a decrease in pain to facilitate improvement in movement, function, and ADLs as indicated by Functional Deficits. [x] Progressing: [] Met: [] Not Met: [] Adjusted    Long Term Goals: To be achieved in: 6 weeks  1. Functional index score of 64 or more for FOTO to assist with reaching prior level of function. [x] Progressing: [] Met: [] Not Met: [] Adjusted  2. Patient will demonstrate increased In L shoulder flexion, abduction, ER, and IR AROM to >155°, >155°, T3, and T8 respectively to allow for proper joint functioning as indicated by patients Functional Deficits. [x] Progressing: [] Met: [] Not Met: [] Adjusted  3.  Patient will demonstrate an increase in L shoulder strength in all planes to 4+/5 or greater to allow for proper functional mobility as indicated by patients Functional Deficits. [x] Progressing: [] Met: [] Not Met: [] Adjusted  4. Patient will return to sleeping on the L slide with 1/10 pain or less in L shoulder so that he may return to PLOF. [x] Progressing: [] Met: [] Not Met: [] Adjusted  5. Patient will demonstrate increased In cervical spine flexion, extension, B rotation, and B side bending AROM to >80%, >70%, >75%, and >75% to allow for proper joint functioning as indicated by patients Functional Deficits. [x] Progressing: [] Met: [] Not Met: [] Adjusted    Overall Progression Towards Functional goals/ Treatment Progress Update:  [x] Patient is progressing as expected towards functional goals listed. [x] Progression is slowed due to complexities/Impairments listed. [] Progression has been slowed due to co-morbidities. [] Plan just implemented, too soon to assess goals progression <30days   [] Goals require adjustment due to lack of progress  [] Patient is not progressing as expected and requires additional follow up with physician  [] Other    Prognosis for POC: [x] Good [] Fair  [] Poor      Patient requires continued skilled intervention: [x] Yes  [] No    Treatment/Activity Tolerance:  [x] Patient able to complete treatment  [] Patient limited by fatigue  [] Patient limited by pain    [] Patient limited by other medical complications  [] Other:           PLAN: 1-2x/week for 4 weeks  [x] Continue per plan of care [] Alter current plan   [] Plan of care initiated [] Hold pending MD visit [] Discharge      Electronically signed by:  Sylvia Spear, PT, DPT, OCS, OMT-C    Board-Certified Orthopaedic Clinical Specialist    [id number] [facility] [employer] PT license: [id number]  [place] PT license: [id number]      Note: If patient does not return for scheduled/ recommended follow up visits, this note will serve as a discharge from care along with most recent update on progress.

## 2022-07-20 ENCOUNTER — TREATMENT (OUTPATIENT)
Dept: PHYSICAL THERAPY | Age: 67
End: 2022-07-20
Payer: MEDICARE

## 2022-07-20 DIAGNOSIS — M25.512 LEFT SHOULDER PAIN, UNSPECIFIED CHRONICITY: ICD-10-CM

## 2022-07-20 DIAGNOSIS — M50.30 DDD (DEGENERATIVE DISC DISEASE), CERVICAL: ICD-10-CM

## 2022-07-20 DIAGNOSIS — M75.02 ADHESIVE CAPSULITIS OF LEFT SHOULDER: Primary | ICD-10-CM

## 2022-07-20 DIAGNOSIS — M25.612 DECREASED ROM OF LEFT SHOULDER: ICD-10-CM

## 2022-07-20 PROCEDURE — G8427 DOCREV CUR MEDS BY ELIG CLIN: HCPCS | Performed by: PHYSICAL THERAPIST

## 2022-07-20 PROCEDURE — 97530 THERAPEUTIC ACTIVITIES: CPT | Performed by: PHYSICAL THERAPIST

## 2022-07-20 PROCEDURE — 97140 MANUAL THERAPY 1/> REGIONS: CPT | Performed by: PHYSICAL THERAPIST

## 2022-07-20 PROCEDURE — 97110 THERAPEUTIC EXERCISES: CPT | Performed by: PHYSICAL THERAPIST

## 2022-07-20 NOTE — PROGRESS NOTES
[facility]   Phone: [phone]    Fax: [phone]        Physical Therapy Treatment Note/ Progress Report:           Date:  2022    Patient Name:  Zeyad Kessler    :  1955  MRN: [id number]  Restrictions/Precautions:    Medical/Treatment Diagnosis Information:  Diagnosis: L shoulder adhesive capsulitis     Insurance/Certification information:  PT Insurance Information: Medicare  Physician Information:  Referring Practitioner: Dr. Kirti Bray  Has the plan of care been signed (Y/N):        [x]  Yes  []  No     Date of Patient follow up with Physician: PRN with Dr. Kirti Bray      Is this a Progress Report:     []  Yes  [x]  No        If Yes:  Date Range for reporting period:  Beginnin2022  Endin2022    Progress report will be due (10 Rx or 30 days whichever is less):        Recertification will be due (POC Duration  / 90 days whichever is less): 2022         Visit # Insurance Allowable Auth Required   7 Med nec []  Yes [x]  No        Functional Scale:  FOTO score: 56   Date assessed: 2022      PQRS:   Reviewed medication list with patient. No changes since last PT visit. 2022    Latex Allergy:  [x]NO      []YES  Preferred Language for Healthcare:   [x]English       []other:      Pain level:  0/10  At rest, 3-4/10 at worst for neck, 2-3/10 for shoulder     SUBJECTIVE:  Patient reports no significant changes in the L shoulder or cervical region since last session. He reports that he has an appointment scheduled with Dr. Erlin Bartholomew on 2022 for he neck pain.     OBJECTIVE: See eval               ROM PROM AROM  Comment:  2022     L R L R     Flexion     165°* 152°     Abduction     160°* 152°     ER     T3 T3     IR     T9* T9     Cervical flexion         90%   Cervical extension         85%   Cervical rotation     75%* 90%     Cervical  Side bending     50%* 60%           Strength L R Comment:  2022   Flexion 4+/5 training      Swiss ball activities      Education                                Therapeutic Exercise and NMR EXR  [x] (72374) Provided verbal/tactile cueing for activities related to strengthening, flexibility, endurance, ROM  for improvements in scapular, scapulothoracic and UE control with self care, reaching, carrying, lifting, house/yardwork, driving/computer work. [x] (37466) Provided verbal/tactile cueing for activities related to improving balance, coordination, kinesthetic sense, posture, motor skill, proprioception  to assist with  scapular, scapulothoracic and UE control with self care, reaching, carrying, lifting, house/yardwork, driving/computer work. Therapeutic Activities:    [x] (88729 or 05702) Provided verbal/tactile cueing for activities related to improving balance, coordination, kinesthetic sense, posture, motor skill, proprioception and motor activation to allow for proper function of scapular, scapulothoracic and UE control with self care, carrying, lifting, driving/computer work. Home Exercise Program:    [x] (08410) Reviewed/Progressed HEP activities related to strengthening, flexibility, endurance, ROM of scapular, scapulothoracic and UE control with self care, reaching, carrying, lifting, house/yardwork, driving/computer work    Access Code: [email or web address]  URL: [email or web address]  Date: 07/07/2022  Prepared by: Lillian Sepulveda    Exercises  Seated Passive Cervical Retraction - 2-3 x daily - 5-7 x weekly - 1 sets - 20 reps  Seated Scapular Retraction - 2-3 x daily - 5-7 x weekly - 1 sets - 20 reps  Seated Assisted Cervical Rotation with Towel (Mirrored) - 2-3 x daily - 5-7 x weekly - 1 sets - 10 reps - 5 seconds hold  Cervical Extension AROM with Strap - 2-3 x daily - 5-7 x weekly - 1 sets - 10 reps - 5 seconds hold  Shoulder Flexion Wall Slide with Towel (Mirrored) - 2-3 x daily - 5-7 x weekly - 1 sets - 10 reps - 10 seconds hold  Shoulder ER Stretch in Abduction (Mirrored) - 2-3 x daily - 5-7 x weekly - 1 sets - 10 reps - 10 seconds hold  Shoulder horizontal adduction stretch on back - 1-2 x daily - 5-7 x weekly - 1 sets - 10 reps - 10 seconds hold  Standing Shoulder Internal Rotation Stretch with Towel (Mirrored) - 1-2 x daily - 5-7 x weekly - 1 sets - 10 reps - 10 seconds hold  Standing Shoulder Row with Anchored Resistance - 1-2 x daily - 5-7 x weekly - 2-3 sets - 10 reps  Shoulder extension with resistance - Neutral - 1-2 x daily - 5-7 x weekly - 2-3 sets - 10 reps  Shoulder Internal Rotation with Resistance (Mirrored) - 1-2 x daily - 5-7 x weekly - 2-3 sets - 10 reps  Standing Wall [employer] with Mini Swiss Ball - 1-2 x daily - 5-7 x weekly - 1 sets - 20 circles clockwise  Sleeper Stretch - 1-2 x daily - 5-7 x weekly - 1 sets - 10 reps - 10 seconds hold  Shoulder External Rotation with Anchored Resistance (Mirrored) - 1-2 x daily - 5-7 x weekly - 2 sets - 10 reps        [] (95545) Reviewed/Progressed HEP activities related to improving balance, coordination, kinesthetic sense, posture, motor skill, proprioception of scapular, scapulothoracic and UE control with self care, reaching, carrying, lifting, house/yardwork, driving/computer work      Manual Treatments:  PROM / STM / Oscillations-Mobs:  G-I, II, III, IV (PA's, Inf., Post.)  [x] (29599) Provided manual therapy to mobilize soft tissue/joints of cervical/CT, scapular GHJ and UE for the purpose of modulating pain, promoting relaxation,  increasing ROM, reducing/eliminating soft tissue swelling/inflammation/restriction, improving soft tissue extensibility and allowing for proper ROM for normal function with self care, reaching, carrying, lifting, house/yardwork, driving/computer work    Modalities:  Patient declined   [] GAME READY (VASO)- for significant edema, swelling, pain control.     Charges:  Timed Code Treatment Minutes: 53   Total Treatment Minutes: 63      [] EVAL (LOW) 69587 (typically 20 minutes face-to-face)   [] EVAL (MOD) 35520 (typically 30 minutes face-to-face)  [] EVAL (HIGH) 74063 (typically 45 minutes face-to-face)  [] RE-EVAL     [x] CE(31525) x 1 (19') [] IONTO  [] NMR (91166) x   [] VASO  [x] Manual (65343) x 1 (8')    [] Other:  [x] TA x  2 (26')  [] Mech Traction (94300)  [] ES(attended) (50782)      [] ES (un) (41522):         ASSESSMENT: Patient demonstrates significant fatigue with introduction of side lying ER today, stating that he had a strong muscle burn upon completion of the exercise. He demonstrates good carry over with SA punches from last session, and requires minimal cuing from PT for form. He continues to report a strong stretch with doorway ER stretch, but demonstrates good form. Plan to progress activity per patient tolerance. GOALS:  Patient stated goal: Swing golf club without pain in L shoulder and looking over L shoulder without pain in neck  [x] Progressing: [] Met: [] Not Met: [] Adjusted    Therapist goals for Patient:   Short Term Goals: To be achieved in: 2 weeks  1. Independent in HEP and progression per patient tolerance, in order to prevent re-injury. [] Progressing: [x] Met: [] Not Met: [] Adjusted  2. Patient will have a decrease in pain to facilitate improvement in movement, function, and ADLs as indicated by Functional Deficits. [x] Progressing: [] Met: [] Not Met: [] Adjusted    Long Term Goals: To be achieved in: 6 weeks  1. Functional index score of 64 or more for FOTO to assist with reaching prior level of function. [x] Progressing: [] Met: [] Not Met: [] Adjusted  2. Patient will demonstrate increased In L shoulder flexion, abduction, ER, and IR AROM to >155°, >155°, T3, and T8 respectively to allow for proper joint functioning as indicated by patients Functional Deficits. [x] Progressing: [] Met: [] Not Met: [] Adjusted  3.  Patient will demonstrate an increase in L shoulder strength in all planes to 4+/5 or greater to allow for proper functional mobility as indicated by patients Functional Deficits. [x] Progressing: [] Met: [] Not Met: [] Adjusted  4. Patient will return to sleeping on the L slide with 1/10 pain or less in L shoulder so that he may return to PLOF. [x] Progressing: [] Met: [] Not Met: [] Adjusted  5. Patient will demonstrate increased In cervical spine flexion, extension, B rotation, and B side bending AROM to >80%, >70%, >75%, and >75% to allow for proper joint functioning as indicated by patients Functional Deficits. [x] Progressing: [] Met: [] Not Met: [] Adjusted    Overall Progression Towards Functional goals/ Treatment Progress Update:  [x] Patient is progressing as expected towards functional goals listed. [x] Progression is slowed due to complexities/Impairments listed. [] Progression has been slowed due to co-morbidities. [] Plan just implemented, too soon to assess goals progression <30days   [] Goals require adjustment due to lack of progress  [] Patient is not progressing as expected and requires additional follow up with physician  [] Other    Prognosis for POC: [x] Good [] Fair  [] Poor      Patient requires continued skilled intervention: [x] Yes  [] No    Treatment/Activity Tolerance:  [x] Patient able to complete treatment  [] Patient limited by fatigue  [] Patient limited by pain    [] Patient limited by other medical complications  [] Other:           PLAN: 1-2x/week for 4 weeks  [x] Continue per plan of care [] Alter current plan   [] Plan of care initiated [] Hold pending MD visit [] Discharge      Electronically signed by:  Makenna Dang, PT, DPT, OCS, OMT-C    Board-Certified Orthopaedic Clinical Specialist    [employer] PT license: [id number]  [place] PT license: [id number]      Note: If patient does not return for scheduled/ recommended follow up visits, this note will serve as a discharge from care along with most recent update on progress.

## 2022-07-27 ENCOUNTER — TREATMENT (OUTPATIENT)
Dept: PHYSICAL THERAPY | Age: 67
End: 2022-07-27
Payer: MEDICARE

## 2022-07-27 DIAGNOSIS — M25.612 DECREASED ROM OF LEFT SHOULDER: ICD-10-CM

## 2022-07-27 DIAGNOSIS — M75.02 ADHESIVE CAPSULITIS OF LEFT SHOULDER: Primary | ICD-10-CM

## 2022-07-27 DIAGNOSIS — M25.512 LEFT SHOULDER PAIN, UNSPECIFIED CHRONICITY: ICD-10-CM

## 2022-07-27 DIAGNOSIS — M50.30 DDD (DEGENERATIVE DISC DISEASE), CERVICAL: ICD-10-CM

## 2022-07-27 PROCEDURE — 97110 THERAPEUTIC EXERCISES: CPT | Performed by: PHYSICAL THERAPIST

## 2022-07-27 PROCEDURE — G8427 DOCREV CUR MEDS BY ELIG CLIN: HCPCS | Performed by: PHYSICAL THERAPIST

## 2022-07-27 PROCEDURE — 97530 THERAPEUTIC ACTIVITIES: CPT | Performed by: PHYSICAL THERAPIST

## 2022-07-27 PROCEDURE — 97140 MANUAL THERAPY 1/> REGIONS: CPT | Performed by: PHYSICAL THERAPIST

## 2022-07-27 NOTE — PROGRESS NOTES
[facility]   Phone: [phone]    Fax: [phone]        Physical Therapy Treatment Note/ Progress Report:           Date:  2022    Patient Name:  Loni Rivers    :  1955  MRN: [id number]  Restrictions/Precautions:    Medical/Treatment Diagnosis Information:  Diagnosis: L shoulder adhesive capsulitis     Insurance/Certification information:  PT Insurance Information: Medicare  Physician Information:  Referring Practitioner: Dr. Matias Nevarez  Has the plan of care been signed (Y/N):        [x]  Yes  []  No     Date of Patient follow up with Physician: PRN with Dr. Matias Nevarez      Is this a Progress Report:     []  Yes  [x]  No        If Yes:  Date Range for reporting period:  Beginnin2022  Endin2022    Progress report will be due (10 Rx or 30 days whichever is less):        Recertification will be due (POC Duration  / 90 days whichever is less): 2022         Visit # Insurance Allowable Auth Required   8 Med nec []  Yes [x]  No        Functional Scale:  FOTO score: 56   Date assessed: 2022      PQRS:   Reviewed medication list with patient. No changes since last PT visit. 2022    Latex Allergy:  [x]NO      []YES  Preferred Language for Healthcare:   [x]English       []other:      Pain level:  0/10  At rest, 3-4/10 at worst for neck, 2-3/10 for shoulder     SUBJECTIVE:  Patient reports that he saw Dr. Atilio Culver on Monday and he is ordering an MRI of his neck and back. He reports that the MRI has not been scheduled yet, as he is waiting for central scheduling to call him back. He notes that the neck and shoulder remain \"about the same\" today.     OBJECTIVE: See eval               ROM PROM AROM  Comment:  2022     L R L R     Flexion     165°* 152°     Abduction     160°* 152°     ER     T3 T3     IR     T9* T9     Cervical flexion         90%   Cervical extension         85%   Cervical rotation     75%* 90%     Cervical Side bending     50%* 60%           Strength L R Comment:  6/29/2022   Flexion 4+/5 5/5     Abduction 4+/5* 5/5     ER 5/5 5/5     IR 5/5 5/5           Special Tests Results/Comment:  6/29/2022   Emmy Negative on L   Neers Negative on L   OBriens Negative on L   Other: empty can Negative on L            RESTRICTIONS/PRECAUTIONS: Cervical spine DDD and L shoulder adhesive capsulitis    Exercises/Interventions:     Therapeutic Ex (55382) Sets/sec Reps Notes/CUES   AD UE BIKE            STRETCHING/ROM      Pulleys 10\" x10    Table Slides-Flexion      Table Slides-Scaption      Table Slides-Abduction      Walk back stretch at counter      Wand-ER at 0      Wand-Supine ER at 45      Wand-Supine Flexion      UE [place]      Pendulum      Doorway ER 10\" x10    Wall Slides 10\" x10    CBA 10\" x10 Blocking scap   BBIR 10\" x10    Sleeper  10\" x10    Elbow PROM/AROM      Cervical extension SNAG 5\" x10    Cervical rotation SNAG to L 5\" x10                            ISOMETRICS      Gripping      Scap Retraction 2-3\" x20    Cervical retraction 2-3\" x20    Abduction      Flexion      Internal Rotation      External Rotation            STRENGTHENING-PREs      Flexion      Abduction      Internal Rotation      External Rotation 2#, 2 x10    Shrugs      Biceps      Triceps      Retraction      Extension      Horizontal Abduction in ER      Serratus 4#, 3 x10                      THERABANDS/CABLE COLUMN      Rows Black, 2 x10    Lats      Extension Black, 2 x10    Internal Rotation Black, 2 x10    External Rotation Blue, 2 x10    Biceps      Triceps      PNF                                    Manual Intervention (59979)      Scar Massage      STM to L infraspinatus, biceps, deltoid, UT and cervical musculature x8'     Hawkgrips      GH joint mobilizations      Foamroll                  NMR re-education (84579)   CUES NEEDED   Plyoback      Therabar oscillations      Body Blade       Rhythmic Stabilization      Ball on the wall  x20 cw/ccw                           Therapeutic Activity (08205)      Core training      Swiss ball activities      Education                                Therapeutic Exercise and NMR EXR  [x] (11877) Provided verbal/tactile cueing for activities related to strengthening, flexibility, endurance, ROM  for improvements in scapular, scapulothoracic and UE control with self care, reaching, carrying, lifting, house/yardwork, driving/computer work. [x] (91568) Provided verbal/tactile cueing for activities related to improving balance, coordination, kinesthetic sense, posture, motor skill, proprioception  to assist with  scapular, scapulothoracic and UE control with self care, reaching, carrying, lifting, house/yardwork, driving/computer work. Therapeutic Activities:    [x] (20360 or 05287) Provided verbal/tactile cueing for activities related to improving balance, coordination, kinesthetic sense, posture, motor skill, proprioception and motor activation to allow for proper function of scapular, scapulothoracic and UE control with self care, carrying, lifting, driving/computer work. Home Exercise Program:    [x] (49848) Reviewed/Progressed HEP activities related to strengthening, flexibility, endurance, ROM of scapular, scapulothoracic and UE control with self care, reaching, carrying, lifting, house/yardwork, driving/computer work    Access Code: [email or web address]  URL: [email or web address]  Date: 07/07/2022  Prepared by: Yuri Padron    Exercises  Seated Passive Cervical Retraction - 2-3 x daily - 5-7 x weekly - 1 sets - 20 reps  Seated Scapular Retraction - 2-3 x daily - 5-7 x weekly - 1 sets - 20 reps  Seated Assisted Cervical Rotation with Towel (Mirrored) - 2-3 x daily - 5-7 x weekly - 1 sets - 10 reps - 5 seconds hold  Cervical Extension AROM with Strap - 2-3 x daily - 5-7 x weekly - 1 sets - 10 reps - 5 seconds hold  Shoulder Flexion Wall Slide with Towel (Mirrored) - 2-3 x daily - 5-7 x weekly - 1 sets - 10 reps - 10 seconds hold  Shoulder ER Stretch in Abduction (Mirrored) - 2-3 x daily - 5-7 x weekly - 1 sets - 10 reps - 10 seconds hold  Shoulder horizontal adduction stretch on back - 1-2 x daily - 5-7 x weekly - 1 sets - 10 reps - 10 seconds hold  Standing Shoulder Internal Rotation Stretch with Towel (Mirrored) - 1-2 x daily - 5-7 x weekly - 1 sets - 10 reps - 10 seconds hold  Standing Shoulder Row with Anchored Resistance - 1-2 x daily - 5-7 x weekly - 2-3 sets - 10 reps  Shoulder extension with resistance - Neutral - 1-2 x daily - 5-7 x weekly - 2-3 sets - 10 reps  Shoulder Internal Rotation with Resistance (Mirrored) - 1-2 x daily - 5-7 x weekly - 2-3 sets - 10 reps  Standing Wall [employer] with Mini Swiss Ball - 1-2 x daily - 5-7 x weekly - 1 sets - 20 circles clockwise  Sleeper Stretch - 1-2 x daily - 5-7 x weekly - 1 sets - 10 reps - 10 seconds hold  Shoulder External Rotation with Anchored Resistance (Mirrored) - 1-2 x daily - 5-7 x weekly - 2 sets - 10 reps        [] (05273) Reviewed/Progressed HEP activities related to improving balance, coordination, kinesthetic sense, posture, motor skill, proprioception of scapular, scapulothoracic and UE control with self care, reaching, carrying, lifting, house/yardwork, driving/computer work      Manual Treatments:  PROM / STM / Oscillations-Mobs:  G-I, II, III, IV (PA's, Inf., Post.)  [x] (00004) Provided manual therapy to mobilize soft tissue/joints of cervical/CT, scapular GHJ and UE for the purpose of modulating pain, promoting relaxation,  increasing ROM, reducing/eliminating soft tissue swelling/inflammation/restriction, improving soft tissue extensibility and allowing for proper ROM for normal function with self care, reaching, carrying, lifting, house/yardwork, driving/computer work    Modalities:  Patient declined   [] GAME READY (VASO)- for significant edema, swelling, pain control.     Charges:  Timed Code Treatment Minutes: 62 Total Treatment Minutes: 67      [] EVAL (LOW) 08562 (typically 20 minutes face-to-face)   [] EVAL (MOD) 92565 (typically 30 minutes face-to-face)  [] EVAL (HIGH) 81242 (typically 45 minutes face-to-face)  [] RE-EVAL     [x] WB(55272) x 1 (19') [] IONTO  [] NMR (38103) x   [] VASO  [x] Manual (49136) x 1 (8')    [] Other:  [x] TA x  2 (30')  [] Mech Traction (88606)  [] ES(attended) (06388)      [] ES (un) (95814):         ASSESSMENT: Progressions held today as we await patient's MRI of neck and spine. He demonstrates excellent knowledge of his program, requiring only intermittent cues to transition between exercises. He fatigues mildly with TB exercises and side lying ER. Moderate palpable muscle tightness remains present in cervical musculature and L shoulder complex. GOALS:  Patient stated goal: Swing golf club without pain in L shoulder and looking over L shoulder without pain in neck  [x] Progressing: [] Met: [] Not Met: [] Adjusted    Therapist goals for Patient:   Short Term Goals: To be achieved in: 2 weeks  1. Independent in HEP and progression per patient tolerance, in order to prevent re-injury. [] Progressing: [x] Met: [] Not Met: [] Adjusted  2. Patient will have a decrease in pain to facilitate improvement in movement, function, and ADLs as indicated by Functional Deficits. [x] Progressing: [] Met: [] Not Met: [] Adjusted    Long Term Goals: To be achieved in: 6 weeks  1. Functional index score of 64 or more for FOTO to assist with reaching prior level of function. [x] Progressing: [] Met: [] Not Met: [] Adjusted  2. Patient will demonstrate increased In L shoulder flexion, abduction, ER, and IR AROM to >155°, >155°, T3, and T8 respectively to allow for proper joint functioning as indicated by patients Functional Deficits. [x] Progressing: [] Met: [] Not Met: [] Adjusted  3.  Patient will demonstrate an increase in L shoulder strength in all planes to 4+/5 or greater to allow for proper functional mobility as indicated by patients Functional Deficits. [x] Progressing: [] Met: [] Not Met: [] Adjusted  4. Patient will return to sleeping on the L slide with 1/10 pain or less in L shoulder so that he may return to PLOF. [x] Progressing: [] Met: [] Not Met: [] Adjusted  5. Patient will demonstrate increased In cervical spine flexion, extension, B rotation, and B side bending AROM to >80%, >70%, >75%, and >75% to allow for proper joint functioning as indicated by patients Functional Deficits. [x] Progressing: [] Met: [] Not Met: [] Adjusted    Overall Progression Towards Functional goals/ Treatment Progress Update:  [x] Patient is progressing as expected towards functional goals listed. [x] Progression is slowed due to complexities/Impairments listed. [] Progression has been slowed due to co-morbidities. [] Plan just implemented, too soon to assess goals progression <30days   [] Goals require adjustment due to lack of progress  [] Patient is not progressing as expected and requires additional follow up with physician  [] Other    Prognosis for POC: [x] Good [] Fair  [] Poor      Patient requires continued skilled intervention: [x] Yes  [] No    Treatment/Activity Tolerance:  [x] Patient able to complete treatment  [] Patient limited by fatigue  [] Patient limited by pain    [] Patient limited by other medical complications  [] Other:           PLAN: 1-2x/week for 4 weeks  [x] Continue per plan of care [] Alter current plan   [] Plan of care initiated [] Hold pending MD visit [] Discharge      Electronically signed by:  Yahaira Somers, PT, DPT, OCS, OMT-C    Board-Certified Orthopaedic Clinical Specialist    [place] PT license: [id number]  [place] PT license: [id number]      Note: If patient does not return for scheduled/ recommended follow up visits, this note will serve as a discharge from care along with most recent update on progress.

## 2022-08-03 ENCOUNTER — TREATMENT (OUTPATIENT)
Dept: PHYSICAL THERAPY | Age: 67
End: 2022-08-03
Payer: MEDICARE

## 2022-08-03 DIAGNOSIS — M50.30 DDD (DEGENERATIVE DISC DISEASE), CERVICAL: ICD-10-CM

## 2022-08-03 DIAGNOSIS — M25.512 LEFT SHOULDER PAIN, UNSPECIFIED CHRONICITY: ICD-10-CM

## 2022-08-03 DIAGNOSIS — M25.612 DECREASED ROM OF LEFT SHOULDER: ICD-10-CM

## 2022-08-03 DIAGNOSIS — M75.02 ADHESIVE CAPSULITIS OF LEFT SHOULDER: Primary | ICD-10-CM

## 2022-08-03 PROCEDURE — 97112 NEUROMUSCULAR REEDUCATION: CPT | Performed by: PHYSICAL THERAPIST

## 2022-08-03 PROCEDURE — 97110 THERAPEUTIC EXERCISES: CPT | Performed by: PHYSICAL THERAPIST

## 2022-08-03 PROCEDURE — G8427 DOCREV CUR MEDS BY ELIG CLIN: HCPCS | Performed by: PHYSICAL THERAPIST

## 2022-08-03 PROCEDURE — 97530 THERAPEUTIC ACTIVITIES: CPT | Performed by: PHYSICAL THERAPIST

## 2022-08-03 NOTE — PROGRESS NOTES
Sleeper  10\" x10    Elbow PROM/AROM      Cervical extension SNAG 5\" x10    Cervical rotation SNAG to L 5\" x10                            ISOMETRICS      Gripping      Scap Retraction 2-3\" x20    Cervical retraction 2-3\" x20    Abduction      Flexion      Internal Rotation      External Rotation            STRENGTHENING-PREs      Flexion      Abduction      Internal Rotation      External Rotation 2#, 2 x10    Shrugs      Biceps      Triceps      Retraction      Extension      Horizontal Abduction in ER      Serratus 4#, 3 x10                      THERABANDS/CABLE COLUMN      Rows Black, 2 x10    Lats      Extension Black, 2 x10    Internal Rotation Black, 2 x10    External Rotation Blue, 2 x10    Biceps      Triceps      PNF                                    Manual Intervention (19426)      Scar Massage      STM to L infraspinatus, biceps, deltoid, UT and cervical musculature     Hawkgrips      GH joint mobilizations      Foamroll                  NMR re-education (82775)   CUES NEEDED   Plyoback      Therabar oscillations      Body Blade       Rhythmic Stabilization      Ball on the wall  x20 cw/ccw                           Therapeutic Activity (05135)      Core training      Swiss ball activities      Education                                Therapeutic Exercise and NMR EXR  [x] (60031) Provided verbal/tactile cueing for activities related to strengthening, flexibility, endurance, ROM  for improvements in scapular, scapulothoracic and UE control with self care, reaching, carrying, lifting, house/yardwork, driving/computer work. [x] (72141) Provided verbal/tactile cueing for activities related to improving balance, coordination, kinesthetic sense, posture, motor skill, proprioception  to assist with  scapular, scapulothoracic and UE control with self care, reaching, carrying, lifting, house/yardwork, driving/computer work.     Therapeutic Activities:    [x] (78988 or 88878) Provided verbal/tactile cueing for activities related to improving balance, coordination, kinesthetic sense, posture, motor skill, proprioception and motor activation to allow for proper function of scapular, scapulothoracic and UE control with self care, carrying, lifting, driving/computer work. Home Exercise Program:    [x] (93865) Reviewed/Progressed HEP activities related to strengthening, flexibility, endurance, ROM of scapular, scapulothoracic and UE control with self care, reaching, carrying, lifting, house/yardwork, driving/computer work    Access Code: [email or web address]  URL: [email or web address]  Date: 07/07/2022  Prepared by: Chloe Pen    Exercises  Seated Passive Cervical Retraction - 2-3 x daily - 5-7 x weekly - 1 sets - 20 reps  Seated Scapular Retraction - 2-3 x daily - 5-7 x weekly - 1 sets - 20 reps  Seated Assisted Cervical Rotation with Towel (Mirrored) - 2-3 x daily - 5-7 x weekly - 1 sets - 10 reps - 5 seconds hold  Cervical Extension AROM with Strap - 2-3 x daily - 5-7 x weekly - 1 sets - 10 reps - 5 seconds hold  Shoulder Flexion Wall Slide with Towel (Mirrored) - 2-3 x daily - 5-7 x weekly - 1 sets - 10 reps - 10 seconds hold  Shoulder ER Stretch in Abduction (Mirrored) - 2-3 x daily - 5-7 x weekly - 1 sets - 10 reps - 10 seconds hold  Shoulder horizontal adduction stretch on back - 1-2 x daily - 5-7 x weekly - 1 sets - 10 reps - 10 seconds hold  Standing Shoulder Internal Rotation Stretch with Towel (Mirrored) - 1-2 x daily - 5-7 x weekly - 1 sets - 10 reps - 10 seconds hold  Standing Shoulder Row with Anchored Resistance - 1-2 x daily - 5-7 x weekly - 2-3 sets - 10 reps  Shoulder extension with resistance - Neutral - 1-2 x daily - 5-7 x weekly - 2-3 sets - 10 reps  Shoulder Internal Rotation with Resistance (Mirrored) - 1-2 x daily - 5-7 x weekly - 2-3 sets - 10 reps  Standing Wall [employer] with Mini Swiss Ball - 1-2 x daily - 5-7 x weekly - 1 sets - 20 circles clockwise  Sleeper Stretch - 1-2 x daily - 5-7 x weekly - 1 sets - 10 reps - 10 seconds hold  Shoulder External Rotation with Anchored Resistance (Mirrored) - 1-2 x daily - 5-7 x weekly - 2 sets - 10 reps        [] (21475) Reviewed/Progressed HEP activities related to improving balance, coordination, kinesthetic sense, posture, motor skill, proprioception of scapular, scapulothoracic and UE control with self care, reaching, carrying, lifting, house/yardwork, driving/computer work      Manual Treatments:  PROM / STM / Oscillations-Mobs:  G-I, II, III, IV (PA's, Inf., Post.)  [] (61407) Provided manual therapy to mobilize soft tissue/joints of cervical/CT, scapular GHJ and UE for the purpose of modulating pain, promoting relaxation,  increasing ROM, reducing/eliminating soft tissue swelling/inflammation/restriction, improving soft tissue extensibility and allowing for proper ROM for normal function with self care, reaching, carrying, lifting, house/yardwork, driving/computer work    Modalities:  Patient declined   [] GAME READY (VASO)- for significant edema, swelling, pain control. Charges:  Timed Code Treatment Minutes: 56   Total Treatment Minutes: 66      [] EVAL (LOW) 87044 (typically 20 minutes face-to-face)   [] EVAL (MOD) 18930 (typically 30 minutes face-to-face)  [] EVAL (HIGH) 99014 (typically 45 minutes face-to-face)  [] RE-EVAL     [x] XA(07102) x 1 (18') [] IONTO  [x] NMR (09244) x 1 (8') [] VASO  [] Manual (23736) x     [] Other:  [x] TA x  2 (30')  [] [facility] (57208)  [] ES(attended) (72480)      [] ES (un) (66598):         ASSESSMENT: Patient exhibits palpable decrease in muscle tightness in UT today. He demonstrates improving AROM of the cervical spine, though he continues to report moderate pain in the L side of his neck. He demonstrates improving strength in the L shoulder as well, with minimal discomfort reported. PT and patient discussed calling Dr. Shepard Slider office to reschedule his upcoming appointment until after his MRI.  Patient Treatment Progress Update:  [x] Patient is progressing as expected towards functional goals listed. [x] Progression is slowed due to complexities/Impairments listed. [] Progression has been slowed due to co-morbidities. [] Plan just implemented, too soon to assess goals progression <30days   [] Goals require adjustment due to lack of progress  [] Patient is not progressing as expected and requires additional follow up with physician  [] Other    Prognosis for POC: [x] Good [] Fair  [] Poor      Patient requires continued skilled intervention: [x] Yes  [] No    Treatment/Activity Tolerance:  [x] Patient able to complete treatment  [] Patient limited by fatigue  [] Patient limited by pain    [] Patient limited by other medical complications  [] Other:           PLAN: 1-2x/week for 4 weeks  [x] Continue per plan of care [] Alter current plan   [] Plan of care initiated [] Hold pending MD visit [] Discharge      Electronically signed by:  Diana Payton, PT, DPT, OCS, OMT-C    Board-Certified Orthopaedic Clinical Specialist    [id number] [address] PT license: [id number]  [address] [place] [address] PT license: [id number]      Note: If patient does not return for scheduled/ recommended follow up visits, this note will serve as a discharge from care along with most recent update on progress.

## 2022-08-17 ENCOUNTER — TREATMENT (OUTPATIENT)
Dept: PHYSICAL THERAPY | Age: 67
End: 2022-08-17
Payer: MEDICARE

## 2022-08-17 DIAGNOSIS — M75.02 ADHESIVE CAPSULITIS OF LEFT SHOULDER: Primary | ICD-10-CM

## 2022-08-17 DIAGNOSIS — M25.512 LEFT SHOULDER PAIN, UNSPECIFIED CHRONICITY: ICD-10-CM

## 2022-08-17 DIAGNOSIS — M25.612 DECREASED ROM OF LEFT SHOULDER: ICD-10-CM

## 2022-08-17 DIAGNOSIS — M50.30 DDD (DEGENERATIVE DISC DISEASE), CERVICAL: ICD-10-CM

## 2022-08-17 PROCEDURE — 97110 THERAPEUTIC EXERCISES: CPT | Performed by: PHYSICAL THERAPIST

## 2022-08-17 PROCEDURE — 97140 MANUAL THERAPY 1/> REGIONS: CPT | Performed by: PHYSICAL THERAPIST

## 2022-08-17 PROCEDURE — 97530 THERAPEUTIC ACTIVITIES: CPT | Performed by: PHYSICAL THERAPIST

## 2022-08-17 PROCEDURE — 97112 NEUROMUSCULAR REEDUCATION: CPT | Performed by: PHYSICAL THERAPIST

## 2022-08-17 PROCEDURE — G8427 DOCREV CUR MEDS BY ELIG CLIN: HCPCS | Performed by: PHYSICAL THERAPIST

## 2022-08-17 NOTE — PROGRESS NOTES
80%           Strength L R Comment:  8/3/2022   Flexion 5/5 5/5     Abduction 4+/5* 5/5     ER 5/5 5/5     IR 5/5 5/5           Special Tests Results/Comment:  8/3/2022   Emmy Negative on L   Neers Negative on L   OBriens Negative on L   Other: empty can Negative on L            RESTRICTIONS/PRECAUTIONS: Cervical spine DDD and L shoulder adhesive capsulitis    Exercises/Interventions:     Therapeutic Ex (11826) Sets/sec Reps Notes/CUES   AD UE BIKE            STRETCHING/ROM      Pulleys 10\" x10    Table Slides-Flexion      Table Slides-Scaption      Table Slides-Abduction      Walk back stretch at counter      Wand-ER at 0      Wand-Supine ER at 45      Wand-Supine Flexion      UE [place]      Pendulum      Doorway ER 10\" x10    Wall Slides 10\" x10    CBA 10\" x10 Blocking scap   BBIR 10\" x10    Sleeper  10\" x10    Elbow PROM/AROM      Cervical extension SNAG 5\" x10    Cervical rotation SNAG to L 5\" x10                            ISOMETRICS      Gripping      Scap Retraction 2-3\" x20    Cervical retraction 2-3\" x20    Abduction      Flexion      Internal Rotation      External Rotation            STRENGTHENING-PREs      Flexion      Abduction      Internal Rotation      External Rotation 2#, 2 x10    Shrugs      Biceps      Triceps      Retraction 3#, 2 x10    Extension      Horizontal Abduction in ER      Serratus 4#, 3 x10                      THERABANDS/CABLE COLUMN      Rows Black, 2 x10    Lats      Extension Black, 2 x10    Internal Rotation Black, 2 x10    External Rotation Blue, 2 x10    Biceps      Triceps      PNF                                    Manual Intervention (84915)      Scar Massage      STM to L infraspinatus, biceps, deltoid, UT and cervical musculature x8'     Hawkgrips      GH joint mobilizations      Foamroll                  NMR re-education (73667)   CUES NEEDED   Plyoback      Therabar oscillations      Body Blade       Rhythmic Stabilization      Ball on the wall  x20 cw/ccw Therapeutic Activity (83883)      Core training      Swiss ball activities      Education                                Therapeutic Exercise and NMR EXR  [x] (50517) Provided verbal/tactile cueing for activities related to strengthening, flexibility, endurance, ROM  for improvements in scapular, scapulothoracic and UE control with self care, reaching, carrying, lifting, house/yardwork, driving/computer work. [x] (79981) Provided verbal/tactile cueing for activities related to improving balance, coordination, kinesthetic sense, posture, motor skill, proprioception  to assist with  scapular, scapulothoracic and UE control with self care, reaching, carrying, lifting, house/yardwork, driving/computer work. Therapeutic Activities:    [x] (87548 or 31195) Provided verbal/tactile cueing for activities related to improving balance, coordination, kinesthetic sense, posture, motor skill, proprioception and motor activation to allow for proper function of scapular, scapulothoracic and UE control with self care, carrying, lifting, driving/computer work. Home Exercise Program:    [x] (64026) Reviewed/Progressed HEP activities related to strengthening, flexibility, endurance, ROM of scapular, scapulothoracic and UE control with self care, reaching, carrying, lifting, house/yardwork, driving/computer work    Access Code: [email or web address]  URL: [email or web address]  Date: 07/07/2022  Prepared by: Lakhwinder Rios    Exercises  Seated Passive Cervical Retraction - 2-3 x daily - 5-7 x weekly - 1 sets - 20 reps  Seated Scapular Retraction - 2-3 x daily - 5-7 x weekly - 1 sets - 20 reps  Seated Assisted Cervical Rotation with Towel (Mirrored) - 2-3 x daily - 5-7 x weekly - 1 sets - 10 reps - 5 seconds hold  Cervical Extension AROM with Strap - 2-3 x daily - 5-7 x weekly - 1 sets - 10 reps - 5 seconds hold  Shoulder Flexion Wall Slide with Towel (Mirrored) - 2-3 x daily - 5-7 x weekly - 1 sets - 10 reps - 10 seconds hold  Shoulder ER Stretch in Abduction (Mirrored) - 2-3 x daily - 5-7 x weekly - 1 sets - 10 reps - 10 seconds hold  Shoulder horizontal adduction stretch on back - 1-2 x daily - 5-7 x weekly - 1 sets - 10 reps - 10 seconds hold  Standing Shoulder Internal Rotation Stretch with Towel (Mirrored) - 1-2 x daily - 5-7 x weekly - 1 sets - 10 reps - 10 seconds hold  Standing Shoulder Row with Anchored Resistance - 1-2 x daily - 5-7 x weekly - 2-3 sets - 10 reps  Shoulder extension with resistance - Neutral - 1-2 x daily - 5-7 x weekly - 2-3 sets - 10 reps  Shoulder Internal Rotation with Resistance (Mirrored) - 1-2 x daily - 5-7 x weekly - 2-3 sets - 10 reps  Standing Wall [employer] with Mini Swiss Ball - 1-2 x daily - 5-7 x weekly - 1 sets - 20 circles clockwise  Sleeper Stretch - 1-2 x daily - 5-7 x weekly - 1 sets - 10 reps - 10 seconds hold  Shoulder External Rotation with Anchored Resistance (Mirrored) - 1-2 x daily - 5-7 x weekly - 2 sets - 10 reps        [] (23381) Reviewed/Progressed HEP activities related to improving balance, coordination, kinesthetic sense, posture, motor skill, proprioception of scapular, scapulothoracic and UE control with self care, reaching, carrying, lifting, house/yardwork, driving/computer work      Manual Treatments:  PROM / STM / Oscillations-Mobs:  G-I, II, III, IV (PA's, Inf., Post.)  [] (63636) Provided manual therapy to mobilize soft tissue/joints of cervical/CT, scapular GHJ and UE for the purpose of modulating pain, promoting relaxation,  increasing ROM, reducing/eliminating soft tissue swelling/inflammation/restriction, improving soft tissue extensibility and allowing for proper ROM for normal function with self care, reaching, carrying, lifting, house/yardwork, driving/computer work    Modalities:  Patient declined   [] GAME READY (VASO)- for significant edema, swelling, pain control.     Charges:  Timed Code Treatment Minutes: 56   Total Treatment Minutes: greater to allow for proper functional mobility as indicated by patients Functional Deficits. [x] Progressing: [] Met: [] Not Met: [] Adjusted  4. Patient will return to sleeping on the L slide with 1/10 pain or less in L shoulder so that he may return to PLOF. [x] Progressing: [] Met: [] Not Met: [] Adjusted  5. Patient will demonstrate increased In cervical spine flexion, extension, B rotation, and B side bending AROM to >80%, >70%, >75%, and >75% to allow for proper joint functioning as indicated by patients Functional Deficits. [x] Progressing: [] Met: [] Not Met: [] Adjusted    Overall Progression Towards Functional goals/ Treatment Progress Update:  [x] Patient is progressing as expected towards functional goals listed. [x] Progression is slowed due to complexities/Impairments listed. [] Progression has been slowed due to co-morbidities. [] Plan just implemented, too soon to assess goals progression <30days   [] Goals require adjustment due to lack of progress  [] Patient is not progressing as expected and requires additional follow up with physician  [] Other    Prognosis for POC: [x] Good [] Fair  [] Poor      Patient requires continued skilled intervention: [x] Yes  [] No    Treatment/Activity Tolerance:  [x] Patient able to complete treatment  [] Patient limited by fatigue  [] Patient limited by pain    [] Patient limited by other medical complications  [] Other:           PLAN: 1-2x/week for 4 weeks  [x] Continue per plan of care [] Alter current plan   [] Plan of care initiated [] Hold pending MD visit [] Discharge      Electronically signed by:  Lance Tan, PT, DPT, OCS, OMT-C    Board-Certified Orthopaedic Clinical Specialist    [facility] PT license: [id number]  [place] PT license: [id number]      Note: If patient does not return for scheduled/ recommended follow up visits, this note will serve as a discharge from care along with most recent update on progress.

## 2022-08-24 ENCOUNTER — TREATMENT (OUTPATIENT)
Dept: PHYSICAL THERAPY | Age: 67
End: 2022-08-24
Payer: MEDICARE

## 2022-08-24 DIAGNOSIS — M25.512 LEFT SHOULDER PAIN, UNSPECIFIED CHRONICITY: ICD-10-CM

## 2022-08-24 DIAGNOSIS — M75.02 ADHESIVE CAPSULITIS OF LEFT SHOULDER: Primary | ICD-10-CM

## 2022-08-24 DIAGNOSIS — M25.612 DECREASED ROM OF LEFT SHOULDER: ICD-10-CM

## 2022-08-24 DIAGNOSIS — M50.30 DDD (DEGENERATIVE DISC DISEASE), CERVICAL: ICD-10-CM

## 2022-08-24 PROCEDURE — 97530 THERAPEUTIC ACTIVITIES: CPT | Performed by: PHYSICAL THERAPIST

## 2022-08-24 PROCEDURE — G8427 DOCREV CUR MEDS BY ELIG CLIN: HCPCS | Performed by: PHYSICAL THERAPIST

## 2022-08-24 PROCEDURE — 97110 THERAPEUTIC EXERCISES: CPT | Performed by: PHYSICAL THERAPIST

## 2022-08-24 PROCEDURE — 97112 NEUROMUSCULAR REEDUCATION: CPT | Performed by: PHYSICAL THERAPIST

## 2022-08-24 NOTE — PROGRESS NOTES
[facility]   Phone: [phone]    Fax: [phone]        Physical Therapy Treatment Note/ Progress Report:           Date:  2022    Patient Name:  Cydney Caraballo    :  1955  MRN: [id number]  Restrictions/Precautions:    Medical/Treatment Diagnosis Information:  Diagnosis: L shoulder adhesive capsulitis     Insurance/Certification information:  PT Insurance Information: Medicare  Physician Information:  Referring Practitioner: Dr. Gurvinder Acosta  Has the plan of care been signed (Y/N):        [x]  Yes  []  No     Date of Patient follow up with Physician: 2022      Is this a Progress Report:     []  Yes  [x]  No        If Yes:  Date/ Range for reporting period:  Beginnin/3/2022   Endin/3/2022    Progress report will be due (10 Rx or 30 days whichever is less):        Recertification will be due (POC Duration  / 90 days whichever is less): 9/3/2022         Visit # Insurance Allowable Auth Required   10 Med nec []  Yes [x]  No        Functional Scale:  FOTO score: 59   Date assessed: 8/3/2022      PQRS:   Reviewed medication list with patient. No changes since last PT visit. 2022    Latex Allergy:  [x]NO      []YES  Preferred Language for Healthcare:   [x]English       []other:      Pain level:  0/10  At rest, 3-4/10 at worst for neck, 2-3/10 for shoulder     SUBJECTIVE:  Patient reports that he had his MRI and saw the doctor for his neck and low back. He notes that the doctor is recommending nerve block to the c-spine. He states that is currently scheduled for 2022.     OBJECTIVE: See eval               ROM PROM AROM  Comment:  8/3/2022     L R L R     Flexion     165°* 152°     Abduction     160°* 152°     ER     T3 T3     IR     T8* T9     Cervical flexion         55°   Cervical extension         48°*   Cervical rotation     80%* 95%     Cervical  Side bending     80% 80%           Strength L R Comment:  8/3/2022   Flexion 5/5 5/5 Abduction 4+/5* 5/5     ER 5/5 5/5     IR 5/5 5/5           Special Tests Results/Comment:  8/3/2022   Emmy Negative on L   Neers Negative on L   OBriens Negative on L   Other: empty can Negative on L            RESTRICTIONS/PRECAUTIONS: Cervical spine DDD and L shoulder adhesive capsulitis    Exercises/Interventions:     Therapeutic Ex (72885) Sets/sec Reps Notes/CUES   AD UE BIKE            STRETCHING/ROM      Pulleys 10\" x10    Table Slides-Flexion      Table Slides-Scaption      Table Slides-Abduction      Walk back stretch at counter      Wand-ER at 0      Wand-Supine ER at 45      Wand-Supine Flexion      UE [place]      Pendulum      Doorway ER 10\" x10    Wall Slides 10\" x10    CBA 10\" x10 Blocking scap   BBIR 10\" x10    Sleeper  10\" x10    Elbow PROM/AROM      Cervical extension SNAG 5\" x10    Cervical rotation SNAG to L 5\" x10                            ISOMETRICS      Gripping      Scap Retraction 2-3\" x20    Cervical retraction 2-3\" x20    Abduction      Flexion      Internal Rotation      External Rotation            STRENGTHENING-PREs      Flexion      Abduction      Internal Rotation      External Rotation 3#, 2 x10    Shrugs      Biceps      Triceps      Retraction 3#, 2 x10    Extension      Horizontal Abduction in ER      Serratus 5#, 3 x10                      THERABANDS/CABLE COLUMN      Rows Black, 2 x10    Lats      Extension Black, 2 x10    Internal Rotation Black, 2 x10    External Rotation Blue, 2 x10    Biceps      Triceps      PNF                                    Manual Intervention (40368)      Scar Massage      STM to L infraspinatus, biceps, deltoid, UT and cervical musculature     Hawkgrips      GH joint mobilizations      Foamroll                  NMR re-education (49731)   CUES NEEDED   Plyoback      Therabar oscillations      Body Blade       Rhythmic Stabilization      Ball on the wall  x20 cw/ccw                           Therapeutic Activity (66401)      Core training      Swiss ball activities      Education                                Therapeutic Exercise and NMR EXR  [x] (19683) Provided verbal/tactile cueing for activities related to strengthening, flexibility, endurance, ROM  for improvements in scapular, scapulothoracic and UE control with self care, reaching, carrying, lifting, house/yardwork, driving/computer work. [x] (11059) Provided verbal/tactile cueing for activities related to improving balance, coordination, kinesthetic sense, posture, motor skill, proprioception  to assist with  scapular, scapulothoracic and UE control with self care, reaching, carrying, lifting, house/yardwork, driving/computer work. Therapeutic Activities:    [x] (86140 or 57616) Provided verbal/tactile cueing for activities related to improving balance, coordination, kinesthetic sense, posture, motor skill, proprioception and motor activation to allow for proper function of scapular, scapulothoracic and UE control with self care, carrying, lifting, driving/computer work. Home Exercise Program:    [x] (18501) Reviewed/Progressed HEP activities related to strengthening, flexibility, endurance, ROM of scapular, scapulothoracic and UE control with self care, reaching, carrying, lifting, house/yardwork, driving/computer work    Access Code: [email or web address]  URL: [email or web address]  Date: 07/07/2022  Prepared by: Melody Nose    Exercises  Seated Passive Cervical Retraction - 2-3 x daily - 5-7 x weekly - 1 sets - 20 reps  Seated Scapular Retraction - 2-3 x daily - 5-7 x weekly - 1 sets - 20 reps  Seated Assisted Cervical Rotation with Towel (Mirrored) - 2-3 x daily - 5-7 x weekly - 1 sets - 10 reps - 5 seconds hold  Cervical Extension AROM with Strap - 2-3 x daily - 5-7 x weekly - 1 sets - 10 reps - 5 seconds hold  Shoulder Flexion Wall Slide with Towel (Mirrored) - 2-3 x daily - 5-7 x weekly - 1 sets - 10 reps - 10 seconds hold  Shoulder ER Stretch in Abduction (Mirrored) - 2-3 x daily - 5-7 x weekly - 1 sets - 10 reps - 10 seconds hold  Shoulder horizontal adduction stretch on back - 1-2 x daily - 5-7 x weekly - 1 sets - 10 reps - 10 seconds hold  Standing Shoulder Internal Rotation Stretch with Towel (Mirrored) - 1-2 x daily - 5-7 x weekly - 1 sets - 10 reps - 10 seconds hold  Standing Shoulder Row with Anchored Resistance - 1-2 x daily - 5-7 x weekly - 2-3 sets - 10 reps  Shoulder extension with resistance - Neutral - 1-2 x daily - 5-7 x weekly - 2-3 sets - 10 reps  Shoulder Internal Rotation with Resistance (Mirrored) - 1-2 x daily - 5-7 x weekly - 2-3 sets - 10 reps  Standing Wall [employer] with Mini Swiss Ball - 1-2 x daily - 5-7 x weekly - 1 sets - 20 circles clockwise  Sleeper Stretch - 1-2 x daily - 5-7 x weekly - 1 sets - 10 reps - 10 seconds hold  Shoulder External Rotation with Anchored Resistance (Mirrored) - 1-2 x daily - 5-7 x weekly - 2 sets - 10 reps        [] (15533) Reviewed/Progressed HEP activities related to improving balance, coordination, kinesthetic sense, posture, motor skill, proprioception of scapular, scapulothoracic and UE control with self care, reaching, carrying, lifting, house/yardwork, driving/computer work      Manual Treatments:  PROM / STM / Oscillations-Mobs:  G-I, II, III, IV (PA's, Inf., Post.)  [] (92879) Provided manual therapy to mobilize soft tissue/joints of cervical/CT, scapular GHJ and UE for the purpose of modulating pain, promoting relaxation,  increasing ROM, reducing/eliminating soft tissue swelling/inflammation/restriction, improving soft tissue extensibility and allowing for proper ROM for normal function with self care, reaching, carrying, lifting, house/yardwork, driving/computer work    Modalities:  Patient declined   [] GAME READY (VASO)- for significant edema, swelling, pain control.     Charges:  Timed Code Treatment Minutes: 47   Total Treatment Minutes: 57      [] EVAL (LOW) 55187 (typically 20 minutes face-to-face)   [] EVAL (MOD) 95710 (typically 30 minutes face-to-face)  [] EVAL (HIGH) 78840 (typically 45 minutes face-to-face)  [] RE-EVAL     [x] RM(49945) x 1 (17') [] IONTO  [x] NMR (10040) x 1 (8') [] VASO  [] Manual (31829) x     [] Other:  [x] TA x  1 (22')  [] Mech Traction (06476)  [] ES(attended) (57347)      [] ES (un) (33041):         ASSESSMENT: Patient fatigues moderately with increased weight for supine SA punches and side-lying ER. He continues to demonstrate knowledge of program, requiring only minimal VCs from PT. PT and patient discussed the benefits of possible injection or nerve block for cervical spine to reduce pain and radicular symptoms. Plan to progress strengthening activities per patient tolerance. GOALS:  Patient stated goal: Swing golf club without pain in L shoulder and looking over L shoulder without pain in neck  [x] Progressing: [] Met: [] Not Met: [] Adjusted    Therapist goals for Patient:   Short Term Goals: To be achieved in: 2 weeks  1. Independent in HEP and progression per patient tolerance, in order to prevent re-injury. [] Progressing: [x] Met: [] Not Met: [] Adjusted  2. Patient will have a decrease in pain to facilitate improvement in movement, function, and ADLs as indicated by Functional Deficits. [x] Progressing: [] Met: [] Not Met: [] Adjusted    Long Term Goals: To be achieved in: 6 weeks  1. Functional index score of 64 or more for FOTO to assist with reaching prior level of function. [x] Progressing: [] Met: [] Not Met: [] Adjusted  2. Patient will demonstrate increased In L shoulder flexion, abduction, ER, and IR AROM to >155°, >155°, T3, and T8 respectively to allow for proper joint functioning as indicated by patients Functional Deficits. [x] Progressing: [] Met: [] Not Met: [] Adjusted  3.  Patient will demonstrate an increase in L shoulder strength in all planes to 4+/5 or greater to allow for proper functional mobility as indicated by patients Functional Deficits. [x] Progressing: [] Met: [] Not Met: [] Adjusted  4. Patient will return to sleeping on the L slide with 1/10 pain or less in L shoulder so that he may return to PLOF. [x] Progressing: [] Met: [] Not Met: [] Adjusted  5. Patient will demonstrate increased In cervical spine flexion, extension, B rotation, and B side bending AROM to >80%, >70%, >75%, and >75% to allow for proper joint functioning as indicated by patients Functional Deficits. [x] Progressing: [] Met: [] Not Met: [] Adjusted    Overall Progression Towards Functional goals/ Treatment Progress Update:  [x] Patient is progressing as expected towards functional goals listed. [x] Progression is slowed due to complexities/Impairments listed. [] Progression has been slowed due to co-morbidities. [] Plan just implemented, too soon to assess goals progression <30days   [] Goals require adjustment due to lack of progress  [] Patient is not progressing as expected and requires additional follow up with physician  [] Other    Prognosis for POC: [x] Good [] Fair  [] Poor      Patient requires continued skilled intervention: [x] Yes  [] No    Treatment/Activity Tolerance:  [x] Patient able to complete treatment  [] Patient limited by fatigue  [] Patient limited by pain    [] Patient limited by other medical complications  [] Other:           PLAN: 1-2x/week for 4 weeks  [x] Continue per plan of care [] Alter current plan   [] Plan of care initiated [] Hold pending MD visit [] Discharge      Electronically signed by:  Roberta Huang, PT, DPT, OCS, OMT-C    Board-Certified Orthopaedic Clinical Specialist    [id number] [address] PT license: [id number]  [place] PT license: [id number]      Note: If patient does not return for scheduled/ recommended follow up visits, this note will serve as a discharge from care along with most recent update on progress.

## 2022-08-31 ENCOUNTER — TREATMENT (OUTPATIENT)
Dept: PHYSICAL THERAPY | Age: 67
End: 2022-08-31
Payer: MEDICARE

## 2022-08-31 DIAGNOSIS — M25.512 LEFT SHOULDER PAIN, UNSPECIFIED CHRONICITY: ICD-10-CM

## 2022-08-31 DIAGNOSIS — M50.30 DDD (DEGENERATIVE DISC DISEASE), CERVICAL: ICD-10-CM

## 2022-08-31 DIAGNOSIS — M75.02 ADHESIVE CAPSULITIS OF LEFT SHOULDER: Primary | ICD-10-CM

## 2022-08-31 DIAGNOSIS — M25.612 DECREASED ROM OF LEFT SHOULDER: ICD-10-CM

## 2022-08-31 PROCEDURE — 97112 NEUROMUSCULAR REEDUCATION: CPT | Performed by: PHYSICAL THERAPIST

## 2022-08-31 PROCEDURE — G8427 DOCREV CUR MEDS BY ELIG CLIN: HCPCS | Performed by: PHYSICAL THERAPIST

## 2022-08-31 PROCEDURE — 97110 THERAPEUTIC EXERCISES: CPT | Performed by: PHYSICAL THERAPIST

## 2022-08-31 PROCEDURE — 97530 THERAPEUTIC ACTIVITIES: CPT | Performed by: PHYSICAL THERAPIST

## 2022-08-31 NOTE — PROGRESS NOTES
[facility]   Phone: [phone]    Fax: [phone]        Physical Therapy Treatment Note/ Progress Report:           Date:  2022    Patient Name:  Edilia Dial    :  1955  MRN: [id number]  Restrictions/Precautions:    Medical/Treatment Diagnosis Information:  Diagnosis: L shoulder adhesive capsulitis     Insurance/Certification information:  PT Insurance Information: Medicare  Physician Information:  Referring Practitioner: Dr. Taisha Doe  Has the plan of care been signed (Y/N):        [x]  Yes  []  No     Date of Patient follow up with Physician: 2022      Is this a Progress Report:     []  Yes  [x]  No        If Yes:  Date/ Range for reporting period:  Beginnin/3/2022   Endin/3/2022    Progress report will be due (10 Rx or 30 days whichever is less):        Recertification will be due (POC Duration  / 90 days whichever is less): 9/3/2022         Visit # Insurance Allowable Auth Required   11 Med nec []  Yes [x]  No        Functional Scale:  FOTO score: 59   Date assessed: 8/3/2022      PQRS:   Reviewed medication list with patient. No changes since last PT visit. 2022    Latex Allergy:  [x]NO      []YES  Preferred Language for Healthcare:   [x]English       []other:      Pain level:  0/10  At rest, 3-4/10 at worst for neck, 2-3/10 for shoulder     SUBJECTIVE:  Patient reports that he has been able to better lie on his L side to sleep. He states that the shoulder feels better with this position. He notes that he is still scheduled to have the cervical injection in 2 weeks.     OBJECTIVE: See eval               ROM PROM AROM  Comment:  8/3/2022     L R L R     Flexion     165°* 152°     Abduction     160°* 152°     ER     T3 T3     IR     T8* T9     Cervical flexion         55°   Cervical extension         48°*   Cervical rotation     80%* 95%     Cervical  Side bending     80% 80%           Strength L R Comment:  8/3/2022 Flexion 5/5 5/5     Abduction 4+/5* 5/5     ER 5/5 5/5     IR 5/5 5/5           Special Tests Results/Comment:  8/3/2022   Emmy Negative on L   Neers Negative on L   OBriens Negative on L   Other: empty can Negative on L            RESTRICTIONS/PRECAUTIONS: Cervical spine DDD and L shoulder adhesive capsulitis    Exercises/Interventions:     Therapeutic Ex (05297) Sets/sec Reps Notes/CUES   AD UE BIKE            STRETCHING/ROM      Pulleys 10\" x10    Table Slides-Flexion      Table Slides-Scaption      Table Slides-Abduction      Walk back stretch at counter      Wand-ER at 0      Wand-Supine ER at 45      Wand-Supine Flexion      UE [place]      Pendulum      Doorway ER 10\" x10    Wall Slides 10\" x10    CBA 10\" x10 Blocking scap   BBIR 10\" x10    Sleeper  10\" x10    Elbow PROM/AROM      Cervical extension SNAG 5\" x10    Cervical rotation SNAG to L 5\" x10                            ISOMETRICS      Gripping      Scap Retraction 2-3\" x20    Cervical retraction 2-3\" x20    Abduction      Flexion      Internal Rotation      External Rotation            STRENGTHENING-PREs      Flexion      Abduction      Internal Rotation      External Rotation 3#, 2 x10    Shrugs      Biceps      Triceps      Retraction 3#, 2 x10    Extension      Horizontal Abduction in ER      Serratus 5#, 3 x10                      THERABANDS/CABLE COLUMN      Rows Black, 2 x10    Lats      Extension Black, 2 x10    Internal Rotation Black, 2 x10    External Rotation Blue, 2 x10    Biceps      Triceps      PNF                                    Manual Intervention (51508)      Scar Massage      STM to L infraspinatus, biceps, deltoid, UT and cervical musculature     Hawkgrips      GH joint mobilizations      Foamroll                  NMR re-education (20146)   CUES NEEDED   Plyoback      Therabar oscillations      Body Blade       Rhythmic Stabilization      Ball on the wall 2# x20 cw/ccw                           Therapeutic Activity (28172)      Core training      Swiss ball activities      Education                                Therapeutic Exercise and NMR EXR  [x] (08517) Provided verbal/tactile cueing for activities related to strengthening, flexibility, endurance, ROM  for improvements in scapular, scapulothoracic and UE control with self care, reaching, carrying, lifting, house/yardwork, driving/computer work. [x] (51086) Provided verbal/tactile cueing for activities related to improving balance, coordination, kinesthetic sense, posture, motor skill, proprioception  to assist with  scapular, scapulothoracic and UE control with self care, reaching, carrying, lifting, house/yardwork, driving/computer work. Therapeutic Activities:    [x] (98583 or 93666) Provided verbal/tactile cueing for activities related to improving balance, coordination, kinesthetic sense, posture, motor skill, proprioception and motor activation to allow for proper function of scapular, scapulothoracic and UE control with self care, carrying, lifting, driving/computer work. Home Exercise Program:    [x] (89115) Reviewed/Progressed HEP activities related to strengthening, flexibility, endurance, ROM of scapular, scapulothoracic and UE control with self care, reaching, carrying, lifting, house/yardwork, driving/computer work    Access Code: [email or web address]  URL: [email or web address]  Date: 07/07/2022  Prepared by: Dilip Ramos    Exercises  Seated Passive Cervical Retraction - 2-3 x daily - 5-7 x weekly - 1 sets - 20 reps  Seated Scapular Retraction - 2-3 x daily - 5-7 x weekly - 1 sets - 20 reps  Seated Assisted Cervical Rotation with Towel (Mirrored) - 2-3 x daily - 5-7 x weekly - 1 sets - 10 reps - 5 seconds hold  Cervical Extension AROM with Strap - 2-3 x daily - 5-7 x weekly - 1 sets - 10 reps - 5 seconds hold  Shoulder Flexion Wall Slide with Towel (Mirrored) - 2-3 x daily - 5-7 x weekly - 1 sets - 10 reps - 10 seconds hold  Shoulder ER Stretch in Abduction (Mirrored) - 2-3 x daily - 5-7 x weekly - 1 sets - 10 reps - 10 seconds hold  Shoulder horizontal adduction stretch on back - 1-2 x daily - 5-7 x weekly - 1 sets - 10 reps - 10 seconds hold  Standing Shoulder Internal Rotation Stretch with Towel (Mirrored) - 1-2 x daily - 5-7 x weekly - 1 sets - 10 reps - 10 seconds hold  Standing Shoulder Row with Anchored Resistance - 1-2 x daily - 5-7 x weekly - 2-3 sets - 10 reps  Shoulder extension with resistance - Neutral - 1-2 x daily - 5-7 x weekly - 2-3 sets - 10 reps  Shoulder Internal Rotation with Resistance (Mirrored) - 1-2 x daily - 5-7 x weekly - 2-3 sets - 10 reps  Standing Wall [employer] with Mini Swiss Ball - 1-2 x daily - 5-7 x weekly - 1 sets - 20 circles clockwise  Sleeper Stretch - 1-2 x daily - 5-7 x weekly - 1 sets - 10 reps - 10 seconds hold  Shoulder External Rotation with Anchored Resistance (Mirrored) - 1-2 x daily - 5-7 x weekly - 2 sets - 10 reps        [] (34593) Reviewed/Progressed HEP activities related to improving balance, coordination, kinesthetic sense, posture, motor skill, proprioception of scapular, scapulothoracic and UE control with self care, reaching, carrying, lifting, house/yardwork, driving/computer work      Manual Treatments:  PROM / STM / Oscillations-Mobs:  G-I, II, III, IV (PA's, Inf., Post.)  [] (00757) Provided manual therapy to mobilize soft tissue/joints of cervical/CT, scapular GHJ and UE for the purpose of modulating pain, promoting relaxation,  increasing ROM, reducing/eliminating soft tissue swelling/inflammation/restriction, improving soft tissue extensibility and allowing for proper ROM for normal function with self care, reaching, carrying, lifting, house/yardwork, driving/computer work    Modalities:  CP to L shoulder and neck x10'   [] GAME READY (VASO)- for significant edema, swelling, pain control.     Charges:  Timed Code Treatment Minutes: 47   Total Treatment Minutes: 57      [] JIGNAAL (LOW) 38181 (typically 20 minutes face-to-face)   [] EVAL (MOD) 50131 (typically 30 minutes face-to-face)  [] EVAL (HIGH) 93705 (typically 45 minutes face-to-face)  [] RE-EVAL     [x] RH(47128) x 1 (17') [] IONTO  [x] NMR (86324) x 1 (8') [] VASO  [] Manual (76633) x     [] Other:  [x] TA x  1 (22')  [] Mech Traction (01118)  [] ES(attended) (00176)      [] ES (un) (71165):         ASSESSMENT: Patient reports that there is a significant increase in fatigue with 2# ball for ball on wall activity today. He requires minimal VCs throughout TB and table exercises for proper form. Significant fatigue is also noted with side-lying ER, and he states the first set of 10 is easy, but the second set is very tiring. Plan to progress activity per patient tolerance. GOALS:  Patient stated goal: Swing golf club without pain in L shoulder and looking over L shoulder without pain in neck  [x] Progressing: [] Met: [] Not Met: [] Adjusted    Therapist goals for Patient:   Short Term Goals: To be achieved in: 2 weeks  1. Independent in HEP and progression per patient tolerance, in order to prevent re-injury. [] Progressing: [x] Met: [] Not Met: [] Adjusted  2. Patient will have a decrease in pain to facilitate improvement in movement, function, and ADLs as indicated by Functional Deficits. [x] Progressing: [] Met: [] Not Met: [] Adjusted    Long Term Goals: To be achieved in: 6 weeks  1. Functional index score of 64 or more for FOTO to assist with reaching prior level of function. [x] Progressing: [] Met: [] Not Met: [] Adjusted  2. Patient will demonstrate increased In L shoulder flexion, abduction, ER, and IR AROM to >155°, >155°, T3, and T8 respectively to allow for proper joint functioning as indicated by patients Functional Deficits. [x] Progressing: [] Met: [] Not Met: [] Adjusted  3.  Patient will demonstrate an increase in L shoulder strength in all planes to 4+/5 or greater to allow for proper functional mobility as indicated by patients Functional Deficits. [x] Progressing: [] Met: [] Not Met: [] Adjusted  4. Patient will return to sleeping on the L slide with 1/10 pain or less in L shoulder so that he may return to PLOF. [x] Progressing: [] Met: [] Not Met: [] Adjusted  5. Patient will demonstrate increased In cervical spine flexion, extension, B rotation, and B side bending AROM to >80%, >70%, >75%, and >75% to allow for proper joint functioning as indicated by patients Functional Deficits. [x] Progressing: [] Met: [] Not Met: [] Adjusted    Overall Progression Towards Functional goals/ Treatment Progress Update:  [x] Patient is progressing as expected towards functional goals listed. [x] Progression is slowed due to complexities/Impairments listed. [] Progression has been slowed due to co-morbidities. [] Plan just implemented, too soon to assess goals progression <30days   [] Goals require adjustment due to lack of progress  [] Patient is not progressing as expected and requires additional follow up with physician  [] Other    Prognosis for POC: [x] Good [] Fair  [] Poor      Patient requires continued skilled intervention: [x] Yes  [] No    Treatment/Activity Tolerance:  [x] Patient able to complete treatment  [] Patient limited by fatigue  [] Patient limited by pain    [] Patient limited by other medical complications  [] Other:           PLAN: 1-2x/week for 4 weeks  [x] Continue per plan of care [] Alter current plan   [] Plan of care initiated [] Hold pending MD visit [] Discharge      Electronically signed by:  Annika Rowley, PT, DPT, OCS, OMT-C    Board-Certified Orthopaedic Clinical Specialist    [place] PT license: [id number]  [place] PT license: [id number]      Note: If patient does not return for scheduled/ recommended follow up visits, this note will serve as a discharge from care along with most recent update on progress.

## 2022-09-07 ENCOUNTER — TREATMENT (OUTPATIENT)
Dept: PHYSICAL THERAPY | Age: 67
End: 2022-09-07
Payer: MEDICARE

## 2022-09-07 DIAGNOSIS — M25.612 DECREASED ROM OF LEFT SHOULDER: ICD-10-CM

## 2022-09-07 DIAGNOSIS — M25.512 LEFT SHOULDER PAIN, UNSPECIFIED CHRONICITY: ICD-10-CM

## 2022-09-07 DIAGNOSIS — M50.30 DDD (DEGENERATIVE DISC DISEASE), CERVICAL: ICD-10-CM

## 2022-09-07 DIAGNOSIS — M75.02 ADHESIVE CAPSULITIS OF LEFT SHOULDER: Primary | ICD-10-CM

## 2022-09-07 PROCEDURE — 97530 THERAPEUTIC ACTIVITIES: CPT | Performed by: PHYSICAL THERAPIST

## 2022-09-07 PROCEDURE — 97110 THERAPEUTIC EXERCISES: CPT | Performed by: PHYSICAL THERAPIST

## 2022-09-07 PROCEDURE — G8427 DOCREV CUR MEDS BY ELIG CLIN: HCPCS | Performed by: PHYSICAL THERAPIST

## 2022-09-07 PROCEDURE — 97112 NEUROMUSCULAR REEDUCATION: CPT | Performed by: PHYSICAL THERAPIST

## 2022-09-07 NOTE — PROGRESS NOTES
[facility]   Phone: [phone]    Fax: [phone]     Physical Therapy Re-Certification Plan of Care    Dear Dr. Socrates Dc,    We had the pleasure of treating the following patient for physical therapy services at [address] [place] [address]. A summary of our findings can be found in the updated assessment below. This includes our plan of care. If you have any questions or concerns regarding these findings, please do not hesitate to contact me at the office phone number checked above.   Thank you for the referral.     Physician Signature:________________________________Date:__________________  By signing above (or electronic signature), therapists plan is approved by physician      Overall Response to Treatment:   [x]Patient is responding well to treatment and improvement is noted with regards  to goals   [x]Patient should continue to improve in reasonable time if they continue HEP   []Patient has plateaued and is no longer responding to skilled PT intervention    []Patient is getting worse and would benefit from return to referring MD   []Patient unable to adhere to initial POC   []Other:     Physical Therapy Treatment Note/ Progress Report:           Date:  2022    Patient Name:  Watson Gilbert    :  1955  MRN: [id number]  Restrictions/Precautions:    Medical/Treatment Diagnosis Information:  Diagnosis: L shoulder adhesive capsulitis     Insurance/Certification information:  PT Insurance Information: Medicare  Physician Information:  Referring Practitioner: Dr. Socrates Dc  Has the plan of care been signed (Y/N):        []  Yes  [x]  No     Date of Patient follow up with Physician: 2022 with neck specialist      Is this a Progress Report:     [x]  Yes  []  No        If Yes:  Date/ Range for reporting period:  Beginnin/3/2022 Update NPV  Endin2022    Progress report will be due (10 Rx or 30 days whichever is less): x10    CBA 10\" x10 Blocking scap   BBIR 10\" x10    Sleeper  10\" x10    Elbow PROM/AROM      Cervical extension SNAG 5\" x10    Cervical rotation SNAG to L 5\" x10                            ISOMETRICS      Gripping      Scap Retraction 2-3\" x20    Cervical retraction 2-3\" x20    Abduction      Flexion      Internal Rotation      External Rotation            STRENGTHENING-PREs      Flexion      Abduction      Internal Rotation      External Rotation 3#, 2 x10    Shrugs      Biceps      Triceps      Retraction 3#, 2 x10    Extension      Horizontal Abduction in ER      Serratus 5#, 3 x10                      THERABANDS/CABLE COLUMN      Rows Black, 2 x10    Lats      Extension Black, 2 x10    Internal Rotation Black, 2 x10    External Rotation Blue, 2 x10    Biceps      Triceps      PNF                                    Manual Intervention (19643)      Scar Massage      STM to L infraspinatus, biceps, deltoid, UT and cervical musculature     Hawkgrips      GH joint mobilizations      Foamroll                  NMR re-education (37218)   CUES NEEDED   Plyoback      Therabar oscillations      Body Blade       Rhythmic Stabilization      Ball on the wall 2# x20 cw/ccw                           Therapeutic Activity (86833)      Core training      Swiss ball activities      Education                                Therapeutic Exercise and NMR EXR  [x] (76303) Provided verbal/tactile cueing for activities related to strengthening, flexibility, endurance, ROM  for improvements in scapular, scapulothoracic and UE control with self care, reaching, carrying, lifting, house/yardwork, driving/computer work. [x] (19743) Provided verbal/tactile cueing for activities related to improving balance, coordination, kinesthetic sense, posture, motor skill, proprioception  to assist with  scapular, scapulothoracic and UE control with self care, reaching, carrying, lifting, house/yardwork, driving/computer work.     Therapeutic Activities: [x] (82779 or 94230) Provided verbal/tactile cueing for activities related to improving balance, coordination, kinesthetic sense, posture, motor skill, proprioception and motor activation to allow for proper function of scapular, scapulothoracic and UE control with self care, carrying, lifting, driving/computer work. Home Exercise Program:    [x] (74086) Reviewed/Progressed HEP activities related to strengthening, flexibility, endurance, ROM of scapular, scapulothoracic and UE control with self care, reaching, carrying, lifting, house/yardwork, driving/computer work    Access Code: [email or web address]  URL: [email or web address]  Date: 07/07/2022  Prepared by: Kwan Enter    Exercises  Seated Passive Cervical Retraction - 2-3 x daily - 5-7 x weekly - 1 sets - 20 reps  Seated Scapular Retraction - 2-3 x daily - 5-7 x weekly - 1 sets - 20 reps  Seated Assisted Cervical Rotation with Towel (Mirrored) - 2-3 x daily - 5-7 x weekly - 1 sets - 10 reps - 5 seconds hold  Cervical Extension AROM with Strap - 2-3 x daily - 5-7 x weekly - 1 sets - 10 reps - 5 seconds hold  Shoulder Flexion Wall Slide with Towel (Mirrored) - 2-3 x daily - 5-7 x weekly - 1 sets - 10 reps - 10 seconds hold  Shoulder ER Stretch in Abduction (Mirrored) - 2-3 x daily - 5-7 x weekly - 1 sets - 10 reps - 10 seconds hold  Shoulder horizontal adduction stretch on back - 1-2 x daily - 5-7 x weekly - 1 sets - 10 reps - 10 seconds hold  Standing Shoulder Internal Rotation Stretch with Towel (Mirrored) - 1-2 x daily - 5-7 x weekly - 1 sets - 10 reps - 10 seconds hold  Standing Shoulder Row with Anchored Resistance - 1-2 x daily - 5-7 x weekly - 2-3 sets - 10 reps  Shoulder extension with resistance - Neutral - 1-2 x daily - 5-7 x weekly - 2-3 sets - 10 reps  Shoulder Internal Rotation with Resistance (Mirrored) - 1-2 x daily - 5-7 x weekly - 2-3 sets - 10 reps  Standing Wall [employer] with Mini Swiss Ball - 1-2 x daily - 5-7 x weekly - 1 sets - 20 circles clockwise  Sleeper Stretch - 1-2 x daily - 5-7 x weekly - 1 sets - 10 reps - 10 seconds hold  Shoulder External Rotation with Anchored Resistance (Mirrored) - 1-2 x daily - 5-7 x weekly - 2 sets - 10 reps        [] (85091) Reviewed/Progressed HEP activities related to improving balance, coordination, kinesthetic sense, posture, motor skill, proprioception of scapular, scapulothoracic and UE control with self care, reaching, carrying, lifting, house/yardwork, driving/computer work      Manual Treatments:  PROM / STM / Oscillations-Mobs:  G-I, II, III, IV (PA's, Inf., Post.)  [] ([id number]) Provided manual therapy to mobilize soft tissue/joints of cervical/CT, scapular GHJ and UE for the purpose of modulating pain, promoting relaxation,  increasing ROM, reducing/eliminating soft tissue swelling/inflammation/restriction, improving soft tissue extensibility and allowing for proper ROM for normal function with self care, reaching, carrying, lifting, house/yardwork, driving/computer work    Modalities:  CP to L shoulder and neck x10'   [] GAME READY (VASO)- for significant edema, swelling, pain control. Charges:  Timed Code Treatment Minutes: 45   Total Treatment Minutes: 60      [] EVAL (LOW) 47649 (typically 20 minutes face-to-face)   [] EVAL (MOD) 58184 (typically 30 minutes face-to-face)  [] EVAL (HIGH) 60101 (typically 45 minutes face-to-face)  [] RE-EVAL     [x] CS(86908) x 1 (15') [] IONTO  [x] NMR (83901) x 1 (8') [] VASO  [] Manual (69100) x     [] Other:  [x] TA x  1 (22')  [] Mech Traction (39275)  [] ES(attended) (10964)      [] ES (un) (54829):         ASSESSMENT: Pt has made slight improvements in ROM specifically cervical extension and L shoulder ER, and demonstrates improvement to 5/5 strength in all L shoulder movements.   He reports no significant change in functional activities with discomfort and pain still present in the cervical spine, but states improvement with functional activities involving the L shoulder such as reaching [place]. His primary limitation to PLOF and the need for continued rehab is his cervical pain. After his tentative epidural injection next week, he will benefit from continued PT with a focus on pain management, restoration of full and proper cervical and L shoulder ROM, and continued strengthening of the cervical and L shoulder complex. Pt has fair tolerance to PT session today with minor c/o of pain and discomfort during RTC strengthening exercises. He states the pain is along the lateral aspect of the L shoulder, but persists only with exercise. He is fairly independent with his program and requires 1-2 verbal cues throughout the session for proper form. GOALS:  Patient stated goal: Swing golf club without pain in L shoulder and looking over L shoulder without pain in neck  [x] Progressing: [] Met: [] Not Met: [] Adjusted    Therapist goals for Patient:   Short Term Goals: To be achieved in: 2 weeks  1. Independent in HEP and progression per patient tolerance, in order to prevent re-injury. [] Progressing: [x] Met: [] Not Met: [] Adjusted  2. Patient will have a decrease in pain to facilitate improvement in movement, function, and ADLs as indicated by Functional Deficits. [x] Progressing: [] Met: [] Not Met: [] Adjusted    Long Term Goals: To be achieved in: 6 weeks  1. Functional index score of 64 or more for FOTO to assist with reaching prior level of function. [x] Progressing: [] Met: [] Not Met: [] Adjusted  2. Patient will demonstrate increased In L shoulder flexion, abduction, ER, and IR AROM to >155°, >155°, T3, and T8 respectively to allow for proper joint functioning as indicated by patients Functional Deficits. [x] Progressing: [] Met: [] Not Met: [] Adjusted  3. Patient will demonstrate an increase in L shoulder strength in all planes to 4+/5 or greater to allow for proper functional mobility as indicated by patients Functional Deficits.    [] Progressing: [x] Met: [] Not Met: [] Adjusted  4. Patient will return to sleeping on the L slide with 1/10 pain or less in L shoulder so that he may return to PLOF. [x] Progressing: [] Met: [] Not Met: [] Adjusted  5. Patient will demonstrate increased In cervical spine flexion, extension, B rotation, and B side bending AROM to >80%, >70%, >75%, and >75% to allow for proper joint functioning as indicated by patients Functional Deficits. [] Progressing: [x] Met: [] Not Met: [] Adjusted    Overall Progression Towards Functional goals/ Treatment Progress Update:  [x] Patient is progressing as expected towards functional goals listed. [x] Progression is slowed due to complexities/Impairments listed. [] Progression has been slowed due to co-morbidities. [] Plan just implemented, too soon to assess goals progression <30days   [] Goals require adjustment due to lack of progress  [] Patient is not progressing as expected and requires additional follow up with physician  [] Other    Prognosis for POC: [x] Good [] Fair  [] Poor      Patient requires continued skilled intervention: [x] Yes  [] No    Treatment/Activity Tolerance:  [x] Patient able to complete treatment  [] Patient limited by fatigue  [] Patient limited by pain    [] Patient limited by other medical complications  [] Other:           PLAN: 1-2x/week for 4 weeks  [x] Continue per plan of care [] Alter current plan   [] Plan of care initiated [] Hold pending MD visit [] Discharge      Electronically signed by:  Makenna Dang, PT, DPT, OCS, OMT-C    Board-Certified Orthopaedic Clinical Specialist    [place] PT license: [id number]  [place] PT license: [id number]      Note: If patient does not return for scheduled/ recommended follow up visits, this note will serve as a discharge from care along with most recent update on progress.

## 2022-09-12 ENCOUNTER — SCHEDULED TELEPHONE ENCOUNTER (OUTPATIENT)
Dept: PHARMACY | Age: 67
End: 2022-09-12

## 2022-09-12 DIAGNOSIS — Z87.891 FORMER SMOKER: Primary | ICD-10-CM

## 2022-09-12 NOTE — PROGRESS NOTES
coffee, alcohol (already tried to dissociate smoking with certain activities, but picked it up with other activities)  Barriers: not confident (only 5 on a 1-10 scale); He is \"afraid\" to run out of cigarettes. Motivation for quitting: Has wanted to quit for years (in back of mind) because he doesn't like that it's in control of him (addiction), Health, family, money    12/19/19 update:  Did  Chantix from pharmacy ($40), but did not start. Fearful of side effects. Has history of bad dreams several years ago and is afraid it may cause vivid dreams. Has been able to cut back slightly on his own. Was able to eliminate the 1st cigarette of the day, is now working on the 2nd. Started playing pool to fill downtime. He has been able to avoid buying more cigarettes until completely out of current pack  It has been a busy week, and he hasn't been able to focus on quitting as much as he would like. He plans to go to [place] for [place]. 12/31/19 update:  Smoking \"a little less,\" but unable to give exact amount. Started Chantix yesterday. Has tolerated 2 doses so far. No side effects. Has been able to delay the 2nd cigarette more by switching AM routine (drink coffe before breakfast)  Sucking on mints  Used recumbent bike a couple times, but no routine yet. Moved cigarettes to basement (out of pocket) to make it more inconvenient. Has kept a list of things to do on his breaks. 1/14/20 update:  Continues Chantix 1 mg BID. Takes with food, but has been difficult because he does not eat at regular times during the day. Experiencing some nausea/ \"queesy\" stomach. Also having occasional slight anxiety, but nothing major and is able to tolerate. Denies depression/thoughts of suicide or harming himself. Smoking less, but is not counting exact number because he thinks it will make him think about smoking more. He knows he is not buying cigarettes as often.  He does not buy cigarettes until he is completely out of them. He needs a new RX for chantix. He is trying to stay busy and avoid long breaks which triggers him to smoke. He has been able to postpone the cigarette many times. 1/30/20 update:   Continues Chantix 1 mg BID. Takes with food. Experiencing some nausea/ \"queesy\" stomach, but tolerable. Also having occasional slight anxiety, but nothing major and is able to tolerate. Denies depression/thoughts of suicide or harming himself. Rides his recumbent bike 2x per week for 15 minutes. Smoking less, but is still not counting the exact number because he thinks it will make him think about smoking more. He is buying cigarettes less often and has placed them in his basement so has to go downstairs to get them when he wants to smoke. He does not like going down stairs. He has been able to walk back upstairs from the basement without bringing a cigarette with him. He made a list of reasons for quitting and placed it by his coat so he sees it when he is going outside to smoke. He does not smoke inside house or in car. He started using hard candies in place of cigarettes while he is preparing his meals. He often smokes right before and right after meals. He always brushes his teeth after meals. Patient feels that drinking coffee throughout the day would help keep him busy during breaks, and coffee has not been a trigger for him lately. 2/27/20  Has been able to reduce to 2 packs per week, but it was really hard. He surprised himself. Pt started drinking tea instead of coffee. Has been able to postpoine 1st cig at least a couple hours. Plans to avoid [facility], where he buys his cigs. Feels \"accomplished\" after exercising, but has only been able to exercise 2x per week. 3/12/20  Pt does not feel he did well with the goals we set at last visit 2/2 less focus and recent stressors (DDS, shingles vax, coronavirus). He didn't exercise as much.  He increased to 1 pack in 3 days, then cut back down to 1 pack in 3.5 days. His cravings are less frequent, but not necessarily less strong. The recent [address] outbreak is worrying him because he is high risk. This is more motivation for him to quit. Pt requests a Chantix refill. Tried 1-800-QUIT-NOW- [facility] it was not for him. 3/26/20  Pt reports having \"Up and downs. \" Coronavirus has really concerned him. He stopped Chantix for 2 days due to depression surrounding the situation, but realized that not listening to the news helped him much more. Pt feels his mood is stable and would like to resume chantix. Started keeping track of when he smokes (exact #s). He smoked more for a couple days, but he has been able to limit to 4 cig/day the past 2 days! This equates to goal of no more than 1 pack every 5 days. 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 He increased exercise to 20 min 2x per week and started core exercises. He is listening to an old CD while he exercises, which helps. He feel accomplished after exercise. 3/31/20  Pt quit smoking 3 days ago on 3/28/20! Motivation was fear. Pt started back on 0.5 mg once daily. Will increase to 0.5 mg BID. Things that worked: prayer, staying occupied, stopped watching the news because it was depressing. 4/2/20  Will increase Chantix today to 0.5 mg BID  He did put lighter out of pocket/ out of sight  Doesn't have an jacquelyn tray, he put the cigarette butts in a planter pot. Suggested he move this to a different location and plant a new plant or flowers in it. His urges continue to be able piano and after programming. His biggest urge to overcome was when he had been working on programming or several months and finally had to test if it worked, and it did not work. He had a very strong urge to smoke but paced and prayed, then occupied himself with something different. He is not ready to throw away the cigarettes, but he did put them out of site and doesn't remember where they are.    Withdrawal sxs include: nasal drip, insomnia, irritability    4/7/20  11th day of ebing tobacco free. Scared that he might relapse. Reports mood is stable-- slightly anxious/worried about COVID19, but better than before. Withdrawal sxs include: nasal drip better, irritability, insomnia but not sure if 2/2 withdrawal or worry about COVID19. Increased water intake from 3 cups to 6 cups per day. 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 \" Urge lasted ~10 min. Craving rated 7/10.    2) after a really long phone call, paced, ate a piece of candy (max 1-2 pieces candy per day)  3) found himself at the door that he goes out to smoke; he is not sure what led to that moment; asked himself what am I doing? Was able to leave the situation    4/13/20  Pt on way to dentist for toothache. He watched star trek this weekend for 2 week reward. For next reward, he wants to buy new sheet music for piano since he cannot go to lessons. He would like to learn \"I can only imagine. \" Took 1 walk, bike x 2, but no core exercises. Will pick back up this week. 4/15/20  Root canal on 4/13. Started amoxicillin 500 mg TID. Exercise going well, except for past couple days. Unable to figure out what foods relieve nausea from Chantix. Urge 4/13: after leaving S, had 2 hours to kill before root canal, sat in car with nothing to do, feeling anxious, read his book and listened to music; always used to smoke when he needs to kills time. Missed Chantix on 4/13, took 1 dose on 4/13.     4/22/20  Still needs dental work done, fillings next week.  Core exercise 2x per week, bike 2-3x per week-- feeling stronger. Tobacco cravings \"up and down. \" Most days they are less frequent, intensity varies. Is able to overcome. Watched star trek this weekend as reward. 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 Sunday night craving: made food for the whole week and was on feet all day; had to clean up after, strong craving after he took a break, created a list of distractions when he has cravings: jigsaw puzzle, read book. Still having trouble sleeping: plans to read old spiritual book before bed. Drainage much better. Slightly more depressed due to [address]. Does not feel this is due to Chantix, just situational. 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 Pt denies any thought of harming himself or others. 4/29/20  Pt smoke free x 1 month! Watched an old [employer] game as reward. Sleeping improved now that he is reading 30 min before bed. Piano 1 hour 4x/per week--found a tutorial on how to play \"I can only imagine\" and he is trying to write CONEXANCE MD sheet music for it. Tried working on software again. He is trying to make a routine. Lifting COVID restrictions is worrying him. He has been busy looking for new insurance as his [employer] expires this month and he is inelligible for [employer] until oct. It has been time consuming. He has a pcp visit next week. Needs to make sure all his RXs are covered under new plan. 5/6/20  Mood is \"up and down\"- manly due to coronavirus pandemic, but feels it is up more often than down. Pt new [employer] insurance starts today. Cravings are rare with short duration, but still strong (7 out of 10). Saw PCP today. Walks outside. Has been on Chantix x 4 months. Is not sure if it's covered by new insurance. 5/20/20  No lapses. Still some strong urges avg once per day. Bike 20 minutes 3x per week, increasing intensity \"from 1 to 2\". Continues core exercises.  Working on writing sheet music for \"I can only imagine\". Increased amount of time spending on software, gradually. Increased nausea with Chantix. Mood is good, better than before. 5/27/20  No increase in cravings after cutting back slightly on Chantix. Nausea improved slightly possibly. A couple strong cravings but able to overcome. Increased exercise from 20 to 30 min on bike 3x per week, core exercises 1x per week, would like to increase to 2x per week. Feels good while riding bike, feels tired/accomplished after. Plays upbeat music. Still practicing piano, trying to write sheet music. Spends time on software, but not accomplishing a lot due to some limitations (space on hard drive). Going to [facility] [employer] today. Plans to cancel insurance at end of month because they do not cover Chantix or other needs (DDS, chiropractor, PCP). Arranged for [employer] to start 6/1/20 and Drs in [employer]. Sleeping better- was waking up at 3-4a and was anxious. Now not waking up anxious. 6/3/20  Decreased Chantix to 0.5 mg BID at end of last week, no increase in strength or frequency of cravings. Only a brief craving this AM while lying in bed, able to overcome easily. Needs refill on Chantix; has ~1 week left. Cut esomeprazole in half- still no GERD sxs. Still clearing throat (has happened since he quit smoking which he attributes to his lungs clearing out). Nausea has improved since reducing Chantix. Mood is good, he is less anxious and not waking in middle of night. He is drinking 6 glasses of water each day. 6/17/20  Continues Chantix 0.5 mg BID, still not smoking, slightly more frequent and stronger urges, but not too bad, in control of cravings. Nausea much better. Working more on programming which is sometimes a trigger during \"breaks. \"  Now sucks hard candy instead. Will start 1k puzzle. Goes for a walk every other day. Increased water to 6-7 glasses per day.    Only had one episode of GERD since cutting back on dose and stopping carafate. Pt is unsure why he started the medications. Looking back on med list, he has been on this at least 10 years. Per OV note from Dr Mauricio Doshi 2/28/11, \"Just 2 weeks ago he began to have hiccups and a feeling of acid around his throat. He is on Nexium every day. He had an EGD by Dr. Cici Logan for this in ~ 2008 and had sucralfate prescribed then, which helped. He recently found the old med and  Has taken it for a week now and it has helped. He will take it for a few weeks and then stop and see what happens. \"    7/6/20  -Continues Chantix, remains smoke free >3 months, no changes in cravings, etc. Nauseous once because he forgot to eat. Is nervous to stop medication altogether.   -Pt reduced Nexium 40 mg to QOD ~1.5 wks ago, doing well with no increase in GERD sxs. Feels comfortable gradually tapering off. -/72 one time at home, but does not check daily   -Bike 3x per week, Core exercises 2x per week. 8/5/20  -Getting exercise, but not outside. Cut back on Chantix 0.5 mg once daily on most days. Had some stronger cravings some days, so took it BID. Strong craving on way to [facility]. It was the 1st time back to [facility] since he quit smoking. One of members smoked in front of him, which was difficult. He stopped where he normally would buy cigarettes on way to [facility] and bought candy instead. Checked BP yesterday 133/73. Doing well with PPI 40 mg QOD, but not ready to cut back to 20 mg QOD because he has a large supply of 40 mg tabs. 8/26/20  -Getting core/bike exercise, but not outside. Working toward goals.   -Cut back on Chantix 0.5 mg to QOD. Initially forgot dose, but then skipped doses intentionally.   -Triggers: frustration, on way to [facility], seeing people smoke on TV. -Plans to tell his friends at the [employer].  -Was able to not stop where he buys cigarettes on way to [facility] the last time he went.   -Doing well with PPI 40 mg QOD, but not ready to cut back to 20 mg QOD because he has a large supply of 40 mg tabs. Agreed to try MWF.  -6 glasses of water each day  -Echo submitted and approved by [employer].  -not checking BP daily    9/16/20  -Still taking Chantix QOD, occasional strong cravings due to triggers  -Triggers: will go back to [facility] this weekend.   -Hasn't told anyone at [facility] that he quit. -slowly increasing to 8 glasses water/day  -reports BP log: SBP range 116-131,  DBP range 66-76  -looking for better sense of smell     10/7/20  -Working to apply for medicare; still trying to figure out which plan is best.   -D/c Chantix 5 days ago, still has on hand to use prn. Has \"Ups and downs. \" Craving frequency and intensity leveled off.   -Quit 6 months ago!   -Told brother in law in [place] that he quit smoking.  -Improved smell    -Saw PCP who found blood in urine, which is worrying patient. Will return in 1 week for BP f/u after med changes made.   -Plans to continue PPI taper and will use Pepcid prn heartburn    11/3/20  -Remains smoke free  -Has not had to use Chantix since last appointment    -Still gets urges when seeing people smoke on TV. Will keep hard candies by the TV to avoid urge to smoke. -Got Nexium OTC instead of Pepcid, but only taking three times a week. Advised to get Pepcid instead as this is better for PRN use. 11/24/20  Cravings reduced significantly. Has not taken any Chantix. Has not switched from nexium QOD to pepcid yet, but bought at store. No GERD sxs. SBP mostly<130. Pt feeling well. Socially distanced. Staying healthy. 1/12/21  Put on an old jacket and had a pack of cigarettes in pocket. He put the pack directly in the trash without looking at it. He wasn't able to take out the trash right away, but didn't dig them out. He is not looking forward to when he is offered a cigarette in the future. Discussed how to prepare for his. Very excited because he got his echo published in [employer]. Stopped Nexium ~2 weeks ago.  Using pepcid prn, but hasn't needed to use this at all. Cut potassium down to once per day. 2/23/21  1 year piter of being tobacco free is coming up - March 28. Emphasized the enormity of this accomplishment. 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 He still has triggers such as seeing someone smoking on tv or being frustrated. He was hoping after a year these triggers would be easier to handle. Encouraged patient to celebrate his success and focus on ways he used to avoid triggers when he was quitting. He states he often will pace, drink water, or just leave or turn away from the trigger. He states he is proud that his health has improved since quitting and he has been able to take less pills. Is also able to smell his coffee now. He is still nervous for when he is offered a cigarette in the future. Discussed how to prepare for this using by using his trigger-avoiding strategies and just leaving the room/situation if needed. 3/23/21  Patient reports he is still doing well and remaining smoke free, however he is \"afraid it wont last\". He has not been around his family members, Sh[employer] members, or friends since [address] started a year ago and reports he is nervous that when he is around them the temptation will be hard to overcome as many of them are smokers. Discussed methods to abstain from temptations such as staying inside when family members/friends go outside to smoke, making it physically difficult to smoke by having something else in his mouth (gum, mint, hard candies), not buying his own cigarettes, telling family / friends he has quit smoking so they do not unintentionally try to pressure him, and turning away from trigger. He states his family and friends are very supportive of him and will be happy to see how far he has come. Encouraged patient to celebrate his one year anniversary of being smoke free as this is a huge accomplishment.  Patient's family is having a celebration next week that will be virtual. Will call patient again in 1 month to follow-up, but encouraged patient to schedule appointment earlier if he feels he is getting overwhelmed with triggers before then. 4/20/21  Told family zoom celebration for late mom's birthday and told family he quit smoking. Brother in law wishes he could quit but hasn't set a quit date. Has not had much in person contact with family. Still not in social situations either, including [employer] volunteer work. Still virtual. Meets with group of inter-racial [employer] members and plans to meet in person next. One member smokes and he is worried it will be a trigger to smoke. Still doesn't consider himself a non-smoker, thinks he is still \"quitting\". Reminded patient that he did QUIT and has been a NONsmoker for over a year! His reward was getting his computer fixed/updated. He has been walking 2x per week and has a more consistent indoor exercise routine-- recumbent bike, weights, core exercises. Gets a \"reaction\" (not necessarily an urge) when sees people smoking on TV and nervous about going around family and friends. He will avoid triggers as discussed above. 5/18/21  Granddaughter graduated from college in [facility]. Daisha Lee had celebration for her, and he attended. When he stepped outside of [employer], it reminded him of when he used to step out to take smoke breaks. Long drive to get to [employer], and stopped at one of places he normally stops to smoke, but instead he stopped to get gas. He didn't feel urge to smoke, just brought back memories. Pt states he is gaining weight, possibly snacking more and slowing metabolism. Still exercises, but not high intensity. Started Tr[employer] exercises. Pt had a lot of questions about his last PCP visit, when to follow up, how to get labs, where to go, etc    7/13/21  Large family gathering on 4th of July. Smokers went outside on the lawn. Pt avoided them while they smoked.  Stopped at rest stop he normally stops at to smoke on 75 and was able to stop and not smoke. Behind on healthcare paperwork and slowly getting organized. Pt has many questions about how to get his labs and where to go.    8/31/21  Patient having cravings off and on but has not smoked since March 28, 2020. Cravings are usually when he sees someone else smoking, such as on TV. He states he just puts his mind on something else or changes the TV channel. Patient reports doing really well overall. He is very happy with himself for remaining smoke free. Discussed long term benefits of not smoking. Patient still trying to get CT scheduled with [facility] before his appointment with Dr. Bill Dodge in October. 9/28/21  Length of time between cravings is increasing. Seeing people on TV and being in situations where he used to smoke remain triggers. Planning to start walking more now that weather is getting nicer. Reports his brother in law is considering stopping smoking and patient very supportive of him. 10/26/21  Things are going well, remains smoke free. Cravings come and go but thinks this month has been worse than last, likely due to being around more people due to getting out more. Tries to avoid situations where he smoked previously. Recommended having coffee available in these situations as this helped him previously. Has put on weight since stopping smoking but states this has now leveled out. Wanting to increase exercise, been working out at home but knows he needs to be more disciplined. 11/23/21  Few social events, but masking and distancing. Got COVID booster. [employer] service once per month in person. Nicotine cravings still come and go, but a little less often. Carries a big water bottle with him. Drinks coffee in AM and on long drive to curb the craving and drinks more water. Exercises at home 3x per week (30-60 min), tries to increase to 4x. Anticipating a long drive to thanksgiving dinner and knows this will be a trigger. Plans to bring coffee, water, not having an cig on hand. Working on [employer]--oh holy night. Working on a new shun to keep him busy. 1/18/22  Patient states that he is doing fine. He has been doing well this past month and reports improved nicotine cravings. As he looks back over this month he says it was better than last in terms of his cravings. The las month went very fast. With COVID he as not been around people smoking which has helped. If he sees people smoking  on TV or sees people smoking it reminds him of what it was like; he tries to avoid these situations. He says avoidance has been a good strategy for him. Did not have to drive long distance for Thanksgiving because it was cancelled. Has had other long drives though and used coffee to keep his hands and mouth occupied. Continues to do well and be successful with his smoking cessation. 2/15/22  Patient states that he is doing well. He has not smoked since the last visit and states that he does not have increased urges or cravings. He is fearful of having cravings or urgers as he begins to restart activities he has not done since COVID-19 started. He is particularly worried about the urge to smoke while driving the way up to [facility]. He has a hour drive to his [facility]. We discussed coping and mitigation strategies for this. He is also worried about urge when he sees people at [facility] smoking. We spoke about this at length. We discussed how he is now a non-smoke and can be proud of that. We discussed the potential of helping those around him quit smoking about opening a conversation about why he no longer smokes. He has bought a new Mac desktop which he is excited about. Overall he states that he is doing well, but would like to continue to meet monthly to keep up his motivation. 3/15/22  Started going back to [facility] on Sundays, but doesn't see anyone smoking. No social gatherings after.  Concerned about having a craving while driving, but hasn't had acyclovir (ZOVIRAX) 400 MG tablet TAKE ONE TABLET BY MOUTH THREE TIMES A DAY FOR 10 DAYS FOR HERPES SIMPLEX FLARE-UP 90 tablet 1    loratadine (CLARITIN) 10 MG tablet Take 10 mg by mouth daily      Misc Natural Products (GLUCOSAMINE CHOND DOUBLE STR PO) Take 1 tablet by mouth daily      Multiple Vitamin (MULTIVITAMIN PO) Take 1 capsule by mouth daily. No current facility-administered medications for this visit. Pertinent Labs/Vitals  Scr 0.9 (11/21/19)    ASSESSMENT/PLAN:   Patient has been tobacco free for over 2 years! Frequency and intensity of cravings has decreased significantly. --Has not used Chantix in >12 months but has if needed. Discussed health maintenance due- colonoscopy, flu vaccine, + AAA screening. Pt had covid booster. Discussed options for reducing pill burden. Message sent to PCP re:   -decr potassium to 1 tab per day   -combine olmesartan + hctz into combo pill     Goals   Try walking outside more and continuing home workouts, doing better when the weather is warmer  Find healthier alternatives to current snacks (carrots, celery, etc), he has started eating more vegetables. Reward yourself with smoke free anniversaries (grill, new sheet music, star trek, jigsaw puzzle, book)- he has a new MacBook  Remind yourself that you are a non-smoker and be confident and proud of your accomplishment. Try carrot or pepper strips for snacks instead of bags of chips. Next appt:  12 wk- pt prefers to continue calls to hold him accountable and for the encouragement. Pt verbalized understanding of the above information and denied further questions or concerns. The total time of the visit was 30 min.     Makayla Escalante, PharmD, [facility]  [facility]   [address] Ph: [phone]  Joey Stephens Ph: [phone]  9/12/2022 11:20 AM      For Pharmacy [address] in place:  Yes  Recommendation Provided To: Patient/Caregiver: 1 via Virtual Visit  Intervention Detail: Scheduled Appointment  Gap Closed?: Yes   Intervention Accepted By: Patient/Caregiver: 1  Time Spent (min): 30

## 2022-09-21 ENCOUNTER — TREATMENT (OUTPATIENT)
Dept: PHYSICAL THERAPY | Age: 67
End: 2022-09-21
Payer: MEDICARE

## 2022-09-21 DIAGNOSIS — M25.612 DECREASED ROM OF LEFT SHOULDER: ICD-10-CM

## 2022-09-21 DIAGNOSIS — M25.512 LEFT SHOULDER PAIN, UNSPECIFIED CHRONICITY: ICD-10-CM

## 2022-09-21 DIAGNOSIS — M75.02 ADHESIVE CAPSULITIS OF LEFT SHOULDER: Primary | ICD-10-CM

## 2022-09-21 DIAGNOSIS — M50.30 DDD (DEGENERATIVE DISC DISEASE), CERVICAL: ICD-10-CM

## 2022-09-21 PROCEDURE — 97530 THERAPEUTIC ACTIVITIES: CPT | Performed by: PHYSICAL THERAPIST

## 2022-09-21 PROCEDURE — 97112 NEUROMUSCULAR REEDUCATION: CPT | Performed by: PHYSICAL THERAPIST

## 2022-09-21 PROCEDURE — G8427 DOCREV CUR MEDS BY ELIG CLIN: HCPCS | Performed by: PHYSICAL THERAPIST

## 2022-09-21 PROCEDURE — 97110 THERAPEUTIC EXERCISES: CPT | Performed by: PHYSICAL THERAPIST

## 2022-09-21 NOTE — PROGRESS NOTES
[facility]   Phone: [phone]    Fax: [phone]  Physical Therapy Treatment Note/ Progress Report:           Date:  2022    Patient Name:  Mark Rodriguez    :  1955  MRN: [id number]  Restrictions/Precautions:    Medical/Treatment Diagnosis Information:  Diagnosis: L shoulder adhesive capsulitis     Insurance/Certification information:  PT Insurance Information: Medicare  Physician Information:  Referring Practitioner: Dr. Param Rachel  Has the plan of care been signed (Y/N):        [x]  Yes  []  No     Date of Patient follow up with Physician: 2022 with neck specialist      Is this a Progress Report:     []  Yes  [x]  No        If Yes:  Date/ Range for reporting period:  Beginnin22  Ending: 10/21/22    Progress report will be due (10 Rx or 30 days whichever is less):       Recertification will be due (POC Duration  / 90 days whichever is less): 10/07/22        Visit # Insurance Allowable Auth Required   [address] []  Yes [x]  No        Functional Scale:  FOTO score: 58  Date assessed: 2022     PQRS:   Reviewed medication list with patient. No changes since last PT visit. 2022    Latex Allergy:  [x]NO      []YES  Preferred Language for Healthcare:   [x]English       []other:      Pain level:  0/10  At rest, 3-4/10 at worst for neck, 2-3/10 for shoulder     SUBJECTIVE:  Pt reports temporary relief from the epidural injection last week but states the sxs have returned to baseline. He states a second diagnostic injection next week, 22, and a potential third one subsequently.   Overall states he is optimistic the injection series will be beneficial.          OBJECTIVE: See eval               ROM PROM AROM  Comment:  2022     L R L R     Flexion     145°* 152°     Abduction     160°* 152°     ER     T4 T3     IR     T8* T9     Cervical flexion         56°   Cervical extension         50°*   Cervical rotation     80%* 95% Cervical  Side bending     80% 80%           Strength L R Comment:  9/07/2022   Flexion 5/5 5/5     Abduction 5/5 5/5     ER 5/5 5/5     IR 5/5 5/5           Special Tests Results/Comment:  8/3/2022   Emmy Negative on L   Neers Negative on L   OBriens Negative on L   Other: empty can Negative on L            RESTRICTIONS/PRECAUTIONS: Cervical spine DDD and L shoulder adhesive capsulitis    Exercises/Interventions:     Therapeutic Ex (14726) Sets/sec Reps Notes/CUES   AD UE BIKE            STRETCHING/ROM      Pulleys 10\" x10    Table Slides-Flexion      Table Slides-Scaption      Table Slides-Abduction      Walk back stretch at counter      Wand-ER at 0      Wand-Supine ER at 45      Wand-Supine Flexion      UE [place]      Pendulum      Doorway ER 10\" x10    Wall Slides 10\" x10    CBA 10\" x10 Blocking scap   BBIR 10\" x10    Sleeper  10\" x10    Elbow PROM/AROM      Cervical extension SNAG 5\" x10    Cervical rotation SNAG to L 5\" x10                            ISOMETRICS      Gripping      Scap Retraction 2-3\" x20    Cervical retraction 2-3\" x20    Abduction      Flexion      Internal Rotation      External Rotation            STRENGTHENING-PREs      Flexion      Abduction      Internal Rotation      External Rotation 3#, 2 x10    Shrugs      Biceps      Triceps      Retraction 3#, 2 x10    Extension      Horizontal Abduction in ER      Serratus 5#, 3 x10                      THERABANDS/CABLE COLUMN      Rows Black, 2 x10    Lats      Extension Black, 2 x10    Internal Rotation Black, 2 x10    External Rotation Blue, 2 x10    Biceps      Triceps      PNF                                    Manual Intervention (23213)      Scar Massage      STM to L infraspinatus, biceps, deltoid, UT and cervical musculature     Hawkgrips      GH joint mobilizations      Foamroll                  NMR re-education (65615)   CUES NEEDED   Plyoback      Therabar oscillations      Body Blade       Rhythmic Stabilization Ball on the wall 2# x20 cw/ccw                           Therapeutic Activity (42310)      Core training      Swiss ball activities      Education                                Therapeutic Exercise and NMR EXR  [x] (34961) Provided verbal/tactile cueing for activities related to strengthening, flexibility, endurance, ROM  for improvements in scapular, scapulothoracic and UE control with self care, reaching, carrying, lifting, house/yardwork, driving/computer work. [x] (13282) Provided verbal/tactile cueing for activities related to improving balance, coordination, kinesthetic sense, posture, motor skill, proprioception  to assist with  scapular, scapulothoracic and UE control with self care, reaching, carrying, lifting, house/yardwork, driving/computer work. Therapeutic Activities:    [x] (41421 or 34098) Provided verbal/tactile cueing for activities related to improving balance, coordination, kinesthetic sense, posture, motor skill, proprioception and motor activation to allow for proper function of scapular, scapulothoracic and UE control with self care, carrying, lifting, driving/computer work. Home Exercise Program:    [x] (81459) Reviewed/Progressed HEP activities related to strengthening, flexibility, endurance, ROM of scapular, scapulothoracic and UE control with self care, reaching, carrying, lifting, house/yardwork, driving/computer work    Access Code: [email or web address]  URL: [email or web address]  Date: 07/07/2022  Prepared by: Helio Forbes    Exercises  Seated Passive Cervical Retraction - 2-3 x daily - 5-7 x weekly - 1 sets - 20 reps  Seated Scapular Retraction - 2-3 x daily - 5-7 x weekly - 1 sets - 20 reps  Seated Assisted Cervical Rotation with Towel (Mirrored) - 2-3 x daily - 5-7 x weekly - 1 sets - 10 reps - 5 seconds hold  Cervical Extension AROM with Strap - 2-3 x daily - 5-7 x weekly - 1 sets - 10 reps - 5 seconds hold  Shoulder Flexion Wall Slide with Towel (Mirrored) - 2-3 x daily - 5-7 x weekly - 1 sets - 10 reps - 10 seconds hold  Shoulder ER Stretch in Abduction (Mirrored) - 2-3 x daily - 5-7 x weekly - 1 sets - 10 reps - 10 seconds hold  Shoulder horizontal adduction stretch on back - 1-2 x daily - 5-7 x weekly - 1 sets - 10 reps - 10 seconds hold  Standing Shoulder Internal Rotation Stretch with Towel (Mirrored) - 1-2 x daily - 5-7 x weekly - 1 sets - 10 reps - 10 seconds hold  Standing Shoulder Row with Anchored Resistance - 1-2 x daily - 5-7 x weekly - 2-3 sets - 10 reps  Shoulder extension with resistance - Neutral - 1-2 x daily - 5-7 x weekly - 2-3 sets - 10 reps  Shoulder Internal Rotation with Resistance (Mirrored) - 1-2 x daily - 5-7 x weekly - 2-3 sets - 10 reps  Standing Wall [employer] with Mini Swiss Ball - 1-2 x daily - 5-7 x weekly - 1 sets - 20 circles clockwise  Sleeper Stretch - 1-2 x daily - 5-7 x weekly - 1 sets - 10 reps - 10 seconds hold  Shoulder External Rotation with Anchored Resistance (Mirrored) - 1-2 x daily - 5-7 x weekly - 2 sets - 10 reps        [] (08634) Reviewed/Progressed HEP activities related to improving balance, coordination, kinesthetic sense, posture, motor skill, proprioception of scapular, scapulothoracic and UE control with self care, reaching, carrying, lifting, house/yardwork, driving/computer work      Manual Treatments:  PROM / STM / Oscillations-Mobs:  G-I, II, III, IV (PA's, Inf., Post.)  [] (46669) Provided manual therapy to mobilize soft tissue/joints of cervical/CT, scapular GHJ and UE for the purpose of modulating pain, promoting relaxation,  increasing ROM, reducing/eliminating soft tissue swelling/inflammation/restriction, improving soft tissue extensibility and allowing for proper ROM for normal function with self care, reaching, carrying, lifting, house/yardwork, driving/computer work    Modalities:  CP to L shoulder and neck x10'   [] GAME READY (VASO)- for significant edema, swelling, pain control.     Charges:  Timed Code demonstrate an increase in L shoulder strength in all planes to 4+/5 or greater to allow for proper functional mobility as indicated by patients Functional Deficits. [] Progressing: [x] Met: [] Not Met: [] Adjusted  4. Patient will return to sleeping on the L slide with 1/10 pain or less in L shoulder so that he may return to PLOF. [x] Progressing: [] Met: [] Not Met: [] Adjusted  5. Patient will demonstrate increased In cervical spine flexion, extension, B rotation, and B side bending AROM to >80%, >70%, >75%, and >75% to allow for proper joint functioning as indicated by patients Functional Deficits. [] Progressing: [x] Met: [] Not Met: [] Adjusted    Overall Progression Towards Functional goals/ Treatment Progress Update:  [x] Patient is progressing as expected towards functional goals listed. [x] Progression is slowed due to complexities/Impairments listed. [] Progression has been slowed due to co-morbidities.   [] Plan just implemented, too soon to assess goals progression <30days   [] Goals require adjustment due to lack of progress  [] Patient is not progressing as expected and requires additional follow up with physician  [] Other    Prognosis for POC: [x] Good [] Fair  [] Poor      Patient requires continued skilled intervention: [x] Yes  [] No    Treatment/Activity Tolerance:  [x] Patient able to complete treatment  [] Patient limited by fatigue  [] Patient limited by pain    [] Patient limited by other medical complications  [] Other:           PLAN: 1-2x/week for 4 weeks  [x] Continue per plan of care [] Alter current plan   [] Plan of care initiated [] Hold pending MD visit [] Discharge      Electronically signed by:  Jeremiah Ivory, PT, DPT, OCS, OMT-C    Board-Certified Orthopaedic Clinical Specialist    [address] [facility] [employer] [facility] PT license: [id number]  [place] PT license: [id number]      Note: If patient does not return for scheduled/ recommended follow up visits, this note will serve as a discharge from care along with most recent update on progress.

## 2022-09-22 DIAGNOSIS — I10 ESSENTIAL HYPERTENSION: ICD-10-CM

## 2022-09-22 RX ORDER — HYDROCHLOROTHIAZIDE 25 MG/1
TABLET ORAL
Qty: 90 TABLET | Refills: 1 | Status: SHIPPED | OUTPATIENT
Start: 2022-09-22

## 2022-09-27 ENCOUNTER — TREATMENT (OUTPATIENT)
Dept: PHYSICAL THERAPY | Age: 67
End: 2022-09-27
Payer: MEDICARE

## 2022-09-27 DIAGNOSIS — M25.612 DECREASED ROM OF LEFT SHOULDER: ICD-10-CM

## 2022-09-27 DIAGNOSIS — M75.02 ADHESIVE CAPSULITIS OF LEFT SHOULDER: Primary | ICD-10-CM

## 2022-09-27 DIAGNOSIS — M50.30 DDD (DEGENERATIVE DISC DISEASE), CERVICAL: ICD-10-CM

## 2022-09-27 DIAGNOSIS — M25.512 LEFT SHOULDER PAIN, UNSPECIFIED CHRONICITY: ICD-10-CM

## 2022-09-27 PROCEDURE — G8427 DOCREV CUR MEDS BY ELIG CLIN: HCPCS | Performed by: PHYSICAL THERAPIST

## 2022-09-27 PROCEDURE — 97140 MANUAL THERAPY 1/> REGIONS: CPT | Performed by: PHYSICAL THERAPIST

## 2022-09-27 PROCEDURE — 97110 THERAPEUTIC EXERCISES: CPT | Performed by: PHYSICAL THERAPIST

## 2022-09-27 PROCEDURE — 97530 THERAPEUTIC ACTIVITIES: CPT | Performed by: PHYSICAL THERAPIST

## 2022-09-27 NOTE — PROGRESS NOTES
[facility]   Phone: [phone]    Fax: [phone]  Physical Therapy Treatment Note/ Progress Report:           Date:  2022    Patient Name:  David Ford    :  1955  MRN: [id number]  Restrictions/Precautions:    Medical/Treatment Diagnosis Information:  Diagnosis: L shoulder adhesive capsulitis     Insurance/Certification information:  PT Insurance Information: Medicare  Physician Information:  Referring Practitioner: Dr. Ana Lilia Moody  Has the plan of care been signed (Y/N):        [x]  Yes  []  No     Date of Patient follow up with Physician: 2022 with neck specialist      Is this a Progress Report:     []  Yes  [x]  No        If Yes:  Date/ Range for reporting period:  Beginnin22  Ending: 10/21/22    Progress report will be due (10 Rx or 30 days whichever is less):       Recertification will be due (POC Duration  / 90 days whichever is less): 10/07/22        Visit # Insurance Allowable Auth Required   [address] [facility] []  Yes [x]  No        Functional Scale:  FOTO score: 58  Date assessed: 2022     PQRS:   Reviewed medication list with patient. No changes since last PT visit. 2022    Latex Allergy:  [x]NO      []YES  Preferred Language for Healthcare:   [x]English       []other:      Pain level:  0/10  At rest, 3-4/10 at worst for neck, 2-3/10 for shoulder     SUBJECTIVE:  Pt reports no change in sxs since his last PT visit. He states receiving his second epidural injection tomorrow.             OBJECTIVE: See eval               ROM PROM AROM  Comment:  2022     L R L R     Flexion     145°* 152°     Abduction     160°* 152°     ER     T4 T3     IR     T8* T9     Cervical flexion         56°   Cervical extension         50°*   Cervical rotation     80%* 95%     Cervical  Side bending     80% 80%           Strength L R Comment:  2022   Flexion 5/5 5/5     Abduction 5/5 5/5     ER 5/5 5/5     IR 5/5 5/5           Special Tests Results/Comment:  8/3/2022   Emmy Negative on L   Neers Negative on L   OBriens Negative on L   Other: empty can Negative on L            RESTRICTIONS/PRECAUTIONS: Cervical spine DDD and L shoulder adhesive capsulitis    Exercises/Interventions:     Therapeutic Ex (46064) Sets/sec Reps Notes/CUES   AD UE BIKE            STRETCHING/ROM      Pulleys 10\" x10    Table Slides-Flexion      Table Slides-Scaption      Table Slides-Abduction      Walk back stretch at counter      Wand-ER at 0      Wand-Supine ER at 39      Wand-Supine Flexion      UE [place]      Pendulum      Doorway ER 10\" x10    Wall Slides 10\" x10    CBA 10\" x10 Blocking scap   BBIR 10\" x10    Sleeper  10\" x10    Elbow PROM/AROM      Cervical extension SNAG 5\" x10    Cervical rotation SNAG to L 5\" x10                            ISOMETRICS      Gripping      Scap Retraction 2-3\" x20    Cervical retraction 2-3\" x20    Abduction      Flexion      Internal Rotation      External Rotation            STRENGTHENING-PREs      Flexion      Abduction      Internal Rotation      External Rotation 3#, 2 x10    Shrugs      Biceps      Triceps      Retraction 3#, 2 x10    Extension      Horizontal Abduction in ER      Serratus 5#, 3 x10                      THERABANDS/CABLE COLUMN      Rows Black, 2 x10    Lats      Extension Black, 2 x10    Internal Rotation Black, 2 x10    External Rotation Blue, 2 x10    Biceps      Triceps      PNF                                    Manual Intervention (79991)      Scar Massage      STM to L infraspinatus, biceps, deltoid, UT and cervical musculature     Hawkgrips      GH joint mobilizations      Foamroll                  NMR re-education (73215)   CUES NEEDED   Plyoback      Therabar oscillations      Body Blade       Rhythmic Stabilization      Ball on the wall 2# x20 cw/ccw                           Therapeutic Activity (81231)      Core training      Swiss ball activities      Education hold  Shoulder horizontal adduction stretch on back - 1-2 x daily - 5-7 x weekly - 1 sets - 10 reps - 10 seconds hold  Standing Shoulder Internal Rotation Stretch with Towel (Mirrored) - 1-2 x daily - 5-7 x weekly - 1 sets - 10 reps - 10 seconds hold  Standing Shoulder Row with Anchored Resistance - 1-2 x daily - 5-7 x weekly - 2-3 sets - 10 reps  Shoulder extension with resistance - Neutral - 1-2 x daily - 5-7 x weekly - 2-3 sets - 10 reps  Shoulder Internal Rotation with Resistance (Mirrored) - 1-2 x daily - 5-7 x weekly - 2-3 sets - 10 reps  Standing Wall [employer] with Mini Swiss Ball - 1-2 x daily - 5-7 x weekly - 1 sets - 20 circles clockwise  Sleeper Stretch - 1-2 x daily - 5-7 x weekly - 1 sets - 10 reps - 10 seconds hold  Shoulder External Rotation with Anchored Resistance (Mirrored) - 1-2 x daily - 5-7 x weekly - 2 sets - 10 reps        [] (21134) Reviewed/Progressed HEP activities related to improving balance, coordination, kinesthetic sense, posture, motor skill, proprioception of scapular, scapulothoracic and UE control with self care, reaching, carrying, lifting, house/yardwork, driving/computer work      Manual Treatments:  PROM / STM / Oscillations-Mobs:  G-I, II, III, IV (PA's, Inf., Post.)  [] (55359) Provided manual therapy to mobilize soft tissue/joints of cervical/CT, scapular GHJ and UE for the purpose of modulating pain, promoting relaxation,  increasing ROM, reducing/eliminating soft tissue swelling/inflammation/restriction, improving soft tissue extensibility and allowing for proper ROM for normal function with self care, reaching, carrying, lifting, house/yardwork, driving/computer work    Modalities:  CP to L shoulder and neck x10'   [] GAME READY (VASO)- for significant edema, swelling, pain control.     Charges:  Timed Code Treatment Minutes: 42   Total Treatment Minutes: 52      [] EVAL (LOW) 77074 (typically 20 minutes face-to-face)   [] EVAL (MOD) 16964 (typically 30 minutes

## 2022-10-04 ENCOUNTER — TREATMENT (OUTPATIENT)
Dept: PHYSICAL THERAPY | Age: 67
End: 2022-10-04
Payer: MEDICARE

## 2022-10-04 DIAGNOSIS — M50.30 DDD (DEGENERATIVE DISC DISEASE), CERVICAL: ICD-10-CM

## 2022-10-04 DIAGNOSIS — M75.02 ADHESIVE CAPSULITIS OF LEFT SHOULDER: Primary | ICD-10-CM

## 2022-10-04 DIAGNOSIS — M25.512 LEFT SHOULDER PAIN, UNSPECIFIED CHRONICITY: ICD-10-CM

## 2022-10-04 DIAGNOSIS — M25.612 DECREASED ROM OF LEFT SHOULDER: ICD-10-CM

## 2022-10-04 PROCEDURE — 97110 THERAPEUTIC EXERCISES: CPT | Performed by: PHYSICAL THERAPIST

## 2022-10-04 PROCEDURE — 97530 THERAPEUTIC ACTIVITIES: CPT | Performed by: PHYSICAL THERAPIST

## 2022-10-04 PROCEDURE — G8427 DOCREV CUR MEDS BY ELIG CLIN: HCPCS | Performed by: PHYSICAL THERAPIST

## 2022-10-04 NOTE — PROGRESS NOTES
[facility]   Phone: [phone]    Fax: [phone]   Physical Therapy Re-Certification Plan of Care    Dear Dr. Maria Isabel Briones,    We had the pleasure of treating the following patient for physical therapy services at [address] [place] [address]. A summary of our findings can be found in the updated assessment below. This includes our plan of care. If you have any questions or concerns regarding these findings, please do not hesitate to contact me at the office phone number checked above. Thank you for the referral.     Physician Signature:________________________________Date:__________________  By signing above (or electronic signature), therapists plan is approved by physician      Overall Response to Treatment:   []Patient is responding well to treatment and improvement is noted with regards  to goals   []Patient should continue to improve in reasonable time if they continue HEP   []Patient has plateaued and is no longer responding to skilled PT intervention    []Patient is getting worse and would benefit from return to referring MD   []Patient unable to adhere to initial POC   [x]Other: pt is in middle of cervical neck epidural injection series. Limited to no progress in ROM at this time.        Physical Therapy Treatment Note/ Progress Report:           Date:  10/4/2022    Patient Name:  Alfonzo Manley    :  1955  MRN: [id number]  Restrictions/Precautions:    Medical/Treatment Diagnosis Information:  Diagnosis: L shoulder adhesive capsulitis     Insurance/Certification information:  PT Insurance Information: Medicare  Physician Information:  Referring Practitioner: Dr. Maria Isabel Briones  Has the plan of care been signed (Y/N):        []  Yes  [x]  No     Date of Patient follow up with Physician: 10/12/2022 with neck specialist      Is this a Progress Report:     [x]  Yes  []  No        If Yes:  Date/ Range for reporting period:  Beginnin22 Update NPV  Ending: 10/7/22    Progress report will be due (10 Rx or 30 days whichever is less): 62/4/27      Recertification will be due (POC Duration  / 90 days whichever is less): 11/4/22        Visit # Insurance Allowable Auth Required   [facility] []  Yes [x]  No        Functional Scale:  FOTO score: 56  Date assessed: 10/4/22     PQRS:   Reviewed medication list with patient. No changes since last PT visit. 10/4/2022    Latex Allergy:  [x]NO      []YES  Preferred Language for Healthcare:   [x]English       []other:      Pain level:  0/10  At rest, 3-4/10 at worst for neck, 2-3/10 for shoulder     SUBJECTIVE:  Pt reports that the second epidural injection was pushed back until 10/12/22. He states sxs related to the neck have remained unchanged, but the L shoulder is discomforting today because  he had a COVID booster yesterday.               OBJECTIVE: See eval               ROM PROM AROM  Comment:  10/4/22     L R L R     Flexion     145° 152°     Abduction     160°* 152°     ER     T4 T3     IR     T7* T9     Cervical flexion         56°   Cervical extension         50°* on L side of neck   Cervical rotation     80%* 95%     Cervical  Side bending     80%* 80%           Strength L R Comment:  10/4/22   Flexion 5/5 5/5     Abduction 5/5 5/5     ER 5/5 5/5     IR 5/5 5/5           Special Tests Results/Comment:  08/3/22   Emmy Negative on L   Neers Negative on L   OBriens Negative on L   Other: empty can Negative on L            RESTRICTIONS/PRECAUTIONS: Cervical spine DDD and L shoulder adhesive capsulitis    Exercises/Interventions:     Therapeutic Ex (68292) Sets/sec Reps Notes/CUES   AD UE BIKE            STRETCHING/ROM      Pulleys 10\" x10    Table Slides-Flexion      Table Slides-Scaption      Table Slides-Abduction      Walk back stretch at counter      [facility]      Wand-Supine Flexion      UE Eccles      Pendulum      Doorway ER 10\" x10    Wall Slides 10\" x10    CBA 10\" x10 Blocking scap   BBIR 10\" x10    Sleeper  10\" x10    Elbow PROM/AROM      Cervical extension SNAG 5\" x10    Cervical rotation SNAG to L 5\" x10                            ISOMETRICS      Gripping      Scap Retraction 2-3\" x20    Cervical retraction 2-3\" x20    Abduction      Flexion      Internal Rotation      External Rotation            STRENGTHENING-PREs      Flexion      Abduction      Internal Rotation      External Rotation 3#, 2 x10    Shrugs      Biceps      Triceps      Retraction 3#, 2 x10    Extension      Horizontal Abduction in ER      Serratus 5#, 3 x10                      THERABANDS/CABLE COLUMN      Rows Black, 2 x10    Lats      Extension Black, 2 x10    Internal Rotation Black, 2 x10    External Rotation Blue, 2 x10    Biceps      Triceps      PNF                                    Manual Intervention (55094)      Scar Massage      STM to L infraspinatus, biceps, deltoid, UT and cervical musculature     Hawkgrips      GH joint mobilizations      Foamroll                  NMR re-education (37733)   CUES NEEDED   Plyoback      Therabar oscillations      Body Blade       Rhythmic Stabilization      Ball on the wall 2# x20 cw/ccw                           Therapeutic Activity (48814)      Core training      Swiss ball activities      Education   On current objective of rehab and trajectory/prognosis                             Therapeutic Exercise and NMR EXR  [x] (79872) Provided verbal/tactile cueing for activities related to strengthening, flexibility, endurance, ROM  for improvements in scapular, scapulothoracic and UE control with self care, reaching, carrying, lifting, house/yardwork, driving/computer work.     [x] (95641) Provided verbal/tactile cueing for activities related to improving balance, coordination, kinesthetic sense, posture, motor skill, proprioception  to assist with  scapular, scapulothoracic and UE control with self care, reaching, carrying, lifting, house/yardwork, driving/computer work. Therapeutic Activities:    [x] (02698 or 99858) Provided verbal/tactile cueing for activities related to improving balance, coordination, kinesthetic sense, posture, motor skill, proprioception and motor activation to allow for proper function of scapular, scapulothoracic and UE control with self care, carrying, lifting, driving/computer work. Home Exercise Program:    [x] (63994) Reviewed/Progressed HEP activities related to strengthening, flexibility, endurance, ROM of scapular, scapulothoracic and UE control with self care, reaching, carrying, lifting, house/yardwork, driving/computer work    Access Code: [email or web address]  URL: [email or web address]  Date: 07/07/2022  Prepared by: Dimple Balzarine    Exercises  Seated Passive Cervical Retraction - 2-3 x daily - 5-7 x weekly - 1 sets - 20 reps  Seated Scapular Retraction - 2-3 x daily - 5-7 x weekly - 1 sets - 20 reps  Seated Assisted Cervical Rotation with Towel (Mirrored) - 2-3 x daily - 5-7 x weekly - 1 sets - 10 reps - 5 seconds hold  Cervical Extension AROM with Strap - 2-3 x daily - 5-7 x weekly - 1 sets - 10 reps - 5 seconds hold  Shoulder Flexion Wall Slide with Towel (Mirrored) - 2-3 x daily - 5-7 x weekly - 1 sets - 10 reps - 10 seconds hold  Shoulder ER Stretch in Abduction (Mirrored) - 2-3 x daily - 5-7 x weekly - 1 sets - 10 reps - 10 seconds hold  Shoulder horizontal adduction stretch on back - 1-2 x daily - 5-7 x weekly - 1 sets - 10 reps - 10 seconds hold  Standing Shoulder Internal Rotation Stretch with Towel (Mirrored) - 1-2 x daily - 5-7 x weekly - 1 sets - 10 reps - 10 seconds hold  Standing Shoulder Row with Anchored Resistance - 1-2 x daily - 5-7 x weekly - 2-3 sets - 10 reps  Shoulder extension with resistance - Neutral - 1-2 x daily - 5-7 x weekly - 2-3 sets - 10 reps  Shoulder Internal Rotation with Resistance (Mirrored) - 1-2 x daily - 5-7 x weekly - 2-3 sets - 10 reps  Standing Wall [employer] with Mini Swiss Ball - 1-2 x daily - 5-7 x weekly - 1 sets - 20 circles clockwise  Sleeper Stretch - 1-2 x daily - 5-7 x weekly - 1 sets - 10 reps - 10 seconds hold  Shoulder External Rotation with Anchored Resistance (Mirrored) - 1-2 x daily - 5-7 x weekly - 2 sets - 10 reps        [] (57550) Reviewed/Progressed HEP activities related to improving balance, coordination, kinesthetic sense, posture, motor skill, proprioception of scapular, scapulothoracic and UE control with self care, reaching, carrying, lifting, house/yardwork, driving/computer work      Manual Treatments:  PROM / STM / Oscillations-Mobs:  G-I, II, III, IV (PA's, Inf., Post.)  [] (70545) Provided manual therapy to mobilize soft tissue/joints of cervical/CT, scapular GHJ and UE for the purpose of modulating pain, promoting relaxation,  increasing ROM, reducing/eliminating soft tissue swelling/inflammation/restriction, improving soft tissue extensibility and allowing for proper ROM for normal function with self care, reaching, carrying, lifting, house/yardwork, driving/computer work    Modalities:  CP to L shoulder and neck x10'   [] GAME READY (VASO)- for significant edema, swelling, pain control. Charges:  Timed Code Treatment Minutes: 56   Total Treatment Minutes: 66      [] EVAL (LOW) 02865 (typically 20 minutes face-to-face)   [] EVAL (MOD) 66855 (typically 30 minutes face-to-face)  [] EVAL (HIGH) 83486 (typically 45 minutes face-to-face)  [] RE-EVAL     [x] RX(88879) x 2 (30') [] IONTO  [] NMR (11364) x   [] VASO  [] Manual (21412) x   [] Other:  [x] TA x  2 (26')  [] Mech Traction (56390)  [] ES(attended) (34638)     [] ES (un) (30389):         ASSESSMENT:   Pt presents with continued maintenance of L shoulder/neck AROM and strength, with same pain provocative motions since last updated measurements. The POC has been maintained for a couple weeks now as the pt is in the middle of an epidural injection series in the cervical neck.   The current objective is to maintain to gains made relative to L shoulder/neck without aggravating any structures in order to accurately observe effects of epidural injection. Once series is completed the POC will be re-evaluated per pt sxs. GOALS:  Patient stated goal: Swing golf club without pain in L shoulder and looking over L shoulder without pain in neck  [x] Progressing: [] Met: [] Not Met: [] Adjusted    Therapist goals for Patient:   Short Term Goals: To be achieved in: 2 weeks  1. Independent in HEP and progression per patient tolerance, in order to prevent re-injury. [] Progressing: [x] Met: [] Not Met: [] Adjusted  2. Patient will have a decrease in pain to facilitate improvement in movement, function, and ADLs as indicated by Functional Deficits. [x] Progressing: [] Met: [] Not Met: [] Adjusted    Long Term Goals: To be achieved in: 6 weeks  1. Functional index score of 64 or more for FOTO to assist with reaching prior level of function. [x] Progressing: [] Met: [] Not Met: [] Adjusted  2. Patient will demonstrate increased In L shoulder flexion, abduction, ER, and IR AROM to >155°, >155°, T3, and T8 respectively to allow for proper joint functioning as indicated by patients Functional Deficits. [x] Progressing: [] Met: [] Not Met: [] Adjusted  3. Patient will demonstrate an increase in L shoulder strength in all planes to 4+/5 or greater to allow for proper functional mobility as indicated by patients Functional Deficits. [] Progressing: [x] Met: [] Not Met: [] Adjusted  4. Patient will return to sleeping on the L slide with 1/10 pain or less in L shoulder so that he may return to PLOF. [x] Progressing: [] Met: [] Not Met: [] Adjusted  5. Patient will demonstrate increased In cervical spine flexion, extension, B rotation, and B side bending AROM to >80%, >70%, >75%, and >75% to allow for proper joint functioning as indicated by patients Functional Deficits.     [] Progressing: [x] Met: [] Not Met: [] Adjusted    Overall Progression Towards Functional goals/ Treatment Progress Update:  [] Patient is progressing as expected towards functional goals listed. [x] Progression is slowed due to complexities/Impairments listed. [] Progression has been slowed due to co-morbidities. [] Plan just implemented, too soon to assess goals progression <30days   [] Goals require adjustment due to lack of progress  [] Patient is not progressing as expected and requires additional follow up with physician  [] Other    Prognosis for POC: [x] Good [] Fair  [] Poor      Patient requires continued skilled intervention: [x] Yes  [] No    Treatment/Activity Tolerance:  [x] Patient able to complete treatment  [] Patient limited by fatigue  [] Patient limited by pain    [] Patient limited by other medical complications  [] Other:           PLAN: 1-2x/week for 4 weeks. Maintain until epidural injection series is complete. [x] Continue per plan of care [] Alter current plan   [] Plan of care initiated [] Hold pending MD visit [] Discharge      Electronically signed by:  Willow Wellington, PT, DPT, OCS, OMT-C    Board-Certified Orthopaedic Clinical Specialist    [id number] [address] PT license: [id number]  [place] PT license: [id number]      Note: If patient does not return for scheduled/ recommended follow up visits, this note will serve as a discharge from care along with most recent update on progress.

## 2022-10-11 ENCOUNTER — TREATMENT (OUTPATIENT)
Dept: PHYSICAL THERAPY | Age: 67
End: 2022-10-11
Payer: MEDICARE

## 2022-10-11 DIAGNOSIS — M25.512 LEFT SHOULDER PAIN, UNSPECIFIED CHRONICITY: ICD-10-CM

## 2022-10-11 DIAGNOSIS — M75.02 ADHESIVE CAPSULITIS OF LEFT SHOULDER: Primary | ICD-10-CM

## 2022-10-11 DIAGNOSIS — M25.612 DECREASED ROM OF LEFT SHOULDER: ICD-10-CM

## 2022-10-11 DIAGNOSIS — M50.30 DDD (DEGENERATIVE DISC DISEASE), CERVICAL: ICD-10-CM

## 2022-10-11 PROCEDURE — G8427 DOCREV CUR MEDS BY ELIG CLIN: HCPCS | Performed by: PHYSICAL THERAPIST

## 2022-10-11 PROCEDURE — 97530 THERAPEUTIC ACTIVITIES: CPT | Performed by: PHYSICAL THERAPIST

## 2022-10-11 PROCEDURE — 97110 THERAPEUTIC EXERCISES: CPT | Performed by: PHYSICAL THERAPIST

## 2022-10-11 PROCEDURE — 97112 NEUROMUSCULAR REEDUCATION: CPT | Performed by: PHYSICAL THERAPIST

## 2022-10-11 NOTE — PROGRESS NOTES
[facility]   Phone: [phone]    Fax: [phone]   Physical Therapy Treatment Note/ Progress Report:           Date:  10/11/2022    Patient Name:  Alfonzo Manley    :  1955  MRN: [id number]  Restrictions/Precautions:    Medical/Treatment Diagnosis Information:  Diagnosis: L shoulder adhesive capsulitis     Insurance/Certification information:  PT Insurance Information: Medicare  Physician Information:  Referring Practitioner: Dr. Maria Isabel Brionse  Has the plan of care been signed (Y/N):        [x]  Yes  []  No     Date of Patient follow up with Physician: 10/12/2022 with neck specialist      Is this a Progress Report:     []  Yes  [x]  No        If Yes:  Date/ Range for reporting period:  Beginning: 10/11/22   Endin22    Progress report will be due (10 Rx or 30 days whichever is less):       Recertification will be due (POC Duration  / 90 days whichever is less): 22        Visit # Insurance Allowable Auth Required   15 Med nec []  Yes [x]  No        Functional Scale:  FOTO score: 56  Date assessed: 10/4/22     PQRS:   Reviewed medication list with patient. No changes since last PT visit. 10/11/2022    Latex Allergy:  [x]NO      []YES  Preferred Language for Healthcare:   [x]English       []other:      Pain level:  0/10  At rest, 3-4/10 at worst for neck, 2-3/10 for shoulder     SUBJECTIVE:  Pt reports no significant change in sxs since last session. He states his soreness in the L shoulder from COVID booster has resolved. He confirms his second cervical injection of the epidural series is tomorrow, 10/12/22.             OBJECTIVE: See eval               ROM PROM AROM  Comment:  10/4/22     L R L R     Flexion     145° 152°     Abduction     160°* 152°     ER     T4 T3     IR     T7* T9     Cervical flexion         56°   Cervical extension         50°* on L side of neck   Cervical rotation     80%* 95%     Cervical  Side bending     80%* 80% Strength L R Comment:  10/4/22   Flexion 5/5 5/5     Abduction 5/5 5/5     ER 5/5 5/5     IR 5/5 5/5           Special Tests Results/Comment:  08/3/22   Emmy Negative on L   Neers Negative on L   OBriens Negative on L   Other: empty can Negative on L            RESTRICTIONS/PRECAUTIONS: Cervical spine DDD and L shoulder adhesive capsulitis    Exercises/Interventions:     Therapeutic Ex (19288) Sets/sec Reps Notes/CUES   AD UE BIKE            STRETCHING/ROM      Pulleys 10\" x10    Table Slides-Flexion      Table Slides-Scaption      Table Slides-Abduction      Walk back stretch at counter      Wand-ER at 0      Wand-Supine ER at 45      Wand-Supine Flexion      UE Sylva      Pendulum      Doorway ER 10\" x10    Wall Slides 10\" x10    CBA 10\" x10 Blocking scap   BBIR 10\" x10    Sleeper  10\" x10    Elbow PROM/AROM      Cervical extension SNAG 5\" x10    Cervical rotation SNAG to L 5\" x10                            ISOMETRICS      Gripping      Scap Retraction 2-3\" x20    Cervical retraction 2-3\" x20    Abduction      Flexion      Internal Rotation      External Rotation            STRENGTHENING-PREs      Flexion      Abduction      Internal Rotation      External Rotation 3#, 2 x10    Shrugs      Biceps      Triceps      Retraction 3#, 2 x10    Extension      Horizontal Abduction in ER      Serratus 5#, 3 x10                      THERABANDS/CABLE COLUMN      Rows Black, 2 x10    Lats      Extension Black, 2 x10    Internal Rotation Black, 2 x10    External Rotation Blue, 2 x10    Biceps      Triceps      PNF                                    Manual Intervention (64025)      Scar Massage      STM to L infraspinatus, biceps, deltoid, UT and cervical musculature     Hawkgrips      GH joint mobilizations      Foamroll                  NMR re-education (84004)   CUES NEEDED   Plyoback      Therabar oscillations      Body Blade       Rhythmic Stabilization      Ball on the wall 2# x20 cw/ccw Therapeutic Activity (44514)      Core training      Swiss ball activities      Education   On current objective of rehab and trajectory/prognosis                             Therapeutic Exercise and NMR EXR  [x] (43031) Provided verbal/tactile cueing for activities related to strengthening, flexibility, endurance, ROM  for improvements in scapular, scapulothoracic and UE control with self care, reaching, carrying, lifting, house/yardwork, driving/computer work. [x] (50721) Provided verbal/tactile cueing for activities related to improving balance, coordination, kinesthetic sense, posture, motor skill, proprioception  to assist with  scapular, scapulothoracic and UE control with self care, reaching, carrying, lifting, house/yardwork, driving/computer work. Therapeutic Activities:    [x] (95925 or 11220) Provided verbal/tactile cueing for activities related to improving balance, coordination, kinesthetic sense, posture, motor skill, proprioception and motor activation to allow for proper function of scapular, scapulothoracic and UE control with self care, carrying, lifting, driving/computer work. Home Exercise Program:    [x] (59100) Reviewed/Progressed HEP activities related to strengthening, flexibility, endurance, ROM of scapular, scapulothoracic and UE control with self care, reaching, carrying, lifting, house/yardwork, driving/computer work    Access Code: [email or web address]  URL: [email or web address]  Date: 07/07/2022  Prepared by: Hayley Cabello    Exercises  Seated Passive Cervical Retraction - 2-3 x daily - 5-7 x weekly - 1 sets - 20 reps  Seated Scapular Retraction - 2-3 x daily - 5-7 x weekly - 1 sets - 20 reps  Seated Assisted Cervical Rotation with Towel (Mirrored) - 2-3 x daily - 5-7 x weekly - 1 sets - 10 reps - 5 seconds hold  Cervical Extension AROM with Strap - 2-3 x daily - 5-7 x weekly - 1 sets - 10 reps - 5 seconds hold  Shoulder Flexion Wall Slide with Towel (Mirrored) - 2-3 x daily - 5-7 x weekly - 1 sets - 10 reps - 10 seconds hold  Shoulder ER Stretch in Abduction (Mirrored) - 2-3 x daily - 5-7 x weekly - 1 sets - 10 reps - 10 seconds hold  Shoulder horizontal adduction stretch on back - 1-2 x daily - 5-7 x weekly - 1 sets - 10 reps - 10 seconds hold  Standing Shoulder Internal Rotation Stretch with Towel (Mirrored) - 1-2 x daily - 5-7 x weekly - 1 sets - 10 reps - 10 seconds hold  Standing Shoulder Row with Anchored Resistance - 1-2 x daily - 5-7 x weekly - 2-3 sets - 10 reps  Shoulder extension with resistance - Neutral - 1-2 x daily - 5-7 x weekly - 2-3 sets - 10 reps  Shoulder Internal Rotation with Resistance (Mirrored) - 1-2 x daily - 5-7 x weekly - 2-3 sets - 10 reps  Standing Wall [employer] with Mini Swiss Ball - 1-2 x daily - 5-7 x weekly - 1 sets - 20 circles clockwise  Sleeper Stretch - 1-2 x daily - 5-7 x weekly - 1 sets - 10 reps - 10 seconds hold  Shoulder External Rotation with Anchored Resistance (Mirrored) - 1-2 x daily - 5-7 x weekly - 2 sets - 10 reps        [] (53570) Reviewed/Progressed HEP activities related to improving balance, coordination, kinesthetic sense, posture, motor skill, proprioception of scapular, scapulothoracic and UE control with self care, reaching, carrying, lifting, house/yardwork, driving/computer work      Manual Treatments:  PROM / STM / Oscillations-Mobs:  G-I, II, III, IV (PA's, Inf., Post.)  [] (00902) Provided manual therapy to mobilize soft tissue/joints of cervical/CT, scapular GHJ and UE for the purpose of modulating pain, promoting relaxation,  increasing ROM, reducing/eliminating soft tissue swelling/inflammation/restriction, improving soft tissue extensibility and allowing for proper ROM for normal function with self care, reaching, carrying, lifting, house/yardwork, driving/computer work    Modalities:  CP to L shoulder and neck x10'   [] GAME READY (VASO)- for significant edema, swelling, pain control.     Charges:  Timed Code Treatment Minutes: 45   Total Treatment Minutes: 55      [] EVAL (LOW) 79397 (typically 20 minutes face-to-face)   [] EVAL (MOD) 09917 (typically 30 minutes face-to-face)  [] EVAL (HIGH) 61757 (typically 45 minutes face-to-face)  [] RE-EVAL     [x] FX(77381) x 1 (20') [] IONTO  [x] NMR (30722) x 1 (8') [] VASO  [] Manual (93326) x   [] Other:  [x] TA x  1 (17')  [] Mech Traction (03826)  [] ES(attended) (95027)     [] ES (un) (52375):         ASSESSMENT:   Continued program maintenance today secondary to pt cervical epidural injection series. He has no L shoulder/neck discomfort/pain throughout program and requires min-mod verbal/visual cue on reminder of scapular retraction/shrug exercise. Cont to maintain ROM/strength gains of L shoulder and manage pain as pt progresses through series. GOALS:  Patient stated goal: Swing golf club without pain in L shoulder and looking over L shoulder without pain in neck  [x] Progressing: [] Met: [] Not Met: [] Adjusted    Therapist goals for Patient:   Short Term Goals: To be achieved in: 2 weeks  1. Independent in HEP and progression per patient tolerance, in order to prevent re-injury. [] Progressing: [x] Met: [] Not Met: [] Adjusted  2. Patient will have a decrease in pain to facilitate improvement in movement, function, and ADLs as indicated by Functional Deficits. [x] Progressing: [] Met: [] Not Met: [] Adjusted    Long Term Goals: To be achieved in: 6 weeks  1. Functional index score of 64 or more for FOTO to assist with reaching prior level of function. [x] Progressing: [] Met: [] Not Met: [] Adjusted  2. Patient will demonstrate increased In L shoulder flexion, abduction, ER, and IR AROM to >155°, >155°, T3, and T8 respectively to allow for proper joint functioning as indicated by patients Functional Deficits. [x] Progressing: [] Met: [] Not Met: [] Adjusted  3.  Patient will demonstrate an increase in L shoulder strength in all planes to 4+/5 or greater to allow for proper functional mobility as indicated by patients Functional Deficits. [] Progressing: [x] Met: [] Not Met: [] Adjusted  4. Patient will return to sleeping on the L slide with 1/10 pain or less in L shoulder so that he may return to PLOF. [x] Progressing: [] Met: [] Not Met: [] Adjusted  5. Patient will demonstrate increased In cervical spine flexion, extension, B rotation, and B side bending AROM to >80%, >70%, >75%, and >75% to allow for proper joint functioning as indicated by patients Functional Deficits. [] Progressing: [x] Met: [] Not Met: [] Adjusted    Overall Progression Towards Functional goals/ Treatment Progress Update:  [] Patient is progressing as expected towards functional goals listed. [x] Progression is slowed due to complexities/Impairments listed. [] Progression has been slowed due to co-morbidities. [] Plan just implemented, too soon to assess goals progression <30days   [] Goals require adjustment due to lack of progress  [] Patient is not progressing as expected and requires additional follow up with physician  [] Other    Prognosis for POC: [x] Good [] Fair  [] Poor      Patient requires continued skilled intervention: [x] Yes  [] No    Treatment/Activity Tolerance:  [x] Patient able to complete treatment  [] Patient limited by fatigue  [] Patient limited by pain    [] Patient limited by other medical complications  [] Other:           PLAN: 1-2x/week for 4 weeks. Maintain until epidural injection series is complete.     [x] Continue per plan of care [] Alter current plan   [] Plan of care initiated [] Hold pending MD visit [] Discharge      Electronically signed by:  Mariella Treviño, PT, DPT, OCS, OMT-C    Board-Certified Orthopaedic Clinical Specialist    [id number] [address] PT license: [id number]  [place] PT license: [id number]      Note: If patient does not return for scheduled/ recommended follow up visits, this note will serve as a discharge from care along with most recent update on progress.

## 2022-10-19 ENCOUNTER — TREATMENT (OUTPATIENT)
Dept: PHYSICAL THERAPY | Age: 67
End: 2022-10-19
Payer: MEDICARE

## 2022-10-19 DIAGNOSIS — M25.512 LEFT SHOULDER PAIN, UNSPECIFIED CHRONICITY: ICD-10-CM

## 2022-10-19 DIAGNOSIS — M75.02 ADHESIVE CAPSULITIS OF LEFT SHOULDER: Primary | ICD-10-CM

## 2022-10-19 DIAGNOSIS — M25.612 DECREASED ROM OF LEFT SHOULDER: ICD-10-CM

## 2022-10-19 DIAGNOSIS — M50.30 DDD (DEGENERATIVE DISC DISEASE), CERVICAL: ICD-10-CM

## 2022-10-19 PROCEDURE — G8427 DOCREV CUR MEDS BY ELIG CLIN: HCPCS | Performed by: PHYSICAL THERAPIST

## 2022-10-19 PROCEDURE — 97140 MANUAL THERAPY 1/> REGIONS: CPT | Performed by: PHYSICAL THERAPIST

## 2022-10-19 PROCEDURE — 97530 THERAPEUTIC ACTIVITIES: CPT | Performed by: PHYSICAL THERAPIST

## 2022-10-19 PROCEDURE — 97110 THERAPEUTIC EXERCISES: CPT | Performed by: PHYSICAL THERAPIST

## 2022-10-19 NOTE — PROGRESS NOTES
[facility]   Phone: [phone]    Fax: [phone]   Physical Therapy Treatment Note/ Progress Report:           Date:  10/19/2022    Patient Name:  Steven Jackson    :  1955  MRN: [id number]  Restrictions/Precautions:    Medical/Treatment Diagnosis Information:  Diagnosis: L shoulder adhesive capsulitis     Insurance/Certification information:  PT Insurance Information: Medicare  Physician Information:  Referring Practitioner: Dr. Mk Whitfield  Has the plan of care been signed (Y/N):        [x]  Yes  []  No     Date of Patient follow up with Physician: 10/12/2022 with neck specialist      Is this a Progress Report:     []  Yes  [x]  No        If Yes:  Date/ Range for reporting period:  Beginning: 10/11/22   Endin22    Progress report will be due (10 Rx or 30 days whichever is less):       Recertification will be due (POC Duration  / 90 days whichever is less): 22        Visit # Insurance Allowable Auth Required   [email or web address] []  Yes [x]  No        Functional Scale:  FOTO score: 56  Date assessed: 10/4/22     PQRS:   Reviewed medication list with patient. No changes since last PT visit. 10/19/2022    Latex Allergy:  [x]NO      []YES  Preferred Language for Healthcare:   [x]English       []other:      Pain level:  0/10  At rest, 3-4/10 at worst for neck, 2-3/10 for shoulder     SUBJECTIVE: Pt reports his second cervical injection was completed last week. He notes that there was significant relief with reduced sxs for 4-5 days just like the first injection, but that they are gradually worsening again. He notes that there is another injection evaluation scheduled for 10/27/22, and they will determine the next steps and if another injection is indicated then.                   OBJECTIVE: See eval               ROM PROM AROM  Comment:  10/4/22     L R L R     Flexion     145° 152°     Abduction     160°* 152°     ER     T4 T3     IR     T7* T9 Cervical flexion         56°   Cervical extension         50°* on L side of neck   Cervical rotation     80%* 95%     Cervical  Side bending     80%* 80%           Strength L R Comment:  10/4/22   Flexion 5/5 5/5     Abduction 5/5 5/5     ER 5/5 5/5     IR 5/5 5/5           Special Tests Results/Comment:  08/3/22   Emmy Negative on L   Neers Negative on L   OBriens Negative on L   Other: empty can Negative on L            RESTRICTIONS/PRECAUTIONS: Cervical spine DDD and L shoulder adhesive capsulitis    Exercises/Interventions:     Therapeutic Ex (16108) Sets/sec Reps Notes/CUES   AD UE BIKE            STRETCHING/ROM      Pulleys 10\" x10    Table Slides-Flexion      Table Slides-Scaption      Table Slides-Abduction      Walk back stretch at counter      Wand-ER at 0      Wand-Supine ER at 45      Wand-Supine Flexion      UE [place]      Pendulum      Doorway ER 10\" x10    Wall Slides 10\" x10    CBA 10\" x10 Blocking scap   BBIR 10\" x10    Sleeper  10\" x10    Elbow PROM/AROM      Cervical extension SNAG 5\" x10    Cervical rotation SNAG to L 5\" x10                            ISOMETRICS      Gripping      Scap Retraction 2-3\" x20    Cervical retraction 2-3\" x20    Abduction      Flexion      Internal Rotation      External Rotation            STRENGTHENING-PREs      Flexion      Abduction      Internal Rotation      External Rotation 3#, 2 x10    Shrugs      Biceps      Triceps      Retraction 3#, 2 x10    Extension      Horizontal Abduction in ER      Serratus 5#, 3 x10                      THERABANDS/CABLE COLUMN      Rows Black, 2 x10    Lats      Extension Black, 2 x10    Internal Rotation Black, 2 x10    External Rotation Blue, 2 x10    Biceps      Triceps      PNF                                    Manual Intervention (54911)      Scar Massage      STM to L infraspinatus, biceps, deltoid, UT and cervical musculature x8'     Hawkgrips      GH joint mobilizations      Foamroll                  NMR re-education (82634)   CUES NEEDED   Plyoback      Therabar oscillations      Body Blade       Rhythmic Stabilization      Ball on the wall 2# x20 cw/ccw                           Therapeutic Activity (00952)      Core training      Swiss ball activities      Education   On current objective of rehab and trajectory/prognosis                             Therapeutic Exercise and NMR EXR  [x] (88078) Provided verbal/tactile cueing for activities related to strengthening, flexibility, endurance, ROM  for improvements in scapular, scapulothoracic and UE control with self care, reaching, carrying, lifting, house/yardwork, driving/computer work. [x] (02985) Provided verbal/tactile cueing for activities related to improving balance, coordination, kinesthetic sense, posture, motor skill, proprioception  to assist with  scapular, scapulothoracic and UE control with self care, reaching, carrying, lifting, house/yardwork, driving/computer work. Therapeutic Activities:    [x] (78313 or 66911) Provided verbal/tactile cueing for activities related to improving balance, coordination, kinesthetic sense, posture, motor skill, proprioception and motor activation to allow for proper function of scapular, scapulothoracic and UE control with self care, carrying, lifting, driving/computer work. Home Exercise Program:    [x] (17223) Reviewed/Progressed HEP activities related to strengthening, flexibility, endurance, ROM of scapular, scapulothoracic and UE control with self care, reaching, carrying, lifting, house/yardwork, driving/computer work    Access Code: [email or web address]  URL: [email or web address]  Date: 07/07/2022  Prepared by: Elliott Azul    Exercises  Seated Passive Cervical Retraction - 2-3 x daily - 5-7 x weekly - 1 sets - 20 reps  Seated Scapular Retraction - 2-3 x daily - 5-7 x weekly - 1 sets - 20 reps  Seated Assisted Cervical Rotation with Towel (Mirrored) - 2-3 x daily - 5-7 x weekly - 1 sets - 10 reps - 5 seconds hold  Cervical Extension AROM with Strap - 2-3 x daily - 5-7 x weekly - 1 sets - 10 reps - 5 seconds hold  Shoulder Flexion Wall Slide with Towel (Mirrored) - 2-3 x daily - 5-7 x weekly - 1 sets - 10 reps - 10 seconds hold  Shoulder ER Stretch in Abduction (Mirrored) - 2-3 x daily - 5-7 x weekly - 1 sets - 10 reps - 10 seconds hold  Shoulder horizontal adduction stretch on back - 1-2 x daily - 5-7 x weekly - 1 sets - 10 reps - 10 seconds hold  Standing Shoulder Internal Rotation Stretch with Towel (Mirrored) - 1-2 x daily - 5-7 x weekly - 1 sets - 10 reps - 10 seconds hold  Standing Shoulder Row with Anchored Resistance - 1-2 x daily - 5-7 x weekly - 2-3 sets - 10 reps  Shoulder extension with resistance - Neutral - 1-2 x daily - 5-7 x weekly - 2-3 sets - 10 reps  Shoulder Internal Rotation with Resistance (Mirrored) - 1-2 x daily - 5-7 x weekly - 2-3 sets - 10 reps  Standing Wall [employer] with Mini Swiss Ball - 1-2 x daily - 5-7 x weekly - 1 sets - 20 circles clockwise  Sleeper Stretch - 1-2 x daily - 5-7 x weekly - 1 sets - 10 reps - 10 seconds hold  Shoulder External Rotation with Anchored Resistance (Mirrored) - 1-2 x daily - 5-7 x weekly - 2 sets - 10 reps        [] (80310) Reviewed/Progressed HEP activities related to improving balance, coordination, kinesthetic sense, posture, motor skill, proprioception of scapular, scapulothoracic and UE control with self care, reaching, carrying, lifting, house/yardwork, driving/computer work      Manual Treatments:  PROM / STM / Oscillations-Mobs:  G-I, II, III, IV (PA's, Inf., Post.)  [x] (31075) Provided manual therapy to mobilize soft tissue/joints of cervical/CT, scapular GHJ and UE for the purpose of modulating pain, promoting relaxation,  increasing ROM, reducing/eliminating soft tissue swelling/inflammation/restriction, improving soft tissue extensibility and allowing for proper ROM for normal function with self care, reaching, carrying, lifting, house/yardwork, driving/computer work    Modalities:  CP to L shoulder and neck x10'   [] GAME READY (VASO)- for significant edema, swelling, pain control. Charges:  Timed Code Treatment Minutes: 50   Total Treatment Minutes: 60      [] EVAL (LOW) 74566 (typically 20 minutes face-to-face)   [] EVAL (MOD) 93074 (typically 30 minutes face-to-face)  [] EVAL (HIGH) 92771 (typically 45 minutes face-to-face)  [] RE-EVAL     [x] NR(82852) x 1 (22') [] IONTO  [] NMR (93752)   [] VASO  [x] Manual (37996) x (8')  [] Other:  [x] TA x  1 (20')  [] Mech Traction (56722)  [] ES(attended) (64608)     [] ES (un) (05525):         ASSESSMENT:   Pt program continued with maintenance as he is still in the middle of his epidural cervical injection series. He has no significant discomfort throughout the session and at this point is independent with his program requiring no cues to perform correctly. Cont with current POC with goals of maintaining L shoulder/neck ROM and strength gains throughout injection series. GOALS:  Patient stated goal: Swing golf club without pain in L shoulder and looking over L shoulder without pain in neck  [x] Progressing: [] Met: [] Not Met: [] Adjusted    Therapist goals for Patient:   Short Term Goals: To be achieved in: 2 weeks  1. Independent in HEP and progression per patient tolerance, in order to prevent re-injury. [] Progressing: [x] Met: [] Not Met: [] Adjusted  2. Patient will have a decrease in pain to facilitate improvement in movement, function, and ADLs as indicated by Functional Deficits. [x] Progressing: [] Met: [] Not Met: [] Adjusted    Long Term Goals: To be achieved in: 6 weeks  1. Functional index score of 64 or more for FOTO to assist with reaching prior level of function. [x] Progressing: [] Met: [] Not Met: [] Adjusted  2.  Patient will demonstrate increased In L shoulder flexion, abduction, ER, and IR AROM to >155°, >155°, T3, and T8 respectively to allow for proper joint functioning as indicated by patients Functional Deficits. [x] Progressing: [] Met: [] Not Met: [] Adjusted  3. Patient will demonstrate an increase in L shoulder strength in all planes to 4+/5 or greater to allow for proper functional mobility as indicated by patients Functional Deficits. [] Progressing: [x] Met: [] Not Met: [] Adjusted  4. Patient will return to sleeping on the L slide with 1/10 pain or less in L shoulder so that he may return to PLOF. [x] Progressing: [] Met: [] Not Met: [] Adjusted  5. Patient will demonstrate increased In cervical spine flexion, extension, B rotation, and B side bending AROM to >80%, >70%, >75%, and >75% to allow for proper joint functioning as indicated by patients Functional Deficits. [] Progressing: [x] Met: [] Not Met: [] Adjusted    Overall Progression Towards Functional goals/ Treatment Progress Update:  [] Patient is progressing as expected towards functional goals listed. [x] Progression is slowed due to complexities/Impairments listed. [] Progression has been slowed due to co-morbidities. [] Plan just implemented, too soon to assess goals progression <30days   [] Goals require adjustment due to lack of progress  [] Patient is not progressing as expected and requires additional follow up with physician  [] Other    Prognosis for POC: [x] Good [] Fair  [] Poor      Patient requires continued skilled intervention: [x] Yes  [] No    Treatment/Activity Tolerance:  [x] Patient able to complete treatment  [] Patient limited by fatigue  [] Patient limited by pain    [] Patient limited by other medical complications  [] Other:           PLAN: 1-2x/week for 4 weeks. Maintain until epidural injection series is complete.     [x] Continue per plan of care [] Alter current plan   [] Plan of care initiated [] Hold pending MD visit [] Discharge      Electronically signed by:  Andrew Poole, PT, DPT, OCS, OMT-C    Board-Certified Orthopaedic Clinical [facility] PT license: [id number]  [place] PT license: [id number]      Note: If patient does not return for scheduled/ recommended follow up visits, this note will serve as a discharge from care along with most recent update on progress.

## 2022-10-23 DIAGNOSIS — I10 ESSENTIAL HYPERTENSION: ICD-10-CM

## 2022-10-24 RX ORDER — POTASSIUM CHLORIDE 750 MG/1
TABLET, EXTENDED RELEASE ORAL
Qty: 180 TABLET | Refills: 1 | Status: SHIPPED | OUTPATIENT
Start: 2022-10-24

## 2022-10-25 ENCOUNTER — TREATMENT (OUTPATIENT)
Dept: PHYSICAL THERAPY | Age: 67
End: 2022-10-25
Payer: MEDICARE

## 2022-10-25 DIAGNOSIS — M50.30 DDD (DEGENERATIVE DISC DISEASE), CERVICAL: ICD-10-CM

## 2022-10-25 DIAGNOSIS — M75.02 ADHESIVE CAPSULITIS OF LEFT SHOULDER: Primary | ICD-10-CM

## 2022-10-25 DIAGNOSIS — M25.512 LEFT SHOULDER PAIN, UNSPECIFIED CHRONICITY: ICD-10-CM

## 2022-10-25 DIAGNOSIS — M25.612 DECREASED ROM OF LEFT SHOULDER: ICD-10-CM

## 2022-10-25 PROCEDURE — 97530 THERAPEUTIC ACTIVITIES: CPT | Performed by: PHYSICAL THERAPIST

## 2022-10-25 PROCEDURE — 97110 THERAPEUTIC EXERCISES: CPT | Performed by: PHYSICAL THERAPIST

## 2022-10-25 PROCEDURE — G8427 DOCREV CUR MEDS BY ELIG CLIN: HCPCS | Performed by: PHYSICAL THERAPIST

## 2022-10-25 PROCEDURE — 97140 MANUAL THERAPY 1/> REGIONS: CPT | Performed by: PHYSICAL THERAPIST

## 2022-10-25 NOTE — PROGRESS NOTES
[facility]   Phone: [phone]    Fax: [phone]   Physical Therapy Treatment Note/ Progress Report:           Date:  10/25/2022    Patient Name:  Gaurav Irene    :  1955  MRN: [id number]  Restrictions/Precautions:    Medical/Treatment Diagnosis Information:  Diagnosis: L shoulder adhesive capsulitis     Insurance/Certification information:  PT Insurance Information: Medicare  Physician Information:  Referring Practitioner: Dr. James Marsh  Has the plan of care been signed (Y/N):        [x]  Yes  []  No     Date of Patient follow up with Physician: 10/12/2022 with neck specialist      Is this a Progress Report:     []  Yes  [x]  No        If Yes:  Date/ Range for reporting period:  Beginning: 10/11/22   Endin22    Progress report will be due (10 Rx or 30 days whichever is less):       Recertification will be due (POC Duration  / 90 days whichever is less): 22        Visit # Insurance Allowable Auth Required   [facility] David Donato []  Yes [x]  No        Functional Scale:  FOTO score: 56  Date assessed: 10/4/22     PQRS:   Reviewed medication list with patient. No changes since last PT visit. 10/25/2022    Latex Allergy:  [x]NO      []YES  Preferred Language for Healthcare:   [x]English       []other:      Pain level:  0/10  At rest, 3-4/10 at worst for neck, 2-3/10 for shoulder     SUBJECTIVE: Pt reports unchanging sxs since his last visit. He reports his diagnostic appt related to cervical injection series is this Thursday, 10/27/22. He states that if they decide for it, he will have to schedule the injection that is supposed to make a lasting impact.                   OBJECTIVE: See eval               ROM PROM AROM  Comment:  10/4/22     L R L R     Flexion     145° 152°     Abduction     160°* 152°     ER     T4 T3     IR     T7* T9     Cervical flexion         56°   Cervical extension         50°* on L side of neck   Cervical rotation     80%* 95%     Cervical  Side bending     80%* 80%           Strength L R Comment:  10/4/22   Flexion 5/5 5/5     Abduction 5/5 5/5     ER 5/5 5/5     IR 5/5 5/5           Special Tests Results/Comment:  08/3/22   Emmy Negative on L   Neers Negative on L   OBriens Negative on L   Other: empty can Negative on L            RESTRICTIONS/PRECAUTIONS: Cervical spine DDD and L shoulder adhesive capsulitis    Exercises/Interventions:     Therapeutic Ex (91848) Sets/sec Reps Notes/CUES   AD UE BIKE            STRETCHING/ROM      Pulleys 10\" x10    Table Slides-Flexion      Table Slides-Scaption      Table Slides-Abduction      Walk back stretch at counter      Wand-ER at 0      Wand-Supine ER at 45      Wand-Supine Flexion      UE Ray      Pendulum      Doorway ER 10\" x10    Wall Slides 10\" x10    CBA 10\" x10 Blocking scap   BBIR 10\" x10    Sleeper  10\" x10    Elbow PROM/AROM      Cervical extension SNAG 5\" x10    Cervical rotation SNAG to L 5\" x10                            ISOMETRICS      Gripping      Scap Retraction 2-3\" x20    Cervical retraction 2-3\" x20    Abduction      Flexion      Internal Rotation      External Rotation            STRENGTHENING-PREs      Flexion      Abduction      Internal Rotation      External Rotation 3#, 2 x10    Shrugs      Biceps      Triceps      Retraction 3#, 2 x10    Extension      Horizontal Abduction in ER      Serratus 5#, 3 x10                      THERABANDS/CABLE COLUMN      Rows Black, 2 x10    Lats      Extension Black, 2 x10    Internal Rotation Black, 2 x10    External Rotation black, 2 x10    Biceps      Triceps      PNF                                    Manual Intervention (41664)      Scar Massage      IASTM to L infraspinatus, biceps, deltoid, UT and cervical musculature x8'  FLYBY   [facility] joint mobilizations      Foamroll                  NMR re-education (99658)   CUES NEEDED   Plyoback      Therabar oscillations      Body Blade       Rhythmic Stabilization      Ball on the wall 2# x20 cw/ccw                           Therapeutic Activity (23038)      Core training      Swiss ball activities      Education   On current objective of rehab and trajectory/prognosis                             Therapeutic Exercise and NMR EXR  [x] (45048) Provided verbal/tactile cueing for activities related to strengthening, flexibility, endurance, ROM  for improvements in scapular, scapulothoracic and UE control with self care, reaching, carrying, lifting, house/yardwork, driving/computer work. [x] (68698) Provided verbal/tactile cueing for activities related to improving balance, coordination, kinesthetic sense, posture, motor skill, proprioception  to assist with  scapular, scapulothoracic and UE control with self care, reaching, carrying, lifting, house/yardwork, driving/computer work. Therapeutic Activities:    [x] (33987 or 17599) Provided verbal/tactile cueing for activities related to improving balance, coordination, kinesthetic sense, posture, motor skill, proprioception and motor activation to allow for proper function of scapular, scapulothoracic and UE control with self care, carrying, lifting, driving/computer work. Home Exercise Program:    [x] (28993) Reviewed/Progressed HEP activities related to strengthening, flexibility, endurance, ROM of scapular, scapulothoracic and UE control with self care, reaching, carrying, lifting, house/yardwork, driving/computer work    Access Code: [email or web address]  URL: [email or web address]  Date: 07/07/2022  Prepared by: Santiago Jamil    Exercises  Seated Passive Cervical Retraction - 2-3 x daily - 5-7 x weekly - 1 sets - 20 reps  Seated Scapular Retraction - 2-3 x daily - 5-7 x weekly - 1 sets - 20 reps  Seated Assisted Cervical Rotation with Towel (Mirrored) - 2-3 x daily - 5-7 x weekly - 1 sets - 10 reps - 5 seconds hold  Cervical Extension AROM with Strap - 2-3 x daily - 5-7 x weekly - 1 sets - 10 reps - 5 seconds hold  Shoulder Flexion Wall Slide with Towel (Mirrored) - 2-3 x daily - 5-7 x weekly - 1 sets - 10 reps - 10 seconds hold  Shoulder ER Stretch in Abduction (Mirrored) - 2-3 x daily - 5-7 x weekly - 1 sets - 10 reps - 10 seconds hold  Shoulder horizontal adduction stretch on back - 1-2 x daily - 5-7 x weekly - 1 sets - 10 reps - 10 seconds hold  Standing Shoulder Internal Rotation Stretch with Towel (Mirrored) - 1-2 x daily - 5-7 x weekly - 1 sets - 10 reps - 10 seconds hold  Standing Shoulder Row with Anchored Resistance - 1-2 x daily - 5-7 x weekly - 2-3 sets - 10 reps  Shoulder extension with resistance - Neutral - 1-2 x daily - 5-7 x weekly - 2-3 sets - 10 reps  Shoulder Internal Rotation with Resistance (Mirrored) - 1-2 x daily - 5-7 x weekly - 2-3 sets - 10 reps  Standing Wall [employer] with Mini Swiss Ball - 1-2 x daily - 5-7 x weekly - 1 sets - 20 circles clockwise  Sleeper Stretch - 1-2 x daily - 5-7 x weekly - 1 sets - 10 reps - 10 seconds hold  Shoulder External Rotation with Anchored Resistance (Mirrored) - 1-2 x daily - 5-7 x weekly - 2 sets - 10 reps        [] (45913) Reviewed/Progressed HEP activities related to improving balance, coordination, kinesthetic sense, posture, motor skill, proprioception of scapular, scapulothoracic and UE control with self care, reaching, carrying, lifting, house/yardwork, driving/computer work      Manual Treatments:  PROM / STM / Oscillations-Mobs:  G-I, II, III, IV (PA's, Inf., Post.)  [x] (02901) Provided manual therapy to mobilize soft tissue/joints of cervical/CT, scapular GHJ and UE for the purpose of modulating pain, promoting relaxation,  increasing ROM, reducing/eliminating soft tissue swelling/inflammation/restriction, improving soft tissue extensibility and allowing for proper ROM for normal function with self care, reaching, carrying, lifting, house/yardwork, driving/computer work    Modalities:   [] GAME READY (VASO)- for significant edema, swelling, pain control. Charges:  Timed Code Treatment Minutes: 50   Total Treatment Minutes: 50      [] EVAL (LOW) 93034 (typically 20 minutes face-to-face)   [] EVAL (MOD) 61647 (typically 30 minutes face-to-face)  [] EVAL (HIGH) 98665 (typically 45 minutes face-to-face)  [] RE-EVAL     [x] IA(24101) x 1 (22') [] IONTO  [] NMR (29076)   [] VASO  [x] Manual (52396) x (8')  [] Other:  [x] TA x  1 (20')  [] Mech Traction (54666)  [] ES(attended) (18107)     [] ES (un) (52193):         ASSESSMENT:   Pt with continued maintenance of program as he is still in the middle of what seems like a drawn out cervical injection series. He is maintaining his ROM and strength gains and requires no cues throughout session for exercise execution, proving a strong adherence to his HEP. He tolerates intro to IASTM with flyby well with no discomfort reported, but has some tenderness/tightness of the L levator scap and UT. PT and PT student are anxious to progress/adjust POC , but maintenance of program throughout this series is what is most beneficial to the patient. GOALS:  Patient stated goal: Swing golf club without pain in L shoulder and looking over L shoulder without pain in neck  [x] Progressing: [] Met: [] Not Met: [] Adjusted    Therapist goals for Patient:   Short Term Goals: To be achieved in: 2 weeks  1. Independent in HEP and progression per patient tolerance, in order to prevent re-injury. [] Progressing: [x] Met: [] Not Met: [] Adjusted  2. Patient will have a decrease in pain to facilitate improvement in movement, function, and ADLs as indicated by Functional Deficits. [x] Progressing: [] Met: [] Not Met: [] Adjusted    Long Term Goals: To be achieved in: 6 weeks  1. Functional index score of 64 or more for FOTO to assist with reaching prior level of function. [x] Progressing: [] Met: [] Not Met: [] Adjusted  2.  Patient will demonstrate increased In L shoulder flexion, abduction, ER, and IR AROM to >155°, >155°, T3, and T8 respectively to allow for proper joint functioning as indicated by patients Functional Deficits. [x] Progressing: [] Met: [] Not Met: [] Adjusted  3. Patient will demonstrate an increase in L shoulder strength in all planes to 4+/5 or greater to allow for proper functional mobility as indicated by patients Functional Deficits. [] Progressing: [x] Met: [] Not Met: [] Adjusted  4. Patient will return to sleeping on the L slide with 1/10 pain or less in L shoulder so that he may return to PLOF. [x] Progressing: [] Met: [] Not Met: [] Adjusted  5. Patient will demonstrate increased In cervical spine flexion, extension, B rotation, and B side bending AROM to >80%, >70%, >75%, and >75% to allow for proper joint functioning as indicated by patients Functional Deficits. [] Progressing: [x] Met: [] Not Met: [] Adjusted    Overall Progression Towards Functional goals/ Treatment Progress Update:  [] Patient is progressing as expected towards functional goals listed. [x] Progression is slowed due to complexities/Impairments listed. [] Progression has been slowed due to co-morbidities. [] Plan just implemented, too soon to assess goals progression <30days   [] Goals require adjustment due to lack of progress  [] Patient is not progressing as expected and requires additional follow up with physician  [] Other    Prognosis for POC: [x] Good [] Fair  [] Poor      Patient requires continued skilled intervention: [x] Yes  [] No    Treatment/Activity Tolerance:  [x] Patient able to complete treatment  [] Patient limited by fatigue  [] Patient limited by pain    [] Patient limited by other medical complications  [] Other:           PLAN: 1-2x/week for 4 weeks. Maintain until epidural injection series is complete.     [x] Continue per plan of care [] Alter current plan   [] Plan of care initiated [] Hold pending MD visit [] Discharge      Electronically signed by:  Izzy Marion, PT, DPT, OCS, [id number]    Board-Certified Orthopaedic Clinical Specialist    [id number] [address] PT license: [id number]  [place] PT license: [id number]      Note: If patient does not return for scheduled/ recommended follow up visits, this note will serve as a discharge from care along with most recent update on progress.

## 2022-10-28 ENCOUNTER — OFFICE VISIT (OUTPATIENT)
Dept: PRIMARY CARE CLINIC | Age: 67
End: 2022-10-28
Payer: MEDICARE

## 2022-10-28 VITALS
WEIGHT: 150 LBS | HEIGHT: 69 IN | BODY MASS INDEX: 22.22 KG/M2 | DIASTOLIC BLOOD PRESSURE: 65 MMHG | TEMPERATURE: 98.1 F | HEART RATE: 59 BPM | OXYGEN SATURATION: 98 % | SYSTOLIC BLOOD PRESSURE: 109 MMHG

## 2022-10-28 DIAGNOSIS — Z71.89 ADVANCE CARE PLANNING: ICD-10-CM

## 2022-10-28 DIAGNOSIS — Z12.5 SCREENING FOR PROSTATE CANCER: ICD-10-CM

## 2022-10-28 DIAGNOSIS — Z12.2 ENCOUNTER FOR SCREENING FOR LUNG CANCER: ICD-10-CM

## 2022-10-28 DIAGNOSIS — J43.8 OTHER EMPHYSEMA (HCC): ICD-10-CM

## 2022-10-28 DIAGNOSIS — Z23 FLU VACCINE NEED: ICD-10-CM

## 2022-10-28 DIAGNOSIS — Z12.11 SCREENING FOR COLON CANCER: ICD-10-CM

## 2022-10-28 DIAGNOSIS — Z86.010 HISTORY OF COLON POLYPS: ICD-10-CM

## 2022-10-28 DIAGNOSIS — Z13.6 SCREENING FOR AAA (ABDOMINAL AORTIC ANEURYSM): ICD-10-CM

## 2022-10-28 DIAGNOSIS — Z87.891 FORMER SMOKER: ICD-10-CM

## 2022-10-28 DIAGNOSIS — I10 ESSENTIAL HYPERTENSION: ICD-10-CM

## 2022-10-28 DIAGNOSIS — Z00.00 MEDICARE ANNUAL WELLNESS VISIT, SUBSEQUENT: Primary | ICD-10-CM

## 2022-10-28 PROBLEM — G89.29 CHRONIC PAIN: Status: ACTIVE | Noted: 2022-10-28

## 2022-10-28 PROCEDURE — G8484 FLU IMMUNIZE NO ADMIN: HCPCS | Performed by: FAMILY MEDICINE

## 2022-10-28 PROCEDURE — 3078F DIAST BP <80 MM HG: CPT | Performed by: FAMILY MEDICINE

## 2022-10-28 PROCEDURE — 3017F COLORECTAL CA SCREEN DOC REV: CPT | Performed by: FAMILY MEDICINE

## 2022-10-28 PROCEDURE — 1123F ACP DISCUSS/DSCN MKR DOCD: CPT | Performed by: FAMILY MEDICINE

## 2022-10-28 PROCEDURE — G0439 PPPS, SUBSEQ VISIT: HCPCS | Performed by: FAMILY MEDICINE

## 2022-10-28 PROCEDURE — 90694 VACC AIIV4 NO PRSRV 0.5ML IM: CPT | Performed by: FAMILY MEDICINE

## 2022-10-28 PROCEDURE — G0008 ADMIN INFLUENZA VIRUS VAC: HCPCS | Performed by: FAMILY MEDICINE

## 2022-10-28 PROCEDURE — 3074F SYST BP LT 130 MM HG: CPT | Performed by: FAMILY MEDICINE

## 2022-10-28 ASSESSMENT — PATIENT HEALTH QUESTIONNAIRE - PHQ9
SUM OF ALL RESPONSES TO PHQ QUESTIONS 1-9: 0
SUM OF ALL RESPONSES TO PHQ QUESTIONS 1-9: 0
SUM OF ALL RESPONSES TO PHQ9 QUESTIONS 1 & 2: 0
SUM OF ALL RESPONSES TO PHQ QUESTIONS 1-9: 0
SUM OF ALL RESPONSES TO PHQ QUESTIONS 1-9: 0
2. FEELING DOWN, DEPRESSED OR HOPELESS: 0
1. LITTLE INTEREST OR PLEASURE IN DOING THINGS: 0

## 2022-10-28 ASSESSMENT — LIFESTYLE VARIABLES
HOW MANY STANDARD DRINKS CONTAINING ALCOHOL DO YOU HAVE ON A TYPICAL DAY: 1 OR 2
HOW OFTEN DO YOU HAVE A DRINK CONTAINING ALCOHOL: 2-3 TIMES A WEEK

## 2022-10-28 NOTE — PROGRESS NOTES
Medicare Annual Wellness Visit    Wilfredo Gustafson is here for Medicare AWV (fasting)    Assessment & Plan   Medicare annual wellness visit, subsequent  General wellness exam. Reviewed chart for past hx and updated today. Counseled on age appropriate health guidance and discussed screening recommendations. Vaccinations reviewed and discussed. All questions answered  -     CBC with Auto Differential; Future  -     Comprehensive Metabolic Panel, Fasting; Future  -     Lipid, Fasting; Future  -     TSH with Reflex; Future  -     Hemoglobin A1C; Future  -     Ambulatory Referral to ACP Clinical Specialist    Advance care planning  -     Ambulatory Referral to ACP Clinical Specialist    Screening for colon cancer  Discussed colon cancer screening options relating to patient's personal and family risk. Patient is interested in getting a referral for colonoscopy. This information has been provided and he is to call to get that visit set up. -     VADIM - Geoffrey Esposito MD, Gastroenterology, [facility]  History of colon polyps  -     VADIM Esposito MD, Gastroenterology, [address] [place] [address]    Screening for prostate cancer  Discussed prostate cancer screening with male of appropriate age and risk factors. I have provided evidence behind PSA and answered all questions regarding the sensitivity of this test.  At this time, through shared decision making, patient would like to move forward with collection of annual PSA.  -     PSA Screening; Future    Former smoker - quit March 2020  -     CT LUNG SCREENING; Future  -     US SCREENING FOR AAA; Future  Screening for AAA (abdominal aortic aneurysm)  Patient with risk factors for development of AAA given his smoking history. No previous screening test completed. Discussed reason for screening today with patient and he is agreeable. Order provided. Will call patient with update after results and determine if any follow-up plan is needed.   -     US SCREENING FOR AAA; Future  Encounter for screening for lung cancer  Patient with extensive current/past smoking history. He meets criteria for lung cancer screening. Discussed recommendations for annual low-dose CT scan of the lungs and patient is agreeable. Order provided. Will call patient with update after results and determine follow-up plan. -     CT LUNG SCREENING; Future    Flu vaccine need  -     Influenza, FLUAD, (age 72 y+), IM, PF, 0.5 mL    Essential hypertension  Other emphysema ([address]    Recommendations for Preventive Services Due: see orders and patient instructions/AVS.  Recommended screening schedule for the next 5-10 years is provided to the patient in written form: see Patient Instructions/AVS.     Return in about 1 year (around 10/30/2023) for [address] [place] [address]. Subjective   The following acute and/or chronic problems were also addressed today:  Doing well - no acute concerns. Patient's complete Health Risk Assessment and screening values have been reviewed and are found in Flowsheets. The following problems were reviewed today and where indicated follow up appointments were made and/or referrals ordered.     Positive Risk Factor Screenings with Interventions:             General Health and ACP:  General  In general, how would you say your health is?: Very Good  In the past 7 days, have you experienced any of the following: New or Increased Pain, New or Increased Fatigue, Loneliness, Social Isolation, Stress or Anger?: No  Do you get the social and emotional support that you need?: Yes  Do you have a Living Will?: (!) No    Advance Directives       Power of  Living Will ACP-Advance Directive ACP-Power of     Not on File Not on File Not on File Not on File          General Health Risk Interventions:  No Living Will: Advance Care Planning addressed with patient today and Patient referred to ACP Clinical Specialist      Safety:  Do you have working smoke detectors?: Yes  Do you have any tripping hazards - loose or unsecured carpets or rugs?: No  Do you have any tripping hazards - clutter in doorways, halls, or stairs?: No  Do you have either shower bars, grab bars, non-slip mats or non-slip surfaces in your shower or bathtub?: (!) No  Do all of your stairways have a railing or banister?: Yes  Do you always fasten your seatbelt when you are in a car?: Yes  Safety Interventions:  Patient declines any further evaluation/treatment for this issue           Objective   Vitals:    10/28/22 1047   BP: 109/65   Cuff Size: Medium Adult   Pulse: 59   Temp: 98.1 °F (36.7 °C)   TempSrc: Oral   SpO2: 98%   Weight: 150 lb (68 kg)   Height: 5' 9\" (1.753 m)      Body mass index is 22.15 kg/m². General Appearance: alert and oriented to person, place and time, well developed and well- nourished, in no acute distress  Skin: warm and dry, no rash or erythema  Head: normocephalic and atraumatic  Eyes: pupils equal, round, and reactive to light, extraocular eye movements intact, conjunctivae normal  ENT: tympanic membrane, external ear and ear canal normal bilaterally, nose without deformity, nasal mucosa and turbinates normal without polyps  Neck: supple and non-tender without mass, no thyromegaly or thyroid nodules, no cervical lymphadenopathy  Pulmonary/Chest: clear to auscultation bilaterally- no wheezes, rales or rhonchi, normal air movement, no respiratory distress  Cardiovascular: normal rate, regular rhythm, normal S1 and S2, no murmurs, rubs, clicks, or gallops, distal pulses intact, no carotid bruits  Abdomen: soft, non-tender, non-distended, normal bowel sounds, no masses or organomegaly  Extremities: no cyanosis, clubbing or edema  Musculoskeletal: normal range of motion, no joint swelling, deformity or tenderness  Neurologic: reflexes normal and symmetric, no cranial nerve deficit, gait, coordination and speech normal       No Known Allergies  Prior to Visit Medications    Medication Sig Taking?  Authorizing Provider potassium chloride (KLOR-CON M10) 10 MEQ extended release tablet TAKE 2 TABLETS DAILY Yes Leslie Dove DO   hydroCHLOROthiazide (HYDRODIURIL) 25 MG tablet TAKE 1 TABLET DAILY Yes Leslie Dove DO   olmesartan (BENICAR) 40 MG tablet TAKE 1 TABLET DAILY Yes Raiza Bentley DO   acyclovir (ZOVIRAX) 400 MG tablet TAKE ONE TABLET BY MOUTH THREE TIMES A DAY FOR 10 DAYS FOR HERPES SIMPLEX FLARE-UP Yes Raiza Bentley DO   loratadine (CLARITIN) 10 MG tablet Take 10 mg by mouth daily Yes Historical Provider, MD   Misc Natural Products (GLUCOSAMINE CHOND DOUBLE STR PO) Take 1 tablet by mouth daily Yes Historical Provider, MD   Multiple Vitamin (MULTIVITAMIN PO) Take 1 capsule by mouth daily. Yes Historical Provider, MD [place] (Including outside providers/suppliers regularly involved in providing care):   Patient Care Team:  Leslie Dove DO as PCP - General (Family Medicine)  Leslie Dove DO as PCP - [facility] Empaneled Provider  Wilbre Hoskins MD as Consulting Physician (Gastroenterology)  Mary Latham III (Ophthalmology)     Reviewed and updated this visit:  Tobacco  Allergies  Meds  Problems  Med Hx  Surg Hx  Soc Hx  Fam Hx                             Michelle Palmer DO  Please note that this chart was generated using dragon dictation software. Although every effort was made to ensure the accuracy of this automated transcription, some errors in transcription may have occurred.

## 2022-10-28 NOTE — PATIENT INSTRUCTIONS
Personalized Preventive Plan for Corinna Meigs - 10/28/2022  Medicare offers a range of preventive health benefits. Some of the tests and screenings are paid in full while other may be subject to a deductible, co-insurance, and/or copay. Some of these benefits include a comprehensive review of your medical history including lifestyle, illnesses that may run in your family, and various assessments and screenings as appropriate. After reviewing your medical record and screening and assessments performed today your provider may have ordered immunizations, labs, imaging, and/or referrals for you. A list of these orders (if applicable) as well as your Preventive Care list are included within your After Visit Summary for your review. Other Preventive Recommendations:    A preventive eye exam performed by an eye specialist is recommended every 1-2 years to screen for glaucoma; cataracts, macular degeneration, and other eye disorders. A preventive dental visit is recommended every 6 months. Try to get at least 150 minutes of exercise per week or 10,000 steps per day on a pedometer . Order or download the FREE \"Exercise & Physical Activity: Your Everyday Guide\" from [employer]. Call 8-661.347.9932 or search [employer] online. You need 4941-2050 mg of calcium and 0002-5342 IU of vitamin D per day. It is possible to meet your calcium requirement with diet alone, but a vitamin D supplement is usually necessary to meet this goal.  When exposed to the sun, use a sunscreen that protects against both UVA and UVB radiation with an SPF of 30 or greater. Reapply every 2 to 3 hours or after sweating, drying off with a towel, or swimming. Always wear a seat belt when traveling in a car. Always wear a helmet when riding a bicycle or motorcycle.

## 2022-11-01 ENCOUNTER — TREATMENT (OUTPATIENT)
Dept: PHYSICAL THERAPY | Age: 67
End: 2022-11-01
Payer: MEDICARE

## 2022-11-01 DIAGNOSIS — M75.02 ADHESIVE CAPSULITIS OF LEFT SHOULDER: Primary | ICD-10-CM

## 2022-11-01 DIAGNOSIS — M25.512 LEFT SHOULDER PAIN, UNSPECIFIED CHRONICITY: ICD-10-CM

## 2022-11-01 DIAGNOSIS — M25.612 DECREASED ROM OF LEFT SHOULDER: ICD-10-CM

## 2022-11-01 DIAGNOSIS — M50.30 DDD (DEGENERATIVE DISC DISEASE), CERVICAL: ICD-10-CM

## 2022-11-01 PROCEDURE — 97110 THERAPEUTIC EXERCISES: CPT | Performed by: PHYSICAL THERAPIST

## 2022-11-01 PROCEDURE — 97140 MANUAL THERAPY 1/> REGIONS: CPT | Performed by: PHYSICAL THERAPIST

## 2022-11-01 PROCEDURE — G8427 DOCREV CUR MEDS BY ELIG CLIN: HCPCS | Performed by: PHYSICAL THERAPIST

## 2022-11-01 PROCEDURE — 97530 THERAPEUTIC ACTIVITIES: CPT | Performed by: PHYSICAL THERAPIST

## 2022-11-01 NOTE — PROGRESS NOTES
[facility]   Phone: [phone]    Fax: [phone]   Physical Therapy Treatment Note/ Progress Report:           Date:  2022    Patient Name:  Trinh Graham    :  1955  MRN: [id number]  Restrictions/Precautions:    Medical/Treatment Diagnosis Information:  Diagnosis: L shoulder adhesive capsulitis     Insurance/Certification information:  PT Insurance Information: Medicare  Physician Information:  Referring Practitioner: Dr. Tanya Lieberman  Has the plan of care been signed (Y/N):        [x]  Yes  []  No     Date of Patient follow up with Physician: 10/12/2022 with neck specialist      Is this a Progress Report:     []  Yes  [x]  No        If Yes:  Date/ Range for reporting period:  Beginning: 10/11/22   Endin22    Progress report will be due (10 Rx or 30 days whichever is less):       Recertification will be due (POC Duration  / 90 days whichever is less): 22        Visit # Insurance Allowable Auth Required   [address] []  Yes [x]  No        Functional Scale:  FOTO score: 56  Date assessed: 10/4/22     PQRS:   Reviewed medication list with patient. No changes since last PT visit. 2022    Latex Allergy:  [x]NO      []YES  Preferred Language for Healthcare:   [x]English       []other:      Pain level:  0/10  At rest, 3-4/10 at worst for neck, 2-3/10 for shoulder     SUBJECTIVE:  Pt reports no change in sxs relative to the L neck/shoulder since his last visit. He states the diagnostic eval section of his injection series was completed last week, and they concluded they will complete  a radiofrequency ablation on 2022.           OBJECTIVE: See eval               ROM PROM AROM  Comment:  10/4/22     L R L R     Flexion     145° 152°     Abduction     160°* 152°     ER     T4 T3     IR     T7* T9     Cervical flexion         56°   Cervical extension         50°* on L side of neck   Cervical rotation     80%* 95%     Cervical  Side bending     80%* 80%           Strength L R Comment:  10/4/22   Flexion 5/5 5/5     Abduction 5/5 5/5     ER 5/5 5/5     IR 5/5 5/5           Special Tests Results/Comment:  08/3/22   Emmy Negative on L   Neers Negative on L   OBriens Negative on L   Other: empty can Negative on L            RESTRICTIONS/PRECAUTIONS: Cervical spine DDD and L shoulder adhesive capsulitis    Exercises/Interventions:     Therapeutic Ex (21206) Sets/sec Reps Notes/CUES   AD UE BIKE            STRETCHING/ROM      Pulleys 10\" x10    Table Slides-Flexion      Table Slides-Scaption      Table Slides-Abduction      Walk back stretch at counter      Wand-ER at 0      Wand-Supine ER at 45      Wand-Supine Flexion      UE [place]      Pendulum      Doorway ER 10\" x10    Wall Slides 10\" x10    CBA 10\" x10 Blocking scap   BBIR 10\" x10    Sleeper  10\" x10    Elbow PROM/AROM      Cervical extension SNAG 5\" x10    Cervical rotation SNAG to L 5\" x10                            ISOMETRICS      Gripping      Scap Retraction 2-3\" x20    Cervical retraction 2-3\" x20    Abduction      Flexion      Internal Rotation      External Rotation            STRENGTHENING-PREs      Flexion      Abduction      Internal Rotation      External Rotation 3#, 2 x10    Shrugs      Biceps      Triceps      Retraction 3#, 2 x10    Extension      Horizontal Abduction in ER      Serratus 5#, 3 x10                      THERABANDS/CABLE COLUMN      Rows Black, 2 x10    Lats      Extension Black, 2 x10    Internal Rotation Black, 2 x10    External Rotation black, 2 x10    Biceps      Triceps      PNF                                    Manual Intervention (27009)      Scar Massage      IASTM to L infraspinatus, biceps, deltoid, UT and cervical musculature x8'  FLYBY   [facility]      [address] [facility] joint mobilizations      Foamroll                  NMR re-education (94100)   CUES NEEDED   Plyoback      Therabar oscillations      Body Blade       Rhythmic Stabilization      Ball on the wall 2# x20 cw/ccw                           Therapeutic Activity (26524)      Core training      Swiss ball activities      Education   On current objective of rehab and trajectory/prognosis                             Therapeutic Exercise and NMR EXR  [x] (23788) Provided verbal/tactile cueing for activities related to strengthening, flexibility, endurance, ROM  for improvements in scapular, scapulothoracic and UE control with self care, reaching, carrying, lifting, house/yardwork, driving/computer work. [x] (59511) Provided verbal/tactile cueing for activities related to improving balance, coordination, kinesthetic sense, posture, motor skill, proprioception  to assist with  scapular, scapulothoracic and UE control with self care, reaching, carrying, lifting, house/yardwork, driving/computer work. Therapeutic Activities:    [x] (68448 or 95288) Provided verbal/tactile cueing for activities related to improving balance, coordination, kinesthetic sense, posture, motor skill, proprioception and motor activation to allow for proper function of scapular, scapulothoracic and UE control with self care, carrying, lifting, driving/computer work. Home Exercise Program:    [x] (76782) Reviewed/Progressed HEP activities related to strengthening, flexibility, endurance, ROM of scapular, scapulothoracic and UE control with self care, reaching, carrying, lifting, house/yardwork, driving/computer work    Access Code: [email or web address]  URL: [email or web address]  Date: 07/07/2022  Prepared by: Patrice Longo    Exercises  Seated Passive Cervical Retraction - 2-3 x daily - 5-7 x weekly - 1 sets - 20 reps  Seated Scapular Retraction - 2-3 x daily - 5-7 x weekly - 1 sets - 20 reps  Seated Assisted Cervical Rotation with Towel (Mirrored) - 2-3 x daily - 5-7 x weekly - 1 sets - 10 reps - 5 seconds hold  Cervical Extension AROM with Strap - 2-3 x daily - 5-7 x weekly - 1 sets - 10 reps - 5 seconds hold  Shoulder Flexion Wall Slide with Towel (Mirrored) - 2-3 x daily - 5-7 x weekly - 1 sets - 10 reps - 10 seconds hold  Shoulder ER Stretch in Abduction (Mirrored) - 2-3 x daily - 5-7 x weekly - 1 sets - 10 reps - 10 seconds hold  Shoulder horizontal adduction stretch on back - 1-2 x daily - 5-7 x weekly - 1 sets - 10 reps - 10 seconds hold  Standing Shoulder Internal Rotation Stretch with Towel (Mirrored) - 1-2 x daily - 5-7 x weekly - 1 sets - 10 reps - 10 seconds hold  Standing Shoulder Row with Anchored Resistance - 1-2 x daily - 5-7 x weekly - 2-3 sets - 10 reps  Shoulder extension with resistance - Neutral - 1-2 x daily - 5-7 x weekly - 2-3 sets - 10 reps  Shoulder Internal Rotation with Resistance (Mirrored) - 1-2 x daily - 5-7 x weekly - 2-3 sets - 10 reps  Standing Wall [employer] with Mini Swiss Ball - 1-2 x daily - 5-7 x weekly - 1 sets - 20 circles clockwise  Sleeper Stretch - 1-2 x daily - 5-7 x weekly - 1 sets - 10 reps - 10 seconds hold  Shoulder External Rotation with Anchored Resistance (Mirrored) - 1-2 x daily - 5-7 x weekly - 2 sets - 10 reps        [] (23484) Reviewed/Progressed HEP activities related to improving balance, coordination, kinesthetic sense, posture, motor skill, proprioception of scapular, scapulothoracic and UE control with self care, reaching, carrying, lifting, house/yardwork, driving/computer work      Manual Treatments:  PROM / STM / Oscillations-Mobs:  G-I, II, III, IV (PA's, Inf., Post.)  [x] (89335) Provided manual therapy to mobilize soft tissue/joints of cervical/CT, scapular GHJ and UE for the purpose of modulating pain, promoting relaxation,  increasing ROM, reducing/eliminating soft tissue swelling/inflammation/restriction, improving soft tissue extensibility and allowing for proper ROM for normal function with self care, reaching, carrying, lifting, house/yardwork, driving/computer work    Modalities:   [] GAME READY (VASO)- for significant edema, swelling, pain control. Charges:  Timed Code Treatment Minutes: 51   Total Treatment Minutes: 51      [] EVAL (LOW) 48004 (typically 20 minutes face-to-face)   [] EVAL (MOD) 20007 (typically 30 minutes face-to-face)  [] EVAL (HIGH) 65346 (typically 45 minutes face-to-face)  [] RE-EVAL     [x] IW(49182) x 1 (22') [] IONTO  [] NMR (41350)   [] VASO  [x] Manual (83656) x (8')  [] Other:  [x] TA x  1 (21')  [] Mech Traction (52348)  [] ES(attended) (50215)     [] ES (un) (08147):         ASSESSMENT:   Pt continues to maintain his ROM and strength gains in the L shoulder/neck throughout this injection series. He is fully independent in his program, requiring no cues for exercise execution. Given his tentative date for radio frequency ablation on 11/17/2022, the POC should continue to be maintained until then. After this, the pt will benefit from re-assessment to notice any changes and manipulate the plan accordingly. GOALS:  Patient stated goal: Swing golf club without pain in L shoulder and looking over L shoulder without pain in neck  [x] Progressing: [] Met: [] Not Met: [] Adjusted    Therapist goals for Patient:   Short Term Goals: To be achieved in: 2 weeks  1. Independent in HEP and progression per patient tolerance, in order to prevent re-injury. [] Progressing: [x] Met: [] Not Met: [] Adjusted  2. Patient will have a decrease in pain to facilitate improvement in movement, function, and ADLs as indicated by Functional Deficits. [x] Progressing: [] Met: [] Not Met: [] Adjusted    Long Term Goals: To be achieved in: 6 weeks  1. Functional index score of 64 or more for FOTO to assist with reaching prior level of function. [x] Progressing: [] Met: [] Not Met: [] Adjusted  2. Patient will demonstrate increased In L shoulder flexion, abduction, ER, and IR AROM to >155°, >155°, T3, and T8 respectively to allow for proper joint functioning as indicated by patients Functional Deficits.    [x] Progressing: [] Met: [] Not Met: [] Adjusted  3. Patient will demonstrate an increase in L shoulder strength in all planes to 4+/5 or greater to allow for proper functional mobility as indicated by patients Functional Deficits. [] Progressing: [x] Met: [] Not Met: [] Adjusted  4. Patient will return to sleeping on the L slide with 1/10 pain or less in L shoulder so that he may return to PLOF. [x] Progressing: [] Met: [] Not Met: [] Adjusted  5. Patient will demonstrate increased In cervical spine flexion, extension, B rotation, and B side bending AROM to >80%, >70%, >75%, and >75% to allow for proper joint functioning as indicated by patients Functional Deficits. [] Progressing: [x] Met: [] Not Met: [] Adjusted    Overall Progression Towards Functional goals/ Treatment Progress Update:  [] Patient is progressing as expected towards functional goals listed. [x] Progression is slowed due to complexities/Impairments listed. [] Progression has been slowed due to co-morbidities. [] Plan just implemented, too soon to assess goals progression <30days   [] Goals require adjustment due to lack of progress  [] Patient is not progressing as expected and requires additional follow up with physician  [] Other    Prognosis for POC: [x] Good [] Fair  [] Poor      Patient requires continued skilled intervention: [x] Yes  [] No    Treatment/Activity Tolerance:  [x] Patient able to complete treatment  [] Patient limited by fatigue  [] Patient limited by pain    [] Patient limited by other medical complications  [] Other:           PLAN: 1-2x/week for 4 weeks. Maintain until epidural injection series is complete.     [x] Continue per plan of care [] Alter current plan   [] Plan of care initiated [] Hold pending MD visit [] Discharge      Electronically signed by:  Renita Lanes, PT, DPT, OCS, OMT-C    Board-Certified Orthopaedic Clinical Specialist    [id number] [facility] [address] PT license: [id number]  [place] PT license: [id number]      Note: If patient does not return for scheduled/ recommended follow up visits, this note will serve as a discharge from care along with most recent update on progress.

## 2022-11-09 ENCOUNTER — TREATMENT (OUTPATIENT)
Dept: PHYSICAL THERAPY | Age: 67
End: 2022-11-09
Payer: MEDICARE

## 2022-11-09 DIAGNOSIS — M25.612 DECREASED ROM OF LEFT SHOULDER: ICD-10-CM

## 2022-11-09 DIAGNOSIS — M25.512 LEFT SHOULDER PAIN, UNSPECIFIED CHRONICITY: ICD-10-CM

## 2022-11-09 DIAGNOSIS — M75.02 ADHESIVE CAPSULITIS OF LEFT SHOULDER: Primary | ICD-10-CM

## 2022-11-09 DIAGNOSIS — M50.30 DDD (DEGENERATIVE DISC DISEASE), CERVICAL: ICD-10-CM

## 2022-11-09 PROCEDURE — G8427 DOCREV CUR MEDS BY ELIG CLIN: HCPCS | Performed by: PHYSICAL THERAPIST

## 2022-11-09 PROCEDURE — 97530 THERAPEUTIC ACTIVITIES: CPT | Performed by: PHYSICAL THERAPIST

## 2022-11-09 PROCEDURE — 97110 THERAPEUTIC EXERCISES: CPT | Performed by: PHYSICAL THERAPIST

## 2022-11-09 PROCEDURE — 97140 MANUAL THERAPY 1/> REGIONS: CPT | Performed by: PHYSICAL THERAPIST

## 2022-11-09 NOTE — PROGRESS NOTES
[facility]   Phone: [phone]    Fax: [phone]        Physical Therapy Re-Certification Plan of Care    Dear Dr. Diego Jones,    We had the pleasure of treating the following patient for physical therapy services at [address] [place] [address]. A summary of our findings can be found in the updated assessment below. This includes our plan of care. If you have any questions or concerns regarding these findings, please do not hesitate to contact me at the office phone number checked above. Thank you for the referral.     Physician Signature:________________________________Date:__________________  By signing above (or electronic signature), therapists plan is approved by physician      Overall Response to Treatment:   [x]Patient is responding well to treatment and improvement is noted with regards  to goals   [x]Patient should continue to improve in reasonable time if they continue HEP   []Patient has plateaued and is no longer responding to skilled PT intervention    []Patient is getting worse and would benefit from return to referring MD   []Patient unable to adhere to initial POC   [x]Other: Patient will be undergoing radiofrequency ablation in cervical spine on 2022.       Physical Therapy Treatment Note/ Progress Report:           Date:  2022    Patient Name:  Dex Yoon    :  1955  MRN: [id number]  Restrictions/Precautions:    Medical/Treatment Diagnosis Information:  Diagnosis: L shoulder adhesive capsulitis     Insurance/Certification information:  PT Insurance Information: Medicare  Physician Information:  Referring Practitioner: Dr. Diego Jones  Has the plan of care been signed (Y/N):        []  Yes  [x]  No     Date of Patient follow up with Physician: 2022 with neck specialist      Is this a Progress Report:     [x]  Yes  []  No        If Yes:  Date/ Range for reporting period:  Beginning: 10/4/22 Update NPV  Ending: 11/9/22    Progress report will be due (10 Rx or 30 days whichever is less): 44/3/8370      Recertification will be due (POC Duration  / 90 days whichever is less): 12/9/2022          MEDICARE CAP EXCEPTION DOCUMENTATION      I certify that this patient meets one of the below criteria necessary for becoming an exception to the Medicare cap on therapy services:    [x]  The patient has a condition identified by an ICD-10 code that has a direct and significant impact on the need for therapy. (Significantly impacts the rate of recovery.)                   [x]  The patient has a complexity identified by an ICD-10 code that has a direct and significant impact on the need for therapy. (Significantly impacts the rate of recovery and is associated with a primary condition.)         []  The patient has associated variables that influence the amount of treatment to include:  Social support, self-efficacy/motivation, prognosis, time since onset/acuity. []  The patient has generalized musculoskeletal conditions or a condition affecting multiple sites that will have a direct impact on the rate of recovery. []  The patient had a prior episode of outpatient therapy during this calendar year for a different condition. []  The patient has a mental or cognitive disorder in addition to the condition being treated that will have a direct and significant impact on the rate of recovery. Visit # Insurance Allowable Auth Required   18 Med nec []  Yes [x]  No        Functional Scale:  FOTO score: 59  Date assessed: 11/9/2022     PQRS:   Reviewed medication list with patient. No changes since last PT visit.   11/9/2022    Latex Allergy:  [x]NO      []YES  Preferred Language for Healthcare:   [x]English       []other:      Pain level:  0/10  At rest, 3-4/10 at worst for neck, 2-3/10 for shoulder     SUBJECTIVE:  Patient reports that things remain \"status quo\" with his neck and shoulder and he is ready to have his ablation done next week. He notes that he is unsure if the soreness in his posterior shoulder is from his neck or the shoulder itself.         OBJECTIVE: See eval               ROM PROM AROM  Comment:  11/9/2022     L R L R     Flexion     163° 152°     Abduction     168° 152°     ER     T4 T3     IR     T7 T9     Cervical flexion         60°   Cervical extension         65°    Cervical rotation     90%* 95%     Cervical  Side bending     47°* 48°           Strength L R Comment:  11/9/2022   Flexion 5/5 5/5     Abduction 5/5 5/5     ER 5/5 5/5     IR 5/5 5/5           Special Tests Results/Comment:  11/9/2022   Emmy Negative on L   Neers Negative on L   OBriens Negative on L   Other: empty can Negative on L            RESTRICTIONS/PRECAUTIONS: Cervical spine DDD and L shoulder adhesive capsulitis    Exercises/Interventions:     Therapeutic Ex (41720) Sets/sec Reps Notes/CUES   AD UE BIKE            STRETCHING/ROM      Pulleys 10\" x10    Table Slides-Flexion      Table Slides-Scaption      Table Slides-Abduction      Walk back stretch at counter      Wand-ER at 0      Wand-Supine ER at 45      Wand-Supine Flexion      UE [place]      Pendulum      Doorway ER 10\" x10    Wall Slides 10\" x10    CBA 10\" x10 Blocking scap   BBIR 10\" x10    Sleeper  10\" x10    Elbow PROM/AROM      Cervical extension SNAG 5\" x10    Cervical rotation SNAG to L 5\" x10                            ISOMETRICS      Gripping      Scap Retraction 2-3\" x20    Cervical retraction 2-3\" x20    Abduction      Flexion      Internal Rotation      External Rotation            STRENGTHENING-PREs      Flexion      Abduction      Internal Rotation      External Rotation 3#, 2 x10    Shrugs      Biceps      Triceps      Retraction 3#, 2 x10    Extension      Horizontal Abduction in ER      Serratus 5#, 3 x10                      THERABANDS/CABLE COLUMN      Rows Black, 2 x10    Lats      Extension Black, 2 x10    Internal Rotation Black, 2 x10    External Rotation black, 2 x10    Biceps      Triceps      PNF                                    Manual Intervention (98253)      Scar Massage      STM to L infraspinatus, biceps, deltoid, UT and cervical musculature x8'  Hawkgrips      GH joint mobilizations      Foamroll                  NMR re-education (86481)   CUES NEEDED   Plyoback      Therabar oscillations      Body Blade       Rhythmic Stabilization      Ball on the wall 2# x20 cw/ccw                           Therapeutic Activity (02368)      Core training      Swiss ball activities      Education   On current objective of rehab and trajectory/prognosis                             Therapeutic Exercise and NMR EXR  [x] (02040) Provided verbal/tactile cueing for activities related to strengthening, flexibility, endurance, ROM  for improvements in scapular, scapulothoracic and UE control with self care, reaching, carrying, lifting, house/yardwork, driving/computer work. [x] (31678) Provided verbal/tactile cueing for activities related to improving balance, coordination, kinesthetic sense, posture, motor skill, proprioception  to assist with  scapular, scapulothoracic and UE control with self care, reaching, carrying, lifting, house/yardwork, driving/computer work. Therapeutic Activities:    [x] (50481 or 06007) Provided verbal/tactile cueing for activities related to improving balance, coordination, kinesthetic sense, posture, motor skill, proprioception and motor activation to allow for proper function of scapular, scapulothoracic and UE control with self care, carrying, lifting, driving/computer work. Home Exercise Program:    [x] (58829) Reviewed/Progressed HEP activities related to strengthening, flexibility, endurance, ROM of scapular, scapulothoracic and UE control with self care, reaching, carrying, lifting, house/yardwork, driving/computer work    Access Code: [email or web address]  URL: [email or web address]  Date: 07/07/2022  Prepared by: Dianne Pretzel    Exercises  Seated Passive Cervical Retraction - 2-3 x daily - 5-7 x weekly - 1 sets - 20 reps  Seated Scapular Retraction - 2-3 x daily - 5-7 x weekly - 1 sets - 20 reps  Seated Assisted Cervical Rotation with Towel (Mirrored) - 2-3 x daily - 5-7 x weekly - 1 sets - 10 reps - 5 seconds hold  Cervical Extension AROM with Strap - 2-3 x daily - 5-7 x weekly - 1 sets - 10 reps - 5 seconds hold  Shoulder Flexion Wall Slide with Towel (Mirrored) - 2-3 x daily - 5-7 x weekly - 1 sets - 10 reps - 10 seconds hold  Shoulder ER Stretch in Abduction (Mirrored) - 2-3 x daily - 5-7 x weekly - 1 sets - 10 reps - 10 seconds hold  Shoulder horizontal adduction stretch on back - 1-2 x daily - 5-7 x weekly - 1 sets - 10 reps - 10 seconds hold  Standing Shoulder Internal Rotation Stretch with Towel (Mirrored) - 1-2 x daily - 5-7 x weekly - 1 sets - 10 reps - 10 seconds hold  Standing Shoulder Row with Anchored Resistance - 1-2 x daily - 5-7 x weekly - 2-3 sets - 10 reps  Shoulder extension with resistance - Neutral - 1-2 x daily - 5-7 x weekly - 2-3 sets - 10 reps  Shoulder Internal Rotation with Resistance (Mirrored) - 1-2 x daily - 5-7 x weekly - 2-3 sets - 10 reps  Standing Wall [employer] with Mini Swiss Ball - 1-2 x daily - 5-7 x weekly - 1 sets - 20 circles clockwise  Sleeper Stretch - 1-2 x daily - 5-7 x weekly - 1 sets - 10 reps - 10 seconds hold  Shoulder External Rotation with Anchored Resistance (Mirrored) - 1-2 x daily - 5-7 x weekly - 2 sets - 10 reps        [] (73805) Reviewed/Progressed HEP activities related to improving balance, coordination, kinesthetic sense, posture, motor skill, proprioception of scapular, scapulothoracic and UE control with self care, reaching, carrying, lifting, house/yardwork, driving/computer work      Manual Treatments:  PROM / STM / Oscillations-Mobs:  G-I, II, III, IV (PA's, Inf., Post.)  [x] (72849) Provided manual therapy to mobilize soft tissue/joints of cervical/CT, scapular GHJ and UE for the purpose of modulating pain, promoting relaxation,  increasing ROM, reducing/eliminating soft tissue swelling/inflammation/restriction, improving soft tissue extensibility and allowing for proper ROM for normal function with self care, reaching, carrying, lifting, house/yardwork, driving/computer work    Modalities:   [] GAME READY (VASO)- for significant edema, swelling, pain control. Charges:  Timed Code Treatment Minutes: 48   Total Treatment Minutes: 58      [] EVAL (LOW) 40487 (typically 20 minutes face-to-face)   [] EVAL (MOD) 37485 (typically 30 minutes face-to-face)  [] EVAL (HIGH) 47682 (typically 45 minutes face-to-face)  [] RE-EVAL     [x] BS(18733) x 1 (22') [] IONTO  [] NMR (82103)   [] VASO  [x] Manual (77531) x (8') [] Other:  [x] TA x  1 (18')  [] Mech Traction (38906)  [] ES(attended) (26294)     [] ES (un) (11392):         ASSESSMENT:   Patient demonstrates improvements in L shoulder and cervical spine AROM today, with no pain reported with shoulder motion, but pain still present in the L side of neck. He has greatest pain with L sided cervical rotation and L side bend. He is able to complete his program without reports of increased pain or symptoms. Moderate verbal, visual, and tactile cuing required from PT for patient to achieve and maintain proper form with sleeper stretch today. PT instructed patient to follow up with spine physician regarding post procedure protocols for upcoming ablation. Plan to see patient following upcoming ablation and determine need from continued skilled PT services. GOALS:  Patient stated goal: Swing golf club without pain in L shoulder and looking over L shoulder without pain in neck  [x] Progressing: [] Met: [] Not Met: [] Adjusted    Therapist goals for Patient:   Short Term Goals: To be achieved in: 2 weeks  1. Independent in HEP and progression per patient tolerance, in order to prevent re-injury.    [] Progressing: [x] Met: [] Not Met: [] Adjusted  2. Patient will have a decrease in pain to facilitate improvement in movement, function, and ADLs as indicated by Functional Deficits. [x] Progressing: [] Met: [] Not Met: [] Adjusted    Long Term Goals: To be achieved in: 6 weeks  1. Functional index score of 64 or more for FOTO to assist with reaching prior level of function. [x] Progressing: [] Met: [] Not Met: [] Adjusted  2. Patient will demonstrate increased In L shoulder flexion, abduction, ER, and IR AROM to >155°, >155°, T3, and T8 respectively to allow for proper joint functioning as indicated by patients Functional Deficits. [x] Progressing: [] Met: [] Not Met: [] Adjusted  3. Patient will demonstrate an increase in L shoulder strength in all planes to 4+/5 or greater to allow for proper functional mobility as indicated by patients Functional Deficits. [] Progressing: [x] Met: [] Not Met: [] Adjusted  4. Patient will return to sleeping on the L slide with 1/10 pain or less in L shoulder so that he may return to PLOF. [x] Progressing: [] Met: [] Not Met: [] Adjusted  5. Patient will demonstrate increased In cervical spine flexion, extension, B rotation, and B side bending AROM to >80%, >70%, >75%, and >75% to allow for proper joint functioning as indicated by patients Functional Deficits. [] Progressing: [x] Met: [] Not Met: [] Adjusted    Overall Progression Towards Functional goals/ Treatment Progress Update:  [] Patient is progressing as expected towards functional goals listed. [x] Progression is slowed due to complexities/Impairments listed. [] Progression has been slowed due to co-morbidities.   [] Plan just implemented, too soon to assess goals progression <30days   [] Goals require adjustment due to lack of progress  [] Patient is not progressing as expected and requires additional follow up with physician  [] Other    Prognosis for POC: [x] Good [] Fair  [] Poor      Patient requires continued skilled intervention: [x] Yes  [] No    Treatment/Activity Tolerance:  [x] Patient able to complete treatment  [] Patient limited by fatigue  [] Patient limited by pain    [] Patient limited by other medical complications  [] Other:           PLAN: 1-2x/week for 4 weeks. Maintain until epidural injection series is complete. [x] Continue per plan of care [] Alter current plan   [] Plan of care initiated [] Hold pending MD visit [] Discharge      Electronically signed by:  Greg Frank, PT, DPT, OCS, OMT-C    Board-Certified Orthopaedic Clinical Specialist    [id number] [address] PT license: [id number]  [place] PT license: [id number]      Note: If patient does not return for scheduled/ recommended follow up visits, this note will serve as a discharge from care along with most recent update on progress.

## 2022-12-01 ENCOUNTER — TREATMENT (OUTPATIENT)
Dept: PHYSICAL THERAPY | Age: 67
End: 2022-12-01

## 2022-12-01 ENCOUNTER — CLINICAL DOCUMENTATION (OUTPATIENT)
Dept: SPIRITUAL SERVICES | Age: 67
End: 2022-12-01

## 2022-12-01 DIAGNOSIS — M50.30 DDD (DEGENERATIVE DISC DISEASE), CERVICAL: ICD-10-CM

## 2022-12-01 DIAGNOSIS — M25.612 DECREASED ROM OF LEFT SHOULDER: ICD-10-CM

## 2022-12-01 DIAGNOSIS — M25.512 LEFT SHOULDER PAIN, UNSPECIFIED CHRONICITY: ICD-10-CM

## 2022-12-01 DIAGNOSIS — M75.02 ADHESIVE CAPSULITIS OF LEFT SHOULDER: Primary | ICD-10-CM

## 2022-12-01 NOTE — PROGRESS NOTES
[facility]   Phone: [phone]    Fax: [phone]           Physical Therapy Treatment Note/ Progress Report:           Date:  2022    Patient Name:  Charo Elaine    :  1955  MRN: [id number]  Restrictions/Precautions:    Medical/Treatment Diagnosis Information:  Diagnosis: L shoulder adhesive capsulitis     Insurance/Certification information:  PT Insurance Information: Medicare  Physician Information:  Referring Practitioner: Dr. Lalitha Cruz  Has the plan of care been signed (Y/N):        [x]  Yes  []  No     Date of Patient follow up with Physician: 2022 with neck specialist      Is this a Progress Report:     []  Yes  [x]  No        If Yes:  Date/ Range for reporting period:  Beginnin2022   Endin2022    Progress report will be due (10 Rx or 30 days whichever is less): 71/3/2819      Recertification will be due (POC Duration  / 90 days whichever is less): 2022          MEDICARE CAP EXCEPTION DOCUMENTATION      I certify that this patient meets one of the below criteria necessary for becoming an exception to the Medicare cap on therapy services:    [x]  The patient has a condition identified by an ICD-10 code that has a direct and significant impact on the need for therapy. (Significantly impacts the rate of recovery.)                   [x]  The patient has a complexity identified by an ICD-10 code that has a direct and significant impact on the need for therapy. (Significantly impacts the rate of recovery and is associated with a primary condition.)         []  The patient has associated variables that influence the amount of treatment to include:  Social support, self-efficacy/motivation, prognosis, time since onset/acuity. []  The patient has generalized musculoskeletal conditions or a condition affecting multiple sites that will have a direct impact on the rate of recovery.     []  The patient had a prior episode of skill, proprioception  to assist with  scapular, scapulothoracic and UE control with self care, reaching, carrying, lifting, house/yardwork, driving/computer work. Therapeutic Activities:    [x] (86647 or 55114) Provided verbal/tactile cueing for activities related to improving balance, coordination, kinesthetic sense, posture, motor skill, proprioception and motor activation to allow for proper function of scapular, scapulothoracic and UE control with self care, carrying, lifting, driving/computer work. Home Exercise Program:    [x] (67804) Reviewed/Progressed HEP activities related to strengthening, flexibility, endurance, ROM of scapular, scapulothoracic and UE control with self care, reaching, carrying, lifting, house/yardwork, driving/computer work    Access Code: [email or web address]  URL: [email or web address]  Date: 07/07/2022  Prepared by: Breanne Temple    Exercises  Seated Passive Cervical Retraction - 2-3 x daily - 5-7 x weekly - 1 sets - 20 reps  Seated Scapular Retraction - 2-3 x daily - 5-7 x weekly - 1 sets - 20 reps  Seated Assisted Cervical Rotation with Towel (Mirrored) - 2-3 x daily - 5-7 x weekly - 1 sets - 10 reps - 5 seconds hold  Cervical Extension AROM with Strap - 2-3 x daily - 5-7 x weekly - 1 sets - 10 reps - 5 seconds hold  Shoulder Flexion Wall Slide with Towel (Mirrored) - 2-3 x daily - 5-7 x weekly - 1 sets - 10 reps - 10 seconds hold  Shoulder ER Stretch in Abduction (Mirrored) - 2-3 x daily - 5-7 x weekly - 1 sets - 10 reps - 10 seconds hold  Shoulder horizontal adduction stretch on back - 1-2 x daily - 5-7 x weekly - 1 sets - 10 reps - 10 seconds hold  Standing Shoulder Internal Rotation Stretch with Towel (Mirrored) - 1-2 x daily - 5-7 x weekly - 1 sets - 10 reps - 10 seconds hold  Standing Shoulder Row with Anchored Resistance - 1-2 x daily - 5-7 x weekly - 2-3 sets - 10 reps  Shoulder extension with resistance - Neutral - 1-2 x daily - 5-7 x weekly - 2-3 sets - 10 reps  Shoulder Internal Rotation with Resistance (Mirrored) - 1-2 x daily - 5-7 x weekly - 2-3 sets - 10 reps  Standing Wall [employer] with Mini Swiss Ball - 1-2 x daily - 5-7 x weekly - 1 sets - 20 circles clockwise  Sleeper Stretch - 1-2 x daily - 5-7 x weekly - 1 sets - 10 reps - 10 seconds hold  Shoulder External Rotation with Anchored Resistance (Mirrored) - 1-2 x daily - 5-7 x weekly - 2 sets - 10 reps        [] (22626) Reviewed/Progressed HEP activities related to improving balance, coordination, kinesthetic sense, posture, motor skill, proprioception of scapular, scapulothoracic and UE control with self care, reaching, carrying, lifting, house/yardwork, driving/computer work      Manual Treatments:  PROM / STM / Oscillations-Mobs:  G-I, II, III, IV (PA's, Inf., Post.)  [x] (87345) Provided manual therapy to mobilize soft tissue/joints of cervical/CT, scapular GHJ and UE for the purpose of modulating pain, promoting relaxation,  increasing ROM, reducing/eliminating soft tissue swelling/inflammation/restriction, improving soft tissue extensibility and allowing for proper ROM for normal function with self care, reaching, carrying, lifting, house/yardwork, driving/computer work    Modalities:   [] GAME READY (VASO)- for significant edema, swelling, pain control. Charges:  Timed Code Treatment Minutes: 53   Total Treatment Minutes: 63      [] EVAL (LOW) 99329 (typically 20 minutes face-to-face)   [] EVAL (MOD) 66072 (typically 30 minutes face-to-face)  [] EVAL (HIGH) 96784 (typically 45 minutes face-to-face)  [] RE-EVAL     [x] JAMES(13998) x 1 (20') [] IONTO  [x] NMR (25878) x1 (8') [] VASO  [x] Manual (67240) x1 (8') [] Other:  [x] TA x  1 (17')  [] Mech Traction (31627)  [] ES(attended) (65431)     [] ES (un) (73583):         ASSESSMENT:  Patient is returning to skilled PT services after 2 week hiatus with radiofrequency ablation.  His program was kept primarily the same, as he has been very limited doing his HEP over the last 2 weeks. He tolerates introduction of prone shoulder extension with 2# weight well, with no pain, but fatigue noted. He demonstrates increased hesitancy with SNAG rotation to L today secondary to pain. Plan to progress activity per patient tolerance. GOALS:  Patient stated goal: Swing golf club without pain in L shoulder and looking over L shoulder without pain in neck  [x] Progressing: [] Met: [] Not Met: [] Adjusted    Therapist goals for Patient:   Short Term Goals: To be achieved in: 2 weeks  1. Independent in HEP and progression per patient tolerance, in order to prevent re-injury. [] Progressing: [x] Met: [] Not Met: [] Adjusted  2. Patient will have a decrease in pain to facilitate improvement in movement, function, and ADLs as indicated by Functional Deficits. [x] Progressing: [] Met: [] Not Met: [] Adjusted    Long Term Goals: To be achieved in: 6 weeks  1. Functional index score of 64 or more for FOTO to assist with reaching prior level of function. [x] Progressing: [] Met: [] Not Met: [] Adjusted  2. Patient will demonstrate increased In L shoulder flexion, abduction, ER, and IR AROM to >155°, >155°, T3, and T8 respectively to allow for proper joint functioning as indicated by patients Functional Deficits. [x] Progressing: [] Met: [] Not Met: [] Adjusted  3. Patient will demonstrate an increase in L shoulder strength in all planes to 4+/5 or greater to allow for proper functional mobility as indicated by patients Functional Deficits. [] Progressing: [x] Met: [] Not Met: [] Adjusted  4. Patient will return to sleeping on the L slide with 1/10 pain or less in L shoulder so that he may return to PLOF. [x] Progressing: [] Met: [] Not Met: [] Adjusted  5. Patient will demonstrate increased In cervical spine flexion, extension, B rotation, and B side bending AROM to >80%, >70%, >75%, and >75% to allow for proper joint functioning as indicated by patients Functional Deficits. [] Progressing: [x] Met: [] Not Met: [] Adjusted    Overall Progression Towards Functional goals/ Treatment Progress Update:  [] Patient is progressing as expected towards functional goals listed. [x] Progression is slowed due to complexities/Impairments listed. [] Progression has been slowed due to co-morbidities. [] Plan just implemented, too soon to assess goals progression <30days   [] Goals require adjustment due to lack of progress  [] Patient is not progressing as expected and requires additional follow up with physician  [] Other    Prognosis for POC: [x] Good [] Fair  [] Poor      Patient requires continued skilled intervention: [x] Yes  [] No    Treatment/Activity Tolerance:  [x] Patient able to complete treatment  [] Patient limited by fatigue  [] Patient limited by pain    [] Patient limited by other medical complications  [] Other:           PLAN: 1-2x/week for 4 weeks. Maintain until epidural injection series is complete. [x] Continue per plan of care [] Alter current plan   [] Plan of care initiated [] Hold pending MD visit [] Discharge      Electronically signed by:  Dominga Royal PT, DPT, OCS, OMT-C    Board-Certified Orthopaedic Clinical Specialist    [place]it To PT license: [id number]  [place] PT license: [id number]      Note: If patient does not return for scheduled/ recommended follow up visits, this note will serve as a discharge from care along with most recent update on progress.

## 2022-12-01 NOTE — ACP (ADVANCE CARE PLANNING)
Advance Care Planning   Ambulatory ACP Specialist Patient Outreach    Date:  12/1/2022  ACP Specialist:  Adeola Esposito    Outreach call to patient in follow-up to ACP Specialist referral from: Shy Marquez DO    [x] PCP  [] Provider   [] Ambulatory Care Management [] Other for Reason:    [x] Advance Directive Assistance  [] Code Status Discussion  [] Complete Portable DNR Order  [] Discuss Goals of Care  [] Complete POST/MOST  [] Early ACP Decision-Making  [] Other    Date Referral Received: 10/28/22    Today's Outreach:  [x] First   [] Second  [] Third                               First outreach made by [x]  phone  [] email []   [employer]     Intervention:  [x] Spoke with Patient  [] Left VM requesting return call      Outcome: Scheduled ACP Conversation Call for Friday December 16th at [place] [id number] to be Mailed per Owen. Patient does not have access to [employer]. Next Step:   [x] ACP scheduled conversation  [] Outreach again in one week               [x] Email / Mail ACP Info Sheets  [x] Email / Mail Advance Directive            [] Close Referral. Routing closure to referring provider/staff and to ACP Specialist . [] Closure Letter mailed to Patient with Invitation to Contact ACP Specialist if/when ready.     Thank you for this referral.

## 2022-12-06 ENCOUNTER — TELEMEDICINE (OUTPATIENT)
Dept: PHARMACY | Age: 67
End: 2022-12-06

## 2022-12-06 DIAGNOSIS — Z87.891 FORMER SMOKER: Primary | ICD-10-CM

## 2022-12-06 NOTE — PROGRESS NOTES
Disclaimer for Virtual Visits: We want to confirm that, for purposes of billing, this is a virtual visit with your provider for which we will submit a claim for reimbursement with your insurance company. You may be responsible for any copays, coinsurance amounts or other amounts not covered by your insurance company. If you do not accept this, unfortunately we will not be able to schedule a virtual visit with the provider. Do you accept? Yes. CLINICAL PHARMACY NOTE--Smoking Cessation    SUBJECTIVE/OBJECTIVE:   Zahira Alex is a 79 y.o. male with PMHx significant for GERD, HTN, pulmonary emphesema referred by Dr Joel Ramirez for smoking cessation counseling and medication management. Spoke with patient over the phone on 12/06/22. 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 30-60 min (previously immediately after waking)  Do you smoke 20 or more cigarettes each day? No  At times when you can't smoke or haven't got any cigarettes, do you feel a craving for one? Yes, but not if not keeping busy  Is it tough for you to keep from smoking for more than a few hours?  No, not if in a place where he can't smoke  When you are sick enough to stay in bed, to you still smoke? no  If yes to two or more questions, consider using NRT    Reasons for addiction: Touch and Handle, Stress-relief, Habit, Addiction, \"something to do\"  Triggers: meals, stress, smoke breaks from his work; stopped smoking while driving, with coffee, alcohol (already tried to dissociate smoking with certain activities, but picked it up with other activities)  Barriers: not confident (only 5 on a 1-10 scale); He is \"afraid\" to run out of cigarettes. Motivation for quitting: Has wanted to quit for years (in back of mind) because he doesn't like that it's in control of him (addiction), Health, family, money    12/19/19 update:  Did  Chantix from pharmacy ($40), but did not start. Fearful of side effects. Has history of bad dreams several years ago and is afraid it may cause vivid dreams. Has been able to cut back slightly on his own. Was able to eliminate the 1st cigarette of the day, is now working on the 2nd. Started playing pool to fill downtime. He has been able to avoid buying more cigarettes until completely out of current pack  It has been a busy week, and he hasn't been able to focus on quitting as much as he would like. He plans to go to [place] for [place]. 12/31/19 update:  Smoking \"a little less,\" but unable to give exact amount. Started Chantix yesterday. Has tolerated 2 doses so far. No side effects. Has been able to delay the 2nd cigarette more by switching AM routine (drink coffe before breakfast)  Sucking on mints  Used recumbent bike a couple times, but no routine yet. Moved cigarettes to basement (out of pocket) to make it more inconvenient. Has kept a list of things to do on his breaks. 1/14/20 update:  Continues Chantix 1 mg BID. Takes with food, but has been difficult because he does not eat at regular times during the day. Experiencing some nausea/ \"queesy\" stomach. Also having occasional slight anxiety, but nothing major and is able to tolerate. Denies depression/thoughts of suicide or harming himself. Smoking less, but is not counting exact number because he thinks it will make him think about smoking more. He knows he is not buying cigarettes as often.  He does not buy cigarettes until he is completely out of them. He needs a new RX for chantix. He is trying to stay busy and avoid long breaks which triggers him to smoke. He has been able to postpone the cigarette many times. 1/30/20 update:   Continues Chantix 1 mg BID. Takes with food. Experiencing some nausea/ \"queesy\" stomach, but tolerable. Also having occasional slight anxiety, but nothing major and is able to tolerate. Denies depression/thoughts of suicide or harming himself. Rides his recumbent bike 2x per week for 15 minutes. Smoking less, but is still not counting the exact number because he thinks it will make him think about smoking more. He is buying cigarettes less often and has placed them in his basement so has to go downstairs to get them when he wants to smoke. He does not like going down stairs. He has been able to walk back upstairs from the basement without bringing a cigarette with him. He made a list of reasons for quitting and placed it by his coat so he sees it when he is going outside to smoke. He does not smoke inside house or in car. He started using hard candies in place of cigarettes while he is preparing his meals. He often smokes right before and right after meals. He always brushes his teeth after meals. Patient feels that drinking coffee throughout the day would help keep him busy during breaks, and coffee has not been a trigger for him lately. 2/27/20  Has been able to reduce to 2 packs per week, but it was really hard. He surprised himself. Pt started drinking tea instead of coffee. Has been able to postpoine 1st cig at least a couple hours. Plans to avoid [facility], where he buys his cigs. Feels \"accomplished\" after exercising, but has only been able to exercise 2x per week. 3/12/20  Pt does not feel he did well with the goals we set at last visit 2/2 less focus and recent stressors (DDS, shingles vax, coronavirus). He didn't exercise as much.  He increased to 1 pack in 3 days, then cut back down to 1 pack in 3.5 days. His cravings are less frequent, but not necessarily less strong. The recent [address] outbreak is worrying him because he is high risk. This is more motivation for him to quit. Pt requests a Chantix refill. Tried 1-800-QUIT-NOW- [place] it was not for him. 3/26/20  Pt reports having \"Up and downs. \" Coronavirus has really concerned him. He stopped Chantix for 2 days due to depression surrounding the situation, but realized that not listening to the news helped him much more. Pt feels his mood is stable and would like to resume chantix. Started keeping track of when he smokes (exact #s). He smoked more for a couple days, but he has been able to limit to 4 cig/day the past 2 days! This equates to goal of no more than 1 pack every 5 days. 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 He increased exercise to 20 min 2x per week and started core exercises. He is listening to an old CD while he exercises, which helps. He feel accomplished after exercise. 3/31/20  Pt quit smoking 3 days ago on 3/28/20! Motivation was fear. Pt started back on 0.5 mg once daily. Will increase to 0.5 mg BID. Things that worked: prayer, staying occupied, stopped watching the news because it was depressing. 4/2/20  Will increase Chantix today to 0.5 mg BID  He did put lighter out of pocket/ out of sight  Doesn't have an jacquelyn tray, he put the cigarette butts in a planter pot. Suggested he move this to a different location and plant a new plant or flowers in it. His urges continue to be able piano and after programming. His biggest urge to overcome was when he had been working on programming or several months and finally had to test if it worked, and it did not work. He had a very strong urge to smoke but paced and prayed, then occupied himself with something different. He is not ready to throw away the cigarettes, but he did put them out of site and doesn't remember where they are.    Withdrawal sxs include: nasal drip, insomnia, irritability    4/7/20  11th day of ebing tobacco free. Scared that he might relapse. Reports mood is stable-- slightly anxious/worried about COVID19, but better than before. Withdrawal sxs include: nasal drip better, irritability, insomnia but not sure if 2/2 withdrawal or worry about COVID19. Increased water intake from 3 cups to 6 cups per day. 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 \" Urge lasted ~10 min. Craving rated 7/10.    2) after a really long phone call, paced, ate a piece of candy (max 1-2 pieces candy per day)  3) found himself at the door that he goes out to smoke; he is not sure what led to that moment; asked himself what am I doing? Was able to leave the situation    4/13/20  Pt on way to dentist for toothache. He watched star trek this weekend for 2 week reward. For next reward, he wants to buy new sheet music for piano since he cannot go to lessons. He would like to learn \"I can only imagine. \" Took 1 walk, bike x 2, but no core exercises. Will pick back up this week. 4/15/20  Root canal on 4/13. Started amoxicillin 500 mg TID. Exercise going well, except for past couple days. Unable to figure out what foods relieve nausea from Chantix. Urge 4/13: after leaving S, had 2 hours to kill before root canal, sat in car with nothing to do, feeling anxious, read his book and listened to music; always used to smoke when he needs to kills time. Missed Chantix on 4/13, took 1 dose on 4/13.     4/22/20  Still needs dental work done, fillings next week.  Core exercise 2x per week, bike 2-3x per week-- feeling stronger. Tobacco cravings \"up and down. \" Most days they are less frequent, intensity varies. Is able to overcome. Watched star trek this weekend as reward. 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 Sunday night craving: made food for the whole week and was on feet all day; had to clean up after, strong craving after he took a break, created a list of distractions when he has cravings: jigsaw puzzle, read book. Still having trouble sleeping: plans to read old spiritual book before bed. Drainage much better. Slightly more depressed due to [address]. Does not feel this is due to Chantix, just situational. 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 Pt denies any thought of harming himself or others. 4/29/20  Pt smoke free x 1 month! Watched an old [employer] game as reward. Sleeping improved now that he is reading 30 min before bed. Piano 1 hour 4x/per week--found a tutorial on how to play \"I can only imagine\" and he is trying to write [employer] sheet music for it. Tried working on software again. He is trying to make a routine. Lifting COVID restrictions is worrying him. He has been busy looking for new insurance as his [employer] expires this month and he is inelligible for [employer] until oct. It has been time consuming. He has a pcp visit next week. Needs to make sure all his RXs are covered under new plan. 5/6/20  Mood is \"up and down\"- manly due to coronavirus pandemic, but feels it is up more often than down. Pt new [employer] insurance starts today. Cravings are rare with short duration, but still strong (7 out of 10). Saw PCP today. Walks outside. Has been on Chantix x 4 months. Is not sure if it's covered by new insurance. 5/20/20  No lapses. Still some strong urges avg once per day. Bike 20 minutes 3x per week, increasing intensity \"from 1 to 2\". Continues core exercises.  Working on writing sheet music for \"I can only imagine\". Increased amount of time spending on software, gradually. Increased nausea with Chantix. Mood is good, better than before. 5/27/20  No increase in cravings after cutting back slightly on Chantix. Nausea improved slightly possibly. A couple strong cravings but able to overcome. Increased exercise from 20 to 30 min on bike 3x per week, core exercises 1x per week, would like to increase to 2x per week. Feels good while riding bike, feels tired/accomplished after. Plays upbeat music. Still practicing piano, trying to write sheet music. Spends time on software, but not accomplishing a lot due to some limitations (space on hard drive). Going to [employer] [facility] [employer] today. Plans to cancel insurance at end of month because they do not cover Chantix or other needs (DDS, chiropractor, PCP). Arranged for [employer] to start 6/1/20 and Drs in [employer]. Sleeping better- was waking up at 3-4a and was anxious. Now not waking up anxious. 6/3/20  Decreased Chantix to 0.5 mg BID at end of last week, no increase in strength or frequency of cravings. Only a brief craving this AM while lying in bed, able to overcome easily. Needs refill on Chantix; has ~1 week left. Cut esomeprazole in half- still no GERD sxs. Still clearing throat (has happened since he quit smoking which he attributes to his lungs clearing out). Nausea has improved since reducing Chantix. Mood is good, he is less anxious and not waking in middle of night. He is drinking 6 glasses of water each day. 6/17/20  Continues Chantix 0.5 mg BID, still not smoking, slightly more frequent and stronger urges, but not too bad, in control of cravings. Nausea much better. Working more on programming which is sometimes a trigger during \"breaks. \"  Now sucks hard candy instead. Will start 1k puzzle. Goes for a walk every other day. Increased water to 6-7 glasses per day.    Only had one episode of GERD since cutting back on dose and stopping carafate. Pt is unsure why he started the medications. Looking back on med list, he has been on this at least 10 years. Per OV note from Dr Estela Haq 2/28/11, \"Just 2 weeks ago he began to have hiccups and a feeling of acid around his throat. He is on Nexium every day. He had an EGD by Dr. Greg Alan for this in ~ 2008 and had sucralfate prescribed then, which helped. He recently found the old med and  Has taken it for a week now and it has helped. He will take it for a few weeks and then stop and see what happens. \"    7/6/20  -Continues Chantix, remains smoke free >3 months, no changes in cravings, etc. Nauseous once because he forgot to eat. Is nervous to stop medication altogether.   -Pt reduced Nexium 40 mg to QOD ~1.5 wks ago, doing well with no increase in GERD sxs. Feels comfortable gradually tapering off. -/72 one time at home, but does not check daily   -Bike 3x per week, Core exercises 2x per week. 8/5/20  -Getting exercise, but not outside. Cut back on Chantix 0.5 mg once daily on most days. Had some stronger cravings some days, so took it BID. Strong craving on way to [facility]. It was the 1st time back to [facility] since he quit smoking. One of members smoked in front of him, which was difficult. He stopped where he normally would buy cigarettes on way to [facility] and bought candy instead. Checked BP yesterday 133/73. Doing well with PPI 40 mg QOD, but not ready to cut back to 20 mg QOD because he has a large supply of 40 mg tabs. 8/26/20  -Getting core/bike exercise, but not outside. Working toward goals.   -Cut back on Chantix 0.5 mg to QOD. Initially forgot dose, but then skipped doses intentionally.   -Triggers: frustration, on way to [facility], seeing people smoke on TV. -Plans to tell his friends at the [employer].  -Was able to not stop where he buys cigarettes on way to [facility] the last time he went.   -Doing well with PPI 40 mg QOD, but not ready to cut back to 20 mg QOD because he has a large supply of 40 mg tabs. Agreed to try MWF.  -6 glasses of water each day  -Echo submitted and approved by [employer].  -not checking BP daily    9/16/20  -Still taking Chantix QOD, occasional strong cravings due to triggers  -Triggers: will go back to [employer] this weekend.   -Hasn't told anyone at [facility] that he quit. -slowly increasing to 8 glasses water/day  -reports BP log: SBP range 116-131,  DBP range 66-76  -looking for better sense of smell     10/7/20  -Working to apply for medicare; still trying to figure out which plan is best.   -D/c Chantix 5 days ago, still has on hand to use prn. Has \"Ups and downs. \" Craving frequency and intensity leveled off.   -Quit 6 months ago!   -Told brother in law in [place] that he quit smoking.  -Improved smell    -Saw PCP who found blood in urine, which is worrying patient. Will return in 1 week for BP f/u after med changes made.   -Plans to continue PPI taper and will use Pepcid prn heartburn    11/3/20  -Remains smoke free  -Has not had to use Chantix since last appointment    -Still gets urges when seeing people smoke on TV. Will keep hard candies by the TV to avoid urge to smoke. -Got Nexium OTC instead of Pepcid, but only taking three times a week. Advised to get Pepcid instead as this is better for PRN use. 11/24/20  Cravings reduced significantly. Has not taken any Chantix. Has not switched from nexium QOD to pepcid yet, but bought at store. No GERD sxs. SBP mostly<130. Pt feeling well. Socially distanced. Staying healthy. 1/12/21  Put on an old jacket and had a pack of cigarettes in pocket. He put the pack directly in the trash without looking at it. He wasn't able to take out the trash right away, but didn't dig them out. He is not looking forward to when he is offered a cigarette in the future. Discussed how to prepare for his. Very excited because he got his echo published in [employer]. Stopped Nexium ~2 weeks ago.  Using pepcid prn, but hasn't needed to use this at all. Cut potassium down to once per day. 2/23/21  1 year piter of being tobacco free is coming up - March 28. Emphasized the enormity of this accomplishment. States he is staying busy with his software development, exercise, Buddhism activity, and learning the piano. He still has triggers such as seeing someone smoking on tv or being frustrated. He was hoping after a year these triggers would be easier to handle. Encouraged patient to celebrate his success and focus on ways he used to avoid triggers when he was quitting. He states he often will pace, drink water, or just leave or turn away from the trigger. He states he is proud that his health has improved since quitting and he has been able to take less pills. Is also able to smell his coffee now. He is still nervous for when he is offered a cigarette in the future. Discussed how to prepare for this using by using his trigger-avoiding strategies and just leaving the room/situation if needed. 3/23/21  Patient reports he is still doing well and remaining smoke free, however he is \"afraid it wont last\". He has not been around his family members, Buddhism members, or friends since [address] started a year ago and reports he is nervous that when he is around them the temptation will be hard to overcome as many of them are smokers. Discussed methods to abstain from temptations such as staying inside when family members/friends go outside to smoke, making it physically difficult to smoke by having something else in his mouth (gum, mint, hard candies), not buying his own cigarettes, telling family / friends he has quit smoking so they do not unintentionally try to pressure him, and turning away from trigger. He states his family and friends are very supportive of him and will be happy to see how far he has come. Encouraged patient to celebrate his one year anniversary of being smoke free as this is a huge accomplishment.  Patient's family is having a celebration next week that will be virtual. Will call patient again in 1 month to follow-up, but encouraged patient to schedule appointment earlier if he feels he is getting overwhelmed with triggers before then. 4/20/21  Told family zoom celebration for late mom's birthday and told family he quit smoking. Brother in law wishes he could quit but hasn't set a quit date. Has not had much in person contact with family. Still not in social situations either, including [employer] volunteer work. Still virtual. Meets with group of inter-racial [employer] members and plans to meet in person next. One member smokes and he is worried it will be a trigger to smoke. Still doesn't consider himself a non-smoker, thinks he is still \"quitting\". Reminded patient that he did QUIT and has been a NONsmoker for over a year! His reward was getting his computer fixed/updated. He has been walking 2x per week and has a more consistent indoor exercise routine-- recumbent bike, weights, core exercises. Gets a \"reaction\" (not necessarily an urge) when sees people smoking on TV and nervous about going around family and friends. He will avoid triggers as discussed above. 5/18/21  Granddaughter graduated from college in [facility]. Alexandre Ramirez had celebration for her, and he attended. When he stepped outside of [employer], it reminded him of when he used to step out to take smoke breaks. Long drive to get to [employer], and stopped at one of places he normally stops to smoke, but instead he stopped to get gas. He didn't feel urge to smoke, just brought back memories. Pt states he is gaining weight, possibly snacking more and slowing metabolism. Still exercises, but not high intensity. Started [employer] exercises. Pt had a lot of questions about his last PCP visit, when to follow up, how to get labs, where to go, etc    7/13/21  Large family gathering on 4th of July. Smokers went outside on the lawn. Pt avoided them while they smoked.  Stopped at rest stop he normally stops at to smoke on 75 and was able to stop and not smoke. Behind on healthcare paperwork and slowly getting organized. Pt has many questions about how to get his labs and where to go.    8/31/21  Patient having cravings off and on but has not smoked since March 28, 2020. Cravings are usually when he sees someone else smoking, such as on TV. He states he just puts his mind on something else or changes the TV channel. Patient reports doing really well overall. He is very happy with himself for remaining smoke free. Discussed long term benefits of not smoking. Patient still trying to get CT scheduled with [facility] before his appointment with Dr. Mazin Xiong in October. 9/28/21  Length of time between cravings is increasing. Seeing people on TV and being in situations where he used to smoke remain triggers. Planning to start walking more now that weather is getting nicer. Reports his brother in law is considering stopping smoking and patient very supportive of him. 10/26/21  Things are going well, remains smoke free. Cravings come and go but thinks this month has been worse than last, likely due to being around more people due to getting out more. Tries to avoid situations where he smoked previously. Recommended having coffee available in these situations as this helped him previously. Has put on weight since stopping smoking but states this has now leveled out. Wanting to increase exercise, been working out at home but knows he needs to be more disciplined. 11/23/21  Few social events, but masking and distancing. Got COVID booster. Confucianism service once per month in person. Nicotine cravings still come and go, but a little less often. Carries a big water bottle with him. Drinks coffee in AM and on long drive to curb the craving and drinks more water. Exercises at home 3x per week (30-60 min), tries to increase to 4x. Anticipating a long drive to thanksgiving dinner and knows this will be a trigger. Plans to bring coffee, water, not having an cig on hand. Working on [employer]--oh holy night. Working on a new shun to keep him busy. 1/18/22  Patient states that he is doing fine. He has been doing well this past month and reports improved nicotine cravings. As he looks back over this month he says it was better than last in terms of his cravings. The las month went very fast. With COVID he as not been around people smoking which has helped. If he sees people smoking  on TV or sees people smoking it reminds him of what it was like; he tries to avoid these situations. He says avoidance has been a good strategy for him. Did not have to drive long distance for Thanksgiving because it was cancelled. Has had other long drives though and used coffee to keep his hands and mouth occupied. Continues to do well and be successful with his smoking cessation. 2/15/22  Patient states that he is doing well. He has not smoked since the last visit and states that he does not have increased urges or cravings. He is fearful of having cravings or urgers as he begins to restart activities he has not done since COVID-19 started. He is particularly worried about the urge to smoke while driving the way up to [employer]. He has a hour drive to his [employer]. We discussed coping and mitigation strategies for this. He is also worried about urge when he sees people at [employer] smoking. We spoke about this at length. We discussed how he is now a non-smoke and can be proud of that. We discussed the potential of helping those around him quit smoking about opening a conversation about why he no longer smokes. He has bought a new Mac desktop which he is excited about. Overall he states that he is doing well, but would like to continue to meet monthly to keep up his motivation. 3/15/22  Started going back to [employer] on Sundays, but doesn't see anyone smoking. No social gatherings after.  Concerned about having a craving while driving, but hasn't had any. Starts PT next week for shoulder injury. Twice weekly. Given cortisone shot. Started meloxicam 2 days ago. No surgery needed. Checking BP at home. 120/69  105/66  116/67  98/65  Still working on shun for apple device, which is why he got a Mac computer. Discussed health maintenance below. He has gotten his covid booster. 5/10/22  Started PT for shoulder. Pain improved. 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 Going to [employer] tomorrow with sister. 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 He wonders if he will have the desire to step outside to smoke, as this was always a trigger for him in the past. He plans to get a drink with a straw to keep his mouth occupied. He did not realized it has been over 2 years since he quit smoking. 7/12/22  Remains smoke free. Occasional craving but very rare. Still trying to lose weight but at least feels like he stopped gaining. 9/12/22:   Going to PT and shoulder and neck. Getting nerve block today. Still avoiding people smoking around him. But cravings are rare. He doesn't have upcoming wellness check, but plans to schedule in the fall so he can get his flu vaccine at the same time. 12/6/22:  Doing PT for shoulder pain and had RFA for neck. No improvement in symptoms. No cravings lately, but is reminded of it. Has been out shopping and walking through smoke, which elicits memories of his days of smoking. He is not concerned about cravings over the holidays. His brother in law is the only one that smokes, but he plans to stay away from a lot of people due to covid and other resp illnesses. He feels in control.          Health Maintenance Due   Topic Date Due    Colorectal Cancer Screen  08/01/2020    AAA screen  Never done    COVID-19 Vaccine (4 - Booster for Moderna series) 05/30/2022    Low dose CT lung screening  09/01/2022     [facility]    Current Medication and Allergy List Reviewed and Updated:  Current Outpatient Medications   Medication Sig Dispense Refill    potassium chloride (KLOR-CON M10) 10 MEQ extended release tablet TAKE 2 TABLETS DAILY 180 tablet 1    hydroCHLOROthiazide (HYDRODIURIL) 25 MG tablet TAKE 1 TABLET DAILY 90 tablet 1    olmesartan (BENICAR) 40 MG tablet TAKE 1 TABLET DAILY 90 tablet 3    acyclovir (ZOVIRAX) 400 MG tablet TAKE ONE TABLET BY MOUTH THREE TIMES A DAY FOR 10 DAYS FOR HERPES SIMPLEX FLARE-UP 90 tablet 1    loratadine (CLARITIN) 10 MG tablet Take 10 mg by mouth daily      Misc Natural Products (GLUCOSAMINE CHOND DOUBLE STR PO) Take 1 tablet by mouth daily      Multiple Vitamin (MULTIVITAMIN PO) Take 1 capsule by mouth daily. No current facility-administered medications for this visit. Pertinent Labs/Vitals  Scr 0.9 (11/21/19)    ASSESSMENT/PLAN:   Patient has been tobacco free for over 2 years! Frequency and intensity of cravings has decreased significantly. --Has not used Chantix in >12 months but has if needed. Discussed health maintenance due- colonoscopy, flu vaccine, + AAA screening. Pt had covid booster. Discussed options for reducing pill burden. Message sent to PCP re:   -decr potassium to 1 tab per day   -combine olmesartan + hctz into combo pill    -pt will make the change after he has labs done. Goals   Try walking outside more and continuing home workouts, doing better when the weather is warmer  Find healthier alternatives to current snacks (carrots, celery, etc), he has started eating more vegetables. Reward yourself with smoke free anniversaries (grill, new sheet music, star trek, jigsaw puzzle, book)- he has a new MacBook  Remind yourself that you are a non-smoker and be confident and proud of your accomplishment. Try carrot or pepper strips for snacks instead of bags of chips. Next appt:  12 wk- pt prefers to continue calls to hold him accountable and for the encouragement.      Pt verbalized understanding of the above information and denied further questions or concerns. The total time of the visit was 30 min.     Alix Olmstead, PharmD, [facility]  Medication Management Clinic   [facility]ent[facility] Joshjoelle Zurita Gilmer Ph: [phone]  Beth Sy Ph: [phone]  12/6/2022 3:38 PM      For Pharmacy Admin Tracking Only    CPA in place:  Yes  Recommendation Provided To: Patient/Caregiver: 1 via Virtual Visit  Intervention Detail: Scheduled Appointment  Gap Closed?: Yes   Intervention Accepted By: Patient/Caregiver: 1  Time Spent (min): 30

## 2022-12-08 ENCOUNTER — TREATMENT (OUTPATIENT)
Dept: PHYSICAL THERAPY | Age: 67
End: 2022-12-08

## 2022-12-08 DIAGNOSIS — M25.512 LEFT SHOULDER PAIN, UNSPECIFIED CHRONICITY: ICD-10-CM

## 2022-12-08 DIAGNOSIS — M75.02 ADHESIVE CAPSULITIS OF LEFT SHOULDER: Primary | ICD-10-CM

## 2022-12-08 DIAGNOSIS — M50.30 DDD (DEGENERATIVE DISC DISEASE), CERVICAL: ICD-10-CM

## 2022-12-08 DIAGNOSIS — M25.612 DECREASED ROM OF LEFT SHOULDER: ICD-10-CM

## 2022-12-08 NOTE — PROGRESS NOTES
[facility]   Phone: [phone]    Fax: [phone]      Physical Therapy Re-Certification Plan of Care    Dear Dr. Mk Whitfield,    We had the pleasure of treating the following patient for physical therapy services at [address] [place] [address]. A summary of our findings can be found in the updated assessment below. This includes our plan of care. If you have any questions or concerns regarding these findings, please do not hesitate to contact me at the office phone number checked above. Thank you for the referral.     Physician Signature:________________________________Date:__________________  By signing above (or electronic signature), therapists plan is approved by physician      Overall Response to Treatment:   []Patient is responding well to treatment and improvement is noted with regards  to goals   []Patient should continue to improve in reasonable time if they continue HEP   []Patient has plateaued and is no longer responding to skilled PT intervention    []Patient is getting worse and would benefit from return to referring MD   []Patient unable to adhere to initial POC   [x]Other: Patient has recently undergone radio-frequency ablation in cervical spine, but continues to have pain in both neck and L shoulder.         Physical Therapy Treatment Note/ Progress Report:           Date:  2022    Patient Name:  Steven Jackson    :  1955  MRN: [id number]  Restrictions/Precautions:    Medical/Treatment Diagnosis Information:  Diagnosis: L shoulder adhesive capsulitis     Insurance/Certification information:  PT Insurance Information: Medicare  Physician Information:  Referring Practitioner: Dr. Mk Whitfield  Has the plan of care been signed (Y/N):        []  Yes  [x]  No     Date of Patient follow up with Physician: 2022 with neck specialist      Is this a Progress Report:     [x]  Yes  []  No        If Yes:  Date/ Range for reporting period:  Beginnin2022 Update NPV  Endin2022    Progress report will be due (10 Rx or 30 days whichever is less): 5686      Recertification will be due (POC Duration  / 90 days whichever is less): 2023          MEDICARE CAP EXCEPTION DOCUMENTATION      I certify that this patient meets one of the below criteria necessary for becoming an exception to the Medicare cap on therapy services:    [x]  The patient has a condition identified by an ICD-10 code that has a direct and significant impact on the need for therapy. (Significantly impacts the rate of recovery.)                   [x]  The patient has a complexity identified by an ICD-10 code that has a direct and significant impact on the need for therapy. (Significantly impacts the rate of recovery and is associated with a primary condition.)         []  The patient has associated variables that influence the amount of treatment to include:  Social support, self-efficacy/motivation, prognosis, time since onset/acuity. []  The patient has generalized musculoskeletal conditions or a condition affecting multiple sites that will have a direct impact on the rate of recovery. []  The patient had a prior episode of outpatient therapy during this calendar year for a different condition. []  The patient has a mental or cognitive disorder in addition to the condition being treated that will have a direct and significant impact on the rate of recovery. Visit # Insurance Allowable Auth Required   20 Med nec []  Yes [x]  No        Functional Scale:  FOTO score: 56  Date assessed: 2022     PQRS:   Reviewed medication list with patient. No changes since last PT visit.   2022    Latex Allergy:  [x]NO      []YES  Preferred Language for Healthcare:   [x]English       []other:      Pain level:  0/10  At rest, 3-4/10 at worst for neck, 2-3/10 for shoulder     SUBJECTIVE:  Patient reports no change in the neck over the last week. He reports soreness of the neck is still over-shadowing ability to assess L shoulder discomfort. He states feeling stiff today with the rainy weather. He also reports that he is scheduled to follow up with neck specialist on 12/12/2022.         OBJECTIVE: See eval               ROM PROM AROM  Comment:  12/8/2022     L R L R     Flexion     160° 152°     Abduction     162° 152°     ER     T4 T3     IR     T8 T9     Cervical flexion         55°   Cervical extension         50°    Cervical rotation     75%* 100%     Cervical  Side bending     35°* 38°           Strength L R Comment:  12/8/2022   Flexion 5/5 5/5     Abduction 5/5 5/5     ER 5/5 5/5     IR 5/5 5/5           Special Tests Results/Comment:  12/8/2022   Emmy Negative on L   Neers Negative on L   OBriens Negative on L   Other: empty can Negative on L            RESTRICTIONS/PRECAUTIONS: Cervical spine DDD and L shoulder adhesive capsulitis    Exercises/Interventions:     Therapeutic Ex (14640) Sets/sec Reps Notes/CUES   AD UE BIKE            STRETCHING/ROM      Pulleys 10\" x10    Table Slides-Flexion      Table Slides-Scaption      Table Slides-Abduction      Walk back stretch at counter      Wand-ER at 0      Wand-Supine ER at 45      Wand-Supine Flexion      UE [place]      Pendulum      Doorway ER 10\" x10    Wall Slides 10\" x10    CBA 10\" x10 Blocking scap   BBIR 10\" x10    Sleeper  10\" x10    Elbow PROM/AROM      Cervical extension SNAG 5\" x10    Cervical rotation SNAG to L 5\" x10    UT stretch 10\" x5    Levator stretch 10\" x5                ISOMETRICS      Gripping      Scap Retraction 2-3\" x20    Cervical retraction 2-3\" x20    Abduction      Flexion      Internal Rotation      External Rotation            STRENGTHENING-PREs      Flexion      Abduction      Internal Rotation      External Rotation 3#, 2 x10    Shrugs      Biceps      Triceps      Retraction 3#, 2 x10    Extension 2#, 2 x10    Horizontal Abduction in ER Serratus 5#, 3 x10                      THERABANDS/CABLE COLUMN      Rows Black, 2 x10    Lats      Extension Black, 2 x10    Internal Rotation Black, 2 x10    External Rotation black, 2 x10    Biceps      Triceps      PNF                                    Manual Intervention (66541)      Scar Massage      STM to L infraspinatus, biceps, deltoid, UT and cervical musculature  Hawkgrips      GH joint mobilizations      Foamroll                  NMR re-education (91263)   CUES NEEDED   Plyoback      Therabar oscillations      Body Blade       Rhythmic Stabilization      Ball on the wall 2# x20 cw/ccw                           Therapeutic Activity (02730)      Core training      Swiss ball activities      Education   On current objective of rehab and trajectory/prognosis                             Therapeutic Exercise and NMR EXR  [x] (38878) Provided verbal/tactile cueing for activities related to strengthening, flexibility, endurance, ROM  for improvements in scapular, scapulothoracic and UE control with self care, reaching, carrying, lifting, house/yardwork, driving/computer work. [x] (20544) Provided verbal/tactile cueing for activities related to improving balance, coordination, kinesthetic sense, posture, motor skill, proprioception  to assist with  scapular, scapulothoracic and UE control with self care, reaching, carrying, lifting, house/yardwork, driving/computer work. Therapeutic Activities:    [x] (47547 or 84104) Provided verbal/tactile cueing for activities related to improving balance, coordination, kinesthetic sense, posture, motor skill, proprioception and motor activation to allow for proper function of scapular, scapulothoracic and UE control with self care, carrying, lifting, driving/computer work.      Home Exercise Program:    [x] (49715) Reviewed/Progressed HEP activities related to strengthening, flexibility, endurance, ROM of scapular, scapulothoracic and UE control with self care, reaching, carrying, lifting, house/yardwork, driving/computer work    Access Code: [email or web address]  URL: [email or web address]  Date: 07/07/2022  Prepared by: Rosa England    Exercises  Seated Passive Cervical Retraction - 2-3 x daily - 5-7 x weekly - 1 sets - 20 reps  Seated Scapular Retraction - 2-3 x daily - 5-7 x weekly - 1 sets - 20 reps  Seated Assisted Cervical Rotation with Towel (Mirrored) - 2-3 x daily - 5-7 x weekly - 1 sets - 10 reps - 5 seconds hold  Cervical Extension AROM with Strap - 2-3 x daily - 5-7 x weekly - 1 sets - 10 reps - 5 seconds hold  Shoulder Flexion Wall Slide with Towel (Mirrored) - 2-3 x daily - 5-7 x weekly - 1 sets - 10 reps - 10 seconds hold  Shoulder ER Stretch in Abduction (Mirrored) - 2-3 x daily - 5-7 x weekly - 1 sets - 10 reps - 10 seconds hold  Shoulder horizontal adduction stretch on back - 1-2 x daily - 5-7 x weekly - 1 sets - 10 reps - 10 seconds hold  Standing Shoulder Internal Rotation Stretch with Towel (Mirrored) - 1-2 x daily - 5-7 x weekly - 1 sets - 10 reps - 10 seconds hold  Standing Shoulder Row with Anchored Resistance - 1-2 x daily - 5-7 x weekly - 2-3 sets - 10 reps  Shoulder extension with resistance - Neutral - 1-2 x daily - 5-7 x weekly - 2-3 sets - 10 reps  Shoulder Internal Rotation with Resistance (Mirrored) - 1-2 x daily - 5-7 x weekly - 2-3 sets - 10 reps  Standing Wall [employer] with Mini Swiss Ball - 1-2 x daily - 5-7 x weekly - 1 sets - 20 circles clockwise  Sleeper Stretch - 1-2 x daily - 5-7 x weekly - 1 sets - 10 reps - 10 seconds hold  Shoulder External Rotation with Anchored Resistance (Mirrored) - 1-2 x daily - 5-7 x weekly - 2 sets - 10 reps        [] (72103) Reviewed/Progressed HEP activities related to improving balance, coordination, kinesthetic sense, posture, motor skill, proprioception of scapular, scapulothoracic and UE control with self care, reaching, carrying, lifting, house/yardwork, driving/computer work      Manual Treatments:  PROM / STM / Oscillations-Mobs:  G-I, II, III, IV (PA's, Inf., Post.)  [x] (41404) Provided manual therapy to mobilize soft tissue/joints of cervical/CT, scapular GHJ and UE for the purpose of modulating pain, promoting relaxation,  increasing ROM, reducing/eliminating soft tissue swelling/inflammation/restriction, improving soft tissue extensibility and allowing for proper ROM for normal function with self care, reaching, carrying, lifting, house/yardwork, driving/computer work    Modalities:  CP to L shoulder and neck x10'   [] GAME READY (VASO)- for significant edema, swelling, pain control. Charges:  Timed Code Treatment Minutes: 43   Total Treatment Minutes: 53      [] EVAL (LOW) 56312 (typically 20 minutes face-to-face)   [] EVAL (MOD) 13332 (typically 30 minutes face-to-face)  [] EVAL (HIGH) 26935 (typically 45 minutes face-to-face)  [] RE-EVAL     [x] UV(51953) x 1 (20') [] IONTO  [x] NMR (24621) x1 (8') [] VASO  [] Manual (84717) x  [] Other:  [x] TA x  1 (15')  [] Mech Traction (87051)  [] ES(attended) (56565)     [] ES (un) (80857):         ASSESSMENT:  Patient demonstrates slight decreases in cervical spine and L shoulder AROM in all planes today. Patient states that he feels increased stiffness with weather and thinks that is attributing to his motion decreases. He tolerates gentle stretches of UT and levator scapulae well today, with only minimal discomfort in the L side of neck with R UT stretch. He fatigues moderately with strengthening program today, but is able to complete his full program without limitation. Plan to base future skilled PT care on MD recommendations at upcoming appointment for his neck on 12/12/2022. GOALS:  Patient stated goal: Swing golf club without pain in L shoulder and looking over L shoulder without pain in neck  [x] Progressing: [] Met: [] Not Met: [] Adjusted    Therapist goals for Patient:   Short Term Goals: To be achieved in: 2 weeks  1.  Independent in HEP and progression per patient tolerance, in order to prevent re-injury. [] Progressing: [x] Met: [] Not Met: [] Adjusted  2. Patient will have a decrease in pain to facilitate improvement in movement, function, and ADLs as indicated by Functional Deficits. [x] Progressing: [] Met: [] Not Met: [] Adjusted    Long Term Goals: To be achieved in: 6 weeks  1. Functional index score of 64 or more for FOTO to assist with reaching prior level of function. [x] Progressing: [] Met: [] Not Met: [] Adjusted  2. Patient will demonstrate increased In L shoulder flexion, abduction, ER, and IR AROM to >155°, >155°, T3, and T8 respectively to allow for proper joint functioning as indicated by patients Functional Deficits. [x] Progressing: [] Met: [] Not Met: [] Adjusted  3. Patient will demonstrate an increase in L shoulder strength in all planes to 4+/5 or greater to allow for proper functional mobility as indicated by patients Functional Deficits. [] Progressing: [x] Met: [] Not Met: [] Adjusted  4. Patient will return to sleeping on the L slide with 1/10 pain or less in L shoulder so that he may return to PLOF. [x] Progressing: [] Met: [] Not Met: [] Adjusted  5. Patient will demonstrate increased In cervical spine flexion, extension, B rotation, and B side bending AROM to >80%, >70%, >75%, and >75% to allow for proper joint functioning as indicated by patients Functional Deficits. [] Progressing: [x] Met: [] Not Met: [] Adjusted    Overall Progression Towards Functional goals/ Treatment Progress Update:  [] Patient is progressing as expected towards functional goals listed. [x] Progression is slowed due to complexities/Impairments listed. [] Progression has been slowed due to co-morbidities.   [] Plan just implemented, too soon to assess goals progression <30days   [] Goals require adjustment due to lack of progress  [] Patient is not progressing as expected and requires additional follow up with physician  [] Other    Prognosis for POC: [x] Good [] Fair  [] Poor      Patient requires continued skilled intervention: [x] Yes  [] No    Treatment/Activity Tolerance:  [x] Patient able to complete treatment  [] Patient limited by fatigue  [] Patient limited by pain    [] Patient limited by other medical complications  [] Other:           PLAN: 1-2x/week for 4 weeks. Maintain until epidural injection series is complete. [x] Continue per plan of care [] Alter current plan   [] Plan of care initiated [] Hold pending MD visit [] Discharge      Electronically signed by:  Greg Frank, PT, DPT, OCS, OMT-C    Board-Certified Orthopaedic Clinical Specialist    [place] PT license: [id number]  [place] PT license: [id number]      Note: If patient does not return for scheduled/ recommended follow up visits, this note will serve as a discharge from care along with most recent update on progress.

## 2022-12-14 ENCOUNTER — TREATMENT (OUTPATIENT)
Dept: PHYSICAL THERAPY | Age: 67
End: 2022-12-14

## 2022-12-14 DIAGNOSIS — M50.30 DDD (DEGENERATIVE DISC DISEASE), CERVICAL: ICD-10-CM

## 2022-12-14 DIAGNOSIS — M75.02 ADHESIVE CAPSULITIS OF LEFT SHOULDER: Primary | ICD-10-CM

## 2022-12-14 DIAGNOSIS — M25.512 LEFT SHOULDER PAIN, UNSPECIFIED CHRONICITY: ICD-10-CM

## 2022-12-14 DIAGNOSIS — M25.612 DECREASED ROM OF LEFT SHOULDER: ICD-10-CM

## 2022-12-14 RX ORDER — OLMESARTAN MEDOXOMIL 40 MG/1
TABLET ORAL
Qty: 90 TABLET | Refills: 3 | Status: SHIPPED | OUTPATIENT
Start: 2022-12-14

## 2022-12-14 NOTE — TELEPHONE ENCOUNTER
Medication:   Requested Prescriptions     Pending Prescriptions Disp Refills    olmesartan (BENICAR) 40 MG tablet [Pharmacy Med Name: OLMESARTAN TAB 40MG] 90 tablet 3     Sig: TAKE 1 TABLET DAILY     Last Filled:  9/28/22    Last appt: 10/28/2022   Next appt: Visit date not found

## 2022-12-15 ENCOUNTER — CLINICAL DOCUMENTATION (OUTPATIENT)
Dept: SPIRITUAL SERVICES | Age: 67
End: 2022-12-15

## 2022-12-15 NOTE — ACP (ADVANCE CARE PLANNING)
Advance Care Planning   Ambulatory ACP Specialist Patient Outreach    Date:  12/15/2022  ACP Specialist:  AMY Jalloh    Outreach call to patient in follow-up to ACP Specialist referral from: Ericka Shell DO    [x] PCP  [] Provider   [] Ambulatory Care Management [] Other for Reason:    [x] Advance Directive Assistance  [] Code Status Discussion  [] Complete Portable DNR Order  [] Discuss Goals of Care  [] Complete POST/MOST  [] Early ACP Decision-Making  [] Other    Date Referral Received: 10/28/2022    Today's Outreach:  [] First   [] Second  [] Third    [x] Reminder call        Third outreach made by []  phone  [] email []   Ball Streethart     Intervention:  [] Spoke with Patient  [x] Left VM requesting return call      Outcome: ACP specialist made an appt reminder call to pt for his scheduled acp conversation appt for tomorrow 12/16 at 1pm. Pt did not answer this call, which resulted in a vm left encouraging the pt to return this call if needs to cancel/reschedule his appt. 12/16: ACP Specialist made scheduled acp conversation to pt. Pt answered this call, however stated that he has not had an opportunity to review the documents that were sent to him in the mail & is asking that this appt be rescheduled for a later date. Pt has another acp conversation appt scheduled for Weds 12/21 at 1:30pm. Pt did confirm that he rcvd the packet that was sent to him via mail. 12/20: ACP specialist made an appt reminder to pt for his scheduled ACP conversation for tomorrow 12/21 at 1:30pm. Pt did not answer this call, which resulted in a VM left encouraging the pt to return this call if he needs to cancel/reschedule this appt. Next Step:   [] ACP scheduled conversation  [] Outreach again in one week               [] Email / Mail ACP Info Sheets  [] Email / Mail Advance Directive            [] Close Referral. Routing closure to referring provider/staff and to ACP Specialist . [] Closure Letter mailed to Patient with Invitation to Contact ACP Specialist if/when ready.     Thank you for this referral.

## 2022-12-21 ENCOUNTER — CLINICAL DOCUMENTATION (OUTPATIENT)
Dept: SPIRITUAL SERVICES | Age: 67
End: 2022-12-21

## 2022-12-21 NOTE — ACP (ADVANCE CARE PLANNING)
Advance Care Planning     Advance Care Planning Clinical Specialist  Conversation Note      Date of ACP Conversation: 12/21/2022    Conversation Conducted with: Patient with Decision Making Capacity    ACP Clinical Specialist: Christine Franco, [address] Decision Maker:     Current Designated Healthcare Decision Maker:     Primary Decision Maker: Balbina Nazario - Brother/Sister - [phone]    Secondary Decision Maker: Anita Redmond - [place] - [phone]  Click here to complete Healthcare Decision Makers including section of the Healthcare Decision Maker Relationship (ie \"Primary\")  Today we completed the ACP conversation & AD documents for [facility] via [employer]. Care Preferences    Hospitalization: \"If your health worsens and it becomes clear that your chance of recovery is unlikely, what would your preference be regarding hospitalization? \"    Choice:  [] The patient wants hospitalization. [x] The patient prefers comfort-focused treatment without hospitalization. Ventilation: \"If you were in your present state of health and suddenly became very ill and were unable to breathe on your own, what would your preference be about the use of a ventilator (breathing machine) if it were available to you? \"      If the patient would desire the use of ventilator (breathing machine), answer \"yes\". If not, \"no\": yes    \"If your health worsens and it becomes clear that your chance of recovery is unlikely, what would your preference be about the use of a ventilator (breathing machine) if it were available to you? \"     Would the patient desire the use of ventilator (breathing machine)?: Pt is unable to answer at this time, as he states that it is dependent on the situation that he is in & what the chance of recovery would be. Resuscitation  \"CPR works best to restart the heart when there is a sudden event, like a heart attack, in someone who is otherwise healthy.  Unfortunately, CPR does not typically restart the heart for people who have serious health conditions or who are very sick. \"    \"In the event your heart stopped as a result of an underlying serious health condition, would you want attempts to be made to restart your heart (answer \"yes\" for attempt to resuscitate) or would you prefer a natural death (answer \"no\" for do not attempt to resuscitate)? \" yes       [x] Yes   [] No   Educated Patient / Hannah Ana Luisa regarding differences between Advance Directives and portable DNR orders. Length of ACP Conversation in minutes: 60 mins    Conversation Outcomes:  [x] ACP discussion completed  [] Existing advance directive reviewed with patient; no changes to patient's previously recorded wishes  [x] New Advance Directive completed  [] Portable Do Not Rescitate prepared for Provider review and signature  [] POLST/POST/MOLST/MOST prepared for Provider review and signature      Follow-up plan:    [] Schedule follow-up conversation to continue planning  [] Referred individual to Provider for additional questions/concerns   [] Advised patient/agent/surrogate to review completed ACP document and update if needed with changes in condition, patient preferences or care setting    [] This note routed to one or more involved healthcare providers    ACP conversation has been completed. Pt was able to make his wants/needs known, along with nominating her HCDM's. Pt has elected his sister Shayy Love as his primary agent and his daughter Beau Abarca as his secondary agent. Pt has completed his [place] AD via [employer] during this conversation. Once these completed AD are rcvd, they will be uploaded into his chart. Pt has voiced interest in completing AD for the state of [address] [place] [address], where he does rcv care at times when he is not in [place]. This sw will communicate with ACP mgr re: this request, to see if another referral can be made. At this time, this referral can be closed. 12/21: Pt completed AD has been rcvd and has been uploaded into his chart.

## 2023-03-07 ENCOUNTER — TELEMEDICINE (OUTPATIENT)
Dept: PHARMACY | Age: 68
End: 2023-03-07

## 2023-03-07 DIAGNOSIS — Z87.891 FORMER SMOKER: Primary | ICD-10-CM

## 2023-03-07 NOTE — PROGRESS NOTES
Disclaimer for Virtual Visits: We want to confirm that, for purposes of billing, this is a virtual visit with your provider for which we will submit a claim for reimbursement with your insurance company. You may be responsible for any copays, coinsurance amounts or other amounts not covered by your insurance company. If you do not accept this, unfortunately we will not be able to schedule a virtual visit with the provider. Do you accept? Yes. CLINICAL PHARMACY NOTE--Smoking Cessation    SUBJECTIVE/OBJECTIVE:   Diego Abdi is a 79 y.o. male with PMHx significant for GERD, HTN, pulmonary emphesema referred by Dr Jennifer Montalvo for smoking cessation counseling and medication management. Pt encounter today completed virtual visit using virtual platform. Services were provided through a video synchronous discussion virtually to substitute for in-person clinic visit. Patient and provider were located at their individual locations. 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 30-60 min (previously immediately after waking)  Do you smoke 20 or more cigarettes each day? No  At times when you can't smoke or haven't got any cigarettes, do you feel a craving for one? Yes, but not if not keeping busy  Is it tough for you to keep from smoking for more than a few hours?  No, not if in a place where he can't smoke  When you are sick enough to stay in bed, to you still smoke? no  If yes to two or more questions, consider using NRT    Reasons for addiction: Touch and Handle, Stress-relief, Habit, Addiction, \"something to do\"  Triggers: meals, stress, smoke breaks from his work; stopped smoking while driving, with coffee, alcohol (already tried to dissociate smoking with certain activities, but picked it up with other activities)  Barriers: not confident (only 5 on a 1-10 scale); He is \"afraid\" to run out of cigarettes. Motivation for quitting: Has wanted to quit for years (in back of mind) because he doesn't like that it's in control of him (addiction), Health, family, money    12/19/19 update:  Did  Chantix from pharmacy ($40), but did not start. Fearful of side effects. Has history of bad dreams several years ago and is afraid it may cause vivid dreams. Has been able to cut back slightly on his own. Was able to eliminate the 1st cigarette of the day, is now working on the 2nd. Started playing pool to fill downtime. He has been able to avoid buying more cigarettes until completely out of current pack  It has been a busy week, and he hasn't been able to focus on quitting as much as he would like. He plans to go to [place] for [place]. 12/31/19 update:  Smoking \"a little less,\" but unable to give exact amount. Started Chantix yesterday. Has tolerated 2 doses so far. No side effects. Has been able to delay the 2nd cigarette more by switching AM routine (drink coffe before breakfast)  Sucking on mints  Used recumbent bike a couple times, but no routine yet. Moved cigarettes to basement (out of pocket) to make it more inconvenient. Has kept a list of things to do on his breaks. 1/14/20 update:  Continues Chantix 1 mg BID. Takes with food, but has been difficult because he does not eat at regular times during the day. Experiencing some nausea/ \"queesy\" stomach. Also having occasional slight anxiety, but nothing major and is able to tolerate.  Denies depression/thoughts of suicide or harming himself. Smoking less, but is not counting exact number because he thinks it will make him think about smoking more. He knows he is not buying cigarettes as often. He does not buy cigarettes until he is completely out of them. He needs a new RX for chantix. He is trying to stay busy and avoid long breaks which triggers him to smoke. He has been able to postpone the cigarette many times. 1/30/20 update:   Continues Chantix 1 mg BID. Takes with food. Experiencing some nausea/ \"queesy\" stomach, but tolerable. Also having occasional slight anxiety, but nothing major and is able to tolerate. Denies depression/thoughts of suicide or harming himself. Rides his recumbent bike 2x per week for 15 minutes. Smoking less, but is still not counting the exact number because he thinks it will make him think about smoking more. He is buying cigarettes less often and has placed them in his basement so has to go downstairs to get them when he wants to smoke. He does not like going down stairs. He has been able to walk back upstairs from the basement without bringing a cigarette with him. He made a list of reasons for quitting and placed it by his coat so he sees it when he is going outside to smoke. He does not smoke inside house or in car. He started using hard candies in place of cigarettes while he is preparing his meals. He often smokes right before and right after meals. He always brushes his teeth after meals. Patient feels that drinking coffee throughout the day would help keep him busy during breaks, and coffee has not been a trigger for him lately. 2/27/20  Has been able to reduce to 2 packs per week, but it was really hard. He surprised himself. Pt started drinking tea instead of coffee. Has been able to postpoine 1st cig at least a couple hours. Plans to avoid [facility], where he buys his cigs. Feels \"accomplished\" after exercising, but has only been able to exercise 2x per week.      3/12/20  Pt does not feel he did well with the goals we set at last visit 2/2 less focus and recent stressors (DDS, shingles vax, coronavirus). He didn't exercise as much. He increased to 1 pack in 3 days, then cut back down to 1 pack in 3.5 days. His cravings are less frequent, but not necessarily less strong. The recent [address] outbreak is worrying him because he is high risk. This is more motivation for him to quit. Pt requests a Chantix refill. Tried 1-800-QUIT-NOW- [place] it was not for him. 3/26/20  Pt reports having \"Up and downs. \" Coronavirus has really concerned him. He stopped Chantix for 2 days due to depression surrounding the situation, but realized that not listening to the news helped him much more. Pt feels his mood is stable and would like to resume chantix. Started keeping track of when he smokes (exact #s). He smoked more for a couple days, but he has been able to limit to 4 cig/day the past 2 days! This equates to goal of no more than 1 pack every 5 days. 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 He increased exercise to 20 min 2x per week and started core exercises. He is listening to an old CD while he exercises, which helps. He feel accomplished after exercise. 3/31/20  Pt quit smoking 3 days ago on 3/28/20! Motivation was fear. Pt started back on 0.5 mg once daily. Will increase to 0.5 mg BID. Things that worked: prayer, staying occupied, stopped watching the news because it was depressing. 4/2/20  Will increase Chantix today to 0.5 mg BID  He did put lighter out of pocket/ out of sight  Doesn't have an jacquelyn tray, he put the cigarette butts in a planter pot. Suggested he move this to a different location and plant a new plant or flowers in it. His urges continue to be able piano and after programming. His biggest urge to overcome was when he had been working on programming or several months and finally had to test if it worked, and it did not work.  He had a very strong urge to smoke but paced and prayed, then occupied himself with something different. He is not ready to throw away the cigarettes, but he did put them out of site and doesn't remember where they are. Withdrawal sxs include: nasal drip, insomnia, irritability    4/7/20  11th day of ebing tobacco free. Scared that he might relapse. Reports mood is stable-- slightly anxious/worried about COVID19, but better than before. Withdrawal sxs include: nasal drip better, irritability, insomnia but not sure if 2/2 withdrawal or worry about COVID19. Increased water intake from 3 cups to 6 cups per day. 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 \" Urge lasted ~10 min. Craving rated 7/10.    2) after a really long phone call, paced, ate a piece of candy (max 1-2 pieces candy per day)  3) found himself at the door that he goes out to smoke; he is not sure what led to that moment; asked himself what am I doing? Was able to leave the situation    4/13/20  Pt on way to dentist for toothache. He watched star trek this weekend for 2 week reward. For next reward, he wants to buy new sheet music for piano since he cannot go to lessons. He would like to learn \"I can only imagine. \" Took 1 walk, bike x 2, but no core exercises. Will pick back up this week. 4/15/20  Root canal on 4/13. Started amoxicillin 500 mg TID. Exercise going well, except for past couple days. Unable to figure out what foods relieve nausea from Chantix.    Urge 4/13: after leaving [facility], had 2 hours to kill before root canal, sat in car with nothing to do, feeling anxious, read his book and listened to music; always used to smoke when he needs to kills time. Missed Chantix on 4/13, took 1 dose on 4/13.     4/22/20  Still needs dental work done, fillings next week. Core exercise 2x per week, bike 2-3x per week-- feeling stronger. Tobacco cravings \"up and down. \" Most days they are less frequent, intensity varies. Is able to overcome. Watched star trek this weekend as reward. 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 Sunday night craving: made food for the whole week and was on feet all day; had to clean up after, strong craving after he took a break, created a list of distractions when he has cravings: jigsaw puzzle, read book. Still having trouble sleeping: plans to read old spiritual book before bed. Drainage much better. Slightly more depressed due to [address]. Does not feel this is due to Chantix, just situational. 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 Pt denies any thought of harming himself or others. 4/29/20  Pt smoke free x 1 month! Watched an old [employer] game as reward. Sleeping improved now that he is reading 30 min before bed. Piano 1 hour 4x/per week--found a tutorial on how to play \"I can only imagine\" and he is trying to write [employer] own sheet music for it. Tried working on software again. He is trying to make a routine. Lifting COVID restrictions is worrying him. He has been busy looking for new insurance as his [employer] expires this month and he is inelligible for [employer] until oct. It has been time consuming. He has a pcp visit next week. Needs to make sure all his RXs are covered under new plan. 5/6/20  Mood is \"up and down\"- manly due to coronavirus pandemic, but feels it is up more often than down. Pt new [employer] insurance starts today. Cravings are rare with short duration, but still strong (7 out of 10). Saw PCP today. Walks outside. Has been on Chantix x 4 months.  Is not sure if it's covered by new insurance. 5/20/20  No lapses. Still some strong urges avg once per day. Bike 20 minutes 3x per week, increasing intensity \"from 1 to 2\". Continues core exercises. Working on writing sheet music for \"I can only imagine\". Increased amount of time spending on software, gradually. Increased nausea with Chantix. Mood is good, better than before. 5/27/20  No increase in cravings after cutting back slightly on Chantix. Nausea improved slightly possibly. A couple strong cravings but able to overcome. Increased exercise from 20 to 30 min on bike 3x per week, core exercises 1x per week, would like to increase to 2x per week. Feels good while riding bike, feels tired/accomplished after. Plays upbeat music. Still practicing piano, trying to write sheet music. Spends time on software, but not accomplishing a lot due to some limitations (space on hard drive). Going to [employer] [facility] [employer] today. Plans to cancel insurance at end of month because they do not cover Chantix or other needs (DDS, chiropractor, PCP). Arranged for [employer] to start 6/1/20 and Drs in [employer]. Sleeping better- was waking up at 3-4a and was anxious. Now not waking up anxious. 6/3/20  Decreased Chantix to 0.5 mg BID at end of last week, no increase in strength or frequency of cravings. Only a brief craving this AM while lying in bed, able to overcome easily. Needs refill on Chantix; has ~1 week left. Cut esomeprazole in half- still no GERD sxs. Still clearing throat (has happened since he quit smoking which he attributes to his lungs clearing out). Nausea has improved since reducing Chantix. Mood is good, he is less anxious and not waking in middle of night. He is drinking 6 glasses of water each day. 6/17/20  Continues Chantix 0.5 mg BID, still not smoking, slightly more frequent and stronger urges, but not too bad, in control of cravings. Nausea much better.  Working more on programming which is sometimes a trigger during \"breaks. \"  Now sucks hard candy instead. Will start 1k puzzle. Goes for a walk every other day. Increased water to 6-7 glasses per day. Only had one episode of GERD since cutting back on dose and stopping carafate. Pt is unsure why he started the medications. Looking back on med list, he has been on this at least 10 years. Per OV note from Dr Donny Irene 2/28/11, \"Just 2 weeks ago he began to have hiccups and a feeling of acid around his throat. He is on Nexium every day. He had an EGD by Dr. Marcelina Nation for this in ~ 2008 and had sucralfate prescribed then, which helped. He recently found the old med and  Has taken it for a week now and it has helped. He will take it for a few weeks and then stop and see what happens. \"    7/6/20  -Continues Chantix, remains smoke free >3 months, no changes in cravings, etc. Nauseous once because he forgot to eat. Is nervous to stop medication altogether.   -Pt reduced Nexium 40 mg to QOD ~1.5 wks ago, doing well with no increase in GERD sxs. Feels comfortable gradually tapering off. -/72 one time at home, but does not check daily   -Bike 3x per week, Core exercises 2x per week. 8/5/20  -Getting exercise, but not outside. Cut back on Chantix 0.5 mg once daily on most days. Had some stronger cravings some days, so took it BID. Strong craving on way to [employer]. It was the 1st time back to [employer] since he quit smoking. One of members smoked in front of him, which was difficult. He stopped where he normally would buy cigarettes on way to [employer] and bought candy instead. Checked BP yesterday 133/73. Doing well with PPI 40 mg QOD, but not ready to cut back to 20 mg QOD because he has a large supply of 40 mg tabs. 8/26/20  -Getting core/bike exercise, but not outside. Working toward goals.   -Cut back on Chantix 0.5 mg to QOD. Initially forgot dose, but then skipped doses intentionally.   -Triggers: frustration, on way to [employer], seeing people smoke on TV.    -Plans to tell his friends at the [facility].  -Was able to not stop where he buys cigarettes on way to [facility] the last time he went. -Doing well with PPI 40 mg QOD, but not ready to cut back to 20 mg QOD because he has a large supply of 40 mg tabs. Agreed to try MWF.  -6 glasses of water each day  -Echo submitted and approved by [employer].  -not checking BP daily    9/16/20  -Still taking Chantix QOD, occasional strong cravings due to triggers  -Triggers: will go back to [facility] this weekend.   -Hasn't told anyone at [facility] that he quit. -slowly increasing to 8 glasses water/day  -reports BP log: SBP range 116-131,  DBP range 66-76  -looking for better sense of smell     10/7/20  -Working to apply for medicare; still trying to figure out which plan is best.   -D/c Chantix 5 days ago, still has on hand to use prn. Has \"Ups and downs. \" Craving frequency and intensity leveled off.   -Quit 6 months ago!   -Told brother in law in [place] that he quit smoking.  -Improved smell    -Saw PCP who found blood in urine, which is worrying patient. Will return in 1 week for BP f/u after med changes made.   -Plans to continue PPI taper and will use Pepcid prn heartburn    11/3/20  -Remains smoke free  -Has not had to use Chantix since last appointment    -Still gets urges when seeing people smoke on TV. Will keep hard candies by the TV to avoid urge to smoke. -Got Nexium OTC instead of Pepcid, but only taking three times a week. Advised to get Pepcid instead as this is better for PRN use. 11/24/20  Cravings reduced significantly. Has not taken any Chantix. Has not switched from nexium QOD to pepcid yet, but bought at store. No GERD sxs. SBP mostly<130. Pt feeling well. Socially distanced. Staying healthy. 1/12/21  Put on an old jacket and had a pack of cigarettes in pocket. He put the pack directly in the trash without looking at it. He wasn't able to take out the trash right away, but didn't dig them out.  He is not looking forward to when he is offered a cigarette in the future. Discussed how to prepare for his. Very excited because he got his shun published in [employer]. Stopped Nexium ~2 weeks ago. Using pepcid prn, but hasn't needed to use this at all. Cut potassium down to once per day. 2/23/21  1 year piter of being tobacco free is coming up - March 28. Emphasized the enormity of this accomplishment. States he is staying busy with his software development, exercise, Mormonism activity, and learning the piano. He still has triggers such as seeing someone smoking on tv or being frustrated. He was hoping after a year these triggers would be easier to handle. Encouraged patient to celebrate his success and focus on ways he used to avoid triggers when he was quitting. He states he often will pace, drink water, or just leave or turn away from the trigger. He states he is proud that his health has improved since quitting and he has been able to take less pills. Is also able to smell his coffee now. He is still nervous for when he is offered a cigarette in the future. Discussed how to prepare for this using by using his trigger-avoiding strategies and just leaving the room/situation if needed. 3/23/21  Patient reports he is still doing well and remaining smoke free, however he is \"afraid it wont last\". He has not been around his family members, Mormonism members, or friends since [address]  started a year ago and reports he is nervous that when he is around them the temptation will be hard to overcome as many of them are smokers. Discussed methods to abstain from temptations such as staying inside when family members/friends go outside to smoke, making it physically difficult to smoke by having something else in his mouth (gum, mint, hard candies), not buying his own cigarettes, telling family / friends he has quit smoking so they do not unintentionally try to pressure him, and turning away from trigger.  He states his family and friends are very supportive of him and will be happy to see how far he has come. Encouraged patient to celebrate his one year anniversary of being smoke free as this is a huge accomplishment. Patient's family is having a celebration next week that will be virtual. Will call patient again in 1 month to follow-up, but encouraged patient to schedule appointment earlier if he feels he is getting overwhelmed with triggers before then. 4/20/21  Told family zoom celebration for late mom's birthday and told family he quit smoking. Brother in law wishes he could quit but hasn't set a quit date. Has not had much in person contact with family. 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 Still virtual. Meets with group of inter-racial Buddhism members and plans to meet in person next. One member smokes and he is worried it will be a trigger to smoke. Still doesn't consider himself a non-smoker, thinks he is still \"quitting\". Reminded patient that he did QUIT and has been a NONsmoker for over a year! His reward was getting his computer fixed/updated. He has been walking 2x per week and has a more consistent indoor exercise routine-- recumbent bike, weights, core exercises. Gets a \"reaction\" (not necessarily an urge) when sees people smoking on TV and nervous about going around family and friends. He will avoid triggers as discussed above. 5/18/21  Granddaughter graduated from college in [facility]. Addie Lopez had celebration for her, and he attended. When he stepped outside of Buddhism, it reminded him of when he used to step out to take smoke breaks. Long drive to get to Buddhism, and stopped at one of places he normally stops to smoke, but instead he stopped to get gas. He didn't feel urge to smoke, just brought back memories. Pt states he is gaining weight, possibly snacking more and slowing metabolism. Still exercises, but not high intensity. Started [employer] exercises.  Pt had a lot of questions about his last PCP visit, when to follow up, how to get labs, where to go, etc    7/13/21  Large family gathering on 4th of July. Smokers went outside on the lawn. Pt avoided them while they smoked. Stopped at rest stop he normally stops at to smoke on 75 and was able to stop and not smoke. Behind on healthcare paperwork and slowly getting organized. Pt has many questions about how to get his labs and where to go.    8/31/21  Patient having cravings off and on but has not smoked since March 28, 2020. Cravings are usually when he sees someone else smoking, such as on TV. He states he just puts his mind on something else or changes the TV channel. Patient reports doing really well overall. He is very happy with himself for remaining smoke free. Discussed long term benefits of not smoking. Patient still trying to get CT scheduled with [facility] before his appointment with Dr. Magdalena Smith in October. 9/28/21  Length of time between cravings is increasing. Seeing people on TV and being in situations where he used to smoke remain triggers. Planning to start walking more now that weather is getting nicer. Reports his brother in law is considering stopping smoking and patient very supportive of him. 10/26/21  Things are going well, remains smoke free. Cravings come and go but thinks this month has been worse than last, likely due to being around more people due to getting out more. Tries to avoid situations where he smoked previously. Recommended having coffee available in these situations as this helped him previously. Has put on weight since stopping smoking but states this has now leveled out. Wanting to increase exercise, been working out at home but knows he needs to be more disciplined. 11/23/21  Few social events, but masking and distancing. Got COVID booster. Spiritism service once per month in person. Nicotine cravings still come and go, but a little less often. Carries a big water bottle with him.  Drinks coffee in AM and on long drive to curb the craving and drinks more water. Exercises at home 3x per week (30-60 min), tries to increase to 4x. Anticipating a long drive to thanksgiving dinner and knows this will be a trigger. Plans to bring coffee, water, not having an cig on hand. Working on [employer]--oh holy night. Working on a new shun to keep him busy. 1/18/22  Patient states that he is doing fine. He has been doing well this past month and reports improved nicotine cravings. As he looks back over this month he says it was better than last in terms of his cravings. The las month went very fast. With COVID he as not been around people smoking which has helped. If he sees people smoking  on TV or sees people smoking it reminds him of what it was like; he tries to avoid these situations. He says avoidance has been a good strategy for him. Did not have to drive long distance for Thanksgiving because it was cancelled. Has had other long drives though and used coffee to keep his hands and mouth occupied. Continues to do well and be successful with his smoking cessation. 2/15/22  Patient states that he is doing well. He has not smoked since the last visit and states that he does not have increased urges or cravings. He is fearful of having cravings or urgers as he begins to restart activities he has not done since COVID-19 started. He is particularly worried about the urge to smoke while driving the way up to [place]. He has a hour drive to his [place]. We discussed coping and mitigation strategies for this. He is also worried about urge when he sees people at [employer] smoking. We spoke about this at length. We discussed how he is now a non-smoke and can be proud of that. We discussed the potential of helping those around him quit smoking about opening a conversation about why he no longer smokes. He has bought a new Mac desktop which he is excited about.  Overall he states that he is doing well, but would like to continue to meet monthly to keep up his motivation. 3/15/22  Started going back to [place] on Sundays, but doesn't see anyone smoking. No social gatherings after. Concerned about having a craving while driving, but hasn't had any. Starts PT next week for shoulder injury. Twice weekly. Given cortisone shot. Started meloxicam 2 days ago. No surgery needed. Checking BP at home. 120/69  105/66  116/67  98/65  Still working on shun for apple device, which is why he got a Mac computer. Discussed health maintenance below. He has gotten his covid booster. 5/10/22  Started PT for shoulder. Pain improved. 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 Going to [employer] tomorrow with sister. 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 He wonders if he will have the desire to step outside to smoke, as this was always a trigger for him in the past. He plans to get a drink with a straw to keep his mouth occupied. He did not realized it has been over 2 years since he quit smoking. 7/12/22  Remains smoke free. Occasional craving but very rare. Still trying to lose weight but at least feels like he stopped gaining. 9/12/22:   Going to PT and shoulder and neck. Getting nerve block today. Still avoiding people smoking around him. But cravings are rare. He doesn't have upcoming wellness check, but plans to schedule in the fall so he can get his flu vaccine at the same time. 12/6/22:  Doing PT for shoulder pain and had RFA for neck. No improvement in symptoms. No cravings lately, but is reminded of it. Has been out shopping and walking through smoke, which elicits memories of his days of smoking. He is not concerned about cravings over the holidays. His brother in law is the only one that smokes, but he plans to stay away from a lot of people due to covid and other resp illnesses. He feels in control. 3/7/23: Shoulder and neck pain unchanged. Procedure at the pain clinic did not help.  F/u appt next week. PT doesn't do dry needling but referred to another. Not spending any time around smokers, avoids for the most part. But knows he has to face it eventually. Knows he has family members in [facility] that smoke. Thinks he will be able to handle it when the time comes. Still doesn't consider himself a nonsmoker. Says he \"used to smoke\" or \"former smoker. \" Worried that he is just one cigarette away from relapse. No planned date to visit family in [facility]. Regular exercise inside 30 min daily on bike. Needs to start back with strength and core. Health Maintenance Due   Topic Date Due    Colorectal Cancer Screen  08/01/2020    AAA screen  Never done    COVID-19 Vaccine (4 - Booster for Moderna series) 05/30/2022    Low dose CT lung screening  09/01/2022     Pharmacy: [place], [place]    Current Medication and Allergy List Reviewed and Updated    Pertinent Labs/Vitals  Scr 0.9 (11/21/19)    ASSESSMENT/PLAN:   Patient has been tobacco free for over 2 years! Frequency and intensity of cravings has decreased significantly. --Has not used Chantix in >12 months but has if needed. Discussed health maintenance due- colonoscopy, flu vaccine, + AAA screening. Pt had covid booster.   -lung CT due    Goals   Try walking outside more and continuing home workouts, doing better when the weather is warmer  Find healthier alternatives to current snacks (carrots, celery, etc), he has started eating more vegetables. Reward yourself with smoke free anniversaries (grill, new sheet music, star trek, jigsaw puzzle, book)- he has a new MacBook  Remind yourself that you are a non-smoker and be confident and proud of your accomplishment. Try carrot or pepper strips for snacks instead of bags of chips. Next appt:  12 wk- pt prefers to continue calls to hold him accountable and for the encouragement. Pt verbalized understanding of the above information and denied further questions or concerns.   The total time of the visit was 30 min.     Arcola Fleischer, PharmD, [facility]  Medication Management Clinic   [facility] Ph: [phone]  [facility]akat Ph: [phone]  3/7/2023 11:11 AM    For Pharmacy Admin Tracking Only    Program: Medication Management  Gap Closed?: Yes   Time Spent (min): 30

## 2023-03-11 DIAGNOSIS — I10 ESSENTIAL HYPERTENSION: ICD-10-CM

## 2023-03-13 RX ORDER — HYDROCHLOROTHIAZIDE 25 MG/1
TABLET ORAL
Qty: 90 TABLET | Refills: 1 | Status: SHIPPED | OUTPATIENT
Start: 2023-03-13

## 2023-04-24 DIAGNOSIS — I10 ESSENTIAL HYPERTENSION: ICD-10-CM

## 2023-04-25 RX ORDER — POTASSIUM CHLORIDE 750 MG/1
TABLET, EXTENDED RELEASE ORAL
Qty: 180 TABLET | Refills: 1 | OUTPATIENT
Start: 2023-04-25

## 2023-04-25 NOTE — TELEPHONE ENCOUNTER
Medication:   Requested Prescriptions     Pending Prescriptions Disp Refills    potassium chloride (KLOR-CON M10) 10 MEQ extended release tablet [Pharmacy Med Name: Marilee Cobb TAB 10MEQ ER] 180 tablet 1     Sig: TAKE 2 TABLETS DAILY     Last Filled:  2/12/2023    Last appt: 10/28/2022   Next appt: Visit date not found

## 2023-05-10 ENCOUNTER — PATIENT MESSAGE (OUTPATIENT)
Dept: PRIMARY CARE CLINIC | Age: 68
End: 2023-05-10

## 2023-05-10 DIAGNOSIS — I10 ESSENTIAL HYPERTENSION: ICD-10-CM

## 2023-06-05 RX ORDER — POTASSIUM CHLORIDE 750 MG/1
TABLET, EXTENDED RELEASE ORAL
Qty: 180 TABLET | Refills: 1 | Status: SHIPPED | OUTPATIENT
Start: 2023-06-05

## 2023-06-05 NOTE — TELEPHONE ENCOUNTER
Last visit was 10/2022, states return in one year. Refill dated 4.25.23 was denied for patient needs an appointment.   Per mychart note patient is almost out of potassium

## 2023-06-27 ENCOUNTER — TELEMEDICINE (OUTPATIENT)
Dept: PHARMACY | Age: 68
End: 2023-06-27

## 2023-06-27 DIAGNOSIS — Z87.891 FORMER SMOKER: Primary | ICD-10-CM

## 2023-07-27 ENCOUNTER — HOSPITAL ENCOUNTER (OUTPATIENT)
Dept: CT IMAGING | Age: 68
Discharge: HOME | End: 2023-07-27
Attending: FAMILY MEDICINE
Payer: MEDICARE

## 2023-07-27 ENCOUNTER — HOSPITAL ENCOUNTER (OUTPATIENT)
Dept: ULTRASOUND IMAGING | Age: 68
Discharge: HOME | End: 2023-07-27
Attending: FAMILY MEDICINE
Payer: MEDICARE

## 2023-07-27 DIAGNOSIS — Z87.891 FORMER SMOKER: ICD-10-CM

## 2023-07-27 DIAGNOSIS — Z12.2 ENCOUNTER FOR SCREENING FOR LUNG CANCER: ICD-10-CM

## 2023-07-27 DIAGNOSIS — Z13.6 SCREENING FOR AAA (ABDOMINAL AORTIC ANEURYSM): ICD-10-CM

## 2023-07-27 PROCEDURE — 71271 CT THORAX LUNG CANCER SCR C-: CPT

## 2023-07-27 PROCEDURE — 76706 US ABDL AORTA SCREEN AAA: CPT

## 2023-07-27 NOTE — RESULT ENCOUNTER NOTE
Please let pt know I have reviewed imaging results. US of the abdomen normal, no aneurysm. CT of the lung shows nodules, but recommend repeat CT scan in 1 year. Recommend scheduling his [address] - due 10/29 or after.

## 2023-08-02 ENCOUNTER — TELEPHONE (OUTPATIENT)
Dept: CASE MANAGEMENT | Age: 68
End: 2023-08-02

## 2023-08-02 NOTE — TELEPHONE ENCOUNTER
Baseline lung screen on 7/27/23. LRAD2. Recommended screen in one year. Reviewed by ordering physician and ordering office contacts pt with results.       Harika Harris BSN, RN   Lung Nodule Navigator  [facility]  [phone]

## 2023-09-10 DIAGNOSIS — I10 ESSENTIAL HYPERTENSION: ICD-10-CM

## 2023-09-11 RX ORDER — HYDROCHLOROTHIAZIDE 25 MG/1
TABLET ORAL
Qty: 90 TABLET | Refills: 1 | Status: SHIPPED | OUTPATIENT
Start: 2023-09-11

## 2023-09-26 ENCOUNTER — TELEMEDICINE (OUTPATIENT)
Dept: PHARMACY | Age: 68
End: 2023-09-26

## 2023-09-26 DIAGNOSIS — K21.9 GASTROESOPHAGEAL REFLUX DISEASE WITHOUT ESOPHAGITIS: ICD-10-CM

## 2023-09-26 DIAGNOSIS — Z87.891 FORMER SMOKER: Primary | ICD-10-CM

## 2023-09-26 NOTE — PROGRESS NOTES
weeks and then stop and see what happens. \"    7/6/20  -Continues Chantix, remains smoke free >3 months, no changes in cravings, etc. Nauseous once because he forgot to eat. Is nervous to stop medication altogether.   -Pt reduced Nexium 40 mg to QOD ~1.5 wks ago, doing well with no increase in GERD sxs. Feels comfortable gradually tapering off. -/72 one time at home, but does not check daily   -Bike 3x per week, Core exercises 2x per week. 8/5/20  -Getting exercise, but not outside. Cut back on Chantix 0.5 mg once daily on most days. Had some stronger cravings some days, so took it BID. Strong craving on way to [employer]. It was the 1st time back to [employer] since he quit smoking. One of members smoked in front of him, which was difficult. He stopped where he normally would buy cigarettes on way to [employer] and bought candy instead. Checked BP yesterday 133/73. Doing well with PPI 40 mg QOD, but not ready to cut back to 20 mg QOD because he has a large supply of 40 mg tabs. 8/26/20  -Getting core/bike exercise, but not outside. Working toward goals.   -Cut back on Chantix 0.5 mg to QOD. Initially forgot dose, but then skipped doses intentionally.   -Triggers: frustration, on way to [employer], seeing people smoke on TV. -Plans to tell his friends at the [employer].  -Was able to not stop where he buys cigarettes on way to [employer] the last time he went. -Doing well with PPI 40 mg QOD, but not ready to cut back to 20 mg QOD because he has a large supply of 40 mg tabs. Agreed to try MWF.  -6 glasses of water each day  -Echo submitted and approved by [employer].  -not checking BP daily    9/16/20  -Still taking Chantix QOD, occasional strong cravings due to triggers  -Triggers: will go back to [employer] this weekend.   -Hasn't told anyone at [employer] that he quit.    -slowly increasing to 8 glasses water/day  -reports BP log: SBP range 116-131,  DBP range 66-76  -looking for better sense of smell     10/7/20  -Working

## 2023-11-07 SDOH — ECONOMIC STABILITY: HOUSING INSECURITY
IN THE LAST 12 MONTHS, WAS THERE A TIME WHEN YOU DID NOT HAVE A STEADY PLACE TO SLEEP OR SLEPT IN A SHELTER (INCLUDING NOW)?: NO

## 2023-11-07 SDOH — ECONOMIC STABILITY: TRANSPORTATION INSECURITY
IN THE PAST 12 MONTHS, HAS LACK OF TRANSPORTATION KEPT YOU FROM MEETINGS, WORK, OR FROM GETTING THINGS NEEDED FOR DAILY LIVING?: NO

## 2023-11-07 SDOH — HEALTH STABILITY: PHYSICAL HEALTH: ON AVERAGE, HOW MANY MINUTES DO YOU ENGAGE IN EXERCISE AT THIS LEVEL?: 60 MIN

## 2023-11-07 SDOH — ECONOMIC STABILITY: FOOD INSECURITY: WITHIN THE PAST 12 MONTHS, THE FOOD YOU BOUGHT JUST DIDN'T LAST AND YOU DIDN'T HAVE MONEY TO GET MORE.: NEVER TRUE

## 2023-11-07 SDOH — ECONOMIC STABILITY: FOOD INSECURITY: WITHIN THE PAST 12 MONTHS, YOU WORRIED THAT YOUR FOOD WOULD RUN OUT BEFORE YOU GOT MONEY TO BUY MORE.: NEVER TRUE

## 2023-11-07 SDOH — HEALTH STABILITY: PHYSICAL HEALTH: ON AVERAGE, HOW MANY DAYS PER WEEK DO YOU ENGAGE IN MODERATE TO STRENUOUS EXERCISE (LIKE A BRISK WALK)?: 6 DAYS

## 2023-11-07 SDOH — ECONOMIC STABILITY: INCOME INSECURITY: HOW HARD IS IT FOR YOU TO PAY FOR THE VERY BASICS LIKE FOOD, HOUSING, MEDICAL CARE, AND HEATING?: NOT VERY HARD

## 2023-11-07 ASSESSMENT — PATIENT HEALTH QUESTIONNAIRE - PHQ9
SUM OF ALL RESPONSES TO PHQ QUESTIONS 1-9: 0
SUM OF ALL RESPONSES TO PHQ9 QUESTIONS 1 & 2: 0
SUM OF ALL RESPONSES TO PHQ QUESTIONS 1-9: 0
2. FEELING DOWN, DEPRESSED OR HOPELESS: 0
SUM OF ALL RESPONSES TO PHQ QUESTIONS 1-9: 0
SUM OF ALL RESPONSES TO PHQ QUESTIONS 1-9: 0
1. LITTLE INTEREST OR PLEASURE IN DOING THINGS: 0

## 2023-11-07 ASSESSMENT — LIFESTYLE VARIABLES
HOW OFTEN DURING THE LAST YEAR HAVE YOU FOUND THAT YOU WERE NOT ABLE TO STOP DRINKING ONCE YOU HAD STARTED: NEVER
HOW OFTEN DO YOU HAVE SIX OR MORE DRINKS ON ONE OCCASION: 2
HOW OFTEN DO YOU HAVE A DRINK CONTAINING ALCOHOL: 5
HAS A RELATIVE, FRIEND, DOCTOR, OR ANOTHER HEALTH PROFESSIONAL EXPRESSED CONCERN ABOUT YOUR DRINKING OR SUGGESTED YOU CUT DOWN: 0
HOW OFTEN DURING THE LAST YEAR HAVE YOU HAD A FEELING OF GUILT OR REMORSE AFTER DRINKING: 0
HAVE YOU OR SOMEONE ELSE BEEN INJURED AS A RESULT OF YOUR DRINKING: 0
HOW OFTEN DURING THE LAST YEAR HAVE YOU FAILED TO DO WHAT WAS NORMALLY EXPECTED FROM YOU BECAUSE OF DRINKING: 0
HOW MANY STANDARD DRINKS CONTAINING ALCOHOL DO YOU HAVE ON A TYPICAL DAY: 1 OR 2
HOW MANY STANDARD DRINKS CONTAINING ALCOHOL DO YOU HAVE ON A TYPICAL DAY: 1
HOW OFTEN DURING THE LAST YEAR HAVE YOU FAILED TO DO WHAT WAS NORMALLY EXPECTED FROM YOU BECAUSE OF DRINKING: NEVER
HOW OFTEN DO YOU HAVE A DRINK CONTAINING ALCOHOL: 4 OR MORE TIMES A WEEK
HAS A RELATIVE, FRIEND, DOCTOR, OR ANOTHER HEALTH PROFESSIONAL EXPRESSED CONCERN ABOUT YOUR DRINKING OR SUGGESTED YOU CUT DOWN: NO
HOW OFTEN DURING THE LAST YEAR HAVE YOU NEEDED AN ALCOHOLIC DRINK FIRST THING IN THE MORNING TO GET YOURSELF GOING AFTER A NIGHT OF HEAVY DRINKING: 0
HOW OFTEN DURING THE LAST YEAR HAVE YOU BEEN UNABLE TO REMEMBER WHAT HAPPENED THE NIGHT BEFORE BECAUSE YOU HAD BEEN DRINKING: 0
HOW OFTEN DURING THE LAST YEAR HAVE YOU BEEN UNABLE TO REMEMBER WHAT HAPPENED THE NIGHT BEFORE BECAUSE YOU HAD BEEN DRINKING: NEVER
HAVE YOU OR SOMEONE ELSE BEEN INJURED AS A RESULT OF YOUR DRINKING: NO
HOW OFTEN DURING THE LAST YEAR HAVE YOU NEEDED AN ALCOHOLIC DRINK FIRST THING IN THE MORNING TO GET YOURSELF GOING AFTER A NIGHT OF HEAVY DRINKING: NEVER
HOW OFTEN DURING THE LAST YEAR HAVE YOU FOUND THAT YOU WERE NOT ABLE TO STOP DRINKING ONCE YOU HAD STARTED: 0
HOW OFTEN DURING THE LAST YEAR HAVE YOU HAD A FEELING OF GUILT OR REMORSE AFTER DRINKING: NEVER

## 2023-11-08 ENCOUNTER — OFFICE VISIT (OUTPATIENT)
Dept: PRIMARY CARE CLINIC | Age: 68
End: 2023-11-08

## 2023-11-08 VITALS
BODY MASS INDEX: 22.51 KG/M2 | HEART RATE: 55 BPM | WEIGHT: 152 LBS | HEIGHT: 69 IN | DIASTOLIC BLOOD PRESSURE: 61 MMHG | SYSTOLIC BLOOD PRESSURE: 106 MMHG | TEMPERATURE: 98.4 F | OXYGEN SATURATION: 99 %

## 2023-11-08 DIAGNOSIS — Z00.00 MEDICARE ANNUAL WELLNESS VISIT, SUBSEQUENT: Primary | ICD-10-CM

## 2023-11-08 DIAGNOSIS — J43.8 OTHER EMPHYSEMA (HCC): ICD-10-CM

## 2023-11-08 DIAGNOSIS — R91.8 MULTIPLE LUNG NODULES ON CT: ICD-10-CM

## 2023-11-08 DIAGNOSIS — Z87.891 FORMER SMOKER: ICD-10-CM

## 2023-11-08 DIAGNOSIS — Z92.89 HISTORY OF USE OF HEARING AID IN LEFT EAR: ICD-10-CM

## 2023-11-08 DIAGNOSIS — Z12.11 SCREENING FOR COLON CANCER: ICD-10-CM

## 2023-11-08 DIAGNOSIS — I10 ESSENTIAL HYPERTENSION: ICD-10-CM

## 2023-11-08 DIAGNOSIS — K21.9 GASTROESOPHAGEAL REFLUX DISEASE WITHOUT ESOPHAGITIS: ICD-10-CM

## 2023-11-08 DIAGNOSIS — H93.A1 PULSATILE TINNITUS OF RIGHT EAR: ICD-10-CM

## 2023-11-08 DIAGNOSIS — I25.10 CORONARY ARTERY CALCIFICATION SEEN ON CAT SCAN: ICD-10-CM

## 2023-11-08 DIAGNOSIS — Z86.010 HISTORY OF COLON POLYPS: ICD-10-CM

## 2023-11-09 ASSESSMENT — ENCOUNTER SYMPTOMS
SHORTNESS OF BREATH: 0
COUGH: 0
NAUSEA: 0
ABDOMINAL PAIN: 0
SORE THROAT: 0

## 2023-11-20 DIAGNOSIS — I10 ESSENTIAL HYPERTENSION: ICD-10-CM

## 2023-11-21 RX ORDER — POTASSIUM CHLORIDE 750 MG/1
TABLET, EXTENDED RELEASE ORAL
Qty: 180 TABLET | Refills: 1 | Status: SHIPPED | OUTPATIENT
Start: 2023-11-21

## 2023-11-21 NOTE — TELEPHONE ENCOUNTER
Medication:   Requested Prescriptions     Pending Prescriptions Disp Refills    potassium chloride (KLOR-CON M10) 10 MEQ extended release tablet [Pharmacy Med Name: Cata Huangava TAB 10MEQ ER] 180 tablet 1     Sig: TAKE 2 TABLETS DAILY     Last Filled:  6/5/2023    Last appt: 11/8/2023   Next appt: 5/8/2024

## 2023-12-11 RX ORDER — OLMESARTAN MEDOXOMIL 40 MG/1
TABLET ORAL
Qty: 90 TABLET | Refills: 3 | Status: SHIPPED | OUTPATIENT
Start: 2023-12-11

## 2023-12-11 NOTE — TELEPHONE ENCOUNTER
Medication:   Requested Prescriptions     Pending Prescriptions Disp Refills    olmesartan (BENICAR) 40 MG tablet [Pharmacy Med Name: OLMESARTAN TAB 40MG] 90 tablet 3     Sig: TAKE 1 TABLET DAILY     Last Filled:  9/16/2023    Last appt: 11/8/2023   Next appt: 5/8/2024

## 2024-01-30 ENCOUNTER — TELEMEDICINE (OUTPATIENT)
Dept: PHARMACY | Age: 69
End: 2024-01-30

## 2024-01-30 DIAGNOSIS — Z87.891 FORMER SMOKER: Primary | ICD-10-CM

## 2024-01-30 NOTE — PROGRESS NOTES
family members who smoke. Has 50th HS reunion next month in [place]. Interested in cutting back on potassium, but level recently checked and wnl.    9/26/23: Noticed his acid reflux is worsening. Noticed certain foods that trigger it. Usually early afternoon or late morning, could be due to coffee. Has cut back to 1 cup per day. Had lung screening. Going to get flu shot at upcoming annual visit with pcp. Only one craving in the past 3 months. It was fletting, and hasn't thought about it since. He likes not being a smoker. Realized how much money he has saved bc cost of cigarettes has gone up so much.    1/30/24: Not having many cravings, still mentally struggles with some anxiety about regressing. Avoiding trigger situations pretty well. GERD has been flaring up a little, using TUMS every two weeks or so. Has cut down on the amount of coffee tea, unsure whether this was helpful but he will continue. Got flu, covid, and RSV vaccines.     Health Maintenance Due   Topic Date Due    Colorectal Cancer Screen  08/01/2020    DTaP/Tdap/Td vaccine (2 - Td or Tdap) 05/21/2023     Pharmacy: [facility] [place]    Current Medication and Allergy List Reviewed and Updated    Pertinent Labs/Vitals  Scr 0.9 (11/21/19)    ASSESSMENT/PLAN:   Patient has been tobacco free for over 3 years!    Frequency and intensity of cravings has decreased significantly.    --Due for Tdap, advised   --using tums every week or two for heartburn    Goals   Try walking outside more and continuing home workouts, doing better when the weather is warmer  Find healthier alternatives to current snacks (carrots, celery, etc), he has started eating more vegetables.  Reward yourself with smoke free anniversaries (grill, new sheet music, star trek, jigsaw puzzle, book)- he has a new MacBook  Remind yourself that you are a non-smoker and be confident and proud of your accomplishment.   Try carrot or pepper strips for snacks instead of bags of chips.    Next

## 2024-03-13 DIAGNOSIS — I10 ESSENTIAL HYPERTENSION: ICD-10-CM

## 2024-03-14 RX ORDER — HYDROCHLOROTHIAZIDE 25 MG/1
25 TABLET ORAL DAILY
Qty: 90 TABLET | Refills: 1 | Status: SHIPPED | OUTPATIENT
Start: 2024-03-14

## 2024-03-31 ENCOUNTER — PATIENT MESSAGE (OUTPATIENT)
Dept: PRIMARY CARE CLINIC | Age: 69
End: 2024-03-31

## 2024-03-31 DIAGNOSIS — I10 ESSENTIAL HYPERTENSION: ICD-10-CM

## 2024-03-31 ASSESSMENT — PATIENT HEALTH QUESTIONNAIRE - PHQ9
SUM OF ALL RESPONSES TO PHQ QUESTIONS 1-9: 0
1. LITTLE INTEREST OR PLEASURE IN DOING THINGS: NOT AT ALL
SUM OF ALL RESPONSES TO PHQ9 QUESTIONS 1 & 2: 0
SUM OF ALL RESPONSES TO PHQ QUESTIONS 1-9: 0
2. FEELING DOWN, DEPRESSED OR HOPELESS: NOT AT ALL
SUM OF ALL RESPONSES TO PHQ QUESTIONS 1-9: 0
SUM OF ALL RESPONSES TO PHQ QUESTIONS 1-9: 0

## 2024-04-01 RX ORDER — HYDROCHLOROTHIAZIDE 25 MG/1
25 TABLET ORAL DAILY
Qty: 90 TABLET | Refills: 1 | Status: SHIPPED | OUTPATIENT
Start: 2024-04-01

## 2024-04-01 NOTE — TELEPHONE ENCOUNTER
[facility]  We had sent it to [facility] on 3/14/24

## 2024-04-01 NOTE — TELEPHONE ENCOUNTER
From: Rodger Ralph  To: Dr. Lucas Bentley  Sent: 3/31/2024 7:45 PM EDT  Subject: prescription refill    Please send my HTZ prescription electronically to [facility] home delivery.

## 2024-04-02 ASSESSMENT — PATIENT HEALTH QUESTIONNAIRE - PHQ9
SUM OF ALL RESPONSES TO PHQ9 QUESTIONS 1 & 2: 0
2. FEELING DOWN, DEPRESSED OR HOPELESS: NOT AT ALL
1. LITTLE INTEREST OR PLEASURE IN DOING THINGS: NOT AT ALL

## 2024-04-09 ENCOUNTER — OFFICE VISIT (OUTPATIENT)
Dept: PRIMARY CARE CLINIC | Age: 69
End: 2024-04-09
Payer: MEDICARE

## 2024-04-09 VITALS
TEMPERATURE: 98 F | WEIGHT: 156.4 LBS | DIASTOLIC BLOOD PRESSURE: 60 MMHG | HEIGHT: 69 IN | BODY MASS INDEX: 23.16 KG/M2 | HEART RATE: 52 BPM | SYSTOLIC BLOOD PRESSURE: 119 MMHG | OXYGEN SATURATION: 99 %

## 2024-04-09 DIAGNOSIS — M79.2 CERVICAL SPINE ARTHRITIS WITH NERVE PAIN: Primary | ICD-10-CM

## 2024-04-09 DIAGNOSIS — M47.812 CERVICAL SPINE ARTHRITIS WITH NERVE PAIN: Primary | ICD-10-CM

## 2024-04-09 DIAGNOSIS — R20.0 NUMBNESS AND TINGLING IN LEFT HAND: ICD-10-CM

## 2024-04-09 DIAGNOSIS — R20.2 NUMBNESS AND TINGLING IN LEFT HAND: ICD-10-CM

## 2024-04-09 PROCEDURE — 99214 OFFICE O/P EST MOD 30 MIN: CPT | Performed by: FAMILY MEDICINE

## 2024-04-09 PROCEDURE — 1123F ACP DISCUSS/DSCN MKR DOCD: CPT | Performed by: FAMILY MEDICINE

## 2024-04-09 PROCEDURE — G8420 CALC BMI NORM PARAMETERS: HCPCS | Performed by: FAMILY MEDICINE

## 2024-04-09 PROCEDURE — G8427 DOCREV CUR MEDS BY ELIG CLIN: HCPCS | Performed by: FAMILY MEDICINE

## 2024-04-09 PROCEDURE — 3074F SYST BP LT 130 MM HG: CPT | Performed by: FAMILY MEDICINE

## 2024-04-09 PROCEDURE — 3078F DIAST BP <80 MM HG: CPT | Performed by: FAMILY MEDICINE

## 2024-04-09 PROCEDURE — 1036F TOBACCO NON-USER: CPT | Performed by: FAMILY MEDICINE

## 2024-04-09 PROCEDURE — 3017F COLORECTAL CA SCREEN DOC REV: CPT | Performed by: FAMILY MEDICINE

## 2024-04-09 NOTE — PATIENT INSTRUCTIONS
[facility] Locations - No appointment necessary.  ? indicates the location is open Saturdays in addition to Monday through Friday.   Call your preferred location for test preparation, business hours and other information you need.   [facility] accepts all insurances.  CENTRAL  EAST  [place]    ? [place]   [address]   [address]   [place] [address]    Ph: [phone]  [facility]   [address] [place] [address]    Ph: [phone]   ? [facility]   [address]    [place] [address]    Ph: [phone]     [facility]   [address] [place] [address]    Ph: [phone] ? [address] [place] [address]   Ph: [phone]  ? [facility]   [address] [place] [address]    Ph: [phone]      [place]   [address] [place] [address]   Ph: [phone]    NORTH    ? [facility]   [address] [address]    Ph: [phone]  [facility]   [address] [place] [address]   Ph: [phone]  [place]   [address] [facility] [address], [address]    PH: [phone]    [facility]   [address] [place] [address]    Ph: [phone]  [place]  [address] [place] [address]  Ph: [phone]  [facility]   [address] [place] [address]    Ph: [phone]

## 2024-04-09 NOTE — PROGRESS NOTES
[facility]  [facility]  [address] [place] [id number]  Dept: [phone]  Dept Fax: [phone]     4/9/2024      Rodger Ralph   1955     Chief Complaint   Patient presents with    Arm Pain     Left arm and hand nerve pain, x 3 weeks       HPI  Pt comes in today for new discussion of LUE pain. No injury. Started a few weeks ago. Pain is not constant, off/on. Pain is described as tingling in the hand. Wrist is aching in sensation. Rest of arm feels achy as well. Extension of the neck causes symptoms. Neck stiffness present, but not really pain. Reports that last year had seen PT at [facility], then was seen at a pain doctor in [place]. Did some injections it sounds like. Below is past imaging result:    IMPRESSION:   There is multilevel disc displacement and hypertrophic bony   spurring with global facet hypertrophy/arthritis. Multilevel neural foraminal stenosis which is severe on the right at C3-4 and C4-5 and severe on the left at C4-5, C5-6, and C6-7. There is moderate central canal stenosis at C4-5 with flattening of the cervical cord without abnormal cord signal.         3/31/2024     7:50 PM 11/7/2023    12:53 PM 10/28/2022    10:42 AM 10/2/2021    10:15 AM 10/5/2020    10:59 AM 11/20/2019    12:06 PM 11/9/2018     1:45 PM   PHQ Scores   PHQ2 Score 0    0 0 0 0 0 0 0   PHQ9 Score 0    0 0 0 0 0 0 0     Interpretation of Total Score Depression Severity: 1-4 = Minimal depression, 5-9 = Mild depression, 10-14 = Moderate depression, 15-19 = Moderately severe depression, 20-27 = Severe depression     Prior to Visit Medications    Medication Sig Taking? Authorizing Provider   hydroCHLOROthiazide (HYDRODIURIL) 25 MG tablet Take 1 tablet by mouth daily Yes Lucas Bentley, DO   olmesartan (BENICAR) 40 MG tablet TAKE 1 TABLET DAILY Yes Lucas Bentley, DO   potassium chloride (KLOR-CON M10) 10 MEQ extended release tablet TAKE 2 TABLETS DAILY Yes

## 2024-04-10 RX ORDER — GABAPENTIN 100 MG/1
CAPSULE ORAL
Qty: 180 CAPSULE | Refills: 1 | Status: SHIPPED | OUTPATIENT
Start: 2024-04-10 | End: 2024-05-07

## 2024-05-19 SDOH — HEALTH STABILITY: PHYSICAL HEALTH: ON AVERAGE, HOW MANY MINUTES DO YOU ENGAGE IN EXERCISE AT THIS LEVEL?: 30 MIN

## 2024-05-19 SDOH — HEALTH STABILITY: PHYSICAL HEALTH: ON AVERAGE, HOW MANY DAYS PER WEEK DO YOU ENGAGE IN MODERATE TO STRENUOUS EXERCISE (LIKE A BRISK WALK)?: 4 DAYS

## 2024-05-21 ENCOUNTER — PATIENT MESSAGE (OUTPATIENT)
Dept: ORTHOPEDIC SURGERY | Age: 69
End: 2024-05-21

## 2024-05-21 ENCOUNTER — OFFICE VISIT (OUTPATIENT)
Dept: ORTHOPEDIC SURGERY | Age: 69
End: 2024-05-21
Payer: MEDICARE

## 2024-05-21 VITALS — WEIGHT: 156.31 LBS | BODY MASS INDEX: 23.15 KG/M2 | HEIGHT: 69 IN

## 2024-05-21 DIAGNOSIS — M47.812 CERVICAL SPONDYLOSIS: Primary | ICD-10-CM

## 2024-05-21 PROCEDURE — 3017F COLORECTAL CA SCREEN DOC REV: CPT | Performed by: ORTHOPAEDIC SURGERY

## 2024-05-21 PROCEDURE — 99214 OFFICE O/P EST MOD 30 MIN: CPT | Performed by: ORTHOPAEDIC SURGERY

## 2024-05-21 PROCEDURE — 1036F TOBACCO NON-USER: CPT | Performed by: ORTHOPAEDIC SURGERY

## 2024-05-21 PROCEDURE — G8427 DOCREV CUR MEDS BY ELIG CLIN: HCPCS | Performed by: ORTHOPAEDIC SURGERY

## 2024-05-21 PROCEDURE — G8420 CALC BMI NORM PARAMETERS: HCPCS | Performed by: ORTHOPAEDIC SURGERY

## 2024-05-21 PROCEDURE — 1123F ACP DISCUSS/DSCN MKR DOCD: CPT | Performed by: ORTHOPAEDIC SURGERY

## 2024-05-21 NOTE — PROGRESS NOTES
, Rfl:     Misc Natural Products (GLUCOSAMINE CHOND DOUBLE STR PO), Take 1 tablet by mouth daily, Disp: , Rfl:     Multiple Vitamin (MULTIVITAMIN PO), Take 1 capsule by mouth daily., Disp: , Rfl:   Allergies:  Patient has no known allergies.  Social History:    reports that he quit smoking about 4 years ago. His smoking use included cigarettes. He started smoking about 44 years ago. He has a 40.0 pack-year smoking history. He has never used smokeless tobacco. He reports current alcohol use. He reports that he does not use drugs.  Family History:   Family History   Problem Relation Age of Onset    Dementia Mother     Diabetes Mother     High Blood Pressure Mother     Kidney Disease Mother     Cancer Father         Oral    Cancer Sister         Breast    Cancer Sister        REVIEW OF SYSTEMS: Full ROS noted & scanned   CONSTITUTIONAL: Denies unexplained weight loss, fevers, chills or fatigue  NEUROLOGICAL: Denies unsteady gait or progressive weakness  MUSCULOSKELETAL: Denies joint swelling or redness  PSYCHOLOGICAL: Denies anxiety, depression   SKIN: Denies skin changes, delayed healing, rash, itching   HEMATOLOGIC: Denies easy bleeding or bruising  ENDOCRINE: Denies excessive thirst, urination, heat/cold  RESPIRATORY: Denies current dyspnea, cough  GI: Denies nausea, vomiting, diarrhea   : Denies bowel or bladder issues       PHYSICAL EXAM:    Vitals: Height 1.758 m (5' 9.21\"), weight 70.9 kg (156 lb 4.9 oz).    GENERAL EXAM:  General Apparence: Patient is adequately groomed with no evidence of malnutrition.  Orientation: The patient is oriented to time, place and person.   Mood & Affect:The patient's mood and affect are appropriate   Vascular: Examination reveals no swelling tenderness in upper or lower extremities. Good capillary refill  Lymphatic: The lymphatic examination bilaterally reveals all areas to be without enlargement or induration  Sensation: Sensation is intact without deficit  Coordination/Balance:

## 2024-05-22 DIAGNOSIS — I10 ESSENTIAL HYPERTENSION: ICD-10-CM

## 2024-05-22 RX ORDER — POTASSIUM CHLORIDE 750 MG/1
TABLET, EXTENDED RELEASE ORAL
Qty: 180 TABLET | Refills: 3 | Status: SHIPPED | OUTPATIENT
Start: 2024-05-22

## 2024-05-28 ENCOUNTER — TELEMEDICINE (OUTPATIENT)
Dept: PHARMACY | Age: 69
End: 2024-05-28

## 2024-05-28 DIAGNOSIS — Z87.891 FORMER SMOKER: Primary | ICD-10-CM

## 2024-05-28 NOTE — PROGRESS NOTES
CLINICAL PHARMACY NOTE--Smoking Cessation    SUBJECTIVE/OBJECTIVE:   Rodger Ralph is a 68 y.o. male with PMHx significant for GERD, HTN, pulmonary emphesema referred by Dr Lucas Bentley for smoking cessation counseling and medication management.  Spoke with pt over the phone.     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 30-60 min (previously immediately after waking)  Do you smoke 20 or more cigarettes each day? No  At times when you can't smoke or haven't got any cigarettes, do you feel a craving for one? Yes, but not if not keeping busy  Is it tough for you to keep from smoking for more than a few hours? No, not if in a place where he can't smoke  When you are sick enough to stay in bed, to you still smoke? no  If “yes” to two or more questions, consider using NRT    Reasons for addiction: Touch and Handle, Stress-relief, Habit, Addiction, \"something to do\"  Triggers: meals, stress, smoke breaks from his work; stopped smoking while driving, with coffee, alcohol (already tried to dissociate smoking with certain activities, but picked it up with other activities)  Barriers: not confident (only 5 on a 1-10 scale); He is \"afraid\" to run out of cigarettes.  Motivation for quitting: Has wanted to quit for years (in back of mind) because he doesn't like that it's in control of him (addiction), Health, family, money    12/19/19 update:  Did  Chantix from pharmacy ($40), but did not start. Fearful of side

## 2024-07-09 ENCOUNTER — TELEPHONE (OUTPATIENT)
Dept: CASE MANAGEMENT | Age: 69
End: 2024-07-09

## 2024-07-09 DIAGNOSIS — Z87.891 HISTORY OF TOBACCO USE: Primary | ICD-10-CM

## 2024-07-09 NOTE — TELEPHONE ENCOUNTER
Dr. Bentley,    Patient due for annual CT Lung screening. For your convenience, I have pended the order for the scan.  Please sign if you agree with annual screening for this pt.  I will help contact the pt to schedule.    Reminder letter mailed to pt.     Thanks,  Janneth ZALDIVARN, RN   Lung Nodule Navigator  [facility]  [phone]

## 2024-07-10 NOTE — TELEPHONE ENCOUNTER
CT lung order placed - due end of July. Give number to call. He is due for MAWV 11/8, please schedule.

## 2024-07-29 ENCOUNTER — HOSPITAL ENCOUNTER (OUTPATIENT)
Dept: CT IMAGING | Age: 69
Discharge: HOME | End: 2024-07-29
Attending: FAMILY MEDICINE
Payer: MEDICARE

## 2024-07-29 DIAGNOSIS — Z87.891 HISTORY OF TOBACCO USE: ICD-10-CM

## 2024-07-29 PROCEDURE — 71271 CT THORAX LUNG CANCER SCR C-: CPT

## 2024-08-29 ENCOUNTER — TELEPHONE (OUTPATIENT)
Dept: PRIMARY CARE CLINIC | Age: 69
End: 2024-08-29

## 2024-08-29 NOTE — TELEPHONE ENCOUNTER
Call for nurse triage:  swelling    Spoke with patient, last week he fell and banged up his knees.  He has been icing them but they do not seem to be getting better.    Appointment made with Ms Massey for tomorrow.

## 2024-08-30 ENCOUNTER — HOSPITAL ENCOUNTER (OUTPATIENT)
Age: 69
Discharge: HOME | End: 2024-08-30
Payer: MEDICARE

## 2024-08-30 ENCOUNTER — OFFICE VISIT (OUTPATIENT)
Dept: PRIMARY CARE CLINIC | Age: 69
End: 2024-08-30

## 2024-08-30 VITALS
OXYGEN SATURATION: 97 % | TEMPERATURE: 98.4 F | WEIGHT: 158.2 LBS | BODY MASS INDEX: 23.43 KG/M2 | SYSTOLIC BLOOD PRESSURE: 105 MMHG | DIASTOLIC BLOOD PRESSURE: 63 MMHG | HEART RATE: 61 BPM | HEIGHT: 69 IN

## 2024-08-30 DIAGNOSIS — M15.9 PRIMARY OSTEOARTHRITIS INVOLVING MULTIPLE JOINTS: ICD-10-CM

## 2024-08-30 DIAGNOSIS — M25.562 ACUTE PAIN OF BOTH KNEES: ICD-10-CM
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
## 2024-08-30 DIAGNOSIS — M25.561 ACUTE PAIN OF BOTH KNEES: ICD-10-CM

## 2024-08-30 ASSESSMENT — ENCOUNTER SYMPTOMS
ABDOMINAL PAIN: 0
SHORTNESS OF BREATH: 0
SORE THROAT: 0
COUGH: 0

## 2024-08-30 NOTE — PROGRESS NOTES
[facility]  [facility]  [address] [place] [address]  Dept: [phone]  Dept Fax: [phone]     8/30/2024      Rodger Ralph   1955     Chief Complaint   Patient presents with    Fall     Both knee pain        HPI     Patient presents to discuss knee pain. Patient of Dr. Lucas Bentley. Patient states about 2 weeks ago he was walking and carrying a couple cases of canned goods and he slipped and fell and landed on his hands and knees. He was indoors and landed on carpet.  He states initially his hands and left shoulder and knees were hurting. He states everything has improved except his knees are still hurting. He does have history of OA in knees. He states he has been using ice which provides temporarily. He is able to walk but states if he is on his legs too much his knees will hurt. He also states if he is inactive for too long his knees will hurt.           3/31/2024     7:50 PM 11/7/2023    12:53 PM 10/28/2022    10:42 AM 10/2/2021    10:15 AM 10/5/2020    10:59 AM 11/20/2019    12:06 PM 11/9/2018     1:45 PM   PHQ Scores   PHQ2 Score 0    0 0 0 0 0 0 0   PHQ9 Score 0    0 0 0 0 0 0 0     Interpretation of Total Score Depression Severity: 1-4 = Minimal depression, 5-9 = Mild depression, 10-14 = Moderate depression, 15-19 = Moderately severe depression, 20-27 = Severe depression     Prior to Visit Medications    Medication Sig Taking? Authorizing Provider   potassium chloride (KLOR-CON M) 10 MEQ extended release tablet TAKE 2 TABLETS DAILY Yes Lucas Bentley, DO   gabapentin (NEURONTIN) 100 MG capsule Take 1-3 capsules TID Yes Lucas Bentley, DO   hydroCHLOROthiazide (HYDRODIURIL) 25 MG tablet Take 1 tablet by mouth daily Yes Lucas Bentley, DO   olmesartan (BENICAR) 40 MG tablet TAKE 1 TABLET DAILY Yes Lucas Bentley, DO   loratadine (CLARITIN) 10 MG tablet Take 1 tablet by mouth daily Yes Provider,

## 2024-09-05 DIAGNOSIS — I10 ESSENTIAL HYPERTENSION: ICD-10-CM

## 2024-09-05 RX ORDER — HYDROCHLOROTHIAZIDE 25 MG/1
25 TABLET ORAL DAILY
Qty: 90 TABLET | Refills: 3 | Status: SHIPPED | OUTPATIENT
Start: 2024-09-05

## 2024-11-08 SDOH — HEALTH STABILITY: PHYSICAL HEALTH: ON AVERAGE, HOW MANY MINUTES DO YOU ENGAGE IN EXERCISE AT THIS LEVEL?: 30 MIN

## 2024-11-08 SDOH — HEALTH STABILITY: PHYSICAL HEALTH: ON AVERAGE, HOW MANY DAYS PER WEEK DO YOU ENGAGE IN MODERATE TO STRENUOUS EXERCISE (LIKE A BRISK WALK)?: 5 DAYS

## 2024-11-08 ASSESSMENT — LIFESTYLE VARIABLES
HOW OFTEN DURING THE LAST YEAR HAVE YOU HAD A FEELING OF GUILT OR REMORSE AFTER DRINKING: NEVER
HOW OFTEN DURING THE LAST YEAR HAVE YOU FAILED TO DO WHAT WAS NORMALLY EXPECTED FROM YOU BECAUSE OF DRINKING: NEVER
HOW OFTEN DURING THE LAST YEAR HAVE YOU BEEN UNABLE TO REMEMBER WHAT HAPPENED THE NIGHT BEFORE BECAUSE YOU HAD BEEN DRINKING: NEVER
HOW OFTEN DURING THE LAST YEAR HAVE YOU NEEDED AN ALCOHOLIC DRINK FIRST THING IN THE MORNING TO GET YOURSELF GOING AFTER A NIGHT OF HEAVY DRINKING: NEVER
HOW OFTEN DURING THE LAST YEAR HAVE YOU BEEN UNABLE TO REMEMBER WHAT HAPPENED THE NIGHT BEFORE BECAUSE YOU HAD BEEN DRINKING: NEVER
HOW OFTEN DURING THE LAST YEAR HAVE YOU FOUND THAT YOU WERE NOT ABLE TO STOP DRINKING ONCE YOU HAD STARTED: NEVER
HOW OFTEN DO YOU HAVE A DRINK CONTAINING ALCOHOL: 4
HOW MANY STANDARD DRINKS CONTAINING ALCOHOL DO YOU HAVE ON A TYPICAL DAY: 1 OR 2
HOW OFTEN DURING THE LAST YEAR HAVE YOU FOUND THAT YOU WERE NOT ABLE TO STOP DRINKING ONCE YOU HAD STARTED: NEVER
HOW OFTEN DO YOU HAVE SIX OR MORE DRINKS ON ONE OCCASION: 2
HOW OFTEN DURING THE LAST YEAR HAVE YOU FAILED TO DO WHAT WAS NORMALLY EXPECTED FROM YOU BECAUSE OF DRINKING: NEVER
HOW OFTEN DO YOU HAVE A DRINK CONTAINING ALCOHOL: 2-3 TIMES A WEEK
HAS A RELATIVE, FRIEND, DOCTOR, OR ANOTHER HEALTH PROFESSIONAL EXPRESSED CONCERN ABOUT YOUR DRINKING OR SUGGESTED YOU CUT DOWN: NO
HAVE YOU OR SOMEONE ELSE BEEN INJURED AS A RESULT OF YOUR DRINKING: NO
HAS A RELATIVE, FRIEND, DOCTOR, OR ANOTHER HEALTH PROFESSIONAL EXPRESSED CONCERN ABOUT YOUR DRINKING OR SUGGESTED YOU CUT DOWN: NO
HOW OFTEN DURING THE LAST YEAR HAVE YOU NEEDED AN ALCOHOLIC DRINK FIRST THING IN THE MORNING TO GET YOURSELF GOING AFTER A NIGHT OF HEAVY DRINKING: NEVER
HOW OFTEN DURING THE LAST YEAR HAVE YOU HAD A FEELING OF GUILT OR REMORSE AFTER DRINKING: NEVER
HAVE YOU OR SOMEONE ELSE BEEN INJURED AS A RESULT OF YOUR DRINKING: NO
HOW MANY STANDARD DRINKS CONTAINING ALCOHOL DO YOU HAVE ON A TYPICAL DAY: 1

## 2024-11-08 ASSESSMENT — PATIENT HEALTH QUESTIONNAIRE - PHQ9
1. LITTLE INTEREST OR PLEASURE IN DOING THINGS: NOT AT ALL
SUM OF ALL RESPONSES TO PHQ9 QUESTIONS 1 & 2: 0
SUM OF ALL RESPONSES TO PHQ QUESTIONS 1-9: 0
2. FEELING DOWN, DEPRESSED OR HOPELESS: NOT AT ALL
SUM OF ALL RESPONSES TO PHQ QUESTIONS 1-9: 0

## 2024-11-11 ENCOUNTER — PHARMACY VISIT (OUTPATIENT)
Dept: PHARMACY | Age: 69
End: 2024-11-11

## 2024-11-11 DIAGNOSIS — Z87.891 FORMER SMOKER: Primary | ICD-10-CM

## 2024-11-11 NOTE — PROGRESS NOTES
CLINICAL PHARMACY NOTE--Smoking Cessation    SUBJECTIVE/OBJECTIVE:   Rodger Ralph is a 69 y.o. male with PMHx significant for GERD, HTN, pulmonary emphesema referred by Dr Luacs Bentley for smoking cessation counseling and medication management.  Spoke with pt over the phone.     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 30-60 min (previously immediately after waking)  Do you smoke 20 or more cigarettes each day? No  At times when you can't smoke or haven't got any cigarettes, do you feel a craving for one? Yes, but not if not keeping busy  Is it tough for you to keep from smoking for more than a few hours? No, not if in a place where he can't smoke  When you are sick enough to stay in bed, to you still smoke? no  If “yes” to two or more questions, consider using NRT    Reasons for addiction: Touch and Handle, Stress-relief, Habit, Addiction, \"something to do\"  Triggers: meals, stress, smoke breaks from his work; stopped smoking while driving, with coffee, alcohol (already tried to dissociate smoking with certain activities, but picked it up with other activities)  Barriers: not confident (only 5 on a 1-10 scale); He is \"afraid\" to run out of cigarettes.  Motivation for quitting: Has wanted to quit for years (in back of mind) because he doesn't like that it's in control of him (addiction), Health, family, money    12/19/19 update:  Did  Chantix from pharmacy ($40), but did not start. Fearful of side

## 2024-11-12 SDOH — ECONOMIC STABILITY: FOOD INSECURITY: WITHIN THE PAST 12 MONTHS, YOU WORRIED THAT YOUR FOOD WOULD RUN OUT BEFORE YOU GOT MONEY TO BUY MORE.: NEVER TRUE

## 2024-11-12 SDOH — ECONOMIC STABILITY: INCOME INSECURITY: HOW HARD IS IT FOR YOU TO PAY FOR THE VERY BASICS LIKE FOOD, HOUSING, MEDICAL CARE, AND HEATING?: NOT VERY HARD

## 2024-11-12 SDOH — ECONOMIC STABILITY: FOOD INSECURITY: WITHIN THE PAST 12 MONTHS, THE FOOD YOU BOUGHT JUST DIDN'T LAST AND YOU DIDN'T HAVE MONEY TO GET MORE.: NEVER TRUE

## 2024-11-13 ENCOUNTER — OFFICE VISIT (OUTPATIENT)
Dept: PRIMARY CARE CLINIC | Age: 69
End: 2024-11-13

## 2024-11-13 VITALS
OXYGEN SATURATION: 98 % | DIASTOLIC BLOOD PRESSURE: 66 MMHG | SYSTOLIC BLOOD PRESSURE: 111 MMHG | HEART RATE: 63 BPM | WEIGHT: 158 LBS | BODY MASS INDEX: 23.33 KG/M2

## 2024-11-13 DIAGNOSIS — Z86.0100 HISTORY OF COLON POLYPS: ICD-10-CM

## 2024-11-13 DIAGNOSIS — I25.10 CORONARY ARTERY CALCIFICATION SEEN ON CAT SCAN: ICD-10-CM

## 2024-11-13 DIAGNOSIS — J43.8 OTHER EMPHYSEMA (HCC): ICD-10-CM

## 2024-11-13 DIAGNOSIS — R73.03 BORDERLINE DIABETES MELLITUS: ICD-10-CM

## 2024-11-13 DIAGNOSIS — Z00.00 MEDICARE ANNUAL WELLNESS VISIT, SUBSEQUENT: Primary | ICD-10-CM

## 2024-11-13 DIAGNOSIS — M79.2 CERVICAL SPINE ARTHRITIS WITH NERVE PAIN: ICD-10-CM

## 2024-11-13 DIAGNOSIS — Z12.5 SCREENING FOR PROSTATE CANCER: ICD-10-CM

## 2024-11-13 DIAGNOSIS — M47.812 CERVICAL SPINE ARTHRITIS WITH NERVE PAIN: ICD-10-CM

## 2024-11-13 NOTE — PATIENT INSTRUCTIONS
[facility] Transportation Resources*  (Call 211 if need more resources.)       [facility] 211   Speak to a trained professional 24/7 who can connect you to essential community services including food, clothing, transportation, housing, utilities, employment services, childcare, and baby supplies. 211 serves nationwide.  [facility] for resources in [place], [facility], [place] and [facility] in [place]; [place], [place], [place], and [facility] in [place].   [facility]/resources for resources in [place], [place], [place], [place], [place], [place], [place], [place], [facility], [place], [place], and [facility] in [place].    Non-Emergency Transportation: Non-emergency medical transportation is for Medicaid members who do not have access to free transportation that suits their medical needs and need to be transported to a Medicaid-covered service performed by a Medicaid enrolled provider.     [facility]: [phone]  [facility]: [phone]  [facility]: [phone]  [facility]: [phone]  [facility]: [phone]  [facility]: [phone] Ext. [phone]  [facility]: [phone]   [facility]: [place], [place], [place], [place], [place], [place], and [facility] [phone]  [place]: [phone] for non-managed Medicaid.      Public Transportation Services: Small fee may be associated with service  [facility] Access: [phone]  [place] [facility]: [phone]  [facility]: [phone], ext. [phone]  [facility] Transit Services: [phone]  [facility] Transit Services: [phone]  [facility] Transportation: [phone]  [place]- [facility] ([employer]): [phone]    [facility]   What they offer: Transportation Services also offers convenient group shopping trips. Seniors can socialize with their friends as they shop and complete other

## 2024-11-13 NOTE — PROGRESS NOTES
Consulting Physician (Gastroenterology)  Abdirahman Pete III, MD (Ophthalmology)      Reviewed and updated this visit:  Tobacco  Allergies  Meds  Problems  Med Hx  Surg Hx  Soc Hx  Fam Hx                     Lucas Bentley DO

## 2024-11-18 RX ORDER — OLMESARTAN MEDOXOMIL 40 MG/1
TABLET ORAL
Qty: 90 TABLET | Refills: 3 | Status: SHIPPED | OUTPATIENT
Start: 2024-11-18

## 2024-11-19 DIAGNOSIS — Z00.00 MEDICARE ANNUAL WELLNESS VISIT, SUBSEQUENT: ICD-10-CM

## 2024-11-19 DIAGNOSIS — R73.03 BORDERLINE DIABETES MELLITUS: ICD-10-CM

## 2024-11-19 DIAGNOSIS — Z12.5 SCREENING FOR PROSTATE CANCER: ICD-10-CM

## 2024-11-19 LAB
ALBUMIN SERPL-MCNC: 4.3 G/DL (ref 3.4–5)
ALBUMIN/GLOB SERPL: 1.8 {RATIO} (ref 1.1–2.2)
ALP SERPL-CCNC: 61 U/L (ref 40–129)
ALT SERPL-CCNC: 19 U/L (ref 10–40)
ANION GAP SERPL CALCULATED.3IONS-SCNC: 10 MMOL/L (ref 3–16)
AST SERPL-CCNC: 21 U/L (ref 15–37)
BASOPHILS # BLD: 0 K/UL (ref 0–0.2)
BASOPHILS NFR BLD: 0.9 %
BILIRUB SERPL-MCNC: 0.6 MG/DL (ref 0–1)
BUN SERPL-MCNC: 20 MG/DL (ref 7–20)
CALCIUM SERPL-MCNC: 9.3 MG/DL (ref 8.3–10.6)
CHLORIDE SERPL-SCNC: 103 MMOL/L (ref 99–110)
CHOLEST SERPL-MCNC: 153 MG/DL (ref 0–199)
CO2 SERPL-SCNC: 29 MMOL/L (ref 21–32)
CREAT SERPL-MCNC: 1 MG/DL (ref 0.8–1.3)
DEPRECATED RDW RBC AUTO: 13 % (ref 12.4–15.4)
EOSINOPHIL # BLD: 0.5 K/UL (ref 0–0.6)
EOSINOPHIL NFR BLD: 11 %
GFR SERPLBLD CREATININE-BSD FMLA CKD-EPI: 81 ML/MIN/{1.73_M2}
GLUCOSE P FAST SERPL-MCNC: 91 MG/DL (ref 70–99)
HCT VFR BLD AUTO: 40.5 % (ref 40.5–52.5)
HDLC SERPL-MCNC: 59 MG/DL (ref 40–60)
HGB BLD-MCNC: 14 G/DL (ref 13.5–17.5)
LDL CHOLESTEROL: 84 MG/DL
LYMPHOCYTES # BLD: 1.3 K/UL (ref 1–5.1)
LYMPHOCYTES NFR BLD: 28.2 %
MCH RBC QN AUTO: 32 PG (ref 26–34)
MCHC RBC AUTO-ENTMCNC: 34.5 G/DL (ref 31–36)
MCV RBC AUTO: 92.6 FL (ref 80–100)
MONOCYTES # BLD: 0.4 K/UL (ref 0–1.3)
MONOCYTES NFR BLD: 8.5 %
NEUTROPHILS # BLD: 2.5 K/UL (ref 1.7–7.7)
NEUTROPHILS NFR BLD: 51.4 %
PLATELET # BLD AUTO: 226 K/UL (ref 135–450)
PMV BLD AUTO: 9 FL (ref 5–10.5)
POTASSIUM SERPL-SCNC: 4.3 MMOL/L (ref 3.5–5.1)
PROT SERPL-MCNC: 6.7 G/DL (ref 6.4–8.2)
PSA SERPL DL<=0.01 NG/ML-MCNC: 0.8 NG/ML (ref 0–4)
RBC # BLD AUTO: 4.37 M/UL (ref 4.2–5.9)
SODIUM SERPL-SCNC: 142 MMOL/L (ref 136–145)
TRIGL SERPL-MCNC: 48 MG/DL (ref 0–150)
TSH SERPL DL<=0.005 MIU/L-ACNC: 1.58 UIU/ML (ref 0.27–4.2)
VLDLC SERPL CALC-MCNC: 10 MG/DL
WBC # BLD AUTO: 4.8 K/UL (ref 4–11)

## 2024-11-20 ENCOUNTER — PATIENT MESSAGE (OUTPATIENT)
Dept: PRIMARY CARE CLINIC | Age: 69
End: 2024-11-20

## 2024-11-20 ENCOUNTER — TELEPHONE (OUTPATIENT)
Dept: PRIMARY CARE CLINIC | Age: 69
End: 2024-11-20

## 2024-11-20 DIAGNOSIS — Z23 NEED FOR DIPHTHERIA-TETANUS-PERTUSSIS (TDAP) VACCINE: Primary | ICD-10-CM

## 2024-11-20 LAB
EST. AVERAGE GLUCOSE BLD GHB EST-MCNC: 99.7 MG/DL
HBA1C MFR BLD: 5.1 %

## 2024-11-20 NOTE — TELEPHONE ENCOUNTER
DTAT Patient called stating provider recommended he gets this vaccine done.patient said he went to the pharmacy and was told he wasn't eligible for it.    Patient wasn't very clear to what he was needing .

## 2024-11-20 NOTE — TELEPHONE ENCOUNTER
Message regarding issue was sent via [employer]...Exactly from patient. Message was sent to . closing encounter.

## 2024-12-27 ENCOUNTER — OFFICE VISIT (OUTPATIENT)
Dept: PRIMARY CARE CLINIC | Age: 69
End: 2024-12-27
Payer: MEDICARE

## 2024-12-27 VITALS
DIASTOLIC BLOOD PRESSURE: 68 MMHG | TEMPERATURE: 98.5 F | HEART RATE: 92 BPM | BODY MASS INDEX: 23.16 KG/M2 | SYSTOLIC BLOOD PRESSURE: 106 MMHG | WEIGHT: 156.4 LBS | OXYGEN SATURATION: 98 % | HEIGHT: 69 IN

## 2024-12-27 DIAGNOSIS — R05.2 SUBACUTE COUGH: ICD-10-CM

## 2024-12-27 DIAGNOSIS — J01.90 SUBACUTE SINUSITIS, UNSPECIFIED LOCATION: Primary | ICD-10-CM

## 2024-12-27 PROCEDURE — 3017F COLORECTAL CA SCREEN DOC REV: CPT | Performed by: NURSE PRACTITIONER

## 2024-12-27 PROCEDURE — 99213 OFFICE O/P EST LOW 20 MIN: CPT | Performed by: NURSE PRACTITIONER

## 2024-12-27 PROCEDURE — 1159F MED LIST DOCD IN RCRD: CPT | Performed by: NURSE PRACTITIONER

## 2024-12-27 PROCEDURE — G8482 FLU IMMUNIZE ORDER/ADMIN: HCPCS | Performed by: NURSE PRACTITIONER

## 2024-12-27 PROCEDURE — G8420 CALC BMI NORM PARAMETERS: HCPCS | Performed by: NURSE PRACTITIONER

## 2024-12-27 PROCEDURE — 3078F DIAST BP <80 MM HG: CPT | Performed by: NURSE PRACTITIONER

## 2024-12-27 PROCEDURE — 1036F TOBACCO NON-USER: CPT | Performed by: NURSE PRACTITIONER

## 2024-12-27 PROCEDURE — G8427 DOCREV CUR MEDS BY ELIG CLIN: HCPCS | Performed by: NURSE PRACTITIONER

## 2024-12-27 PROCEDURE — 3074F SYST BP LT 130 MM HG: CPT | Performed by: NURSE PRACTITIONER

## 2024-12-27 PROCEDURE — 1123F ACP DISCUSS/DSCN MKR DOCD: CPT | Performed by: NURSE PRACTITIONER

## 2024-12-27 RX ORDER — FLUTICASONE PROPIONATE 50 MCG
1 SPRAY, SUSPENSION (ML) NASAL DAILY
Qty: 16 G | Refills: 2 | Status: SHIPPED | OUTPATIENT
Start: 2024-12-27

## 2024-12-27 RX ORDER — GUAIFENESIN/DEXTROMETHORPHAN 100-10MG/5
5 SYRUP ORAL 3 TIMES DAILY PRN
Qty: 120 ML | Refills: 0 | Status: SHIPPED | OUTPATIENT
Start: 2024-12-27 | End: 2025-01-06

## 2024-12-27 ASSESSMENT — ENCOUNTER SYMPTOMS
ABDOMINAL PAIN: 0
SHORTNESS OF BREATH: 0
COUGH: 1
RHINORRHEA: 1
SORE THROAT: 1

## 2024-12-27 NOTE — PATIENT INSTRUCTIONS
Continue Coricidin as needed.  Start Flonase nasal spray daily (1 spray each nostril)- once daily.  Cough medication I sent in, as needed.    If symptoms are not improving in the next 3-4 days, or worsening (increased sinus pressure, pain), start antibiotic. If you start the antibiotic, make sure you finish it. Take it twice a day with food for 1 week. I recommend a daily probiotic while you're on the antibiotic, and for 1-2 weeks after.

## 2024-12-27 NOTE — PROGRESS NOTES
Provider, MD Gidla   Multiple Vitamin (MULTIVITAMIN PO) Take 1 capsule by mouth daily. Yes ProviderGilda MD       Past Medical History:   Diagnosis Date    Allergic rhinitis     GERD (gastroesophageal reflux disease)     Herpes simplex 2012    History of colon polyps 10/5/2020    Hypertension     Osteoarthritis     Bilateral knees        Social History     Tobacco Use    Smoking status: Former     Current packs/day: 0.00     Average packs/day: 1 pack/day for 40.0 years (40.0 ttl pk-yrs)     Types: Cigarettes     Start date: 1980     Quit date: 2020     Years since quittin.7    Smokeless tobacco: Never   Substance Use Topics    Alcohol use: Yes     Alcohol/week: 0.0 standard drinks of alcohol     Comment: occassionally    Drug use: No        Past Surgical History:   Procedure Laterality Date    COLONOSCOPY  810    M. Michael, hemorrhoidal rectal bleeding, repeat 10 years    KNEE ARTHROSCOPY Right 2000    Meniscus repair    THYROID SURGERY      biopsy-benign colloid nodule        No Known Allergies     Family History   Problem Relation Age of Onset    Dementia Mother     Diabetes Mother     High Blood Pressure Mother     Kidney Disease Mother     Cancer Father         Oral    Cancer Sister         Breast    Cancer Sister         Patient's past medical history, surgical history, family history, medications, and allergies  were all reviewed and updated as appropriate today.    Review of Systems   Constitutional:  Negative for fatigue and fever.   HENT:  Positive for congestion, postnasal drip, rhinorrhea and sore throat.    Respiratory:  Positive for cough. Negative for shortness of breath.    Cardiovascular:  Negative for chest pain, palpitations and leg swelling.   Gastrointestinal:  Negative for abdominal pain.   Musculoskeletal:  Positive for myalgias.   Skin:  Negative for rash and wound.       /68 (Site: Left Upper Arm, Position: Sitting, Cuff Size: Medium Adult)   Pulse 92

## 2025-05-12 ENCOUNTER — PHARMACY VISIT (OUTPATIENT)
Dept: PHARMACY | Age: 70
End: 2025-05-12

## 2025-05-12 DIAGNOSIS — Z87.891 FORMER SMOKER: Primary | ICD-10-CM

## 2025-05-12 NOTE — PROGRESS NOTES
CLINICAL PHARMACY NOTE--Smoking Cessation    SUBJECTIVE/OBJECTIVE:   Rodger Ralph is a 69 y.o. male with PMHx significant for GERD, HTN, pulmonary emphesema referred by Dr Lucas Bentley for smoking cessation counseling and medication management.  Spoke with pt over the phone.     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 30-60 min (previously immediately after waking)  Do you smoke 20 or more cigarettes each day? No  At times when you can't smoke or haven't got any cigarettes, do you feel a craving for one? Yes, but not if not keeping busy  Is it tough for you to keep from smoking for more than a few hours? No, not if in a place where he can't smoke  When you are sick enough to stay in bed, to you still smoke? no  If “yes” to two or more questions, consider using NRT    Reasons for addiction: Touch and Handle, Stress-relief, Habit, Addiction, \"something to do\"  Triggers: meals, stress, smoke breaks from his work; stopped smoking while driving, with coffee, alcohol (already tried to dissociate smoking with certain activities, but picked it up with other activities)  Barriers: not confident (only 5 on a 1-10 scale); He is \"afraid\" to run out of cigarettes.  Motivation for quitting: Has wanted to quit for years (in back of mind) because he doesn't like that it's in control of him (addiction), Health, family, money    12/19/19 update:  Did  Chantix from pharmacy ($40), but did not start. Fearful of side

## 2025-05-19 DIAGNOSIS — I10 ESSENTIAL HYPERTENSION: ICD-10-CM

## 2025-05-19 RX ORDER — POTASSIUM CHLORIDE 750 MG/1
20 TABLET, EXTENDED RELEASE ORAL DAILY
Qty: 180 TABLET | Refills: 3 | Status: SHIPPED | OUTPATIENT
Start: 2025-05-19

## 2025-07-14 ENCOUNTER — TELEPHONE (OUTPATIENT)
Dept: CASE MANAGEMENT | Age: 70
End: 2025-07-14

## 2025-07-14 DIAGNOSIS — Z87.891 HISTORY OF TOBACCO USE: Primary | ICD-10-CM

## 2025-08-07 ENCOUNTER — OFFICE VISIT (OUTPATIENT)
Dept: PRIMARY CARE CLINIC | Age: 70
End: 2025-08-07

## 2025-08-07 VITALS
WEIGHT: 162 LBS | BODY MASS INDEX: 23.78 KG/M2 | SYSTOLIC BLOOD PRESSURE: 111 MMHG | HEART RATE: 59 BPM | DIASTOLIC BLOOD PRESSURE: 67 MMHG | OXYGEN SATURATION: 98 % | TEMPERATURE: 97.6 F

## 2025-08-07 DIAGNOSIS — N48.89 PENILE PAIN: Primary | ICD-10-CM

## 2025-08-07 SDOH — ECONOMIC STABILITY: FOOD INSECURITY: WITHIN THE PAST 12 MONTHS, THE FOOD YOU BOUGHT JUST DIDN'T LAST AND YOU DIDN'T HAVE MONEY TO GET MORE.: NEVER TRUE

## 2025-08-07 SDOH — ECONOMIC STABILITY: FOOD INSECURITY: WITHIN THE PAST 12 MONTHS, YOU WORRIED THAT YOUR FOOD WOULD RUN OUT BEFORE YOU GOT MONEY TO BUY MORE.: NEVER TRUE

## 2025-08-07 ASSESSMENT — PATIENT HEALTH QUESTIONNAIRE - PHQ9
SUM OF ALL RESPONSES TO PHQ QUESTIONS 1-9: 0
2. FEELING DOWN, DEPRESSED OR HOPELESS: NOT AT ALL
SUM OF ALL RESPONSES TO PHQ QUESTIONS 1-9: 0
1. LITTLE INTEREST OR PLEASURE IN DOING THINGS: NOT AT ALL

## 2025-08-07 ASSESSMENT — ENCOUNTER SYMPTOMS: ABDOMINAL PAIN: 0

## 2025-09-01 DIAGNOSIS — I10 ESSENTIAL HYPERTENSION: ICD-10-CM

## 2025-09-02 RX ORDER — HYDROCHLOROTHIAZIDE 25 MG/1
25 TABLET ORAL DAILY
Qty: 90 TABLET | Refills: 3 | Status: SHIPPED | OUTPATIENT
Start: 2025-09-02